# Patient Record
Sex: FEMALE | Race: WHITE | NOT HISPANIC OR LATINO | Employment: OTHER | URBAN - METROPOLITAN AREA
[De-identification: names, ages, dates, MRNs, and addresses within clinical notes are randomized per-mention and may not be internally consistent; named-entity substitution may affect disease eponyms.]

---

## 2017-01-04 ENCOUNTER — HOSPITAL ENCOUNTER (EMERGENCY)
Facility: HOSPITAL | Age: 82
Discharge: HOME | End: 2017-01-04
Attending: EMERGENCY MEDICINE
Payer: MEDICARE

## 2017-01-04 VITALS
HEART RATE: 84 BPM | OXYGEN SATURATION: 95 % | WEIGHT: 160 LBS | DIASTOLIC BLOOD PRESSURE: 60 MMHG | SYSTOLIC BLOOD PRESSURE: 134 MMHG | HEIGHT: 59 IN | BODY MASS INDEX: 32.25 KG/M2 | RESPIRATION RATE: 18 BRPM | TEMPERATURE: 101 F

## 2017-01-04 DIAGNOSIS — J11.1 INFLUENZA-LIKE ILLNESS: Primary | ICD-10-CM

## 2017-01-04 DIAGNOSIS — R05.9 COUGH IN ADULT: ICD-10-CM

## 2017-01-04 LAB
FLUAV AG SPEC QL IA: NEGATIVE
FLUBV AG SPEC QL IA: NEGATIVE
SPECIMEN SOURCE: NORMAL

## 2017-01-04 PROCEDURE — 87400 INFLUENZA A/B EACH AG IA: CPT | Mod: 59

## 2017-01-04 PROCEDURE — 94640 AIRWAY INHALATION TREATMENT: CPT

## 2017-01-04 PROCEDURE — 99283 EMERGENCY DEPT VISIT LOW MDM: CPT | Mod: 59

## 2017-01-04 PROCEDURE — 25000242 PHARM REV CODE 250 ALT 637 W/ HCPCS: Performed by: EMERGENCY MEDICINE

## 2017-01-04 PROCEDURE — 25000003 PHARM REV CODE 250: Performed by: EMERGENCY MEDICINE

## 2017-01-04 RX ORDER — IPRATROPIUM BROMIDE AND ALBUTEROL SULFATE 2.5; .5 MG/3ML; MG/3ML
3 SOLUTION RESPIRATORY (INHALATION)
Status: COMPLETED | OUTPATIENT
Start: 2017-01-04 | End: 2017-01-04

## 2017-01-04 RX ORDER — ACETAMINOPHEN 325 MG/1
650 TABLET ORAL
Status: COMPLETED | OUTPATIENT
Start: 2017-01-04 | End: 2017-01-04

## 2017-01-04 RX ADMIN — ACETAMINOPHEN 650 MG: 325 TABLET ORAL at 04:01

## 2017-01-04 RX ADMIN — IPRATROPIUM BROMIDE AND ALBUTEROL SULFATE 3 ML: .5; 3 SOLUTION RESPIRATORY (INHALATION) at 04:01

## 2017-01-04 NOTE — ED PROVIDER NOTES
Encounter Date: 1/4/2017    SCRIBE #1 NOTE: I, Rosina Smith, am scribing for, and in the presence of,  Dr. Hermosillo. I have scribed the entire note.       History     Chief Complaint   Patient presents with    Sore Throat     sore throat last night, cough x 2 days, fever last night, headache.  Hx of COPD and Asthma.      Review of patient's allergies indicates:   Allergen Reactions    Sulfa (sulfonamide antibiotics) Swelling    Penicillins      HPI Comments: 01/04/2017  4:19 PM     Chief Complaint: sore throat      The patient is a 83 y.o. female who is presenting with a 2 day hx of sore throat, productive cough, and fever. Associated with slight SOB. She denies vomiting, diarrhea or CP. She reports that nothing has alleviated her sx's and did not want to take tylenol before going into the ER. She denies sick contact. There are no other associated sx's at this time. She has no further complaints. She has a past medical history of Anxiety; Arthritis; Asthma; COPD; Hypertension; and Thyroid disease. She has a past surgical history that includes Skin biopsy.    The history is provided by the patient.     Past Medical History   Diagnosis Date    Anxiety     Arthritis     Asthma     COPD (chronic obstructive pulmonary disease)     Hypertension     Thyroid disease      Past Medical History Pertinent Negatives   Diagnosis Date Noted    Blood transfusion 2/15/2012     Past Surgical History   Procedure Laterality Date    Skin biopsy       No family history on file.  Social History   Substance Use Topics    Smoking status: Former Smoker     Packs/day: 0.50     Years: 5.00     Quit date: 2/15/2005    Smokeless tobacco: Never Used    Alcohol use No     Review of Systems   Constitutional: Positive for fever.   HENT: Negative for sore throat.    Eyes: Negative for visual disturbance.   Respiratory: Positive for cough and shortness of breath.    Cardiovascular: Negative for chest pain.   Gastrointestinal: Negative  for diarrhea, nausea and vomiting.   Genitourinary: Negative for dysuria.   Musculoskeletal: Negative for back pain.   Skin: Negative for rash.   Neurological: Negative for weakness.   Hematological: Does not bruise/bleed easily.   Psychiatric/Behavioral: Negative for confusion.       Physical Exam   Initial Vitals   BP Pulse Resp Temp SpO2   01/04/17 1558 01/04/17 1558 01/04/17 1558 01/04/17 1558 01/04/17 1558   134/60 88 16 101.3 °F (38.5 °C) 91 %     Physical Exam    Nursing note and vitals reviewed.  Constitutional: She appears well-developed and well-nourished. No distress.   HENT:   Head: Normocephalic and atraumatic.   Eyes: Conjunctivae are normal.   Cardiovascular: Normal rate, regular rhythm, normal heart sounds and intact distal pulses. Exam reveals no gallop and no friction rub.    No murmur heard.  Pulmonary/Chest: No respiratory distress. She has wheezes. She has no rhonchi. She has no rales. She exhibits no tenderness.   Wheezing bilaterally    Abdominal: Soft. Bowel sounds are normal. She exhibits no distension and no mass. There is no tenderness. There is no rebound and no guarding.   Neurological: She is alert and oriented to person, place, and time.   Skin: Skin is warm.   Psychiatric: She has a normal mood and affect.         ED Course   Procedures  Labs Reviewed   INFLUENZA A AND B ANTIGEN             Medical Decision Making:   Initial Assessment:   83-year-old female presented with a chief complaint of sore throat and cough.  Differential Diagnosis:   Initial differential diagnosis included but not limited to pneumonia, influenza, and acute asthma exacerbation.    Clinical Tests:   Lab Tests: Ordered and Reviewed  Radiological Study: Ordered and Reviewed  ED Management:  The patient was urgently evaluated in the ED, her evaluation was significant for an elderly female with abnormal lung sounds. The patient was treated with rest 3 nebulizer treatments and Tylenol in the ED.  The patient's x-ray  shows no acute abnormalities per my independent interpretation.  The patient's influenza swab to be negative. Her diagnosis is likely an influenza-like illness. She is stable for discharge to home. She reports improvement in her symptoms after the respiratory treatments. She'll be discharged home with medications for symptomatic relief and she is to follow-up with her PCP for further care.            Scribe Attestation:   Scribe #1: I performed the above scribed service and the documentation accurately describes the services I performed. I attest to the accuracy of the note.    Attending Attestation:           Physician Attestation for Scribe:  Physician Attestation Statement for Scribe #1: I, Dr. Hermosillo, reviewed documentation, as scribed by Rosina Smith in my presence, and it is both accurate and complete.                 ED Course     Clinical Impression:   The primary encounter diagnosis was Influenza-like illness. A diagnosis of Cough in adult was also pertinent to this visit.     .hpit     Obie Hermosillo MD  01/05/17 [id number]

## 2017-01-04 NOTE — ED NOTES
Pt presents to ED with c/o sore throat, cough, and fever x2 days. Pt also reports mild SOB. Pt is AAOx4. Skin warm, dry to touch. Respirations even, nonlabored. NAD noted. VSS. Wheezing noted bilaterally upon ausculation of lungs.

## 2017-01-04 NOTE — ED NOTES
Discharge instructions, diagnosis, and follow up discussed with patient. Patient verbalized understanding. All questions and concerns answered. No needs expressed at the time. Pt is awake, alert and oriented with no acute distress noted. Respirations even and unlabored. Ambulatory out of ed.

## 2017-01-04 NOTE — ED AVS SNAPSHOT
[facility] [address] [place] [address]               Valerie Jones   2017  4:05 PM   ED    Description:  Female : 1933   Department:  [facility]           Your Care was Coordinated By:     Provider Role From To    Obie Hermosillo MD Attending Provider 17 1606 --      Reason for Visit     Sore Throat           Diagnoses this Visit        Comments    Influenza-like illness    -  Primary     Cough in adult           ED Disposition     None           To Do List           Follow-up Information     Follow up with Reed Andersen MD. Schedule an appointment as soon as possible for a visit in 1 week.    Specialty:  Family Medicine    Why:  return to the ED, As needed, If symptoms worsen    Contact information:    [address] [facility]  [address]  [facility] [address]  [phone]        [facility] On Call     [facility] On Call Nurse Care Line -  Assistance  Registered nurses in the [facility] provide clinical advisement, health education, appointment booking, and other advisory services.  Call for this free service at [phone].             Medications           Message regarding Medications     Verify the changes and/or additions to your medication regime listed below are the same as discussed with your clinician today.  If any of these changes or additions are incorrect, please notify your healthcare provider.        These medications were administered today        Dose Freq    acetaminophen tablet 650 mg 650 mg ED 1 Time    Sig: Take 2 tablets (650 mg total) by mouth ED 1 Time.    Class: Normal    Route: Oral    albuterol-ipratropium 2.5mg-0.5mg/3mL nebulizer solution 3 mL 3 mL ED 1 Time    Sig: Take 3 mLs by nebulization ED 1 Time.    Class: Normal    Route: Nebulization           Verify that the below list of medications is an accurate representation of the medications you are currently  "taking.  If none reported, the list may be blank. If incorrect, please contact your healthcare provider. Carry this list with you in case of emergency.           Current Medications     alprazolam (XANAX) 0.5 MG tablet Take 0.5 mg by mouth nightly as needed.      atenolol (TENORMIN) 50 MG tablet Take 50 mg by mouth once daily.      BENZONATATE (TESSALON PERLES ORAL) Take by mouth.      clidinium-chlordiazepoxide (LIBRAX) 5-2.5 mg per capsule Take 1 capsule by mouth 4 (four) times daily before meals and nightly.      esomeprazole (NEXIUM) 40 MG capsule Take 40 mg by mouth before breakfast.      hydrochlorothiazide (HYDRODIURIL) 25 MG tablet Take 25 mg by mouth once daily.      levothyroxine (SYNTHROID) 50 MCG tablet Take 50 mcg by mouth once daily.      mth-me blue-sod phos-phsal-hyo (PHOSPHASAL) 81.6-10.8-40.8 mg Tab Take 1 tablet by mouth every 6 (six) hours as needed.           Clinical Reference Information           Your Vitals Were     BP Pulse Temp Resp Height Weight    134/60 (BP Location: Right arm, Patient Position: Sitting) 84 101.3 °F (38.5 °C) (Oral) 18 4' 11" (1.499 m) 72.6 kg (160 lb)    Last Period SpO2 BMI          02/15/1980 95% 32.32 kg/m2        Allergies as of 1/4/2017        Reactions    Sulfa (Sulfonamide Antibiotics) Swelling    Penicillins       Immunizations Administered on Date of Encounter - 1/4/2017     None      ED Micro, Lab, POCT     Start Ordered       Status Ordering Provider    01/04/17 1601 01/04/17 1600  Influenza antigen Nasopharyngeal Swab  Once      Final result       ED Imaging Orders     Start Ordered       Status Ordering Provider    01/04/17 1605 01/04/17 1604  X-Ray Chest PA And Lateral  1 time imaging      Final result       [facility] Sign-Up     Activating your [facility] account is as easy as 1-2-3!     1) Visit [email or web address], select Sign Up Now, enter this activation code and your date of birth, then select Next.  [email or web address]  Expires: 2/18/2017  5:19 PM      2) " Create a username and password to use when you visit [facility] in the future and select a security question in case you lose your password and select Next.    3) Enter your e-mail address and click Sign Up!    Additional Information  If you have questions, please e-mail [email or web address] or call [phone] to talk to our [facility] staff. Remember, [facility] is NOT to be used for urgent needs. For medical emergencies, dial 911.         Smoking Cessation     If you would like to quit smoking:   You may be eligible for free services if you are a [place] resident and started smoking cigarettes before September 1, 1988.  Call the Smoking Cessation Trust (SCT) toll free at (257) 801-8593 or (151) 321-0682.   Call 8-012-QUIT-NOW if you do not meet the above criteria.             [facility] complies with applicable Federal civil rights laws and does not discriminate on the basis of race, color, national origin, age, disability, or sex.        Language Assistance Services     ATTENTION: Language assistance services are available, free of charge. Please call [phone].      ATENCIÓN: Si habla español, tiene a barkley disposición servicios gratuitos de asistencia lingüística. Llame al [phone].     CHÚ Ý: N?u b?n nói Ti?ng Vi?t, có các d?ch v? h? tr? ngôn ng? mi?n phí dành cho b?n. G?i s? [phone].

## 2017-09-25 ENCOUNTER — OFFICE VISIT (OUTPATIENT)
Dept: PULMONOLOGY | Facility: CLINIC | Age: 82
End: 2017-09-25
Payer: MEDICARE

## 2017-09-25 VITALS
HEIGHT: 59 IN | HEART RATE: 98 BPM | DIASTOLIC BLOOD PRESSURE: 68 MMHG | WEIGHT: 164 LBS | SYSTOLIC BLOOD PRESSURE: 150 MMHG | BODY MASS INDEX: 33.06 KG/M2 | OXYGEN SATURATION: 89 %

## 2017-09-25 DIAGNOSIS — J96.11 CHRONIC HYPOXEMIC RESPIRATORY FAILURE: ICD-10-CM

## 2017-09-25 DIAGNOSIS — J44.89 COPD WITH ASTHMA: ICD-10-CM

## 2017-09-25 PROCEDURE — 1159F MED LIST DOCD IN RCRD: CPT | Mod: ,,, | Performed by: INTERNAL MEDICINE

## 2017-09-25 PROCEDURE — 1126F AMNT PAIN NOTED NONE PRSNT: CPT | Mod: ,,, | Performed by: INTERNAL MEDICINE

## 2017-09-25 PROCEDURE — 3078F DIAST BP <80 MM HG: CPT | Mod: ,,, | Performed by: INTERNAL MEDICINE

## 2017-09-25 PROCEDURE — 99203 OFFICE O/P NEW LOW 30 MIN: CPT | Mod: S$PBB,,, | Performed by: INTERNAL MEDICINE

## 2017-09-25 PROCEDURE — 99999 PR PBB SHADOW E&M-EST. PATIENT-LVL IV: CPT | Mod: PBBFAC,,, | Performed by: INTERNAL MEDICINE

## 2017-09-25 PROCEDURE — 99214 OFFICE O/P EST MOD 30 MIN: CPT | Mod: PBBFAC,PO | Performed by: INTERNAL MEDICINE

## 2017-09-25 PROCEDURE — 3077F SYST BP >= 140 MM HG: CPT | Mod: ,,, | Performed by: INTERNAL MEDICINE

## 2017-09-25 RX ORDER — ALBUTEROL SULFATE 90 UG/1
AEROSOL, METERED RESPIRATORY (INHALATION)
Qty: 3 INHALER | Refills: 3 | Status: SHIPPED | OUTPATIENT
Start: 2017-09-25 | End: 2017-12-19 | Stop reason: SDUPTHER

## 2017-09-25 RX ORDER — ATENOLOL AND CHLORTHALIDONE TABLET 50; 25 MG/1; MG/1
TABLET ORAL
COMMUNITY
Start: 2017-09-06 | End: 2019-09-17 | Stop reason: SDUPTHER

## 2017-09-25 RX ORDER — FUROSEMIDE 40 MG/1
TABLET ORAL
COMMUNITY
Start: 2017-08-01 | End: 2019-09-17 | Stop reason: SDUPTHER

## 2017-09-25 RX ORDER — OMEGA-3-ACID ETHYL ESTERS 1 G/1
CAPSULE, LIQUID FILLED ORAL
COMMUNITY
Start: 2017-06-28 | End: 2019-12-17

## 2017-09-25 RX ORDER — PREDNISONE 20 MG/1
TABLET ORAL
Qty: 12 TABLET | Refills: 0 | Status: SHIPPED | OUTPATIENT
Start: 2017-09-25 | End: 2018-10-16 | Stop reason: SDUPTHER

## 2017-09-25 RX ORDER — IPRATROPIUM BROMIDE AND ALBUTEROL SULFATE 2.5; .5 MG/3ML; MG/3ML
3 SOLUTION RESPIRATORY (INHALATION) EVERY 6 HOURS PRN
Qty: 120 VIAL | Refills: 11 | Status: SHIPPED | OUTPATIENT
Start: 2017-09-25 | End: 2019-10-21 | Stop reason: SDUPTHER

## 2017-09-25 RX ORDER — HYDROCODONE BITARTRATE AND ACETAMINOPHEN 10; 325 MG/1; MG/1
TABLET ORAL
COMMUNITY
Start: 2017-08-31 | End: 2019-09-17 | Stop reason: SDUPTHER

## 2017-09-25 RX ORDER — CLOBETASOL PROPIONATE 0.5 MG/G
OINTMENT TOPICAL
COMMUNITY
Start: 2017-09-08 | End: 2019-12-17

## 2017-09-25 RX ORDER — NAPROXEN 375 MG/1
375 TABLET ORAL 2 TIMES DAILY
COMMUNITY
Start: 2017-07-07 | End: 2020-03-16 | Stop reason: SDUPTHER

## 2017-09-25 RX ORDER — DOXYCYCLINE 100 MG/1
100 CAPSULE ORAL EVERY 12 HOURS
Qty: 20 CAPSULE | Refills: 2 | Status: SHIPPED | OUTPATIENT
Start: 2017-09-25 | End: 2017-10-05

## 2017-09-25 RX ORDER — TRAMADOL HYDROCHLORIDE 50 MG/1
TABLET ORAL
COMMUNITY
Start: 2017-09-16 | End: 2018-10-16

## 2017-09-25 RX ORDER — DOXYCYCLINE 100 MG/1
CAPSULE ORAL
COMMUNITY
Start: 2017-09-20 | End: 2017-09-25 | Stop reason: SDUPTHER

## 2017-09-25 RX ORDER — CODEINE PHOSPHATE AND GUAIFENESIN 10; 100 MG/5ML; MG/5ML
SOLUTION ORAL
COMMUNITY
Start: 2017-09-20 | End: 2019-12-17

## 2017-09-25 NOTE — PROGRESS NOTES
"9/25/2017    Valerie Jones  Patient Seen Years Ago And Here For Evaluation    Chief Complaint   Patient presents with    COPD    Asthma       HPI: pt h/o asthma and copd, off ppd or less last 12 yrs, no daily lung meds, gets vera, smothery daily, medications in past helped but stopped as was ok.  Monitors 02 home but no home 02,  Uses albuterol in nebs over  5 yrs old. Doxycycline works, no recent steroids - ppt jumpy.  [place]ives [place], pt independent.      anoro works but not lasting, used for years.      The chief compliant  problem is varies with instablilty at time  PFSH:  Past Medical History:   Diagnosis Date    Anxiety     Arthritis     Asthma     COPD (chronic obstructive pulmonary disease)     Hypertension     Thyroid disease          Past Surgical History:   Procedure Laterality Date    SKIN BIOPSY       Social History   Substance Use Topics    Smoking status: Former Smoker     Packs/day: 0.50     Years: 5.00     Quit date: 2/15/2005    Smokeless tobacco: Never Used    Alcohol use No     History reviewed. No pertinent family history.  Review of patient's allergies indicates:   Allergen Reactions    Sulfa (sulfonamide antibiotics) Swelling    Penicillins        Performance Status:The patient's activity level is functions out of house.      Review of Systems:  a review of eleven systems covering constitutional, Eye, HEENT, Psych, Respiratory, Cardiac, GI, , Musculoskeletal, Endocrine, Dermatologic was negative except for pertinent findings as listed ABOVE and below: uses stool softener, nebulizer helps.     Exam:Comprehensive exam done. BP (!) 150/68 (BP Location: Right arm, Patient Position: Sitting)   Pulse 98   Ht 4' 11" (1.499 m)   Wt 74.4 kg (164 lb 0.4 oz)   LMP 02/15/1980   SpO2 (!) 89%   BMI 33.13 kg/m²   Exam included Vitals as listed, and patient's appearance and affect and alertness and mood, oral exam for yeast and hygiene and pharynx lesions and Mallapatti (M) " score, neck with inspection for jvd and masses and thyroid abnormalities and lymph nodes (supraclavicular and infraclavicular nodes and axillary also examined and noted if abn), chest exam included symmetry and effort and fremitus and percussion and auscultation, cardiac exam included rhythm and gallops and murmur and rubs and jvd and edema, abdominal exam for mass and hepatosplenomegaly and tenderness and hernias and bowel sounds, Musculoskeletal exam with muscle tone and posture and mobility/gait and  strength, and skin for rashes and cyanosis and pallor and turgor, extremity for clubbing.  Findings were normal except for pertinent findings listed below: younger than 84, M2, sl obese, good bs, symmetric,no distress, nl fremitus/percussion, cor nl,        Radiographs (ct chest and cxr) reviewed: view by direct vision  cxr Jan 2017 kyphosis with old compression fx.      Labs reviewed  eos sl up in 2012    PFT will be done and results to be reviewed     Plan:  Clinical impression is resonably certain and repeated evaluation prn +/- follow up will be needed as below.    Valerie was seen today for copd and asthma.    Diagnoses and all orders for this visit:    COPD with asthma  -     fluticasone furoate (ARNUITY ELLIPTA) 200 mcg/actuation DsDv; Inhale 1 Inhaler into the lungs once daily. Controller  -     Six Minute Walk Test to qualify for Home Oxygen; Future  -     Pulse oximetry overnight  -     Complete PFT with bronchodilator; Future  -     doxycycline (MONODOX) 100 MG capsule; Take 1 capsule (100 mg total) by mouth every 12 (twelve) hours.  -     predniSONE (DELTASONE) 20 MG tablet; One daily for 3 days and repeat for flare of lung symptoms as intructed  -     albuterol-ipratropium 2.5mg-0.5mg/3mL (DUO-NEB) 0.5 mg-3 mg(2.5 mg base)/3 mL nebulizer solution; Take 3 mLs by nebulization every 6 (six) hours as needed.  -     albuterol 90 mcg/actuation inhaler; 2 puffs every 4 hours as needed for cough, wheeze, or  shortness of breath    Chronic hypoxemic respiratory failure  -     Six Minute Walk Test to qualify for Home Oxygen; Future  -     Pulse oximetry overnight  -     Complete PFT with bronchodilator; Future        Return in about 6 months (around 3/25/2018), or if symptoms worsen or fail to improve.    Discussed with patient above for education the following:       Check 0xygen sleep and walking,  Set up at home, may need to use for sleep and as needed in day.  Check lung capacity.  If portable system needed- will need small tanks.    Use anoro one daily, use arnuity one daily.    Use albuterol inhaler or nebulizer as needed.    If yellow mucous - take doxycycline, call if cannot control yellow mucous.    If cough or wheezes badly- take prednisone daily for just 3 days.

## 2017-09-27 ENCOUNTER — HOSPITAL ENCOUNTER (OUTPATIENT)
Dept: RESPIRATORY THERAPY | Facility: HOSPITAL | Age: 82
Discharge: HOME | End: 2017-09-27
Attending: INTERNAL MEDICINE
Payer: MEDICARE

## 2017-09-27 DIAGNOSIS — J44.89 COPD WITH ASTHMA: ICD-10-CM

## 2017-09-27 DIAGNOSIS — J96.11 CHRONIC HYPOXEMIC RESPIRATORY FAILURE: ICD-10-CM

## 2017-09-27 PROCEDURE — 94620 *HC PUL STRESS; SIMPLE (6MIN WALK): CPT

## 2017-09-27 PROCEDURE — 94727 GAS DIL/WSHOT DETER LNG VOL: CPT | Mod: 26,,, | Performed by: INTERNAL MEDICINE

## 2017-09-27 PROCEDURE — 94729 DIFFUSING CAPACITY: CPT | Mod: 26,,, | Performed by: INTERNAL MEDICINE

## 2017-09-27 PROCEDURE — 94060 EVALUATION OF WHEEZING: CPT

## 2017-09-27 PROCEDURE — 94727 GAS DIL/WSHOT DETER LNG VOL: CPT

## 2017-09-27 PROCEDURE — 94729 DIFFUSING CAPACITY: CPT

## 2017-09-27 PROCEDURE — 94060 EVALUATION OF WHEEZING: CPT | Mod: 26,,, | Performed by: INTERNAL MEDICINE

## 2017-09-27 NOTE — PROCEDURES
6 minute Walk                                                                     O2 Saturation                        Heart Rate    Resting Room Air    92    75                    1 minute ambulating   87% Nc 2 lpm  85     2 minute Ambulating   94%    89                         3 minute ambulating   94%    83                                               4 minute ambulating   94%    87         5 minute Ambulating   93%    92        6 minute Ambulating   93%    89                Recovery at rest      1 minute Nc 2 lpm   96%    82    2 minute     96%     79           How Many times stopped_ None     Symptoms: none    Total feet Walked: 1260 ft without difficulty

## 2017-09-28 ENCOUNTER — TELEPHONE (OUTPATIENT)
Dept: PULMONOLOGY | Facility: CLINIC | Age: 82
End: 2017-09-28

## 2017-09-28 NOTE — TELEPHONE ENCOUNTER
----- Message from Davidson Mack MD sent at 9/28/2017 12:51 PM CDT -----  Needs to have 02 2 lpm set up

## 2017-09-28 NOTE — TELEPHONE ENCOUNTER
Called the patient to [facility] per Dr. Mack that she needs O2 at 2lpm and that I am sending the orders to [employer] and that someone from there will be calling her.

## 2017-09-29 ENCOUNTER — TELEPHONE (OUTPATIENT)
Dept: PULMONOLOGY | Facility: CLINIC | Age: 82
End: 2017-09-29

## 2017-09-29 NOTE — TELEPHONE ENCOUNTER
Explained to the patient that the [employer] rep will call her as soon as she gets everything set up from the insurance stand point.

## 2017-09-29 NOTE — TELEPHONE ENCOUNTER
----- Message from Jamie Garnica sent at 9/29/2017  1:47 PM CDT -----  Contact: self  [phone]  Patient called asking to speak with the nurse. Patient says the oxygen has not arrived.Thanks!

## 2017-10-03 NOTE — PROCEDURES
Pulmonary Functions, including spirometry and bronchodilator response and lung volumes and diffusion, study was done sept 27, 2017.  Spirometry shows no obstruction and normal vital capacity and no bronchodilator response.   FEV1 is 90% or 0.97 liters.  Lung volumes show  normal TLC and elevated residual volume.  Diffusion shows within normal range.    Pulmonary functions show air trapping. Clinical correlation recommended.     Davidson Mack M.D.

## 2017-12-19 ENCOUNTER — OFFICE VISIT (OUTPATIENT)
Dept: PULMONOLOGY | Facility: CLINIC | Age: 82
End: 2017-12-19
Payer: MEDICARE

## 2017-12-19 VITALS
HEART RATE: 80 BPM | OXYGEN SATURATION: 88 % | HEIGHT: 59 IN | SYSTOLIC BLOOD PRESSURE: 136 MMHG | WEIGHT: 166.25 LBS | DIASTOLIC BLOOD PRESSURE: 63 MMHG | BODY MASS INDEX: 33.52 KG/M2

## 2017-12-19 DIAGNOSIS — J44.89 COPD WITH ASTHMA: Primary | ICD-10-CM

## 2017-12-19 DIAGNOSIS — J96.11 CHRONIC HYPOXEMIC RESPIRATORY FAILURE: ICD-10-CM

## 2017-12-19 PROCEDURE — 99214 OFFICE O/P EST MOD 30 MIN: CPT | Mod: PBBFAC,PO | Performed by: INTERNAL MEDICINE

## 2017-12-19 PROCEDURE — 99213 OFFICE O/P EST LOW 20 MIN: CPT | Mod: S$PBB,,, | Performed by: INTERNAL MEDICINE

## 2017-12-19 PROCEDURE — 99999 PR PBB SHADOW E&M-EST. PATIENT-LVL IV: CPT | Mod: PBBFAC,,, | Performed by: INTERNAL MEDICINE

## 2017-12-19 RX ORDER — LINACLOTIDE 72 UG/1
CAPSULE, GELATIN COATED ORAL
COMMUNITY
Start: 2017-10-24 | End: 2018-10-16

## 2017-12-19 RX ORDER — ALBUTEROL SULFATE 90 UG/1
AEROSOL, METERED RESPIRATORY (INHALATION)
Qty: 3 INHALER | Refills: 3 | Status: SHIPPED | OUTPATIENT
Start: 2017-12-19 | End: 2018-10-16 | Stop reason: SDUPTHER

## 2017-12-19 NOTE — PATIENT INSTRUCTIONS
Doing well , use 02 as much as able,  Use anoro daily, take prednisone for bad cough/wheeze, use doxycycline for yellow mucous.    Re check in a yr, call for problems.    Breathing test is very good.  If oxygen were better - would consider asthma more than copd.

## 2017-12-19 NOTE — PROGRESS NOTES
"12/19/2017    Valerie Jones  Patient Seen Years Ago And Here For f/u    Chief Complaint   Patient presents with    Follow-up     3 month    Shortness of Breath    COPD    Asthma   copd and resp failure hypoxic are cc.      HPI:   Dec 19, 2017-- c/o heavy 02 portable concentrator.  No mucous, sinus drip occ.  Uses meds as needed.  No prednisone or abx.  Got flu shot.  pft good    9/25/17 pt h/o asthma and copd, off ppd or less last 12 yrs, no daily lung meds, gets vera, smothery daily, medications in past helped but stopped as was ok.  Monitors 02 home but no home 02,  Uses albuterol in nebs over  5 yrs. Doxycycline works, no recent steroids - ppt jumpy.  [place], pt independent.      anoro works but not lasting, used for years.      The chief compliant  problem is varies with instablilty at time  PFSH:  Past Medical History:   Diagnosis Date    Anxiety     Arthritis     Asthma     COPD (chronic obstructive pulmonary disease)     Hypertension     Thyroid disease          Past Surgical History:   Procedure Laterality Date    SKIN BIOPSY       Social History   Substance Use Topics    Smoking status: Former Smoker     Packs/day: 0.50     Years: 5.00     Quit date: 2/15/2005    Smokeless tobacco: Never Used    Alcohol use No     History reviewed. No pertinent family history.  Review of patient's allergies indicates:   Allergen Reactions    Sulfa (sulfonamide antibiotics) Swelling    Penicillins        Performance Status:The patient's activity level is functions out of house.      Review of Systems:  a review of eleven systems covering constitutional, Eye, HEENT, Psych, Respiratory, Cardiac, GI, , Musculoskeletal, Endocrine, Dermatologic was negative except for pertinent findings as listed ABOVE and below: uses stool softener, nebulizer helps.     Exam:Comprehensive exam done. /63 (BP Location: Left arm, Patient Position: Sitting)   Pulse 80   Ht 4' 11" (1.499 m)   Wt 75.4 kg " (166 lb 3.6 oz)   LMP 02/15/1980   SpO2 (!) 88%   BMI 33.57 kg/m²   Exam included Vitals as listed, and patient's appearance and affect and alertness and mood, oral exam for yeast and hygiene and pharynx lesions and Mallapatti (M) score, neck with inspection for jvd and masses and thyroid abnormalities and lymph nodes (supraclavicular and infraclavicular nodes and axillary also examined and noted if abn), chest exam included symmetry and effort and fremitus and percussion and auscultation, cardiac exam included rhythm and gallops and murmur and rubs and jvd and edema, abdominal exam for mass and hepatosplenomegaly and tenderness and hernias and bowel sounds, Musculoskeletal exam with muscle tone and posture and mobility/gait and  strength, and skin for rashes and cyanosis and pallor and turgor, extremity for clubbing.  Findings were normal except for pertinent findings listed below: younger than 84, M2, sl obese, good bs, symmetric,no distress, nl fremitus/percussion, cor nl,        Radiographs (ct chest and cxr) reviewed: view by direct vision  cxr Jan 2017 kyphosis with old compression fx.      Labs reviewed  eos sl up in 2012    PFT Pulmonary Functions, including spirometry and bronchodilator response and lung volumes and diffusion, study was done sept 27, 2017.  Spirometry shows no obstruction and normal vital capacity and no bronchodilator response.   FEV1 is 90% or 0.97 liters.  Lung volumes show  normal TLC and elevated residual volume.  Diffusion shows within normal range.    Plan:  Clinical impression is resonably certain and repeated evaluation prn +/- follow up will be needed as below.    Valerei was seen today for follow-up, shortness of breath, copd and asthma.    Diagnoses and all orders for this visit:    COPD with asthma  -     umeclidinium-vilanterol (ANORO ELLIPTA) 62.5-25 mcg/actuation DsDv; Inhale 1 puff into the lungs once daily. Controller  -     albuterol 90 mcg/actuation inhaler; 2  puffs every 4 hours as needed for cough, wheeze, or shortness of breath    Chronic hypoxemic respiratory failure        Return in about 1 year (around 12/19/2018), or if symptoms worsen or fail to improve.    Discussed with patient above for education the following:      Doing well , use 02 as much as able,  Use anoro daily, take prednisone for bad cough/wheeze, use doxycycline for yellow mucous.    Re check in a yr, call for problems.  If oxygen were better - would consider asthma more than copd.

## 2018-10-16 ENCOUNTER — OFFICE VISIT (OUTPATIENT)
Dept: PULMONOLOGY | Facility: CLINIC | Age: 83
End: 2018-10-16
Payer: MEDICARE

## 2018-10-16 VITALS
BODY MASS INDEX: 32.44 KG/M2 | SYSTOLIC BLOOD PRESSURE: 128 MMHG | HEART RATE: 71 BPM | WEIGHT: 160.94 LBS | DIASTOLIC BLOOD PRESSURE: 62 MMHG | HEIGHT: 59 IN | OXYGEN SATURATION: 90 %

## 2018-10-16 DIAGNOSIS — J96.11 CHRONIC HYPOXEMIC RESPIRATORY FAILURE: ICD-10-CM

## 2018-10-16 DIAGNOSIS — J44.89 COPD WITH ASTHMA: Primary | ICD-10-CM

## 2018-10-16 PROCEDURE — 99999 PR PBB SHADOW E&M-EST. PATIENT-LVL IV: CPT | Mod: PBBFAC,,, | Performed by: INTERNAL MEDICINE

## 2018-10-16 PROCEDURE — 99214 OFFICE O/P EST MOD 30 MIN: CPT | Mod: PBBFAC,PO | Performed by: INTERNAL MEDICINE

## 2018-10-16 PROCEDURE — 99213 OFFICE O/P EST LOW 20 MIN: CPT | Mod: S$PBB,,, | Performed by: INTERNAL MEDICINE

## 2018-10-16 RX ORDER — ALBUTEROL SULFATE 90 UG/1
AEROSOL, METERED RESPIRATORY (INHALATION)
Qty: 3 INHALER | Refills: 3 | Status: SHIPPED | OUTPATIENT
Start: 2018-10-16 | End: 2019-10-11 | Stop reason: SDUPTHER

## 2018-10-16 RX ORDER — PREDNISONE 20 MG/1
TABLET ORAL
Qty: 12 TABLET | Refills: 0 | Status: SHIPPED | OUTPATIENT
Start: 2018-10-16 | End: 2019-09-17 | Stop reason: ALTCHOICE

## 2018-10-16 NOTE — PROGRESS NOTES
10/16/2018    Valerie Jones  Patient Seen Years Ago And Here For f/u    Chief Complaint   Patient presents with    yearly check up    COPD    Medication Refill   copd and resp failure hypoxic are cc.      HPI:   Oct 16, 2018- inogen used for distances,  Breathing ok , no abx nor prednisone.    Dec 19, 2017-- c/o heavy 02 portable concentrator.  No mucous, sinus drip occ.  Uses meds as needed.  No prednisone or abx.  Got flu shot.  pft good    17 pt h/o asthma and copd, off ppd or less last 12 yrs, no daily lung meds, gets vera, smothery daily, medications in past helped but stopped as was ok.  Monitors 02 home but no home 02,  Uses albuterol in nebs over  5 yrs. Doxycycline works, no recent steroids - ppt jumpy.  [place], pt independent.      anoro works but not lasting, used for years.      The chief compliant  problem is varies with instablilty at time  PFSH:  Past Medical History:   Diagnosis Date    Anxiety     Arthritis     Asthma     COPD (chronic obstructive pulmonary disease)     Hypertension     Thyroid disease          Past Surgical History:   Procedure Laterality Date    SKIN BIOPSY       Social History     Tobacco Use    Smoking status: Former Smoker     Packs/day: 0.50     Years: 5.00     Pack years: 2.50     Last attempt to quit: 2/15/2005     Years since quittin.6    Smokeless tobacco: Never Used   Substance Use Topics    Alcohol use: No    Drug use: No     History reviewed. No pertinent family history.  Review of patient's allergies indicates:   Allergen Reactions    Sulfa (sulfonamide antibiotics) Swelling    Penicillins        Performance Status:The patient's activity level is functions out of house.      Review of Systems:  a review of eleven systems covering constitutional, Eye, HEENT, Psych, Respiratory, Cardiac, GI, , Musculoskeletal, Endocrine, Dermatologic was negative except for pertinent findings as listed ABOVE and below: uses stool softener,  "nebulizer helps.     Exam:Comprehensive exam done. /62 (BP Location: Left arm, Patient Position: Sitting)   Pulse 71   Ht 4' 11" (1.499 m)   Wt 73 kg (160 lb 15 oz)   LMP 02/15/1980   SpO2 (!) 90% Comment: on room air  BMI 32.51 kg/m²   Exam included Vitals as listed, and patient's appearance and affect and alertness and mood, oral exam for yeast and hygiene and pharynx lesions and Mallapatti (M) score, neck with inspection for jvd and masses and thyroid abnormalities and lymph nodes (supraclavicular and infraclavicular nodes and axillary also examined and noted if abn), chest exam included symmetry and effort and fremitus and percussion and auscultation, cardiac exam included rhythm and gallops and murmur and rubs and jvd and edema, abdominal exam for mass and hepatosplenomegaly and tenderness and hernias and bowel sounds, Musculoskeletal exam with muscle tone and posture and mobility/gait and  strength, and skin for rashes and cyanosis and pallor and turgor, extremity for clubbing.  Findings were normal except for pertinent findings listed below: younger than 85, M2, sl obese, good bs, symmetric,no distress, nl fremitus/percussion, cor nl,        Radiographs (ct chest and cxr) reviewed: view by direct vision  cxr Jan 2017 kyphosis with old compression fx.      Labs reviewed  eos sl up in 2012    PFT Pulmonary Functions, including spirometry and bronchodilator response and lung volumes and diffusion, study was done sept 27, 2017.  Spirometry shows no obstruction and normal vital capacity and no bronchodilator response.   FEV1 is 90% or 0.97 liters.  Lung volumes show  normal TLC and elevated residual volume.  Diffusion shows within normal range.    Plan:  Clinical impression is resonably certain and repeated evaluation prn +/- follow up will be needed as below.    Valerie was seen today for yearly check up, copd and medication refill.    Diagnoses and all orders for this visit:    COPD with asthma  - "     predniSONE (DELTASONE) 20 MG tablet; One daily for 3 days and repeat for flare of lung symptoms as intructed  -     fluticasone-umeclidin-vilanter (TRELEGY ELLIPTA) 100-62.5-25 mcg DsDv; Inhale 1 puff into the lungs once daily.  -     albuterol (PROVENTIL/VENTOLIN HFA) 90 mcg/actuation inhaler; 2 puffs every 4 hours as needed for cough, wheeze, or shortness of breath    Chronic hypoxemic respiratory failure        Follow-up in about 1 year (around 10/16/2019), or if symptoms worsen or fail to improve.    Discussed with patient above for education the following:      Change anoro to trelegy once out anoro.     May use prednisone 20 mg daily for 3 days if bronchitis worsens.

## 2018-10-16 NOTE — PATIENT INSTRUCTIONS
Change anoro to trelegy once out anoro.     May use prednisone 20 mg daily for 3 days if bronchitis worsens.

## 2019-08-15 DIAGNOSIS — R10.13 ABDOMINAL PAIN, EPIGASTRIC: Primary | ICD-10-CM

## 2019-09-05 ENCOUNTER — TELEPHONE (OUTPATIENT)
Dept: FAMILY MEDICINE | Facility: CLINIC | Age: 84
End: 2019-09-05

## 2019-09-05 NOTE — TELEPHONE ENCOUNTER
----- Message from Payal Hylton sent at 9/5/2019 12:22 PM CDT -----  Contact: Valerie Diamond fill furosemide 40 mg tablets. [facility]. pts # [phone]

## 2019-09-17 ENCOUNTER — TELEPHONE (OUTPATIENT)
Dept: FAMILY MEDICINE | Facility: CLINIC | Age: 84
End: 2019-09-17

## 2019-09-17 ENCOUNTER — OFFICE VISIT (OUTPATIENT)
Dept: FAMILY MEDICINE | Facility: CLINIC | Age: 84
End: 2019-09-17
Payer: MEDICARE

## 2019-09-17 VITALS
HEIGHT: 59 IN | SYSTOLIC BLOOD PRESSURE: 124 MMHG | DIASTOLIC BLOOD PRESSURE: 52 MMHG | WEIGHT: 156 LBS | HEART RATE: 68 BPM | BODY MASS INDEX: 31.45 KG/M2

## 2019-09-17 DIAGNOSIS — E03.9 ACQUIRED HYPOTHYROIDISM: ICD-10-CM

## 2019-09-17 DIAGNOSIS — F51.01 PRIMARY INSOMNIA: ICD-10-CM

## 2019-09-17 DIAGNOSIS — J44.9 CHRONIC OBSTRUCTIVE PULMONARY DISEASE, UNSPECIFIED COPD TYPE: Primary | ICD-10-CM

## 2019-09-17 DIAGNOSIS — I10 ESSENTIAL HYPERTENSION: ICD-10-CM

## 2019-09-17 DIAGNOSIS — M15.9 PRIMARY OSTEOARTHRITIS INVOLVING MULTIPLE JOINTS: ICD-10-CM

## 2019-09-17 DIAGNOSIS — R10.13 EPIGASTRIC PAIN: ICD-10-CM

## 2019-09-17 DIAGNOSIS — I87.8 VENOUS STASIS OF BOTH LOWER EXTREMITIES: ICD-10-CM

## 2019-09-17 PROBLEM — M15.0 PRIMARY OSTEOARTHRITIS INVOLVING MULTIPLE JOINTS: Status: ACTIVE | Noted: 2019-09-17

## 2019-09-17 PROCEDURE — 99214 PR OFFICE/OUTPT VISIT, EST, LEVL IV, 30-39 MIN: ICD-10-PCS | Mod: S$GLB,,, | Performed by: NURSE PRACTITIONER

## 2019-09-17 PROCEDURE — 99214 OFFICE O/P EST MOD 30 MIN: CPT | Mod: S$GLB,,, | Performed by: NURSE PRACTITIONER

## 2019-09-17 RX ORDER — ALPRAZOLAM 0.5 MG/1
0.5 TABLET ORAL NIGHTLY PRN
Qty: 90 TABLET | Refills: 1 | Status: SHIPPED | OUTPATIENT
Start: 2019-09-17 | End: 2019-12-17 | Stop reason: SDUPTHER

## 2019-09-17 RX ORDER — ATENOLOL AND CHLORTHALIDONE TABLET 50; 25 MG/1; MG/1
1 TABLET ORAL DAILY
Qty: 90 TABLET | Refills: 1 | Status: SHIPPED | OUTPATIENT
Start: 2019-09-17 | End: 2019-10-22 | Stop reason: SDUPTHER

## 2019-09-17 RX ORDER — HYDROCODONE BITARTRATE AND ACETAMINOPHEN 10; 325 MG/1; MG/1
1 TABLET ORAL EVERY 12 HOURS PRN
Qty: 60 TABLET | Refills: 0 | Status: SHIPPED | OUTPATIENT
Start: 2019-09-17 | End: 2019-10-22 | Stop reason: SDUPTHER

## 2019-09-17 RX ORDER — FUROSEMIDE 40 MG/1
40 TABLET ORAL DAILY
Qty: 90 TABLET | Refills: 1 | Status: SHIPPED | OUTPATIENT
Start: 2019-09-17 | End: 2019-12-17

## 2019-09-17 RX ORDER — LEVOTHYROXINE SODIUM 50 UG/1
50 TABLET ORAL DAILY
Qty: 90 TABLET | Refills: 1 | Status: SHIPPED | OUTPATIENT
Start: 2019-09-17 | End: 2019-12-17 | Stop reason: SDUPTHER

## 2019-09-17 NOTE — PROGRESS NOTES
SUBJECTIVE:    Patient ID: Valerie Jones is a 85 y.o. female.    Chief Complaint: Follow-up    Pt here for regular f/u. Overall doing okay. Leg swelling improved- no longer getting the steroid shots from derm which seemed to help a lot.  Pt reports was having some reflux and epigastric pain- saw Dr. Bah and he has ordered abd US and labs- has f/u with him next month  Hx of asthma/COPD- follows with Dr. Mack. Reports has home O2 for prn use- breathing has been stable recently      No visits with results within 6 Month(s) from this visit.   Latest known visit with results is:   Admission on 01/04/2017, Discharged on 01/04/2017   Component Date Value Ref Range Status    Influenza A Ag, EIA 01/04/2017 Negative  Negative Final    Influenza B Ag, EIA 01/04/2017 Negative  Negative Final    Flu A & B Source 01/04/2017 Nasopharyngeal Swab   Final       Past Medical History:   Diagnosis Date    Anxiety     Arthritis     Asthma     COPD (chronic obstructive pulmonary disease)     Hypertension     Thyroid disease      Past Surgical History:   Procedure Laterality Date    SKIN BIOPSY       History reviewed. No pertinent family history.    Marital Status:   Alcohol History:  reports that she does not drink alcohol.  Tobacco History:  reports that she quit smoking about 14 years ago. She has a 2.50 pack-year smoking history. She has never used smokeless tobacco.  Drug History:  reports that she does not use drugs.    Review of patient's allergies indicates:   Allergen Reactions    Sulfa (sulfonamide antibiotics) Swelling    Androgenic anabolic steroid     Aspirin Other (See Comments)    Erythromycin Other (See Comments)    Methylprednisolone Other (See Comments)    Penicillins        Current Outpatient Medications:     albuterol (PROVENTIL/VENTOLIN HFA) 90 mcg/actuation inhaler, 2 puffs every 4 hours as needed for cough, wheeze, or shortness of breath, Disp: 3 Inhaler, Rfl: 3     albuterol-ipratropium 2.5mg-0.5mg/3mL (DUO-NEB) 0.5 mg-3 mg(2.5 mg base)/3 mL nebulizer solution, Take 3 mLs by nebulization every 6 (six) hours as needed., Disp: 120 vial, Rfl: 11    ALPRAZolam (XANAX) 0.5 MG tablet, Take 1 tablet (0.5 mg total) by mouth nightly as needed., Disp: 90 tablet, Rfl: 1    atenolol-chlorthalidone (TENORETIC) 50-25 mg Tab, Take 1 tablet by mouth once daily., Disp: 90 tablet, Rfl: 1    clidinium-chlordiazepoxide (LIBRAX) 5-2.5 mg per capsule, Take 1 capsule by mouth 4 (four) times daily before meals and nightly.  , Disp: , Rfl:     clobetasol 0.05% (TEMOVATE) 0.05 % Oint, , Disp: , Rfl:     fluticasone-umeclidin-vilanter (TRELEGY ELLIPTA) 100-62.5-25 mcg DsDv, Inhale 1 puff into the lungs once daily., Disp: 3 each, Rfl: 3    furosemide (LASIX) 40 MG tablet, Take 1 tablet (40 mg total) by mouth once daily., Disp: 90 tablet, Rfl: 1    guaifenesin-codeine 100-10 mg/5 ml (TUSSI-ORGANIDIN NR)  mg/5 mL syrup, , Disp: , Rfl:     levothyroxine (SYNTHROID) 50 MCG tablet, Take 1 tablet (50 mcg total) by mouth once daily., Disp: 90 tablet, Rfl: 1    omega-3 acid ethyl esters (LOVAZA) 1 gram capsule, , Disp: , Rfl:     ranitidine (ZANTAC) 300 MG capsule, Take 1 capsule (300 mg total) by mouth nightly., Disp: 90 capsule, Rfl: 1    BENZONATATE (TESSALON PERLES ORAL), Take by mouth.  , Disp: , Rfl:     HYDROcodone-acetaminophen (NORCO)  mg per tablet, Take 1 tablet by mouth every 12 (twelve) hours as needed for Pain., Disp: 60 tablet, Rfl: 0    naproxen (NAPROSYN) 375 MG tablet, , Disp: , Rfl:     Review of Systems   Constitutional: Negative for appetite change, chills and fever.   Respiratory: Positive for shortness of breath (at baseline). Negative for cough and wheezing.    Cardiovascular: Positive for leg swelling (improved). Negative for chest pain and palpitations.   Gastrointestinal: Positive for abdominal pain (mild epigastric discomfort and indigesion occasionally).  "Negative for constipation and diarrhea.   Genitourinary: Negative for difficulty urinating, dysuria, frequency and hematuria.   Musculoskeletal: Positive for back pain (chronic lower back pain). Negative for gait problem.   Skin: Negative for rash.   Neurological: Negative for dizziness, syncope and numbness.   Psychiatric/Behavioral: Negative for dysphoric mood. The patient is not nervous/anxious.           Objective:      Vitals:    09/17/19 1445   BP: (!) 124/52   Pulse: 68   Weight: 70.8 kg (156 lb)   Height: 4' 11" (1.499 m)     Physical Exam   Constitutional: She is oriented to person, place, and time. She appears well-developed and well-nourished.   HENT:   Head: Normocephalic and atraumatic.   Right Ear: Tympanic membrane and ear canal normal.   Left Ear: Tympanic membrane and ear canal normal.   Mouth/Throat: Mucous membranes are normal. No posterior oropharyngeal erythema.   Neck: Neck supple. Carotid bruit is not present.   Cardiovascular: Normal rate and regular rhythm. Exam reveals no gallop and no friction rub.   No murmur heard.  Pulmonary/Chest: No respiratory distress. She has no wheezes. She has no rales.   Slightly diminished throughout otherwise clear   Abdominal: Soft. She exhibits no distension. There is no tenderness.   Musculoskeletal: She exhibits no edema (numerous spider/varicose veins without erythema).   Lymphadenopathy:     She has no cervical adenopathy.   Neurological: She is alert and oriented to person, place, and time. She has normal strength. Gait normal.   Skin: Skin is warm and dry. No rash noted.   Psychiatric: She has a normal mood and affect.   Nursing note and vitals reviewed.        Assessment:       1. Chronic obstructive pulmonary disease, unspecified COPD type    2. Epigastric pain    3. Essential hypertension    4. Venous stasis of both lower extremities    5. Acquired hypothyroidism    6. Primary insomnia    7. Primary osteoarthritis involving multiple joints         "   Plan:       Chronic obstructive pulmonary disease, unspecified COPD type  - stable on current regimen    Epigastric pain  -     ranitidine (ZANTAC) 300 MG capsule; Take 1 capsule (300 mg total) by mouth nightly.  Dispense: 90 capsule; Refill: 1  -     Ambulatory referral to Gastroenterology  -glo, has f/u with US and labs scheduled with Dr. Bah    Essential hypertension  -     atenolol-chlorthalidone (TENORETIC) 50-25 mg Tab; Take 1 tablet by mouth once daily.  Dispense: 90 tablet; Refill: 1  - BP well controlled    Venous stasis of both lower extremities  -     furosemide (LASIX) 40 MG tablet; Take 1 tablet (40 mg total) by mouth once daily.  Dispense: 90 tablet; Refill: 1  -improved    Acquired hypothyroidism  -     levothyroxine (SYNTHROID) 50 MCG tablet; Take 1 tablet (50 mcg total) by mouth once daily.  Dispense: 90 tablet; Refill: 1  -stable, needs f/u labs before next visit    Primary insomnia  -     ALPRAZolam (XANAX) 0.5 MG tablet; Take 1 tablet (0.5 mg total) by mouth nightly as needed.  Dispense: 90 tablet; Refill: 1  Stable    Primary osteoarthritis of multiple joints  -overall stable      Follow up for as scheduled.        9/17/2019 Nimo Butterfield NP

## 2019-09-23 ENCOUNTER — HOSPITAL ENCOUNTER (OUTPATIENT)
Dept: RADIOLOGY | Facility: HOSPITAL | Age: 84
Discharge: HOME | End: 2019-09-23
Attending: INTERNAL MEDICINE
Payer: MEDICARE

## 2019-09-23 DIAGNOSIS — R10.13 ABDOMINAL PAIN, EPIGASTRIC: ICD-10-CM

## 2019-09-23 PROCEDURE — 76705 ECHO EXAM OF ABDOMEN: CPT | Mod: TC,PO

## 2019-09-30 ENCOUNTER — TELEPHONE (OUTPATIENT)
Dept: FAMILY MEDICINE | Facility: CLINIC | Age: 84
End: 2019-09-30

## 2019-09-30 NOTE — TELEPHONE ENCOUNTER
----- Message from Irma Monterroso sent at 9/30/2019 11:24 AM CDT -----  WE RECEIVED A FAX COPY OF THE (US ABDOMEN LIMITED) 9/23/19. THE REPORT CAN BE FOUND IN [facility] IMAGING.    THANKSGRAHAM

## 2019-10-11 DIAGNOSIS — J44.89 COPD WITH ASTHMA: ICD-10-CM

## 2019-10-11 NOTE — TELEPHONE ENCOUNTER
----- Message from Vidya Pope sent at 10/11/2019 12:24 PM CDT -----  Contact: patient  Type:  RX Refill Request    Who Called:  patient  Refill or New Rx:  rfill  RX Name and Strength:  albuterol (PROVENTIL/VENTOLIN HFA) 90 mcg/actuation inhaler  Preferred Pharmacy with phone number:    [facility]- [place] - [address]  [address] [place] [address]  Phone: [phone] Fax: [phone]  Local or Mail Order:  local  Ordering Provider:  Frederick Miranda Call Back Number:  [phone]  Additional Information:  Patient states that she is completely out of her rescue inhaler. Please give call once completely

## 2019-10-21 ENCOUNTER — OFFICE VISIT (OUTPATIENT)
Dept: PULMONOLOGY | Facility: CLINIC | Age: 84
End: 2019-10-21
Payer: MEDICARE

## 2019-10-21 VITALS
WEIGHT: 153.25 LBS | HEART RATE: 71 BPM | HEIGHT: 59 IN | BODY MASS INDEX: 30.89 KG/M2 | OXYGEN SATURATION: 89 % | SYSTOLIC BLOOD PRESSURE: 120 MMHG | DIASTOLIC BLOOD PRESSURE: 57 MMHG

## 2019-10-21 DIAGNOSIS — J96.11 CHRONIC HYPOXEMIC RESPIRATORY FAILURE: Primary | ICD-10-CM

## 2019-10-21 DIAGNOSIS — J44.9 CHRONIC OBSTRUCTIVE PULMONARY DISEASE, UNSPECIFIED COPD TYPE: ICD-10-CM

## 2019-10-21 DIAGNOSIS — J44.89 COPD WITH ASTHMA: ICD-10-CM

## 2019-10-21 PROCEDURE — 99999 PR PBB SHADOW E&M-EST. PATIENT-LVL IV: ICD-10-PCS | Mod: PBBFAC,,, | Performed by: INTERNAL MEDICINE

## 2019-10-21 PROCEDURE — 99999 PR PBB SHADOW E&M-EST. PATIENT-LVL IV: CPT | Mod: PBBFAC,,, | Performed by: INTERNAL MEDICINE

## 2019-10-21 PROCEDURE — 99214 OFFICE O/P EST MOD 30 MIN: CPT | Mod: PBBFAC,PO | Performed by: INTERNAL MEDICINE

## 2019-10-21 PROCEDURE — 99213 PR OFFICE/OUTPT VISIT, EST, LEVL III, 20-29 MIN: ICD-10-PCS | Mod: S$PBB,,, | Performed by: INTERNAL MEDICINE

## 2019-10-21 PROCEDURE — 99213 OFFICE O/P EST LOW 20 MIN: CPT | Mod: S$PBB,,, | Performed by: INTERNAL MEDICINE

## 2019-10-21 RX ORDER — PREDNISONE 20 MG/1
TABLET ORAL
Qty: 12 TABLET | Refills: 0 | Status: ON HOLD | OUTPATIENT
Start: 2019-10-21 | End: 2019-11-22 | Stop reason: HOSPADM

## 2019-10-21 RX ORDER — IPRATROPIUM BROMIDE AND ALBUTEROL SULFATE 2.5; .5 MG/3ML; MG/3ML
3 SOLUTION RESPIRATORY (INHALATION) EVERY 6 HOURS PRN
Qty: 120 VIAL | Refills: 11 | Status: SHIPPED | OUTPATIENT
Start: 2019-10-21 | End: 2019-12-17 | Stop reason: SDUPTHER

## 2019-10-21 RX ORDER — FLUOCINONIDE 0.5 MG/G
OINTMENT TOPICAL
COMMUNITY
End: 2019-12-17

## 2019-10-21 RX ORDER — AZITHROMYCIN 500 MG/1
TABLET, FILM COATED ORAL
Qty: 3 TABLET | Refills: 3 | Status: SHIPPED | OUTPATIENT
Start: 2019-10-21 | End: 2019-12-17

## 2019-10-21 RX ORDER — NYSTATIN 100000 [USP'U]/G
POWDER TOPICAL
COMMUNITY
End: 2019-12-17

## 2019-10-21 RX ORDER — PANTOPRAZOLE SODIUM 40 MG/1
40 TABLET, DELAYED RELEASE ORAL EVERY MORNING
Refills: 3 | COMMUNITY
Start: 2019-09-30 | End: 2020-03-16 | Stop reason: SDUPTHER

## 2019-10-21 NOTE — PATIENT INSTRUCTIONS
"Your lungs have been stable- could use oxygen more.  Use trelegy    Can use rescue albuterol as needed.    If yellow mucous - may take azithromycin    If bad cough or wheezes- prednisone daily for 3 days- steroid may make sugar up.       Oximeter could be obtained from drug store or over internet- no endorsement.  Fingertip Pulse Oximeter Blood Oxygen Saturation Monitor with lanyard and carrying case   by [employer]   3.9 out of 5 stars 295 customer reviews    6 answered questions   Amazon's Choice for "Neighbor[employer]ly pulse oximeter"     Price: $12.95 FREE Shipping   on orders over $25.00 shipped by Amazon or get Fast, Free Shipping with [employer] Prime & FREE Returns            "

## 2019-10-21 NOTE — PROGRESS NOTES
10/21/2019    Valerieradha Jones  Patient Seen Years Ago And Here For f/u    Chief Complaint   Patient presents with    yearly f/u    COPD    Shortness of Breath   copd and resp failure hypoxic are cc.      HPI:   Oct 21, 2019-no prednisone nor abx use last yr.  No recent cxr, for cholecystectomy soon.    Oct 16, 2018- inogen used for distances,  Breathing ok , no abx nor prednisone.  Follow-up in about 1 year (around 10/16/2019), or if symptoms worsen or fail to improve.    Discussed with patient above for education the following:      Change anoro to trelegy once out anoro.     May use prednisone 20 mg daily for 3 days if bronchitis worsens.  Dec 19, 2017-- c/o heavy 02 portable concentrator.  No mucous, sinus drip occ.  Uses meds as needed.  No prednisone or abx.  Got flu shot.  pft good    17 pt h/o asthma and copd, off ppd or less last 12 yrs, no daily lung meds, gets vera, smothery daily, medications in past helped but stopped as was ok.  Monitors 02 home but no home 02,  Uses albuterol in nebs over  5 yrs. Doxycycline works, no recent steroids - ppt jumpy.  [place], pt independent.      anoro works but not lasting, used for years.      The chief compliant  problem is varies with instablilty at time  PFSH:  Past Medical History:   Diagnosis Date    Anxiety     Arthritis     Asthma     COPD (chronic obstructive pulmonary disease)     Hypertension     Thyroid disease          Past Surgical History:   Procedure Laterality Date    SKIN BIOPSY       Social History     Tobacco Use    Smoking status: Former Smoker     Packs/day: 0.50     Years: 5.00     Pack years: 2.50     Last attempt to quit: 2/15/2005     Years since quittin.6    Smokeless tobacco: Never Used   Substance Use Topics    Alcohol use: No    Drug use: No     History reviewed. No pertinent family history.  Review of patient's allergies indicates:   Allergen Reactions    Sulfa (sulfonamide antibiotics) Swelling     "Penicillins        Performance Status:The patient's activity level is functions out of house.      Review of Systems:  a review of eleven systems covering constitutional, Eye, HEENT, Psych, Respiratory, Cardiac, GI, , Musculoskeletal, Endocrine, Dermatologic was negative except for pertinent findings as listed ABOVE and below: uses stool softener, nebulizer helps.     Exam:Comprehensive exam done. BP (!) 120/57 (BP Location: Left arm, Patient Position: Sitting)   Pulse 71   Ht 4' 11" (1.499 m)   Wt 69.5 kg (153 lb 3.5 oz)   LMP 02/15/1980   SpO2 (!) 89% Comment: on room air  BMI 30.95 kg/m²   Exam included Vitals as listed, and patient's appearance and affect and alertness and mood, oral exam for yeast and hygiene and pharynx lesions and Mallapatti (M) score, neck with inspection for jvd and masses and thyroid abnormalities and lymph nodes (supraclavicular and infraclavicular nodes and axillary also examined and noted if abn), chest exam included symmetry and effort and fremitus and percussion and auscultation, cardiac exam included rhythm and gallops and murmur and rubs and jvd and edema, abdominal exam for mass and hepatosplenomegaly and tenderness and hernias and bowel sounds, Musculoskeletal exam with muscle tone and posture and mobility/gait and  strength, and skin for rashes and cyanosis and pallor and turgor, extremity for clubbing.  Findings were normal except for pertinent findings listed below: younger than 85, M2, sl obese, good bs, symmetric,no distress, nl fremitus/percussion, cor nl,        Radiographs (ct chest and cxr) reviewed: view by direct vision  cxr Jan 2017 kyphosis with old compression fx.      Labs reviewed  eos sl up in 2012    PFT Pulmonary Functions, including spirometry and bronchodilator response and lung volumes and diffusion, study was done sept 27, 2017.  Spirometry shows no obstruction and normal vital capacity and no bronchodilator response.   FEV1 is 90% or 0.97 " liters.  Lung volumes show  normal TLC and elevated residual volume.  Diffusion shows within normal range.    Plan:  Clinical impression is resonably certain and repeated evaluation prn +/- follow up will be needed as below.    Valerie was seen today for yearly f/u, copd and shortness of breath.    Diagnoses and all orders for this visit:    Chronic hypoxemic respiratory failure    COPD with asthma  -     fluticasone-umeclidin-vilanter (TRELEGY ELLIPTA) 100-62.5-25 mcg DsDv; Inhale 1 puff into the lungs once daily.  -     albuterol sulfate (PROAIR RESPICLICK) 90 mcg/actuation AePB; Inhale 2 puffs into the lungs every 4 (four) hours. Rescue2 puffs every 4 hours as needed for cough, wheeze, or shortness of breath  -     albuterol-ipratropium (DUO-NEB) 2.5 mg-0.5 mg/3 mL nebulizer solution; Take 3 mLs by nebulization every 6 (six) hours as needed.  -     predniSONE (DELTASONE) 20 MG tablet; One daily for 3 days and repeat for flare of lung symptoms as intructed    Chronic obstructive pulmonary disease, unspecified COPD type    Other orders  -     azithromycin (ZITHROMAX) 500 MG tablet; One daily for yellow mucous, repeat if needed        Follow up in about 1 year (around 10/21/2020).    Discussed with patient above for education the following:      Change anoro to trelegy once out anoro.     May use prednisone 20 mg daily for 3 days if bronchitis worsens.

## 2019-10-22 ENCOUNTER — CLINICAL SUPPORT (OUTPATIENT)
Dept: FAMILY MEDICINE | Facility: CLINIC | Age: 84
End: 2019-10-22
Payer: MEDICARE

## 2019-10-22 VITALS — TEMPERATURE: 98 F

## 2019-10-22 DIAGNOSIS — I10 ESSENTIAL HYPERTENSION: ICD-10-CM

## 2019-10-22 DIAGNOSIS — M15.9 PRIMARY OSTEOARTHRITIS INVOLVING MULTIPLE JOINTS: ICD-10-CM

## 2019-10-22 DIAGNOSIS — J96.11 CHRONIC HYPOXEMIC RESPIRATORY FAILURE: ICD-10-CM

## 2019-10-22 DIAGNOSIS — Z23 NEED FOR INFLUENZA VACCINATION: Primary | ICD-10-CM

## 2019-10-22 DIAGNOSIS — E03.9 ACQUIRED HYPOTHYROIDISM: ICD-10-CM

## 2019-10-22 DIAGNOSIS — I87.8 VENOUS STASIS OF BOTH LOWER EXTREMITIES: ICD-10-CM

## 2019-10-22 DIAGNOSIS — J43.1 PANLOBULAR EMPHYSEMA: ICD-10-CM

## 2019-10-22 DIAGNOSIS — F51.01 PRIMARY INSOMNIA: ICD-10-CM

## 2019-10-22 PROCEDURE — G0008 FLU VACCINE - HIGH DOSE (65+) PRESERVATIVE FREE IM: ICD-10-PCS | Mod: S$GLB,,, | Performed by: FAMILY MEDICINE

## 2019-10-22 PROCEDURE — 90662 IIV NO PRSV INCREASED AG IM: CPT | Mod: S$GLB,,, | Performed by: FAMILY MEDICINE

## 2019-10-22 PROCEDURE — G0008 ADMIN INFLUENZA VIRUS VAC: HCPCS | Mod: S$GLB,,, | Performed by: FAMILY MEDICINE

## 2019-10-22 PROCEDURE — 90662 FLU VACCINE - HIGH DOSE (65+) PRESERVATIVE FREE IM: ICD-10-PCS | Mod: S$GLB,,, | Performed by: FAMILY MEDICINE

## 2019-10-22 RX ORDER — ATENOLOL AND CHLORTHALIDONE TABLET 50; 25 MG/1; MG/1
1 TABLET ORAL DAILY
Qty: 90 TABLET | Refills: 1 | Status: SHIPPED | OUTPATIENT
Start: 2019-10-22 | End: 2019-12-17 | Stop reason: SDUPTHER

## 2019-10-22 RX ORDER — HYDROCODONE BITARTRATE AND ACETAMINOPHEN 10; 325 MG/1; MG/1
1 TABLET ORAL EVERY 12 HOURS PRN
Qty: 60 TABLET | Refills: 0 | Status: SHIPPED | OUTPATIENT
Start: 2019-11-02 | End: 2019-12-04 | Stop reason: SDUPTHER

## 2019-10-22 RX ORDER — CYCLOBENZAPRINE HCL 10 MG
10 TABLET ORAL NIGHTLY
Qty: 30 TABLET | Refills: 0 | Status: SHIPPED | OUTPATIENT
Start: 2019-10-22 | End: 2019-11-21

## 2019-10-30 ENCOUNTER — OFFICE VISIT (OUTPATIENT)
Dept: SURGERY | Facility: CLINIC | Age: 84
End: 2019-10-30
Payer: MEDICARE

## 2019-10-30 VITALS
WEIGHT: 152 LBS | HEART RATE: 69 BPM | SYSTOLIC BLOOD PRESSURE: 109 MMHG | TEMPERATURE: 98 F | DIASTOLIC BLOOD PRESSURE: 67 MMHG | BODY MASS INDEX: 30.64 KG/M2 | HEIGHT: 59 IN

## 2019-10-30 DIAGNOSIS — K80.10 CHRONIC CALCULOUS CHOLECYSTITIS: Primary | ICD-10-CM

## 2019-10-30 PROCEDURE — 99214 OFFICE O/P EST MOD 30 MIN: CPT | Performed by: SURGERY

## 2019-10-30 PROCEDURE — 99203 PR OFFICE/OUTPT VISIT, NEW, LEVL III, 30-44 MIN: ICD-10-PCS | Mod: S$PBB,,, | Performed by: SURGERY

## 2019-10-30 PROCEDURE — 99203 OFFICE O/P NEW LOW 30 MIN: CPT | Mod: S$PBB,,, | Performed by: SURGERY

## 2019-10-30 RX ORDER — LIDOCAINE HYDROCHLORIDE 10 MG/ML
1 INJECTION, SOLUTION EPIDURAL; INFILTRATION; INTRACAUDAL; PERINEURAL ONCE
Status: DISCONTINUED | OUTPATIENT
Start: 2019-10-30 | End: 2019-11-22 | Stop reason: HOSPADM

## 2019-10-30 RX ORDER — SODIUM CHLORIDE 9 MG/ML
INJECTION, SOLUTION INTRAVENOUS CONTINUOUS
Status: CANCELLED | OUTPATIENT
Start: 2019-10-30

## 2019-10-30 NOTE — LETTER
October 30, 2019      Leander Bah MD  [address]  [place] [address]           [facility]  [address]  [place] [address]  Phone: [phone]  Fax: [phone]          Patient: Valerie Jones   MR Number: [id number]   YOB: 1933   Date of Visit: 10/30/2019       Dear Dr. Leander Bah:    Thank you for referring Valerie Jones to me for evaluation. Attached you will find relevant portions of my assessment and plan of care.    If you have questions, please do not hesitate to call me. I look forward to following Valerie Jones along with you.    Sincerely,    Toney Grace III, MD    Enclosure  CC:  No Recipients    If you would like to receive this communication electronically, please contact [email or web address] or [phone] to request more information on [employer] Link access.    For providers and/or their staff who would like to refer a patient to [facility], please contact us through our one-stop-shop provider referral line, [facility], at [phone].    If you feel you have received this communication in error or would no longer like to receive these types of communications, please e-mail [email or web address]

## 2019-10-30 NOTE — PROGRESS NOTES
Subjective:       Patient ID: Valerie Jones is a 86 y.o. female.    Chief Complaint: Other (Referred by Dr. Bah to eval galbladder)      HPI:  Patient is referred to the office with issues related to the gallbladder. She has history of frequent nausea with RUQ discomfort. Symptoms date back to around five years ago. She had EGD last month that showed gastritis without ulcer. She had Abd US that showed large 2.3cm intraluminal gallstone without evidence of cholecystitis. She has no abd pain today. She has been afebrile. She has history of  via midline incision.     Past Medical History:   Diagnosis Date    Anxiety     Arthritis     Asthma     COPD (chronic obstructive pulmonary disease)     Hypertension     Thyroid disease      Past Surgical History:   Procedure Laterality Date     SECTION      x's2    SKIN CANCER EXCISION      Dr.Weil     Review of patient's allergies indicates:   Allergen Reactions    Sulfa (sulfonamide antibiotics) Swelling    Androgenic anabolic steroid     Aspirin Other (See Comments)    Erythromycin Other (See Comments)    Methylprednisolone Other (See Comments)    Penicillins      Medication List with Changes/Refills   Current Medications    ALBUTEROL SULFATE (PROAIR RESPICLICK) 90 MCG/ACTUATION AEPB    Inhale 2 puffs into the lungs every 4 (four) hours. Rescue2 puffs every 4 hours as needed for cough, wheeze, or shortness of breath    ALBUTEROL-IPRATROPIUM (DUO-NEB) 2.5 MG-0.5 MG/3 ML NEBULIZER SOLUTION    Take 3 mLs by nebulization every 6 (six) hours as needed.    ALPRAZOLAM (XANAX) 0.5 MG TABLET    Take 1 tablet (0.5 mg total) by mouth nightly as needed.    ATENOLOL-CHLORTHALIDONE (TENORETIC) 50-25 MG TAB    Take 1 tablet by mouth once daily.    AZITHROMYCIN (ZITHROMAX) 500 MG TABLET    One daily for yellow mucous, repeat if needed    BENZONATATE (TESSALON PERLES ORAL)    Take by mouth.      CLIDINIUM-CHLORDIAZEPOXIDE (LIBRAX) 5-2.5 MG PER CAPSULE     Take 1 capsule by mouth 4 (four) times daily before meals and nightly.      CLOBETASOL 0.05% (TEMOVATE) 0.05 % OINT        CYCLOBENZAPRINE (FLEXERIL) 10 MG TABLET    Take 1 tablet (10 mg total) by mouth every evening.    FLUOCINONIDE (LIDEX) 0.05 % OINTMENT    fluocinonide 0.05 % topical ointment    FLUTICASONE-UMECLIDIN-VILANTER (TRELEGY ELLIPTA) 100-62.5-25 MCG DSDV    Inhale 1 puff into the lungs once daily.    FUROSEMIDE (LASIX) 40 MG TABLET    Take 1 tablet (40 mg total) by mouth once daily.    GUAIFENESIN-CODEINE 100-10 MG/5 ML (TUSSI-ORGANIDIN NR)  MG/5 ML SYRUP        HYDROCODONE-ACETAMINOPHEN (NORCO)  MG PER TABLET    Take 1 tablet by mouth every 12 (twelve) hours as needed for Pain.    LEVOTHYROXINE (SYNTHROID) 50 MCG TABLET    Take 1 tablet (50 mcg total) by mouth once daily.    NAPROXEN (NAPROSYN) 375 MG TABLET        NYSTATIN (MYCOSTATIN) POWDER    nystatin 100,000 unit/gram topical powder    OMEGA-3 ACID ETHYL ESTERS (LOVAZA) 1 GRAM CAPSULE        PANTOPRAZOLE (PROTONIX) 40 MG TABLET    Take 40 mg by mouth every morning.    PREDNISONE (DELTASONE) 20 MG TABLET    One daily for 3 days and repeat for flare of lung symptoms as intructed     Family History   Problem Relation Age of Onset    Diabetes Mother     Depression Maternal Grandmother     Depression Maternal Grandfather      Social History     Socioeconomic History    Marital status:      Spouse name: Not on file    Number of children: Not on file    Years of education: Not on file    Highest education level: Not on file   Occupational History    Not on file   Social Needs    Financial resource strain: Not on file    Food insecurity:     Worry: Not on file     Inability: Not on file    Transportation needs:     Medical: Not on file     Non-medical: Not on file   Tobacco Use    Smoking status: Former Smoker     Packs/day: 0.50     Years: 5.00     Pack years: 2.50     Last attempt to quit: 2/15/2005     Years since  quittin.7    Smokeless tobacco: Never Used   Substance and Sexual Activity    Alcohol use: No    Drug use: No    Sexual activity: Never   Lifestyle    Physical activity:     Days per week: Not on file     Minutes per session: Not on file    Stress: Not at all   Relationships    Social connections:     Talks on phone: Not on file     Gets together: Not on file     Attends Oriental orthodox service: Not on file     Active member of club or organization: Not on file     Attends meetings of clubs or organizations: Not on file     Relationship status: Not on file   Other Topics Concern    Not on file   Social History Narrative    Not on file         Review of Systems   Constitutional: Negative for appetite change, chills, fever and unexpected weight change.   HENT: Negative for hearing loss, rhinorrhea, sore throat and voice change.    Eyes: Negative for photophobia and visual disturbance.   Respiratory: Negative for cough, choking and shortness of breath.    Cardiovascular: Negative for chest pain, palpitations and leg swelling.   Gastrointestinal: Negative for abdominal pain, blood in stool, constipation, diarrhea, nausea and vomiting.   Endocrine: Negative for cold intolerance, heat intolerance, polydipsia and polyuria.   Musculoskeletal: Negative for arthralgias, back pain, joint swelling and neck stiffness.   Skin: Negative for color change, pallor and rash.   Neurological: Negative for dizziness, seizures, syncope and headaches.   Hematological: Negative for adenopathy. Does not bruise/bleed easily.   Psychiatric/Behavioral: Negative for agitation, behavioral problems and confusion.       Objective:      Physical Exam   Constitutional: She is oriented to person, place, and time. She appears well-developed and well-nourished.  Non-toxic appearance. No distress.   HENT:   Head: Normocephalic and atraumatic. Head is without abrasion and without laceration.   Right Ear: External ear normal.   Left Ear: External  ear normal.   Nose: Nose normal.   Mouth/Throat: Oropharynx is clear and moist.   Eyes: Pupils are equal, round, and reactive to light. EOM are normal.   Neck: Trachea normal and phonation normal. Neck supple. No tracheal deviation and normal range of motion present.   Cardiovascular: Normal rate and regular rhythm.   Pulmonary/Chest: Effort normal. No accessory muscle usage. No tachypnea. No respiratory distress.   Abdominal: Soft. Normal appearance and bowel sounds are normal. She exhibits no distension and no mass. There is no tenderness. There is no rigidity, no rebound and no guarding.       Lymphadenopathy:        Right: No inguinal adenopathy present.        Left: No inguinal adenopathy present.   Neurological: She is alert and oriented to person, place, and time. Coordination and gait normal.   Skin: Skin is warm and intact.   Psychiatric: She has a normal mood and affect. Her speech is normal and behavior is normal.       Assessment/Plan:   Valerie was seen today for other.    Diagnoses and all orders for this visit:    Chronic calculous cholecystitis  -     Vital signs; Standing  -     Insert peripheral IV; Standing  -     Diet NPO; Standing  -     Pulse Oximetry Q4H; Standing  -     Case Request Operating Room: CHOLECYSTECTOMY, LAPAROSCOPIC  -     Full code; Standing  -     Place sequential compression device; Standing  -     Comprehensive metabolic panel; Future  -     CBC auto differential; Future  -     Place in Outpatient; Standing  -     EKG 12-lead; Future  -     X-Ray Chest 1 View; Future  -     Insert peripheral IV  -     Full code    Other orders  -     lidocaine (PF) 10 mg/ml (1%) injection 10 mg  -     0.9%  NaCl infusion  -     IP VTE LOW RISK PATIENT; Standing      Patient has large gallstone with associated frequent nausea and right upper quadrant pain. She will be scheduled for cholecystectomy Nov 22.       Impression/Treatment Plan: elizabeth luna I discussed the proposed procedures the  patient including risks, benefits, indications, alternatives and special concerns.  The patient appears to understand and agrees to go ahead with surgery.  I have made no promises, warranties or verbal agreements beyond what was discussed above.

## 2019-10-30 NOTE — H&P (VIEW-ONLY)
Subjective:       Patient ID: Valerie Jones is a 86 y.o. female.    Chief Complaint: Other (Referred by Dr. Bah to eval galbladder)      HPI:  Patient is referred to the office with issues related to the gallbladder. She has history of frequent nausea with RUQ discomfort. Symptoms date back to around five years ago. She had EGD last month that showed gastritis without ulcer. She had Abd US that showed large 2.3cm intraluminal gallstone without evidence of cholecystitis. She has no abd pain today. She has been afebrile. She has history of  via midline incision.     Past Medical History:   Diagnosis Date    Anxiety     Arthritis     Asthma     COPD (chronic obstructive pulmonary disease)     Hypertension     Thyroid disease      Past Surgical History:   Procedure Laterality Date     SECTION      x's2    SKIN CANCER EXCISION      Dr.Weil     Review of patient's allergies indicates:   Allergen Reactions    Sulfa (sulfonamide antibiotics) Swelling    Androgenic anabolic steroid     Aspirin Other (See Comments)    Erythromycin Other (See Comments)    Methylprednisolone Other (See Comments)    Penicillins      Medication List with Changes/Refills   Current Medications    ALBUTEROL SULFATE (PROAIR RESPICLICK) 90 MCG/ACTUATION AEPB    Inhale 2 puffs into the lungs every 4 (four) hours. Rescue2 puffs every 4 hours as needed for cough, wheeze, or shortness of breath    ALBUTEROL-IPRATROPIUM (DUO-NEB) 2.5 MG-0.5 MG/3 ML NEBULIZER SOLUTION    Take 3 mLs by nebulization every 6 (six) hours as needed.    ALPRAZOLAM (XANAX) 0.5 MG TABLET    Take 1 tablet (0.5 mg total) by mouth nightly as needed.    ATENOLOL-CHLORTHALIDONE (TENORETIC) 50-25 MG TAB    Take 1 tablet by mouth once daily.    AZITHROMYCIN (ZITHROMAX) 500 MG TABLET    One daily for yellow mucous, repeat if needed    BENZONATATE (TESSALON PERLES ORAL)    Take by mouth.      CLIDINIUM-CHLORDIAZEPOXIDE (LIBRAX) 5-2.5 MG PER CAPSULE     Take 1 capsule by mouth 4 (four) times daily before meals and nightly.      CLOBETASOL 0.05% (TEMOVATE) 0.05 % OINT        CYCLOBENZAPRINE (FLEXERIL) 10 MG TABLET    Take 1 tablet (10 mg total) by mouth every evening.    FLUOCINONIDE (LIDEX) 0.05 % OINTMENT    fluocinonide 0.05 % topical ointment    FLUTICASONE-UMECLIDIN-VILANTER (TRELEGY ELLIPTA) 100-62.5-25 MCG DSDV    Inhale 1 puff into the lungs once daily.    FUROSEMIDE (LASIX) 40 MG TABLET    Take 1 tablet (40 mg total) by mouth once daily.    GUAIFENESIN-CODEINE 100-10 MG/5 ML (TUSSI-ORGANIDIN NR)  MG/5 ML SYRUP        HYDROCODONE-ACETAMINOPHEN (NORCO)  MG PER TABLET    Take 1 tablet by mouth every 12 (twelve) hours as needed for Pain.    LEVOTHYROXINE (SYNTHROID) 50 MCG TABLET    Take 1 tablet (50 mcg total) by mouth once daily.    NAPROXEN (NAPROSYN) 375 MG TABLET        NYSTATIN (MYCOSTATIN) POWDER    nystatin 100,000 unit/gram topical powder    OMEGA-3 ACID ETHYL ESTERS (LOVAZA) 1 GRAM CAPSULE        PANTOPRAZOLE (PROTONIX) 40 MG TABLET    Take 40 mg by mouth every morning.    PREDNISONE (DELTASONE) 20 MG TABLET    One daily for 3 days and repeat for flare of lung symptoms as intructed     Family History   Problem Relation Age of Onset    Diabetes Mother     Depression Maternal Grandmother     Depression Maternal Grandfather      Social History     Socioeconomic History    Marital status:      Spouse name: Not on file    Number of children: Not on file    Years of education: Not on file    Highest education level: Not on file   Occupational History    Not on file   Social Needs    Financial resource strain: Not on file    Food insecurity:     Worry: Not on file     Inability: Not on file    Transportation needs:     Medical: Not on file     Non-medical: Not on file   Tobacco Use    Smoking status: Former Smoker     Packs/day: 0.50     Years: 5.00     Pack years: 2.50     Last attempt to quit: 2/15/2005     Years since  quittin.7    Smokeless tobacco: Never Used   Substance and Sexual Activity    Alcohol use: No    Drug use: No    Sexual activity: Never   Lifestyle    Physical activity:     Days per week: Not on file     Minutes per session: Not on file    Stress: Not at all   Relationships    Social connections:     Talks on phone: Not on file     Gets together: Not on file     Attends Mormonism service: Not on file     Active member of club or organization: Not on file     Attends meetings of clubs or organizations: Not on file     Relationship status: Not on file   Other Topics Concern    Not on file   Social History Narrative    Not on file         Review of Systems   Constitutional: Negative for appetite change, chills, fever and unexpected weight change.   HENT: Negative for hearing loss, rhinorrhea, sore throat and voice change.    Eyes: Negative for photophobia and visual disturbance.   Respiratory: Negative for cough, choking and shortness of breath.    Cardiovascular: Negative for chest pain, palpitations and leg swelling.   Gastrointestinal: Negative for abdominal pain, blood in stool, constipation, diarrhea, nausea and vomiting.   Endocrine: Negative for cold intolerance, heat intolerance, polydipsia and polyuria.   Musculoskeletal: Negative for arthralgias, back pain, joint swelling and neck stiffness.   Skin: Negative for color change, pallor and rash.   Neurological: Negative for dizziness, seizures, syncope and headaches.   Hematological: Negative for adenopathy. Does not bruise/bleed easily.   Psychiatric/Behavioral: Negative for agitation, behavioral problems and confusion.       Objective:      Physical Exam   Constitutional: She is oriented to person, place, and time. She appears well-developed and well-nourished.  Non-toxic appearance. No distress.   HENT:   Head: Normocephalic and atraumatic. Head is without abrasion and without laceration.   Right Ear: External ear normal.   Left Ear: External  ear normal.   Nose: Nose normal.   Mouth/Throat: Oropharynx is clear and moist.   Eyes: Pupils are equal, round, and reactive to light. EOM are normal.   Neck: Trachea normal and phonation normal. Neck supple. No tracheal deviation and normal range of motion present.   Cardiovascular: Normal rate and regular rhythm.   Pulmonary/Chest: Effort normal. No accessory muscle usage. No tachypnea. No respiratory distress.   Abdominal: Soft. Normal appearance and bowel sounds are normal. She exhibits no distension and no mass. There is no tenderness. There is no rigidity, no rebound and no guarding.       Lymphadenopathy:        Right: No inguinal adenopathy present.        Left: No inguinal adenopathy present.   Neurological: She is alert and oriented to person, place, and time. Coordination and gait normal.   Skin: Skin is warm and intact.   Psychiatric: She has a normal mood and affect. Her speech is normal and behavior is normal.       Assessment/Plan:   Valerie was seen today for other.    Diagnoses and all orders for this visit:    Chronic calculous cholecystitis  -     Vital signs; Standing  -     Insert peripheral IV; Standing  -     Diet NPO; Standing  -     Pulse Oximetry Q4H; Standing  -     Case Request Operating Room: CHOLECYSTECTOMY, LAPAROSCOPIC  -     Full code; Standing  -     Place sequential compression device; Standing  -     Comprehensive metabolic panel; Future  -     CBC auto differential; Future  -     Place in Outpatient; Standing  -     EKG 12-lead; Future  -     X-Ray Chest 1 View; Future  -     Insert peripheral IV  -     Full code    Other orders  -     lidocaine (PF) 10 mg/ml (1%) injection 10 mg  -     0.9%  NaCl infusion  -     IP VTE LOW RISK PATIENT; Standing      Patient has large gallstone with associated frequent nausea and right upper quadrant pain. She will be scheduled for cholecystectomy Nov 22.       Impression/Treatment Plan: elizabeth luna I discussed the proposed procedures the  patient including risks, benefits, indications, alternatives and special concerns.  The patient appears to understand and agrees to go ahead with surgery.  I have made no promises, warranties or verbal agreements beyond what was discussed above.

## 2019-11-06 ENCOUNTER — TELEPHONE (OUTPATIENT)
Dept: FAMILY MEDICINE | Facility: CLINIC | Age: 84
End: 2019-11-06

## 2019-11-06 NOTE — TELEPHONE ENCOUNTER
Spoke with the pharmacist, they are going to fix the rx. The directions say 1 daily but directions are suppose to read tid. #90.

## 2019-11-06 NOTE — TELEPHONE ENCOUNTER
----- Message from Izabel Rodriguez sent at 11/6/2019  4:00 PM CST -----  Contact: pt  Pt said she saw Nimo last time (b/c Keenan was out -baby arrival)    She said she usually gets a 90 supply for her Alprazolam... But Nimo called in her 30 day supply. She she said didn't look at it written Rx (b/c its been the same for so long)    Wants to know If we can call in the other 60.    [phone]    [facility]

## 2019-11-11 ENCOUNTER — HOSPITAL ENCOUNTER (OUTPATIENT)
Dept: RADIOLOGY | Facility: HOSPITAL | Age: 84
Discharge: HOME | End: 2019-11-11
Attending: SURGERY
Payer: MEDICARE

## 2019-11-11 ENCOUNTER — HOSPITAL ENCOUNTER (OUTPATIENT)
Dept: PREADMISSION TESTING | Facility: HOSPITAL | Age: 84
Discharge: HOME | End: 2019-11-11
Attending: SURGERY
Payer: MEDICARE

## 2019-11-11 VITALS
RESPIRATION RATE: 16 BRPM | OXYGEN SATURATION: 94 % | SYSTOLIC BLOOD PRESSURE: 107 MMHG | BODY MASS INDEX: 30.84 KG/M2 | WEIGHT: 153 LBS | HEART RATE: 67 BPM | TEMPERATURE: 98 F | HEIGHT: 59 IN | DIASTOLIC BLOOD PRESSURE: 58 MMHG

## 2019-11-11 DIAGNOSIS — Z01.818 PRE-OP TESTING: Primary | ICD-10-CM

## 2019-11-11 DIAGNOSIS — K80.10 CHRONIC CALCULOUS CHOLECYSTITIS: ICD-10-CM

## 2019-11-11 PROCEDURE — 93005 ELECTROCARDIOGRAM TRACING: CPT

## 2019-11-11 PROCEDURE — 71046 X-RAY EXAM CHEST 2 VIEWS: CPT | Mod: TC

## 2019-11-11 NOTE — DISCHARGE INSTRUCTIONS
To confirm, Your doctor has instructed you that surgery is scheduled for:     Pre-Op will call the afternoon prior to surgery between 4:00 and 6:00 PM with the final arrival time.      Please report to [facility] on [address] the morning of surgery.     Do not eat or drink anything after midnight the night before your surgery - THIS INCLUDES  WATER, GUM, MINTS AND CANDY.  YOU MAY BRUSH YOUR TEETH BUT DO NOT SWALLOW     TAKE ONLY THESE MEDICATIONS WITH A SMALL SIP OF WATER THE MORNING OF YOUR PROCEDURE:      ONLY if you are diabetic, check your sugar in the morning before your procedure.       Do not take any diabetic medicines or insulin the morning of surgery .     PLEASE NOTE:  The surgery schedule has many variables which may affect the time of your surgery case.  Family members should be available if your surgery time changes.  Plan to be here the day of your procedure between 4-6 hours.      DO NOT TAKE THESE MEDICATIONS 5-7 DAYS PRIOR to your procedure or per your surgeon's request: ASPIRIN, ALEVE, ADVIL, IBUPROFEN,  GUSTAVO SELTZER, BC , FISH OIL , VITAMIN E, HERBALS  (May take Tylenol) PT WILL NOT TAKE NAPROSYN OR ASA PRODUCTS 7 DAYS PRIOR TO SURG       ONLY if you are prescribed any types of blood thinners such as:  Aspirin, Coumadin, Plavix, Pradaxa, Xarelto, Aggrenox, Effient, Eliquis, Savasya, Brilinta, or any other, ask your surgeon whether you should stop taking them and how long before surgery you should stop.  You may also need to verify with the prescribing physician if it is ok to stop your medication.                                                        IMPORTANT INSTRUCTIONS      · Do not smoke, vape or drink alcoholic beverages 24 hours prior to your procedure.  · Shower the night before AND the morning of your procedure with a Chlorhexidine wash such as Hibiclens or Dial antibacterial soap from the neck down.   ·  Do not get it on your face or in your eyes.  You may use your own shampoo  and face wash. This helps your skin to be as bacteria free as possible.   ·  DO NOT remove hair from the surgery site.  Do not shave the incision site unless you are given specific instructions to do so.    · Sleep in a bed with clean sheets.  Do not sleep with a pet in the bed.   · If you wear contact lenses, dentures, hearing aids or glasses, bring a container to put them in during surgery and give to a family member for safe keeping.    · Please leave all jewelry, piercing's and valuables at home.   · ONLY if you have been diagnosed with sleep apnea please bring your C-PAP machine.  · ONLY if you wear home oxygen please bring your portable oxygen tank the day of your procedure.   · ONLY for patients requiring bowel prep, written instructions will be given by your doctor's office.  · ONLY if you have a neuro stimulator, please bring the controller with you the morning of surgery  · ONLY if a type and screen test is needed before surgery, please return:  · If your doctor has scheduled you for an overnight stay, bring a small overnight bag with any personal items you need.    · Make arrangements in advance for transportation home by a responsible adult.      · You must make arrangements for transportation, TAXI'S, UBER'S OR LYFTS ARE NOT ALLOWED.        If you have any questions about these instructions, call Pre-Op Admit  Nursing at [phone] or the Pre-Op Day Surgery Unit at [phone].

## 2019-11-12 ENCOUNTER — TELEPHONE (OUTPATIENT)
Dept: FAMILY MEDICINE | Facility: CLINIC | Age: 84
End: 2019-11-12

## 2019-11-12 ENCOUNTER — ANESTHESIA EVENT (OUTPATIENT)
Dept: SURGERY | Facility: HOSPITAL | Age: 84
End: 2019-11-12
Payer: MEDICARE

## 2019-11-12 NOTE — PRE-PROCEDURE INSTRUCTIONS
Spoke with dr arreola (anesthesia dept) re ok - no new orders but would like note from her pulmonologist re surg ( dr tran)- will f/u tomorrow.

## 2019-11-12 NOTE — TELEPHONE ENCOUNTER
----- Message from Albertina Murphy sent at 11/12/2019  2:47 PM CST -----  Contact: Shanice cabrera/ [facility]  Wants to know if we have any old EKG on the patient, if so please fax them to # [phone] or call her [phone]

## 2019-11-13 ENCOUNTER — TELEPHONE (OUTPATIENT)
Dept: PULMONOLOGY | Facility: CLINIC | Age: 84
End: 2019-11-13

## 2019-11-13 NOTE — TELEPHONE ENCOUNTER
Changed appt date.    ----- Message from Deonte Wiseman sent at 11/13/2019  2:38 PM CST -----  Contact: pt  Type: Needs Medical Advice    Who Called:  pt    Best Call Back Number: [phone]  Additional Information: Pt would like to know if her appointment can be moved to 11/18/19 instead of coming in on 11/20/19. please call to advise.e

## 2019-11-13 NOTE — PRE-PROCEDURE INSTRUCTIONS
Spoke with madelyn crook's office re pulmonary clearance- she will fax request for clearance from dr tran.

## 2019-11-18 ENCOUNTER — OFFICE VISIT (OUTPATIENT)
Dept: PULMONOLOGY | Facility: CLINIC | Age: 84
End: 2019-11-18
Payer: MEDICARE

## 2019-11-18 VITALS
HEART RATE: 69 BPM | SYSTOLIC BLOOD PRESSURE: 120 MMHG | BODY MASS INDEX: 30.4 KG/M2 | WEIGHT: 150.81 LBS | OXYGEN SATURATION: 88 % | HEIGHT: 59 IN | DIASTOLIC BLOOD PRESSURE: 60 MMHG

## 2019-11-18 DIAGNOSIS — J44.89 COPD WITH ASTHMA: ICD-10-CM

## 2019-11-18 DIAGNOSIS — J43.1 PANLOBULAR EMPHYSEMA: ICD-10-CM

## 2019-11-18 DIAGNOSIS — J96.11 CHRONIC HYPOXEMIC RESPIRATORY FAILURE: Primary | ICD-10-CM

## 2019-11-18 PROCEDURE — 99213 PR OFFICE/OUTPT VISIT, EST, LEVL III, 20-29 MIN: ICD-10-PCS | Mod: S$PBB,,, | Performed by: INTERNAL MEDICINE

## 2019-11-18 PROCEDURE — 99213 OFFICE O/P EST LOW 20 MIN: CPT | Mod: S$PBB,,, | Performed by: INTERNAL MEDICINE

## 2019-11-18 PROCEDURE — 99214 OFFICE O/P EST MOD 30 MIN: CPT | Mod: PBBFAC,PO | Performed by: INTERNAL MEDICINE

## 2019-11-18 PROCEDURE — 99999 PR PBB SHADOW E&M-EST. PATIENT-LVL IV: ICD-10-PCS | Mod: PBBFAC,,, | Performed by: INTERNAL MEDICINE

## 2019-11-18 PROCEDURE — 99999 PR PBB SHADOW E&M-EST. PATIENT-LVL IV: CPT | Mod: PBBFAC,,, | Performed by: INTERNAL MEDICINE

## 2019-11-18 RX ORDER — PREDNISONE 20 MG/1
TABLET ORAL
Qty: 12 TABLET | Refills: 0 | Status: SHIPPED | OUTPATIENT
Start: 2019-11-18 | End: 2019-12-17

## 2019-11-18 RX ORDER — SPIRONOLACTONE 25 MG/1
TABLET ORAL
COMMUNITY
Start: 2019-11-14 | End: 2020-03-16 | Stop reason: SDUPTHER

## 2019-11-18 NOTE — LETTER
[facility] - Pulmonary   [facility] [address]  [place] [address]  Phone: [phone]  Fax: [phone]   2019    Dr Koch,    Re: Valerie Jones,  19    Pt has chronic lung disease in stable and optimal shape for cholecystectomy.  She has had low 02 sat on room air for last 3 yrs in upper 80's, and she uses oxygen occasionally.  Her fev1 was 90% in 2017.    Would request she take low dose prednisone, 20 mg daily on  and , then use ics (trelegy) to maximize lungs.    She is cleared for cholecystectomy from pulmonary perspective.    Thank You,       Davidson Mack MD  Pulmonary.

## 2019-11-18 NOTE — PROGRESS NOTES
11/18/2019    Valeriesukhdev Jones  Patient Seen Years Ago And Here For f/u    Chief Complaint   Patient presents with    surgical clearance    Shortness of Breath   copd and resp failure hypoxic are cc.      HPI:     Nov 18, 2019- needs to have cheryl, having usual cough/wheezes and ox sat 88%.   Pt was getting shots for psoirasis with subsequent leg swelling and was told to not take steroids.  Uses anoro only - no ics.  Has inogen- usually not taking with pt. rm air sat 89 oct 2019-   Sat 90 in oct 2018- sat 88 oct 2017.   Oct 21, 2019-no prednisone nor abx use last yr.  No recent cxr, for cholecystectomy soon.  Discussed with patient above for education the following:      Change anoro to trelegy once out anoro.     May use prednisone 20 mg daily for 3 days if bronchitis worsens.  Oct 16, 2018- inogen used for distances,  Breathing ok , no abx nor prednisone.  Follow-up in about 1 year (around 10/16/2019), or if symptoms worsen or fail to improve.    Discussed with patient above for education the following:      Change anoro to trelegy once out anoro.     May use prednisone 20 mg daily for 3 days if bronchitis worsens.  Dec 19, 2017-- c/o heavy 02 portable concentrator.  No mucous, sinus drip occ.  Uses meds as needed.  No prednisone or abx.  Got flu shot.  pft good    9/25/17 pt h/o asthma and copd, off ppd or less last 12 yrs, no daily lung meds, gets vera, smothery daily, medications in past helped but stopped as was ok.  Monitors 02 home but no home 02,  Uses albuterol in [facility] over  5 yrs. Doxycycline works, no recent steroids - ppt jumpy.  [place]ives [place] son, pt independent.      anoro works but not lasting, used for years.      The chief compliant  problem is varies with instablilty at time  PFSH:  Past Medical History:   Diagnosis Date    Anxiety     Arthritis     Asthma     COPD (chronic obstructive pulmonary disease)     Hypertension     Pain in the abdomen 2019    Thyroid disease          Past  "Surgical History:   Procedure Laterality Date     SECTION      x's2    SKIN CANCER EXCISION  2014    Dr.Weil     Social History     Tobacco Use    Smoking status: Former Smoker     Packs/day: 0.50     Years: 5.00     Pack years: 2.50     Last attempt to quit: 2/15/2005     Years since quittin.7    Smokeless tobacco: Never Used   Substance Use Topics    Alcohol use: No    Drug use: No     Family History   Problem Relation Age of Onset    Diabetes Mother     Depression Maternal Grandmother     Depression Maternal Grandfather      Review of patient's allergies indicates:   Allergen Reactions    Sulfa (sulfonamide antibiotics) Swelling    Penicillins        Performance Status:The patient's activity level is functions out of house.      Review of Systems:  a review of eleven systems covering constitutional, Eye, HEENT, Psych, Respiratory, Cardiac, GI, , Musculoskeletal, Endocrine, Dermatologic was negative except for pertinent findings as listed ABOVE and below: uses stool softener, nebulizer helps.     Exam:Comprehensive exam done. /60 (BP Location: Right arm, Patient Position: Sitting)   Pulse 69   Ht 4' 11" (1.499 m)   Wt 68.4 kg (150 lb 12.7 oz)   LMP 02/15/1980   SpO2 (!) 88% Comment: on room air  BMI 30.46 kg/m²   Exam included Vitals as listed, and patient's appearance and affect and alertness and mood, oral exam for yeast and hygiene and pharynx lesions and Mallapatti (M) score, neck with inspection for jvd and masses and thyroid abnormalities and lymph nodes (supraclavicular and infraclavicular nodes and axillary also examined and noted if abn), chest exam included symmetry and effort and fremitus and percussion and auscultation, cardiac exam included rhythm and gallops and murmur and rubs and jvd and edema, abdominal exam for mass and hepatosplenomegaly and tenderness and hernias and bowel sounds, Musculoskeletal exam with muscle tone and posture and mobility/gait and  " strength, and skin for rashes and cyanosis and pallor and turgor, extremity for clubbing.  Findings were normal except for pertinent findings listed below: younger than 85, M2, sl obese, good bs, symmetric,no distress, nl fremitus/percussion, cor nl,        Radiographs (ct chest and cxr) reviewed: view by direct vision  cxr Jan 2017 kyphosis with old compression fx.      Labs reviewed  eos sl up in 2012    PFT Pulmonary Functions, including spirometry and bronchodilator response and lung volumes and diffusion, study was done sept 27, 2017.  Spirometry shows no obstruction and normal vital capacity and no bronchodilator response.   FEV1 is 90% or 0.97 liters.  Lung volumes show  normal TLC and elevated residual volume.  Diffusion shows within normal range.    Plan:  Clinical impression is resonably certain and repeated evaluation prn +/- follow up will be needed as below.    There are no diagnoses linked to this encounter.    Follow up in about 1 year (around 11/18/2020), or if symptoms worsen or fail to improve.    Discussed with patient above for education the following:         Follow up in about 1 year (around 11/18/2020), or if symptoms worsen or fail to improve.    Discussed with patient above for education the following:      Patient Instructions   You are in stable and optimal shape for gall bladder removal by scope.      You do report some wheeze lately.      Would use trelegy as has steroid inhaled - doesn't get into system- in place of anoro.      Prednisone one daily for 2 days should further stabilize lungs.  May repeat if needed.  You can take low dose steroid pills.

## 2019-11-18 NOTE — PATIENT INSTRUCTIONS
You are in stable and optimal shape for gall bladder removal by scope.      You do report some wheeze lately.      Would use trelegy as has steroid inhaled - doesn't get into system- in place of anoro.      Prednisone one daily for 2 days should further stabilize lungs.  May repeat if needed.  You can take low dose steroid pills.

## 2019-11-22 ENCOUNTER — ANESTHESIA (OUTPATIENT)
Dept: SURGERY | Facility: HOSPITAL | Age: 84
End: 2019-11-22
Payer: MEDICARE

## 2019-11-22 ENCOUNTER — HOSPITAL ENCOUNTER (OUTPATIENT)
Facility: HOSPITAL | Age: 84
Discharge: HOME | End: 2019-11-22
Attending: SURGERY | Admitting: SURGERY
Payer: MEDICARE

## 2019-11-22 VITALS
SYSTOLIC BLOOD PRESSURE: 130 MMHG | RESPIRATION RATE: 14 BRPM | TEMPERATURE: 98 F | OXYGEN SATURATION: 98 % | WEIGHT: 152.13 LBS | HEART RATE: 72 BPM | BODY MASS INDEX: 30.67 KG/M2 | DIASTOLIC BLOOD PRESSURE: 64 MMHG | HEIGHT: 59 IN

## 2019-11-22 DIAGNOSIS — K80.10 CHRONIC CALCULOUS CHOLECYSTITIS: Primary | ICD-10-CM

## 2019-11-22 DIAGNOSIS — Z01.818 PRE-OP TESTING: ICD-10-CM

## 2019-11-22 LAB — POTASSIUM SERPL-SCNC: 2.8 MMOL/L (ref 3.5–5.1)

## 2019-11-22 PROCEDURE — 37000009 HC ANESTHESIA EA ADD 15 MINS: Performed by: SURGERY

## 2019-11-22 PROCEDURE — 36000708 HC OR TIME LEV III 1ST 15 MIN: Performed by: SURGERY

## 2019-11-22 PROCEDURE — 63600175 PHARM REV CODE 636 W HCPCS: Performed by: NURSE ANESTHETIST, CERTIFIED REGISTERED

## 2019-11-22 PROCEDURE — 84132 ASSAY OF SERUM POTASSIUM: CPT

## 2019-11-22 PROCEDURE — 63600175 PHARM REV CODE 636 W HCPCS: Performed by: SURGERY

## 2019-11-22 PROCEDURE — 88304 TISSUE EXAM BY PATHOLOGIST: CPT | Mod: TC

## 2019-11-22 PROCEDURE — 71000039 HC RECOVERY, EACH ADD'L HOUR: Performed by: SURGERY

## 2019-11-22 PROCEDURE — 37000008 HC ANESTHESIA 1ST 15 MINUTES: Performed by: SURGERY

## 2019-11-22 PROCEDURE — C9290 INJ, BUPIVACAINE LIPOSOME: HCPCS | Performed by: SURGERY

## 2019-11-22 PROCEDURE — 25000003 PHARM REV CODE 250: Performed by: SURGERY

## 2019-11-22 PROCEDURE — 27202107 HC XP QUATRO SENSOR: Performed by: STUDENT IN AN ORGANIZED HEALTH CARE EDUCATION/TRAINING PROGRAM

## 2019-11-22 PROCEDURE — 71000033 HC RECOVERY, INTIAL HOUR: Performed by: SURGERY

## 2019-11-22 PROCEDURE — 27201107 HC STYLET, STANDARD: Performed by: STUDENT IN AN ORGANIZED HEALTH CARE EDUCATION/TRAINING PROGRAM

## 2019-11-22 PROCEDURE — 25000003 PHARM REV CODE 250: Performed by: NURSE ANESTHETIST, CERTIFIED REGISTERED

## 2019-11-22 PROCEDURE — 63600175 PHARM REV CODE 636 W HCPCS: Performed by: STUDENT IN AN ORGANIZED HEALTH CARE EDUCATION/TRAINING PROGRAM

## 2019-11-22 PROCEDURE — 71000016 HC POSTOP RECOV ADDL HR: Performed by: SURGERY

## 2019-11-22 PROCEDURE — 27201423 OPTIME MED/SURG SUP & DEVICES STERILE SUPPLY: Performed by: SURGERY

## 2019-11-22 PROCEDURE — 47562 PR LAP,CHOLECYSTECTOMY: ICD-10-PCS | Mod: ,,, | Performed by: SURGERY

## 2019-11-22 PROCEDURE — 25000003 PHARM REV CODE 250: Performed by: STUDENT IN AN ORGANIZED HEALTH CARE EDUCATION/TRAINING PROGRAM

## 2019-11-22 PROCEDURE — 27000675 HC TUBING MICRODRIP: Performed by: STUDENT IN AN ORGANIZED HEALTH CARE EDUCATION/TRAINING PROGRAM

## 2019-11-22 PROCEDURE — 47562 LAPAROSCOPIC CHOLECYSTECTOMY: CPT | Mod: ,,, | Performed by: SURGERY

## 2019-11-22 PROCEDURE — 36000709 HC OR TIME LEV III EA ADD 15 MIN: Performed by: SURGERY

## 2019-11-22 PROCEDURE — 71000015 HC POSTOP RECOV 1ST HR: Performed by: SURGERY

## 2019-11-22 PROCEDURE — 27000080 OPTIME MED/SURG SUP & DEVICES GENERAL CLASSIFICATION: Performed by: SURGERY

## 2019-11-22 RX ORDER — LIDOCAINE HYDROCHLORIDE 20 MG/ML
JELLY TOPICAL
Status: DISCONTINUED | OUTPATIENT
Start: 2019-11-22 | End: 2019-11-22

## 2019-11-22 RX ORDER — LIDOCAINE HCL/PF 100 MG/5ML
SYRINGE (ML) INTRAVENOUS
Status: DISCONTINUED | OUTPATIENT
Start: 2019-11-22 | End: 2019-11-22

## 2019-11-22 RX ORDER — DEXAMETHASONE SODIUM PHOSPHATE 4 MG/ML
INJECTION, SOLUTION INTRA-ARTICULAR; INTRALESIONAL; INTRAMUSCULAR; INTRAVENOUS; SOFT TISSUE
Status: DISCONTINUED | OUTPATIENT
Start: 2019-11-22 | End: 2019-11-22

## 2019-11-22 RX ORDER — ACETAMINOPHEN 500 MG
1000 TABLET ORAL ONCE
Status: COMPLETED | OUTPATIENT
Start: 2019-11-22 | End: 2019-11-22

## 2019-11-22 RX ORDER — DIPHENHYDRAMINE HYDROCHLORIDE 50 MG/ML
12.5 INJECTION INTRAMUSCULAR; INTRAVENOUS
Status: DISCONTINUED | OUTPATIENT
Start: 2019-11-22 | End: 2019-11-22 | Stop reason: HOSPADM

## 2019-11-22 RX ORDER — SUCCINYLCHOLINE CHLORIDE 20 MG/ML
INJECTION INTRAMUSCULAR; INTRAVENOUS
Status: DISCONTINUED | OUTPATIENT
Start: 2019-11-22 | End: 2019-11-22

## 2019-11-22 RX ORDER — OXYCODONE HYDROCHLORIDE 5 MG/1
5 TABLET ORAL
Status: DISCONTINUED | OUTPATIENT
Start: 2019-11-22 | End: 2019-11-22 | Stop reason: HOSPADM

## 2019-11-22 RX ORDER — CEFOXITIN SODIUM 2 G/50ML
INJECTION, SOLUTION INTRAVENOUS
Status: DISCONTINUED | OUTPATIENT
Start: 2019-11-22 | End: 2019-11-22

## 2019-11-22 RX ORDER — SODIUM CHLORIDE 0.9 % (FLUSH) 0.9 %
10 SYRINGE (ML) INJECTION
Status: DISCONTINUED | OUTPATIENT
Start: 2019-11-22 | End: 2019-11-22 | Stop reason: HOSPADM

## 2019-11-22 RX ORDER — ONDANSETRON 2 MG/ML
4 INJECTION INTRAMUSCULAR; INTRAVENOUS DAILY PRN
Status: DISCONTINUED | OUTPATIENT
Start: 2019-11-22 | End: 2019-11-22 | Stop reason: HOSPADM

## 2019-11-22 RX ORDER — POTASSIUM CHLORIDE 7.45 MG/ML
10 INJECTION INTRAVENOUS
Status: COMPLETED | OUTPATIENT
Start: 2019-11-22 | End: 2019-11-22

## 2019-11-22 RX ORDER — SODIUM CHLORIDE, SODIUM LACTATE, POTASSIUM CHLORIDE, CALCIUM CHLORIDE 600; 310; 30; 20 MG/100ML; MG/100ML; MG/100ML; MG/100ML
INJECTION, SOLUTION INTRAVENOUS CONTINUOUS
Status: DISCONTINUED | OUTPATIENT
Start: 2019-11-22 | End: 2019-11-22 | Stop reason: HOSPADM

## 2019-11-22 RX ORDER — SODIUM CHLORIDE, SODIUM LACTATE, POTASSIUM CHLORIDE, CALCIUM CHLORIDE 600; 310; 30; 20 MG/100ML; MG/100ML; MG/100ML; MG/100ML
INJECTION, SOLUTION INTRAVENOUS CONTINUOUS PRN
Status: DISCONTINUED | OUTPATIENT
Start: 2019-11-22 | End: 2019-11-22

## 2019-11-22 RX ORDER — SODIUM CHLORIDE 9 MG/ML
INJECTION, SOLUTION INTRAVENOUS CONTINUOUS
Status: DISCONTINUED | OUTPATIENT
Start: 2019-11-22 | End: 2019-11-22 | Stop reason: HOSPADM

## 2019-11-22 RX ORDER — EPHEDRINE SULFATE 50 MG/ML
INJECTION, SOLUTION INTRAVENOUS
Status: DISCONTINUED | OUTPATIENT
Start: 2019-11-22 | End: 2019-11-22

## 2019-11-22 RX ORDER — ROCURONIUM BROMIDE 10 MG/ML
INJECTION, SOLUTION INTRAVENOUS
Status: DISCONTINUED | OUTPATIENT
Start: 2019-11-22 | End: 2019-11-22

## 2019-11-22 RX ORDER — GABAPENTIN 100 MG/1
100 CAPSULE ORAL ONCE
Status: COMPLETED | OUTPATIENT
Start: 2019-11-22 | End: 2019-11-22

## 2019-11-22 RX ORDER — FENTANYL CITRATE 50 UG/ML
INJECTION, SOLUTION INTRAMUSCULAR; INTRAVENOUS
Status: DISCONTINUED | OUTPATIENT
Start: 2019-11-22 | End: 2019-11-22

## 2019-11-22 RX ORDER — BUPIVACAINE HYDROCHLORIDE AND EPINEPHRINE 2.5; 5 MG/ML; UG/ML
INJECTION, SOLUTION EPIDURAL; INFILTRATION; INTRACAUDAL; PERINEURAL
Status: DISCONTINUED | OUTPATIENT
Start: 2019-11-22 | End: 2019-11-22 | Stop reason: HOSPADM

## 2019-11-22 RX ORDER — ONDANSETRON 2 MG/ML
INJECTION INTRAMUSCULAR; INTRAVENOUS
Status: DISCONTINUED | OUTPATIENT
Start: 2019-11-22 | End: 2019-11-22

## 2019-11-22 RX ORDER — MEPERIDINE HYDROCHLORIDE 50 MG/ML
12.5 INJECTION INTRAMUSCULAR; INTRAVENOUS; SUBCUTANEOUS EVERY 10 MIN PRN
Status: DISCONTINUED | OUTPATIENT
Start: 2019-11-22 | End: 2019-11-22 | Stop reason: HOSPADM

## 2019-11-22 RX ORDER — PROPOFOL 10 MG/ML
VIAL (ML) INTRAVENOUS
Status: DISCONTINUED | OUTPATIENT
Start: 2019-11-22 | End: 2019-11-22

## 2019-11-22 RX ORDER — HYDROMORPHONE HYDROCHLORIDE 1 MG/ML
0.2 INJECTION, SOLUTION INTRAMUSCULAR; INTRAVENOUS; SUBCUTANEOUS
Status: DISCONTINUED | OUTPATIENT
Start: 2019-11-22 | End: 2019-11-22 | Stop reason: HOSPADM

## 2019-11-22 RX ORDER — POTASSIUM CHLORIDE 7.45 MG/ML
INJECTION INTRAVENOUS
Status: DISCONTINUED | OUTPATIENT
Start: 2019-11-22 | End: 2019-11-22

## 2019-11-22 RX ADMIN — CEFOXITIN SODIUM 2 G: 2 INJECTION, SOLUTION INTRAVENOUS at 07:11

## 2019-11-22 RX ADMIN — DEXAMETHASONE SODIUM PHOSPHATE 4 MG: 4 INJECTION, SOLUTION INTRAMUSCULAR; INTRAVENOUS at 08:11

## 2019-11-22 RX ADMIN — ONDANSETRON 4 MG: 2 INJECTION INTRAMUSCULAR; INTRAVENOUS at 07:11

## 2019-11-22 RX ADMIN — HYDROMORPHONE HYDROCHLORIDE 0.2 MG: 1 INJECTION, SOLUTION INTRAMUSCULAR; INTRAVENOUS; SUBCUTANEOUS at 09:11

## 2019-11-22 RX ADMIN — EPHEDRINE SULFATE 15 MG: 50 INJECTION, SOLUTION INTRAVENOUS at 07:11

## 2019-11-22 RX ADMIN — LIDOCAINE HYDROCHLORIDE 60 MG: 20 INJECTION, SOLUTION INTRAVENOUS at 07:11

## 2019-11-22 RX ADMIN — POTASSIUM CHLORIDE 10 MEQ: 7.46 INJECTION, SOLUTION INTRAVENOUS at 07:11

## 2019-11-22 RX ADMIN — ROCURONIUM BROMIDE 25 MG: 10 INJECTION, SOLUTION INTRAVENOUS at 07:11

## 2019-11-22 RX ADMIN — SUCCINYLCHOLINE CHLORIDE 120 MG: 20 INJECTION, SOLUTION INTRAMUSCULAR; INTRAVENOUS at 07:11

## 2019-11-22 RX ADMIN — SODIUM CHLORIDE, SODIUM LACTATE, POTASSIUM CHLORIDE, AND CALCIUM CHLORIDE: .6; .31; .03; .02 INJECTION, SOLUTION INTRAVENOUS at 07:11

## 2019-11-22 RX ADMIN — ACETAMINOPHEN 1000 MG: 500 TABLET, FILM COATED ORAL at 06:11

## 2019-11-22 RX ADMIN — OXYCODONE HYDROCHLORIDE 5 MG: 5 TABLET ORAL at 08:11

## 2019-11-22 RX ADMIN — SUGAMMADEX 200 MG: 100 INJECTION, SOLUTION INTRAVENOUS at 08:11

## 2019-11-22 RX ADMIN — FENTANYL CITRATE 25 MCG: 50 INJECTION INTRAMUSCULAR; INTRAVENOUS at 07:11

## 2019-11-22 RX ADMIN — FENTANYL CITRATE 50 MCG: 50 INJECTION INTRAMUSCULAR; INTRAVENOUS at 07:11

## 2019-11-22 RX ADMIN — PROPOFOL 70 MG: 10 INJECTION, EMULSION INTRAVENOUS at 07:11

## 2019-11-22 RX ADMIN — ROCURONIUM BROMIDE 5 MG: 10 INJECTION, SOLUTION INTRAVENOUS at 07:11

## 2019-11-22 RX ADMIN — SODIUM CHLORIDE, SODIUM LACTATE, POTASSIUM CHLORIDE, AND CALCIUM CHLORIDE 100 ML: .6; .31; .03; .02 INJECTION, SOLUTION INTRAVENOUS at 07:11

## 2019-11-22 RX ADMIN — LIDOCAINE HYDROCHLORIDE 3 ML: 20 JELLY TOPICAL at 07:11

## 2019-11-22 RX ADMIN — GABAPENTIN 100 MG: 100 CAPSULE ORAL at 06:11

## 2019-11-22 NOTE — BRIEF OP NOTE
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 She was given general anesthesia and intubated. Abdomen was prepped and draped. She had previous midline operation.  To try to stay away from potential adhesions, I made a small LUQ incision and placed Veress needle through the abdominal wall. The abdomen was insufflated. I then entered the abdomen using the 5 mm Visiport.  There were no adhesions to the anterior abdominal wall. I placed another 5 mm port in the right anterior axillary line. I then placed a 12mm port just inferior to the umbilicus.  The camera was replaced to this port. I placed a 5 mm port in the right midclavicular line.  The gallbladder was grasped. There were some omental adhesions to the gallbladder that were taken down.  The dome of the gallbladder was grasped and retracted superiorly and the infundibulum was retracted laterally.  The cystic duct and cystic artery were dissected out 360°.  The cystic duct was clipped proximally and distally and transected with EndoShears. The cystic artery was clipped proximally and distally and divided with Endo shear. The gallbladder was taken off the liver bed with electrocautery. Gallbladder was removed with Endo Catch. There was no evidence of bleeding or leak at the end of the case.  All her ports were removed. The abdomen was deflated. Abdominal fascia closed with interrupted Ethibond. Skin sites were closed with 4 Monocryl. She was extubated and brought to recovery  room in stable condition.    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

## 2019-11-22 NOTE — ANESTHESIA POSTPROCEDURE EVALUATION
Anesthesia Post Evaluation    Patient: Valerie Jones    Procedure(s) Performed: Procedure(s) (LRB):  CHOLECYSTECTOMY, LAPAROSCOPIC (N/A)    Final Anesthesia Type: general    Patient location during evaluation: PACU  Patient participation: Yes- Able to Participate  Level of consciousness: awake and alert  Post-procedure vital signs: reviewed and stable  Pain management: adequate  Airway patency: patent    PONV status at discharge: No PONV  Anesthetic complications: no      Cardiovascular status: hemodynamically stable  Respiratory status: unassisted, spontaneous ventilation and room air  Hydration status: euvolemic  Follow-up not needed.          Vitals Value Taken Time   /58 11/22/2019  9:30 AM   Temp 36.4 °C (97.5 °F) 11/22/2019  9:30 AM   Pulse 73 11/22/2019  9:41 AM   Resp 27 11/22/2019  9:41 AM   SpO2 96 % 11/22/2019  9:41 AM   Vitals shown include unvalidated device data.      No case tracking events are documented in the log.      Pain/Arianna Score: Pain Rating Prior to Med Admin: 3 (11/22/2019  9:35 AM)  Arianna Score: 9 (11/22/2019  9:15 AM)

## 2019-11-22 NOTE — DISCHARGE SUMMARY
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    Patient Instructions:   Current Discharge Medication List      CONTINUE these medications which have NOT CHANGED    Details   albuterol sulfate (PROAIR RESPICLICK) 90 mcg/actuation AePB Inhale 2 puffs into the lungs every 4 (four) hours. Rescue2 puffs every 4 hours as needed for cough, wheeze, or shortness of breath  Qty: 1 each, Refills: 11    Associated Diagnoses: COPD with asthma      albuterol-ipratropium (DUO-NEB) 2.5 mg-0.5 mg/3 mL nebulizer solution Take 3 mLs by nebulization every 6 (six) hours as needed.  Qty: 120 vial, Refills: 11    Associated Diagnoses: COPD with asthma      ALPRAZolam (XANAX) 0.5 MG tablet Take 1 tablet (0.5 mg total) by mouth nightly as needed.  Qty: 90 tablet, Refills: 1    Associated Diagnoses: Primary insomnia      atenolol-chlorthalidone (TENORETIC) 50-25 mg Tab Take 1 tablet by mouth once daily.  Qty: 90 tablet, Refills: 1    Associated Diagnoses: Essential hypertension      furosemide (LASIX) 40 MG tablet Take 1 tablet (40 mg total) by mouth once daily.  Qty: 90 tablet, Refills: 1    Associated Diagnoses: Venous stasis of both lower extremities      guaifenesin-codeine 100-10 mg/5 ml (TUSSI-ORGANIDIN NR)  mg/5 mL syrup       HYDROcodone-acetaminophen (NORCO)  mg per tablet Take 1 tablet by mouth every 12 (twelve) hours as needed for Pain.  Qty: 60 tablet, Refills: 0     Comments: Quantity prescribed more than 7 day supply? Yes, quantity medically necessary  Associated Diagnoses: Primary osteoarthritis involving multiple joints      levothyroxine (SYNTHROID) 50 MCG tablet Take 1 tablet (50 mcg total) by mouth once daily.  Qty: 90 tablet, Refills: 1    Associated Diagnoses: Acquired hypothyroidism      omega-3 acid ethyl esters (LOVAZA) 1 gram capsule       pantoprazole (PROTONIX) 40 MG tablet Take 40 mg by mouth every morning.  Refills: 3      spironolactone (ALDACTONE) 25 MG tablet       azithromycin (ZITHROMAX) 500 MG tablet One daily for yellow mucous, repeat if needed  Qty: 3 tablet, Refills: 3      BENZONATATE (TESSALON PERLES ORAL) Take by mouth.        clidinium-chlordiazepoxide (LIBRAX) 5-2.5 mg per capsule Take 1 capsule by mouth before breakfast.       clobetasol 0.05% (TEMOVATE) 0.05 % Oint       fluocinonide (LIDEX) 0.05 % ointment fluocinonide 0.05 % topical ointment      fluticasone-umeclidin-vilanter (TRELEGY ELLIPTA) 100-62.5-25 mcg DsDv Inhale 1 puff into the lungs once daily.  Qty: 3 each, Refills: 3    Associated Diagnoses: COPD with asthma      naproxen (NAPROSYN) 375 MG tablet Take 375 mg by mouth 2 (two) times daily.       nystatin (MYCOSTATIN) powder nystatin 100,000 unit/gram topical powder      predniSONE (DELTASONE) 20 MG tablet One daily for 2 days and repeat for flare of lung symptoms as intructed  Qty: 12 tablet, Refills: 0         STOP taking these medications       cyclobenzaprine (FLEXERIL) 10 MG tablet Comments:   Reason for Stopping:               Discharge Procedure Orders   Diet Adult Regular     Lifting restrictions   Order Comments: No lifting over twenty pounds for six weeks     Notify your health care provider if you experience any of the following:  temperature >100.4     Notify your health care provider if you experience any of the following:  persistent nausea and vomiting or diarrhea     Notify your health care provider if you experience  any of the following:  redness, tenderness, or signs of infection (pain, swelling, redness, odor or green/yellow discharge around incision site)     Notify your health care provider if you experience any of the following:  increased confusion or weakness     Remove dressing in 48 hours     Shower on day dressing removed (No bath)

## 2019-11-22 NOTE — OP NOTE
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 She was given general anesthesia and intubated. Abdomen was prepped and draped. She had previous midline operation.  To try to stay away from potential adhesions, I made a small LUQ incision and placed Veress needle through the abdominal wall. The abdomen was insufflated. I then entered the abdomen using the 5 mm Visiport.  There were no adhesions to the anterior abdominal wall. I placed another 5 mm port in the right anterior axillary line. I then placed a 12mm port just inferior to the umbilicus.  The camera was replaced to this port. I placed a 5 mm port in the right midclavicular line.  The gallbladder was grasped. There were some omental adhesions to the gallbladder that were taken down.  The dome of the gallbladder was grasped and retracted superiorly and the infundibulum was retracted laterally.  The cystic duct and cystic artery were dissected out 360°.  The cystic duct was clipped proximally and distally and transected with EndoShears. The cystic artery was clipped proximally and distally and divided with Endo shear. The gallbladder was taken off the liver bed with electrocautery. Gallbladder was removed with Endo Catch. There was no evidence of bleeding or leak at the end of the case.  All her ports were removed. The abdomen was deflated. Abdominal fascia closed with interrupted Ethibond. Skin sites were closed with 4 Monocryl. She was extubated and brought to recovery room in stable condition.    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

## 2019-11-22 NOTE — ANESTHESIA PREPROCEDURE EVALUATION
2019  Valerie Jones is a 86 y.o., female.     Patient Active Problem List   Diagnosis    COPD with asthma    Chronic hypoxemic respiratory failure    Chronic obstructive pulmonary disease    Essential hypertension    Venous stasis of both lower extremities    Acquired hypothyroidism    Primary insomnia    Primary osteoarthritis involving multiple joints    Chronic calculous cholecystitis       Past Surgical History:   Procedure Laterality Date     SECTION      x's2    SKIN CANCER EXCISION      Dr.Weil        Tobacco Use:  The patient  reports that she quit smoking about 14 years ago. She has a 2.50 pack-year smoking history. She has never used smokeless tobacco.     Results for orders placed or performed during the hospital encounter of 19   EKG 12-lead    Collection Time: 19  2:40 PM    Narrative    Test Reason : Z01.818,    Vent. Rate : 068 BPM     Atrial Rate : 068 BPM     P-R Int : 166 ms          QRS Dur : 102 ms      QT Int : 456 ms       P-R-T Axes : 102 042 112 degrees     QTc Int : 484 ms    Normal sinus rhythm  Lateral infarct (cited on or before 2019)  Abnormal ECG  When compared with ECG of 2019 14:39,  No significant change was found  Confirmed by Zoran Chahal MD ([id number]) on 2019 6:18:43 PM    Referred By:  BOO           Confirmed By:Zoran Chahal MD             Lab Results   Component Value Date    WBC 8.60 2019    HGB 12.3 2019    HCT 38.3 2019    MCV 96 2019     2019     BMP  Lab Results   Component Value Date     2019    K 3.4 (L) 2019    CL 89 (L) 2019    CO2 34 (H) 2019    BUN 32 (H) 2019    CREATININE 1.5 (H) 2019    CALCIUM 9.1 2019    ANIONGAP 16 2019    ESTGFRAFRICA 36.1 (A) 2019    EGFRNONAA 31.3 (A) 2019            Anesthesia Evaluation    I have reviewed the Patient Summary Reports.     I have reviewed the Medications.     Review of Systems  Anesthesia Hx:  No problems with previous Anesthesia Denies Hx of Anesthetic complications  Denies Family Hx of Anesthesia complications.   Denies Personal Hx of Anesthesia complications.   Social:  Former Smoker    Hematology/Oncology:  Hematology Normal   Oncology Normal     EENT/Dental:EENT/Dental Normal   Cardiovascular:   Hypertension    Pulmonary:   COPD (Uses 3L NC at home prnUses 3 L NC at home prn, puilm clearance on chart) Asthma    Renal/:  Renal/ Normal     Hepatic/GI:  Hepatic/GI Normal    Musculoskeletal:   Arthritis     Neurological:  Neurology Normal    Endocrine:   Hypothyroidism    Psych:  Psychiatric Normal           Physical Exam  General:  Well nourished    Airway/Jaw/Neck:  Airway Findings: Mouth Opening: Small, but > 3cm Tongue: Normal  General Airway Assessment: Adult  Mallampati: II  TM Distance: Normal, at least 6 cm  Jaw/Neck Findings:  Neck ROM: Extension Decreased, Mild      Dental:  Dental Findings: Edentulous   Chest/Lungs:  Chest/Lungs Findings: Clear to auscultation, Normal Respiratory Rate     Heart/Vascular:  Heart Findings: Rate: Normal  Rhythm: Regular Rhythm  Sounds: Normal        Mental Status:  Mental Status Findings:  Alert and Oriented         Anesthesia Plan  Type of Anesthesia, risks & benefits discussed:  Anesthesia Type:  general  Patient's Preference:   Intra-op Monitoring Plan: standard ASA monitors  Intra-op Monitoring Plan Comments:   Post Op Pain Control Plan: multimodal analgesia  Post Op Pain Control Plan Comments:   Induction:   IV  Beta Blocker:  Patient is not currently on a Beta-Blocker (No further documentation required).       Informed Consent: Patient understands risks and agrees with Anesthesia plan.  Questions answered. Anesthesia consent signed with patient.  ASA Score: 3     Day of Surgery Review of History &  Physical:        Anesthesia Plan Notes: 100 mg gabapentin and 1g tylenol given in OPS.  Breathing treatment vs albuterol INH preinduction.   Glidescope available tfor intubation.  Reversal with sugamadex.          Ready For Surgery From Anesthesia Perspective.

## 2019-11-22 NOTE — TRANSFER OF CARE
"Anesthesia Transfer of Care Note    Patient: Valerie Jones    Procedure(s) Performed: Procedure(s) (LRB):  CHOLECYSTECTOMY, LAPAROSCOPIC (N/A)    Patient location: PACU    Anesthesia Type: general    Transport from OR: Transported from OR on room air with adequate spontaneous ventilation    Post pain: adequate analgesia    Post assessment: no apparent anesthetic complications    Post vital signs: stable    Level of consciousness: awake and alert    Nausea/Vomiting: no nausea/vomiting    Complications: none    Transfer of care protocol was followed      Last vitals:   Visit Vitals  BP (!) 147/64 (BP Location: Right arm, Patient Position: Lying)   Pulse 73   Temp 36.7 °C (98 °F) (Oral)   Resp 14   Ht 4' 11" (1.499 m)   Wt 69 kg (152 lb 1.9 oz)   LMP 02/15/1980   SpO2 96%   Breastfeeding? No   BMI 30.72 kg/m²     "

## 2019-11-22 NOTE — ANESTHESIA PROCEDURE NOTES
Intubation  Performed by: Reed Ryan CRNA  Authorized by: Arcenio Menendez MD     Intubation:     Induction:  Intravenous    Intubated:  Postinduction    Mask Ventilation:  Easy mask    Attempts:  1    Attempted By:  CRNA    Method of Intubation:  Direct    Blade:  Pope 2    Laryngeal View Grade: Grade I - full view of chords      Difficult Airway Encountered?: No      Complications:  None    Airway Device:  Oral endotracheal tube    Airway Device Size:  7.5    Style/Cuff Inflation:  Cuffed (inflated to minimal occlusive pressure)    Tube secured:  22    Secured at:  The lips    Placement Verified By:  Capnometry    Complicating Factors:  None    Findings Post-Intubation:  BS equal bilateral and atraumatic/condition of teeth unchanged

## 2019-12-04 ENCOUNTER — OFFICE VISIT (OUTPATIENT)
Dept: SURGERY | Facility: CLINIC | Age: 84
End: 2019-12-04
Payer: MEDICARE

## 2019-12-04 VITALS
HEART RATE: 74 BPM | BODY MASS INDEX: 30.64 KG/M2 | TEMPERATURE: 98 F | HEIGHT: 59 IN | WEIGHT: 152 LBS | SYSTOLIC BLOOD PRESSURE: 115 MMHG | DIASTOLIC BLOOD PRESSURE: 72 MMHG

## 2019-12-04 DIAGNOSIS — K80.10 CHRONIC CALCULOUS CHOLECYSTITIS: Primary | ICD-10-CM

## 2019-12-04 DIAGNOSIS — M15.9 PRIMARY OSTEOARTHRITIS INVOLVING MULTIPLE JOINTS: ICD-10-CM

## 2019-12-04 PROCEDURE — 99024 PR POST-OP FOLLOW-UP VISIT: ICD-10-PCS | Mod: POP,,, | Performed by: SURGERY

## 2019-12-04 PROCEDURE — 99024 POSTOP FOLLOW-UP VISIT: CPT | Mod: POP,,, | Performed by: SURGERY

## 2019-12-04 PROCEDURE — 99213 OFFICE O/P EST LOW 20 MIN: CPT | Performed by: SURGERY

## 2019-12-04 RX ORDER — HYDROCODONE BITARTRATE AND ACETAMINOPHEN 10; 325 MG/1; MG/1
1 TABLET ORAL EVERY 12 HOURS PRN
Qty: 60 TABLET | Refills: 0 | Status: SHIPPED | OUTPATIENT
Start: 2019-12-04 | End: 2019-12-17 | Stop reason: SDUPTHER

## 2019-12-11 NOTE — PROGRESS NOTES
Subjective:       Patient ID: Valerie Jones is a 86 y.o. female.    Chief Complaint: Post-op Evaluation (FU DOS 11/22/19 Lap Deborah )      HPI:  Patient is status post laparoscopic cholecystectomy.  She is feeling well. She has no complaints.  Pathology is benign.    Review of Systems    Objective:      Physical Exam   Constitutional: She is oriented to person, place, and time. She is cooperative. No distress.   Pulmonary/Chest: Effort normal. No respiratory distress.   Abdominal: Soft. She exhibits no distension. There is no tenderness. There is no rebound and no guarding. No hernia.   Neurological: She is alert and oriented to person, place, and time.   Skin:   Incisions are clean, dry and intact  There is no evidence of infection, hematoma or seroma        Assessment/Plan:   Chronic calculous cholecystitis      Status post cholecystectomy.  Path benign.  No evidence of any postoperative complications. Return to clinic p.r.n..  Follow up if symptoms worsen or fail to improve.

## 2019-12-17 ENCOUNTER — OFFICE VISIT (OUTPATIENT)
Dept: FAMILY MEDICINE | Facility: CLINIC | Age: 84
End: 2019-12-17
Payer: MEDICARE

## 2019-12-17 VITALS
HEART RATE: 76 BPM | DIASTOLIC BLOOD PRESSURE: 78 MMHG | SYSTOLIC BLOOD PRESSURE: 126 MMHG | BODY MASS INDEX: 30.84 KG/M2 | WEIGHT: 153 LBS | HEIGHT: 59 IN

## 2019-12-17 DIAGNOSIS — I87.8 VENOUS STASIS OF BOTH LOWER EXTREMITIES: ICD-10-CM

## 2019-12-17 DIAGNOSIS — M15.9 PRIMARY OSTEOARTHRITIS INVOLVING MULTIPLE JOINTS: ICD-10-CM

## 2019-12-17 DIAGNOSIS — Z90.49 STATUS POST LAPAROSCOPIC CHOLECYSTECTOMY: Primary | ICD-10-CM

## 2019-12-17 DIAGNOSIS — E03.9 ACQUIRED HYPOTHYROIDISM: ICD-10-CM

## 2019-12-17 DIAGNOSIS — F51.01 PRIMARY INSOMNIA: ICD-10-CM

## 2019-12-17 DIAGNOSIS — J44.89 COPD WITH ASTHMA: ICD-10-CM

## 2019-12-17 DIAGNOSIS — I10 ESSENTIAL HYPERTENSION: ICD-10-CM

## 2019-12-17 PROCEDURE — 1159F PR MEDICATION LIST DOCUMENTED IN MEDICAL RECORD: ICD-10-PCS | Mod: S$GLB,,, | Performed by: FAMILY MEDICINE

## 2019-12-17 PROCEDURE — 99214 OFFICE O/P EST MOD 30 MIN: CPT | Mod: S$GLB,,, | Performed by: FAMILY MEDICINE

## 2019-12-17 PROCEDURE — 99214 PR OFFICE/OUTPT VISIT, EST, LEVL IV, 30-39 MIN: ICD-10-PCS | Mod: S$GLB,,, | Performed by: FAMILY MEDICINE

## 2019-12-17 PROCEDURE — 1159F MED LIST DOCD IN RCRD: CPT | Mod: S$GLB,,, | Performed by: FAMILY MEDICINE

## 2019-12-17 RX ORDER — CYCLOBENZAPRINE HCL 10 MG
10 TABLET ORAL NIGHTLY
Qty: 30 TABLET | Refills: 1 | Status: SHIPPED | OUTPATIENT
Start: 2019-12-17 | End: 2019-12-27

## 2019-12-17 RX ORDER — HYDROCODONE BITARTRATE AND ACETAMINOPHEN 10; 325 MG/1; MG/1
1 TABLET ORAL EVERY 12 HOURS PRN
Qty: 60 TABLET | Refills: 0 | Status: SHIPPED | OUTPATIENT
Start: 2020-01-04 | End: 2020-02-28 | Stop reason: SDUPTHER

## 2019-12-17 RX ORDER — PHENAZOPYRIDINE HYDROCHLORIDE 100 MG/1
100 TABLET, FILM COATED ORAL 3 TIMES DAILY PRN
Qty: 30 TABLET | Refills: 2 | Status: SHIPPED | OUTPATIENT
Start: 2019-12-17 | End: 2019-12-27

## 2019-12-17 RX ORDER — LEVOTHYROXINE SODIUM 50 UG/1
50 TABLET ORAL DAILY
Qty: 90 TABLET | Refills: 1 | Status: SHIPPED | OUTPATIENT
Start: 2019-12-17 | End: 2020-03-16 | Stop reason: SDUPTHER

## 2019-12-17 RX ORDER — ALPRAZOLAM 0.5 MG/1
0.5 TABLET ORAL NIGHTLY PRN
Qty: 90 TABLET | Refills: 1 | Status: SHIPPED | OUTPATIENT
Start: 2019-12-17 | End: 2020-01-21 | Stop reason: SDUPTHER

## 2019-12-17 RX ORDER — FUROSEMIDE 20 MG/1
20 TABLET ORAL DAILY
Qty: 90 TABLET | Refills: 1 | Status: SHIPPED | OUTPATIENT
Start: 2019-12-17 | End: 2020-03-16 | Stop reason: SDUPTHER

## 2019-12-17 RX ORDER — HYDROCORTISONE ACETATE, PRAMOXINE HCL 2.5; 1 G/100G; G/100G
CREAM TOPICAL 3 TIMES DAILY
Qty: 30 G | Refills: 2 | Status: SHIPPED | OUTPATIENT
Start: 2019-12-17 | End: 2020-03-16 | Stop reason: SDUPTHER

## 2019-12-17 RX ORDER — HYDROCODONE BITARTRATE AND ACETAMINOPHEN 10; 325 MG/1; MG/1
1 TABLET ORAL EVERY 12 HOURS PRN
Qty: 60 TABLET | Refills: 0 | OUTPATIENT
Start: 2020-02-04 | End: 2020-03-09

## 2019-12-17 RX ORDER — ATENOLOL AND CHLORTHALIDONE TABLET 50; 25 MG/1; MG/1
1 TABLET ORAL DAILY
Qty: 90 TABLET | Refills: 1 | Status: SHIPPED | OUTPATIENT
Start: 2019-12-17 | End: 2020-03-16 | Stop reason: SDUPTHER

## 2019-12-17 RX ORDER — IPRATROPIUM BROMIDE AND ALBUTEROL SULFATE 2.5; .5 MG/3ML; MG/3ML
3 SOLUTION RESPIRATORY (INHALATION) EVERY 6 HOURS PRN
Qty: 120 VIAL | Refills: 11 | Status: SHIPPED | OUTPATIENT
Start: 2019-12-17 | End: 2020-03-16 | Stop reason: SDUPTHER

## 2019-12-17 NOTE — PROGRESS NOTES
SUBJECTIVE:    Patient ID: Valerie Jones is a 86 y.o. female.    Chief Complaint: Follow-up (bottles brought -ac )    This 86-year-old female who recently had a laparoscopic cholecystectomy with Dr. Grace.  Due to nausea she sought advice from Dr. Bah who found a very large gallstone in her gallbladder.  Her pathology report showed a gall stone to be approximately 7 cm in size.  She recovered well from the surgery and went home with a 1 day surgery.  Postoperative she is eating well and feels like her normal self.      Admission on 11/22/2019, Discharged on 11/22/2019   Component Date Value Ref Range Status    Potassium 11/22/2019 2.8* 3.5 - 5.1 mmol/L Final   Lab Visit on 11/11/2019   Component Date Value Ref Range Status    Sodium 11/11/2019 139  136 - 145 mmol/L Final    Potassium 11/11/2019 3.4* 3.5 - 5.1 mmol/L Final    Chloride 11/11/2019 89* 95 - 110 mmol/L Final    CO2 11/11/2019 34* 23 - 29 mmol/L Final    Glucose 11/11/2019 102  70 - 110 mg/dL Final    BUN, Bld 11/11/2019 32* 8 - 23 mg/dL Final    Creatinine 11/11/2019 1.5* 0.5 - 1.4 mg/dL Final    Calcium 11/11/2019 9.1  8.7 - 10.5 mg/dL Final    Total Protein 11/11/2019 7.4  6.0 - 8.4 g/dL Final    Albumin 11/11/2019 3.9  3.5 - 5.2 g/dL Final    Total Bilirubin 11/11/2019 0.9  0.1 - 1.0 mg/dL Final    Alkaline Phosphatase 11/11/2019 38* 55 - 135 U/L Final    AST 11/11/2019 23  10 - 40 U/L Final    ALT 11/11/2019 16  10 - 44 U/L Final    Anion Gap 11/11/2019 16  8 - 16 mmol/L Final    eGFR if  11/11/2019 36.1* >60 mL/min/1.73 m^2 Final    eGFR if non African American 11/11/2019 31.3* >60 mL/min/1.73 m^2 Final    WBC 11/11/2019 8.60  3.90 - 12.70 K/uL Final    RBC 11/11/2019 4.00  4.00 - 5.40 M/uL Final    Hemoglobin 11/11/2019 12.3  12.0 - 16.0 g/dL Final    Hematocrit 11/11/2019 38.3  37.0 - 48.5 % Final    Mean Corpuscular Volume 11/11/2019 96  82 - 98 fL Final    Mean Corpuscular Hemoglobin 11/11/2019  30.8  27.0 - 31.0 pg Final    Mean Corpuscular Hemoglobin Conc 2019 32.1  32.0 - 36.0 g/dL Final    RDW 2019 13.6  11.5 - 14.5 % Final    Platelets 2019 337  150 - 350 K/uL Final    MPV 2019 10.9  9.2 - 12.9 fL Final    Immature Granulocytes 2019 0.2  0.0 - 0.5 % Final    Gran # (ANC) 2019 5.4  1.8 - 7.7 K/uL Final    Immature Grans (Abs) 2019 0.02  0.00 - 0.04 K/uL Final    Lymph # 2019 2.3  1.0 - 4.8 K/uL Final    Mono # 2019 0.6  0.3 - 1.0 K/uL Final    Eos # 2019 0.2  0.0 - 0.5 K/uL Final    Baso # 2019 0.07  0.00 - 0.20 K/uL Final    nRBC 2019 0  0 /100 WBC Final    Gran% 2019 63.1  38.0 - 73.0 % Final    Lymph% 2019 26.9  18.0 - 48.0 % Final    Mono% 2019 7.1  4.0 - 15.0 % Final    Eosinophil% 2019 1.9  0.0 - 8.0 % Final    Basophil% 2019 0.8  0.0 - 1.9 % Final    Differential Method 2019 Automated   Final       Past Medical History:   Diagnosis Date    Anxiety     Arthritis     Asthma     COPD (chronic obstructive pulmonary disease)     Hypertension     Pain in the abdomen     Thyroid disease      Past Surgical History:   Procedure Laterality Date    CATARACT EXTRACTION, BILATERAL  2017    Dr Collins     SECTION      x's2    CHOLECYSTECTOMY      COLONOSCOPY  2009    ESOPHAGOGASTRODUODENOSCOPY (EGD) WITH DILATION      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  Tobacco History:  reports that she quit smoking about 14  years ago. She has a 2.50 pack-year smoking history. She has never used smokeless tobacco.  Drug History:  reports that she does not use drugs.    Review of patient's allergies indicates:   Allergen Reactions    Sulfa (sulfonamide antibiotics) Swelling    Androgenic anabolic steroid Swelling     Feet swelling     Aspirin Other (See Comments)     Upset stomach     Erythromycin Itching    Methylprednisolone Other (See Comments)    Penicillins Itching       Current Outpatient Medications:     ALPRAZolam (XANAX) 0.5 MG tablet, Take 1 tablet (0.5 mg total) by mouth nightly as needed., Disp: 90 tablet, Rfl: 1    atenolol-chlorthalidone (TENORETIC) 50-25 mg Tab, Take 1 tablet by mouth once daily., Disp: 90 tablet, Rfl: 1    [START ON 1/4/2020] HYDROcodone-acetaminophen (NORCO)  mg per tablet, Take 1 tablet by mouth every 12 (twelve) hours as needed for Pain., Disp: 60 tablet, Rfl: 0    [START ON 2/4/2020] HYDROcodone-acetaminophen (NORCO)  mg per tablet, Take 1 tablet by mouth every 12 (twelve) hours as needed for Pain., Disp: 60 tablet, Rfl: 0    levothyroxine (SYNTHROID) 50 MCG tablet, Take 1 tablet (50 mcg total) by mouth once daily., Disp: 90 tablet, Rfl: 1    spironolactone (ALDACTONE) 25 MG tablet, , Disp: , Rfl:     albuterol sulfate (PROAIR RESPICLICK) 90 mcg/actuation AePB, Inhale 2 puffs into the lungs every 4 (four) hours. Rescue2 puffs every 4 hours as needed for cough, wheeze, or shortness of breath, Disp: 1 each, Rfl: 11    albuterol-ipratropium (DUO-NEB) 2.5 mg-0.5 mg/3 mL nebulizer solution, Take 3 mLs by nebulization every 6 (six) hours as needed., Disp: 120 vial, Rfl: 11    cyclobenzaprine (FLEXERIL) 10 MG tablet, Take 1 tablet (10 mg total) by mouth every evening. for 10 days, Disp: 30 tablet, Rfl: 1    fluticasone-umeclidin-vilanter (TRELEGY ELLIPTA) 100-62.5-25 mcg DsDv, Inhale 1 puff into the lungs once daily., Disp: 3 each, Rfl: 3    furosemide (LASIX) 20 MG tablet, Take  "1 tablet (20 mg total) by mouth once daily., Disp: 90 tablet, Rfl: 1    naproxen (NAPROSYN) 375 MG tablet, Take 375 mg by mouth 2 (two) times daily. , Disp: , Rfl:     pantoprazole (PROTONIX) 40 MG tablet, Take 40 mg by mouth every morning., Disp: , Rfl: 3    phenazopyridine (PYRIDIUM) 100 MG tablet, Take 1 tablet (100 mg total) by mouth 3 (three) times daily as needed for Pain., Disp: 30 tablet, Rfl: 2    pramoxine-hydrocortisone cream, Apply topically 3 (three) times daily., Disp: 30 g, Rfl: 2    Review of Systems   Constitutional: Negative for appetite change, chills, fatigue, fever and unexpected weight change.   HENT: Negative for congestion, ear pain, sinus pain, sore throat and trouble swallowing.    Eyes: Negative for pain, discharge and visual disturbance.   Respiratory: Negative for apnea, cough, shortness of breath and wheezing.    Cardiovascular: Negative for chest pain, palpitations and leg swelling.   Gastrointestinal: Negative for abdominal pain, blood in stool, constipation, diarrhea, nausea and vomiting.   Endocrine: Negative for heat intolerance, polydipsia and polyuria.   Genitourinary: Negative for difficulty urinating, dyspareunia, dysuria, frequency, hematuria and menstrual problem.   Musculoskeletal: Negative for arthralgias, back pain, gait problem, joint swelling and myalgias.   Allergic/Immunologic: Negative for environmental allergies, food allergies and immunocompromised state.   Neurological: Negative for dizziness, tremors, seizures, numbness and headaches.   Psychiatric/Behavioral: Negative for behavioral problems, confusion, hallucinations and suicidal ideas. The patient is not nervous/anxious.           Objective:      Vitals:    12/17/19 1207   BP: 126/78   Pulse: 76   Weight: 69.4 kg (153 lb)   Height: 4' 11" (1.499 m)     Body mass index is 30.9 kg/m².  Physical Exam   Constitutional: She is oriented to person, place, and time. She appears well-developed and well-nourished. "   Elderly white female in no apparent distress   HENT:   Head: Normocephalic and atraumatic.   Right Ear: External ear normal.   Left Ear: External ear normal.   Nose: Nose normal.   Mouth/Throat: Oropharynx is clear and moist.   Eyes: Pupils are equal, round, and reactive to light. EOM are normal.   Neck: Normal range of motion. Neck supple. Carotid bruit is not present. No thyromegaly present.   Cardiovascular: Normal rate, regular rhythm, normal heart sounds and intact distal pulses.   No murmur heard.  Pulmonary/Chest: Effort normal and breath sounds normal. She has no wheezes. She has no rales.   Abdominal: Soft. Bowel sounds are normal. She exhibits no distension. There is no hepatosplenomegaly. There is no tenderness.   Laparoscopy sites have all healed well.  She has no abdominal pain or tenderness   Musculoskeletal: Normal range of motion. She exhibits no tenderness or deformity.        Lumbar back: Normal. She exhibits no pain and no spasm.   Bends 90 degrees at  waist   Lymphadenopathy:     She has no cervical adenopathy.   Neurological: She is alert and oriented to person, place, and time. No cranial nerve deficit. Coordination normal.   Skin: Skin is warm and dry. No rash noted.   Psychiatric: She has a normal mood and affect. Her behavior is normal. Judgment and thought content normal.   Nursing note and vitals reviewed.        Assessment:       1. Status post laparoscopic cholecystectomy    2. Primary osteoarthritis involving multiple joints    3. Acquired hypothyroidism    4. Venous stasis of both lower extremities    5. Essential hypertension    6. Primary insomnia    7. COPD with asthma         Plan:       Status post laparoscopic cholecystectomy  She is now back to normal activities and normal diet.  Primary osteoarthritis involving multiple joints  -     HYDROcodone-acetaminophen (NORCO)  mg per tablet; Take 1 tablet by mouth every 12 (twelve) hours as needed for Pain.  Dispense: 60 tablet;  Refill: 0  -     HYDROcodone-acetaminophen (NORCO)  mg per tablet; Take 1 tablet by mouth every 12 (twelve) hours as needed for Pain.  Dispense: 60 tablet; Refill: 0  Okay to refill her pain medications for the next 2 months  Acquired hypothyroidism  -     levothyroxine (SYNTHROID) 50 MCG tablet; Take 1 tablet (50 mcg total) by mouth once daily.  Dispense: 90 tablet; Refill: 1  Continue levothyroxine  Venous stasis of both lower extremities    Essential hypertension  -     atenolol-chlorthalidone (TENORETIC) 50-25 mg Tab; Take 1 tablet by mouth once daily.  Dispense: 90 tablet; Refill: 1  -     CBC auto differential; Future; Expected date: 12/17/2019  -     Comprehensive metabolic panel; Future; Expected date: 12/17/2019  -     Lipid panel; Future; Expected date: 12/17/2019  -     TSH w/reflex to FT4; Future; Expected date: 12/17/2019    Primary insomnia  -     ALPRAZolam (XANAX) 0.5 MG tablet; Take 1 tablet (0.5 mg total) by mouth nightly as needed.  Dispense: 90 tablet; Refill: 1    COPD with asthma  -     albuterol-ipratropium (DUO-NEB) 2.5 mg-0.5 mg/3 mL nebulizer solution; Take 3 mLs by nebulization every 6 (six) hours as needed.  Dispense: 120 vial; Refill: 11    Other orders  -     cyclobenzaprine (FLEXERIL) 10 MG tablet; Take 1 tablet (10 mg total) by mouth every evening. for 10 days  Dispense: 30 tablet; Refill: 1  -     phenazopyridine (PYRIDIUM) 100 MG tablet; Take 1 tablet (100 mg total) by mouth 3 (three) times daily as needed for Pain.  Dispense: 30 tablet; Refill: 2  -     furosemide (LASIX) 20 MG tablet; Take 1 tablet (20 mg total) by mouth once daily.  Dispense: 90 tablet; Refill: 1  -     pramoxine-hydrocortisone cream; Apply topically 3 (three) times daily.  Dispense: 30 g; Refill: 2      Follow up in about 3 months (around 3/17/2020) for [place]-hypertension.    labs to be ordered a [facility] for the next few months

## 2020-01-21 ENCOUNTER — TELEPHONE (OUTPATIENT)
Dept: PULMONOLOGY | Facility: CLINIC | Age: 85
End: 2020-01-21

## 2020-01-21 ENCOUNTER — TELEPHONE (OUTPATIENT)
Dept: FAMILY MEDICINE | Facility: CLINIC | Age: 85
End: 2020-01-21

## 2020-01-21 DIAGNOSIS — F51.01 PRIMARY INSOMNIA: ICD-10-CM

## 2020-01-21 RX ORDER — ALPRAZOLAM 0.5 MG/1
0.5 TABLET ORAL 3 TIMES DAILY PRN
Qty: 90 TABLET | Refills: 2 | Status: SHIPPED | OUTPATIENT
Start: 2020-01-21 | End: 2020-03-16 | Stop reason: SDUPTHER

## 2020-01-21 NOTE — TELEPHONE ENCOUNTER
----- Message from Albertina Murphy sent at 1/21/2020  3:05 PM CST -----  Contact: Rafaela Jones   Pt says that her Alprazolam script was messed up about a few months ago. She says she was getting 90 day supply but lately she's been getting 30. She take at least 2 a day , but it was decreased to 60. She says she would like to go back to her old prescription for 90 day or 30 day supply if she can please. She says if the nurse can give her a call to better explain the situation, that would be greatly appreciated  [facility]  Pt# [phone]

## 2020-01-21 NOTE — TELEPHONE ENCOUNTER
----- Message from Payal Hylton sent at 1/21/2020  3:31 PM CST -----  Contact: Valerie  The last time the patient got her alprazolam filled IT was a paper Rx # 30. It suppose to be # 60 can you call in the other 30 in . [facility] pts # [phone]

## 2020-01-21 NOTE — TELEPHONE ENCOUNTER
Patient will  an order for her nebulizer machine.      ----- Message from Deonte Wiseman sent at 1/21/2020  2:26 PM CST -----  Contact: pt  Type: Needs Medical Advice    Who Called:  pt    Best Call Back Number: [phone]  Additional Information: pt states that she needs a nebulizer/breathng machine  Ordered. Please call to advise.

## 2020-01-23 ENCOUNTER — TELEPHONE (OUTPATIENT)
Dept: FAMILY MEDICINE | Facility: CLINIC | Age: 85
End: 2020-01-23

## 2020-01-23 NOTE — TELEPHONE ENCOUNTER
Spoke with Marco A and she stated they did receive it but it can not be filled until Jan 28th. Spoke with pt and let her know the above pt stated okay thank you.

## 2020-01-23 NOTE — TELEPHONE ENCOUNTER
----- Message from Albertina Murphy sent at 1/23/2020 11:03 AM CST -----  Contact: Rafaela Jones  Pt says that she called the pharmacy yesterday and they told her that they still hadn't received the Alprazolam escript. Please re-send it over to [facility]   Pt# [phone]

## 2020-02-28 DIAGNOSIS — M15.9 PRIMARY OSTEOARTHRITIS INVOLVING MULTIPLE JOINTS: ICD-10-CM

## 2020-02-28 RX ORDER — HYDROCODONE BITARTRATE AND ACETAMINOPHEN 10; 325 MG/1; MG/1
1 TABLET ORAL EVERY 12 HOURS PRN
Qty: 60 TABLET | Refills: 0 | Status: SHIPPED | OUTPATIENT
Start: 2020-02-28 | End: 2020-03-09

## 2020-02-28 NOTE — TELEPHONE ENCOUNTER
----- Message from Izabel Ruiz sent at 2/28/2020 11:41 AM CST -----  Contact: pt  Pt needs refill on her hydocodone - sent to [facility]    [phone]

## 2020-03-05 ENCOUNTER — TELEPHONE (OUTPATIENT)
Dept: FAMILY MEDICINE | Facility: CLINIC | Age: 85
End: 2020-03-05

## 2020-03-09 ENCOUNTER — HOSPITAL ENCOUNTER (EMERGENCY)
Facility: HOSPITAL | Age: 85
Discharge: HOME | End: 2020-03-09
Attending: EMERGENCY MEDICINE
Payer: MEDICARE

## 2020-03-09 ENCOUNTER — TELEPHONE (OUTPATIENT)
Dept: FAMILY MEDICINE | Facility: CLINIC | Age: 85
End: 2020-03-09

## 2020-03-09 VITALS
RESPIRATION RATE: 20 BRPM | DIASTOLIC BLOOD PRESSURE: 66 MMHG | HEART RATE: 67 BPM | SYSTOLIC BLOOD PRESSURE: 142 MMHG | TEMPERATURE: 98 F | OXYGEN SATURATION: 93 %
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
## 2020-03-09 DIAGNOSIS — R07.81 RIB PAIN: ICD-10-CM

## 2020-03-09 DIAGNOSIS — S80.12XA CONTUSION OF LEFT LOWER LEG, INITIAL ENCOUNTER: ICD-10-CM

## 2020-03-09 PROCEDURE — 99284 EMERGENCY DEPT VISIT MOD MDM: CPT | Mod: 25

## 2020-03-09 PROCEDURE — 29505 APPLICATION LONG LEG SPLINT: CPT | Mod: LT

## 2020-03-09 RX ORDER — HYDROCODONE BITARTRATE AND ACETAMINOPHEN 5; 325 MG/1; MG/1
1 TABLET ORAL EVERY 4 HOURS PRN
Qty: 10 TABLET | Refills: 0 | Status: SHIPPED | OUTPATIENT
Start: 2020-03-09 | End: 2020-03-26

## 2020-03-09 NOTE — ED PROVIDER NOTES
Encounter Date: 3/9/2020       History     Chief Complaint   Patient presents with    Rib Injury     multiple rib fractures / Thursday        Time seen by provider: 6:37 PM on 2020    Valerie Jones is a 86 y.o. female with a PMHx of asthma, COPD and HTN who presents to the ED with an onset of left knee and left rib pain s/p a mechanical fall onto her concrete floor 5 days ago on . Patient went to Urgent Care [facility]TA for same complaint. She had a negative x-ray of the left knee but CXR showed 3 rib fractures. Patient was sent to the ED for a CT scan and further examination. Patient reports rib pain when taking a deep breath. Patient is able to bear weight on left leg with pain. Patient endorses taking 5mg of Lortab for pain with some relief. The patient denies any other symptoms at this time. No musculoskeletal PSHx.      The history is provided by the patient.     Review of patient's allergies indicates:   Allergen Reactions    Sulfa (sulfonamide antibiotics) Swelling    Androgenic anabolic steroid Swelling     Feet swelling     Aspirin Other (See Comments)     Upset stomach     Erythromycin Itching    Methylprednisolone Other (See Comments)    Penicillins Itching     Past Medical History:   Diagnosis Date    Anxiety     Arthritis     Asthma     COPD (chronic obstructive pulmonary disease)     Hypertension     Pain in the abdomen     Thyroid disease      Past Surgical History:   Procedure Laterality Date    CATARACT EXTRACTION, BILATERAL  2017    Dr Collins     SECTION      x's2    CHOLECYSTECTOMY      COLONOSCOPY  2009    ESOPHAGOGASTRODUODENOSCOPY (EGD) WITH DILATION      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    LAPAROSCOPIC CHOLECYSTECTOMY N/A 2019    Procedure: CHOLECYSTECTOMY, LAPAROSCOPIC;  Surgeon: Toney Grace III, MD;  Location: [facility];  Service: General;  Laterality: N/A;    SKIN CANCER EXCISION      Dr.Weil     Family History   Problem Relation Age of  Onset    Diabetes Mother     Depression Maternal Grandmother     Depression Maternal Grandfather      Social History     Tobacco Use    Smoking status: Former Smoker     Packs/day: 0.50     Years: 5.00     Pack years: 2.50     Last attempt to quit: 2/15/2005     Years since quitting: 15.0    Smokeless tobacco: Never Used   Substance Use Topics    Alcohol use: No    Drug use: No     Review of Systems   Constitutional: Negative for activity change, appetite change, chills and fever.   HENT: Negative for congestion, ear pain, rhinorrhea, sinus pressure, sinus pain, sneezing, sore throat and voice change.    Eyes: Negative for pain, discharge and redness.   Respiratory: Negative for cough, shortness of breath, wheezing and stridor.    Cardiovascular: Negative for chest pain, palpitations and leg swelling.   Gastrointestinal: Negative for abdominal distention, abdominal pain, blood in stool, constipation, diarrhea, nausea and vomiting.   Genitourinary: Negative for difficulty urinating, dysuria, flank pain, frequency, hematuria and urgency.   Musculoskeletal: Positive for arthralgias and gait problem. Negative for joint swelling and myalgias.   Skin: Negative for rash and wound.   Neurological: Negative for dizziness, syncope, facial asymmetry, speech difficulty, weakness, light-headedness, numbness and headaches.   Hematological: Negative for adenopathy.   Psychiatric/Behavioral: Negative.        Physical Exam     Initial Vitals [03/09/20 1829]   BP Pulse Resp Temp SpO2   (!) 142/66 67 20 98 °F (36.7 °C) (!) 93 %      MAP       --         Physical Exam    Nursing note and vitals reviewed.  Constitutional: She appears well-developed and well-nourished. She is active.   HENT:   Head: Normocephalic and atraumatic.   Right Ear: Tympanic membrane, external ear and ear canal normal. No drainage or tenderness. No mastoid tenderness. Tympanic membrane is not perforated, not erythematous and not bulging. No middle ear  effusion.   Left Ear: Tympanic membrane, external ear and ear canal normal. No drainage or tenderness. No mastoid tenderness. Tympanic membrane is not perforated, not erythematous and not bulging.  No middle ear effusion.   Nose: Nose normal. No rhinorrhea. Right sinus exhibits no maxillary sinus tenderness and no frontal sinus tenderness. Left sinus exhibits no maxillary sinus tenderness and no frontal sinus tenderness.   Mouth/Throat: Uvula is midline, oropharynx is clear and moist and mucous membranes are normal. No uvula swelling. No oropharyngeal exudate, posterior oropharyngeal edema or posterior oropharyngeal erythema.   Eyes: Conjunctivae, EOM and lids are normal. Pupils are equal, round, and reactive to light. Right eye exhibits no chemosis and no discharge. Left eye exhibits no chemosis and no discharge. Right conjunctiva is not injected. Left conjunctiva is not injected.   Neck: Trachea normal and normal range of motion. Neck supple. No stridor present. No tracheal deviation present. No neck rigidity.   Cardiovascular: Normal rate, regular rhythm, normal heart sounds and normal pulses. Exam reveals no distant heart sounds and no friction rub.    No murmur heard.  Pulmonary/Chest: Breath sounds normal. No stridor. She has no wheezes. She has no rhonchi. She has no rales. She exhibits tenderness.   Breath sounds clear bilaterally. Left lateral ribs tender to palpation.   Musculoskeletal: Normal range of motion.   Neurological: She is alert and oriented to person, place, and time. She has normal strength. GCS eye subscore is 4. GCS verbal subscore is 5. GCS motor subscore is 6.   Skin: Skin is warm, dry and intact. Capillary refill takes less than 2 seconds. Bruising noted. No rash noted.   Bruising noted to left lower leg.   Psychiatric: She has a normal mood and affect. Her speech is normal and behavior is normal. Thought content normal.         ED Course   Procedures  Labs Reviewed - No data to display        Imaging Results          CT Knee Without Contrast Left (Final result)  Result time 03/09/20 21:24:54    Final result by Negrito Haq MD (03/09/20 21:24:54)                 Impression:      No evidence of acute fracture or malalignment of the left knee.      Electronically signed by: Negrito Haq MD  Date:    03/09/2020  Time:    21:24             Narrative:    EXAMINATION:  CT KNEE WITHOUT CONTRAST LEFT    CLINICAL HISTORY:  Fracture, knee;    TECHNIQUE:  Axial CT scan of the left knee was performed without intravenous contrast.  Coronal and sagittal reformats were obtained.  3D reformats were also obtained and reviewed    COMPARISON:  X-ray of the left knee dated 03/09/2020.    FINDINGS:  The bone mineralization is within normal limits.  No cortical step-off is identified.  Specifically, region of the lateral tibial plateau, the bone is normal in morphology.    There is mild tricompartmental joint space narrowing.  No significant joint effusion is identified.  The quadriceps tendon appears unremarkable.  The patellar tendon is within normal limits.  The menisci appears normal in morphology.  The anterior cruciate and posterior cruciate ligaments appear unremarkable.  No significant translation of the tibia is identified.    The muscles appear normal in bulk.  The vessels are unremarkable.  No radiopaque foreign body is identified.                               X-Ray Tibia Fibula 2 View Left (Final result)  Result time 03/09/20 20:04:46    Final result by Negrito Haq MD (03/09/20 20:04:46)                 Impression:      Cortical step-off of the left lateral tibia plateau, suggestive of an acute nondisplaced fracture.      Electronically signed by: Negrito Haq MD  Date:    03/09/2020  Time:    20:04             Narrative:    EXAMINATION:  XR TIBIA FIBULA 2 VIEW LEFT    CLINICAL HISTORY:  Unspecified fall, initial encounter    TECHNIQUE:  AP and lateral views of the left tibia and fibula were  performed.    COMPARISON:  None.    FINDINGS:  The bone mineralization is within normal limits.  There is cortical step-off involving the left lateral tibia plateau.    The joint spaces are maintained.  The soft tissues are unremarkable.  No radiopaque foreign body is identified.    There is no evidence of a dislocation.                               X-Ray Knee 3 View Left (Final result)  Result time 03/09/20 20:03:29    Final result by Negrito Haq MD (03/09/20 20:03:29)                 Impression:      Cortical step-off involving the lateral tibial plateau of the left knee.  The findings represent an acute nondisplaced fracture.  Additional evaluation with MR may be obtained, as clinically warranted.      Electronically signed by: Negrito Haq MD  Date:    03/09/2020  Time:    20:03             Narrative:    EXAMINATION:  XR KNEE 3 VIEW LEFT    CLINICAL HISTORY:  Unspecified fall, initial encounter    TECHNIQUE:  AP, lateral, and Merchant views of the left knee were performed.    COMPARISON:  None    FINDINGS:  The bone mineralization is within normal limits.  There is minimal cortical step-off involving the lateral tibial plateau.  No additional cortical step-off is identified.    The joint spaces are maintained.  No joint effusion is present.  The soft tissues are unremarkable.  No radiopaque foreign body is identified.                               X-Ray Chest PA And Lateral (Final result)  Result time 03/09/20 19:50:27    Final result by Negrito Haq MD (03/09/20 19:50:27)                 Impression:      No acute process.      Electronically signed by: Negrito Haq MD  Date:    03/09/2020  Time:    19:50             Narrative:    EXAMINATION:  XR CHEST PA AND LATERAL    CLINICAL HISTORY:  Unspecified fall, initial encounter    TECHNIQUE:  PA and lateral views of the chest were performed.    COMPARISON:  11/11/2019.    FINDINGS:  The trachea is unremarkable.  The cardiomediastinal silhouette is upper limits of  normal.  The hilar structures are unremarkable.  The hemidiaphragms are within normal limits.  There is no evidence of free air beneath the hemidiaphragms.  There are no pleural effusions.  There is no evidence of a pneumothorax.  There is no evidence of pneumomediastinum.  No airspace opacity is present.  There are degenerative changes in the osseous structures.    There are postoperative changes in the right upper abdominal quadrant.                            (radiology reading, XRs visualized by me)     Medical Decision Making:   History:   Old Medical Records: I decided to obtain old medical records.  Differential Diagnosis:   Fracture  Dislocation  Sprain  Contusion  Strain  Spasm      Clinical Tests:   Radiological Study: Ordered and Reviewed       APC / Resident Notes:   Based upon the patient's thorough history and physical exam, I do not appreciate any severe injuries from their fall aside from musculoskeletal sprains and strains and contusions.  The patient has no signs of significant head injury, neurologic deficit, musculoskeletal deformities, acute abdomen, cardiopulmonary injury, or vascular deficit. Chest xray showed no acute findings. Xray of lower leg concerning for fracture however CT scan did not show that. I do not think the patient needs any further workup at this time.  I have given the patient specific return precautions as well as instructed them to follow up with their regular doctor or the one provided. I have discussed pt with Dr Clifford who evaluated pt and agrees with poc. Pt voices understanding and is agreeable to the plan.  She is given specific return precautions.            Scribe Attestation:   Scribe #1: I performed the above scribed service and the documentation accurately describes the services I performed. I attest to the accuracy of the note.    I, Dr. Rajendra Clifford, personally performed the services described in this documentation. All medical record entries made by  the scribe were at my direction and in my presence.  I have reviewed the chart and agree that the record reflects my personal performance and is accurate and complete. Rajendra Clifford MD.  10:05 PM 03/10/2020    Valerie Jones is a 86 y.o. female presenting with fall with multiple complaints.  Outpatient imaging shows rib fractures.  There is no sign of flail chest or any increased work of breathing here.  There is no tachypnea.  There is no paradoxical wall motion.  There is no chest wall crepitus.  Injury notably occurred multiple days prior.  Oxygen levels are normal.  There is no sign of pneumonia or pulmonary contusion.  I did discuss possible CT although I do not feel it is indicated today.  I have very low suspicion for acute life-threatening process, particularly in this patient who has done well multiple days out from her injury.  I doubt pneumothorax.  I have reviewed appropriate return precautions.  Further CT of the knee done to clarify subtle potential x-ray finding showing no nondisplaced tibial plateau fracture.  I do not think immobilization is indicated in doubt fracture.  Potential or meniscal ligamentous injuries in the knee discussed to be reassessed on outpatient with further referral to Orthopedics as decided by follow-up provider.  2+ DP PT pulses with 5/5 distal strength sensation.  Equal breath sounds bilaterally with normal work of breathing.  Occasional scattered rhonchi with otherwise clear lungs.  She has full active range of motion of the left knee with no effusion.  She does have ecchymosis to the knee and the associated tib-fib region.  There is mild, diffuse tenderness within this region.  Compartments are soft.  Detailed return precautions reviewed.          I, GLORIA Mcqueen, personally performed the services described in this documentation. All medical record entries made by the scribe were at my direction and in my presence.  I have reviewed the chart and agree that the  record reflects my personal performance and is accurate and complete. GLORIA Mcqueen.  1:06 AM 03/10/2020               Clinical Impression:       ICD-10-CM ICD-9-CM   1. Fall W19.XXXA E888.9   2. Rib pain R07.81 786.50   3. Contusion of left lower leg, initial encounter S80.12XA 924.10         Disposition:   Disposition: Discharged  Condition: Stable     ED Disposition Condition    Discharge Stable        ED Prescriptions     Medication Sig Dispense Start Date End Date Auth. Provider    HYDROcodone-acetaminophen (NORCO) 5-325 mg per tablet Take 1 tablet by mouth every 4 (four) hours as needed for Pain. 10 tablet 3/9/2020  Daiana Pearce NP        Follow-up Information     Follow up With Specialties Details Why Contact Info    Rajendra Devine PA-C Family Medicine Schedule an appointment as soon as possible for a visit   [address] [facility]  [address] [place] [id number]  [phone]      [facility] Emergency Medicine  As needed, If symptoms worsen [address] [place] [id number] [phone]                                     Daiana Pearce NP  03/10/20 0106

## 2020-03-09 NOTE — TELEPHONE ENCOUNTER
----- Message from Silvia Schaffer sent at 3/9/2020 11:41 AM CDT -----   pt had a fall at a grocery store 3-5-2020 hit left knee and ribs she is very bruised and is wanting to know if she can be seen for this as well but isnt sure because the store is liable.  cb # [phone]

## 2020-03-09 NOTE — TELEPHONE ENCOUNTER
Patient says she does not want to use her insurance to pay for imaging she feels that sh needs done. Says she fell  In a grocery store and is currently bruised and in pain but did not seek emergency care. She is currently waiting for the home office of store to contact her with further advise. Patient reports she dealt with pain since fall and has been using OTC analgesics.         Verified Lab orders had been placed in preparation for upcoming office visit.

## 2020-03-10 NOTE — DISCHARGE INSTRUCTIONS
Follow up with primary care doctor this week as scheduled. Return to ED for new or worsening symptoms.

## 2020-03-11 ENCOUNTER — HOSPITAL ENCOUNTER (OUTPATIENT)
Dept: RADIOLOGY | Facility: HOSPITAL | Age: 85
Discharge: HOME | End: 2020-03-11
Attending: NURSE PRACTITIONER
Payer: MEDICARE

## 2020-03-11 DIAGNOSIS — S22.39XA CLOSED FRACTURE OF ONE RIB: ICD-10-CM

## 2020-03-11 DIAGNOSIS — S22.39XA CLOSED FRACTURE OF ONE RIB: Primary | ICD-10-CM

## 2020-03-11 PROCEDURE — 71260 CT THORAX DX C+: CPT | Mod: TC

## 2020-03-11 PROCEDURE — 25500020 PHARM REV CODE 255: Performed by: NURSE PRACTITIONER

## 2020-03-11 RX ADMIN — IOHEXOL 100 ML: 350 INJECTION, SOLUTION INTRAVENOUS at 01:03

## 2020-03-12 ENCOUNTER — TELEPHONE (OUTPATIENT)
Dept: FAMILY MEDICINE | Facility: CLINIC | Age: 85
End: 2020-03-12

## 2020-03-12 LAB
ALBUMIN SERPL-MCNC: 4 G/DL (ref 3.6–5.1)
ALBUMIN/GLOB SERPL: 1.3 (CALC) (ref 1–2.5)
ALP SERPL-CCNC: 54 U/L (ref 37–153)
ALT SERPL-CCNC: 10 U/L (ref 6–29)
AST SERPL-CCNC: 17 U/L (ref 10–35)
BASOPHILS # BLD AUTO: 69 CELLS/UL (ref 0–200)
BASOPHILS NFR BLD AUTO: 0.8 %
BILIRUB SERPL-MCNC: 0.5 MG/DL (ref 0.2–1.2)
BUN SERPL-MCNC: 28 MG/DL (ref 7–25)
BUN/CREAT SERPL: 27 (CALC) (ref 6–22)
CALCIUM SERPL-MCNC: 9.3 MG/DL (ref 8.6–10.4)
CHLORIDE SERPL-SCNC: 91 MMOL/L (ref 98–110)
CHOLEST SERPL-MCNC: 195 MG/DL
CHOLEST/HDLC SERPL: 4.6 (CALC)
CO2 SERPL-SCNC: 38 MMOL/L (ref 20–32)
CREAT SERPL-MCNC: 1.05 MG/DL (ref 0.6–0.88)
EOSINOPHIL # BLD AUTO: 138 CELLS/UL (ref 15–500)
EOSINOPHIL NFR BLD AUTO: 1.6 %
ERYTHROCYTE [DISTWIDTH] IN BLOOD BY AUTOMATED COUNT: 13.5 % (ref 11–15)
GFRSERPLBLD MDRD-ARVRAT: 48 ML/MIN/1.73M2
GLOBULIN SER CALC-MCNC: 3 G/DL (CALC) (ref 1.9–3.7)
GLUCOSE SERPL-MCNC: 98 MG/DL (ref 65–99)
HCT VFR BLD AUTO: 35.4 % (ref 35–45)
HDLC SERPL-MCNC: 42 MG/DL
HGB BLD-MCNC: 11.7 G/DL (ref 11.7–15.5)
LDLC SERPL CALC-MCNC: 118 MG/DL (CALC)
LYMPHOCYTES # BLD AUTO: 3156 CELLS/UL (ref 850–3900)
LYMPHOCYTES NFR BLD AUTO: 36.7 %
MCH RBC QN AUTO: 30.5 PG (ref 27–33)
MCHC RBC AUTO-ENTMCNC: 33.1 G/DL (ref 32–36)
MCV RBC AUTO: 92.2 FL (ref 80–100)
MONOCYTES # BLD AUTO: 602 CELLS/UL (ref 200–950)
MONOCYTES NFR BLD AUTO: 7 %
NEUTROPHILS # BLD AUTO: 4635 CELLS/UL (ref 1500–7800)
NEUTROPHILS NFR BLD AUTO: 53.9 %
NONHDLC SERPL-MCNC: 153 MG/DL (CALC)
PLATELET # BLD AUTO: 362 THOUSAND/UL (ref 140–400)
PMV BLD REES-ECKER: 10 FL (ref 7.5–12.5)
POTASSIUM SERPL-SCNC: 3.1 MMOL/L (ref 3.5–5.3)
PROT SERPL-MCNC: 7 G/DL (ref 6.1–8.1)
RBC # BLD AUTO: 3.84 MILLION/UL (ref 3.8–5.1)
SODIUM SERPL-SCNC: 140 MMOL/L (ref 135–146)
TRIGL SERPL-MCNC: 230 MG/DL
TSH SERPL-ACNC: 3.65 MIU/L (ref 0.4–4.5)
WBC # BLD AUTO: 8.6 THOUSAND/UL (ref 3.8–10.8)

## 2020-03-16 DIAGNOSIS — E03.9 ACQUIRED HYPOTHYROIDISM: ICD-10-CM

## 2020-03-16 DIAGNOSIS — I10 ESSENTIAL HYPERTENSION: ICD-10-CM

## 2020-03-16 DIAGNOSIS — F51.01 PRIMARY INSOMNIA: ICD-10-CM

## 2020-03-16 DIAGNOSIS — J44.89 COPD WITH ASTHMA: ICD-10-CM

## 2020-03-16 RX ORDER — ATENOLOL AND CHLORTHALIDONE TABLET 50; 25 MG/1; MG/1
1 TABLET ORAL DAILY
Qty: 90 TABLET | Refills: 1 | Status: SHIPPED | OUTPATIENT
Start: 2020-03-16 | End: 2020-07-23 | Stop reason: SDUPTHER

## 2020-03-16 RX ORDER — LEVOTHYROXINE SODIUM 50 UG/1
50 TABLET ORAL DAILY
Qty: 90 TABLET | Refills: 1 | Status: SHIPPED | OUTPATIENT
Start: 2020-03-16 | End: 2020-06-16 | Stop reason: SDUPTHER

## 2020-03-16 RX ORDER — IPRATROPIUM BROMIDE AND ALBUTEROL SULFATE 2.5; .5 MG/3ML; MG/3ML
3 SOLUTION RESPIRATORY (INHALATION) EVERY 6 HOURS PRN
Qty: 120 VIAL | Refills: 1 | Status: SHIPPED | OUTPATIENT
Start: 2020-03-16 | End: 2022-07-11

## 2020-03-16 RX ORDER — NAPROXEN 375 MG/1
375 TABLET ORAL 2 TIMES DAILY
Qty: 60 TABLET | Refills: 0 | Status: SHIPPED | OUTPATIENT
Start: 2020-03-16 | End: 2020-05-22 | Stop reason: SDUPTHER

## 2020-03-16 RX ORDER — FUROSEMIDE 20 MG/1
20 TABLET ORAL DAILY
Qty: 90 TABLET | Refills: 1 | Status: SHIPPED | OUTPATIENT
Start: 2020-03-16 | End: 2020-03-19 | Stop reason: SDUPTHER

## 2020-03-16 RX ORDER — HYDROCORTISONE ACETATE, PRAMOXINE HCL 2.5; 1 G/100G; G/100G
CREAM TOPICAL 3 TIMES DAILY
Qty: 30 G | Refills: 2 | Status: SHIPPED | OUTPATIENT
Start: 2020-03-16 | End: 2020-06-18

## 2020-03-16 RX ORDER — ALPRAZOLAM 0.5 MG/1
0.5 TABLET ORAL 3 TIMES DAILY PRN
Qty: 90 TABLET | Refills: 2 | Status: SHIPPED | OUTPATIENT
Start: 2020-03-16 | End: 2020-06-18 | Stop reason: SDUPTHER

## 2020-03-16 RX ORDER — SPIRONOLACTONE 25 MG/1
25 TABLET ORAL DAILY
Qty: 90 TABLET | Refills: 1 | Status: SHIPPED | OUTPATIENT
Start: 2020-03-16 | End: 2020-06-18 | Stop reason: SDUPTHER

## 2020-03-16 RX ORDER — CHLORDIAZEPOXIDE HYDROCHLORIDE AND CLIDINIUM BROMIDE 5; 2.5 MG/1; MG/1
1 CAPSULE ORAL
Qty: 120 CAPSULE | Refills: 2 | Status: SHIPPED | OUTPATIENT
Start: 2020-03-16 | End: 2020-03-19 | Stop reason: SDUPTHER

## 2020-03-16 RX ORDER — PANTOPRAZOLE SODIUM 40 MG/1
40 TABLET, DELAYED RELEASE ORAL EVERY MORNING
Qty: 90 TABLET | Refills: 1 | Status: SHIPPED | OUTPATIENT
Start: 2020-03-16 | End: 2020-06-16 | Stop reason: SDUPTHER

## 2020-03-19 DIAGNOSIS — R60.9 EDEMA, UNSPECIFIED TYPE: Primary | ICD-10-CM

## 2020-03-19 DIAGNOSIS — Z90.49 STATUS POST LAPAROSCOPIC CHOLECYSTECTOMY: ICD-10-CM

## 2020-03-19 RX ORDER — CYCLOBENZAPRINE HCL 10 MG
10 TABLET ORAL DAILY PRN
Qty: 30 TABLET | Refills: 2 | Status: SHIPPED | OUTPATIENT
Start: 2020-03-19 | End: 2020-03-29

## 2020-03-19 RX ORDER — FUROSEMIDE 20 MG/1
20 TABLET ORAL DAILY
Qty: 90 TABLET | Refills: 1 | Status: SHIPPED | OUTPATIENT
Start: 2020-03-19 | End: 2020-03-23 | Stop reason: SDUPTHER

## 2020-03-19 RX ORDER — CHLORDIAZEPOXIDE HYDROCHLORIDE AND CLIDINIUM BROMIDE 5; 2.5 MG/1; MG/1
1 CAPSULE ORAL
Qty: 120 CAPSULE | Refills: 2 | Status: SHIPPED | OUTPATIENT
Start: 2020-03-19 | End: 2020-04-13 | Stop reason: SDUPTHER

## 2020-03-19 NOTE — TELEPHONE ENCOUNTER
----- Message from Albertina Murphy sent at 3/19/2020  2:24 PM CDT -----  Contact: Valerie Jones   Needs refill on furosemide , chlordiazepoxide-clidinium, and cyclobenzprine  Send to [facility]  Pt# [phone]

## 2020-03-23 DIAGNOSIS — R60.9 EDEMA, UNSPECIFIED TYPE: ICD-10-CM

## 2020-03-23 RX ORDER — FUROSEMIDE 40 MG/1
40 TABLET ORAL DAILY
Qty: 90 TABLET | Refills: 1 | Status: SHIPPED | OUTPATIENT
Start: 2020-03-23 | End: 2020-10-07 | Stop reason: SDUPTHER

## 2020-03-23 NOTE — TELEPHONE ENCOUNTER
----- Message from Indira Joe MA sent at 3/23/2020  3:02 PM CDT -----  Pt reports that her medication was called in incorrectly.  She states she takes Furosemide 40 mg tablets, however 20 mg tablets were sent in.    Advised pt she could take two, but she has requested a new Rx of 40 mg.    Pharmacy - [facility]    Pt - [phone]

## 2020-03-25 ENCOUNTER — TELEPHONE (OUTPATIENT)
Dept: FAMILY MEDICINE | Facility: CLINIC | Age: 85
End: 2020-03-25

## 2020-03-25 NOTE — TELEPHONE ENCOUNTER
"----- Message from Indira Joe MA sent at 3/25/2020  2:50 PM CDT -----  Pt is requesting an "in office" visit due to the fact she fell 3 weeks ago and her left leg is still hurting.    Pt - [phone] - H         [phone] - C  "

## 2020-03-26 ENCOUNTER — OFFICE VISIT (OUTPATIENT)
Dept: FAMILY MEDICINE | Facility: CLINIC | Age: 85
End: 2020-03-26
Payer: MEDICARE

## 2020-03-26 VITALS
SYSTOLIC BLOOD PRESSURE: 134 MMHG | BODY MASS INDEX: 30.44 KG/M2 | HEIGHT: 59 IN | DIASTOLIC BLOOD PRESSURE: 62 MMHG | TEMPERATURE: 98 F | HEART RATE: 76 BPM | WEIGHT: 151 LBS

## 2020-03-26 DIAGNOSIS — M25.562 ACUTE PAIN OF LEFT KNEE: Primary | ICD-10-CM

## 2020-03-26 DIAGNOSIS — E87.6 HYPOKALEMIA: ICD-10-CM

## 2020-03-26 DIAGNOSIS — L03.116 LEFT LEG CELLULITIS: ICD-10-CM

## 2020-03-26 DIAGNOSIS — S22.32XD CLOSED FRACTURE OF ONE RIB OF LEFT SIDE WITH ROUTINE HEALING, SUBSEQUENT ENCOUNTER: ICD-10-CM

## 2020-03-26 DIAGNOSIS — M15.9 PRIMARY OSTEOARTHRITIS INVOLVING MULTIPLE JOINTS: Primary | ICD-10-CM

## 2020-03-26 PROCEDURE — 99213 OFFICE O/P EST LOW 20 MIN: CPT | Mod: S$GLB,,, | Performed by: NURSE PRACTITIONER

## 2020-03-26 PROCEDURE — 99213 PR OFFICE/OUTPT VISIT, EST, LEVL III, 20-29 MIN: ICD-10-PCS | Mod: S$GLB,,, | Performed by: NURSE PRACTITIONER

## 2020-03-26 RX ORDER — HYDROCODONE BITARTRATE AND ACETAMINOPHEN 10; 325 MG/1; MG/1
1 TABLET ORAL EVERY 4 HOURS PRN
COMMUNITY
End: 2020-03-26 | Stop reason: SDUPTHER

## 2020-03-26 RX ORDER — DOXYCYCLINE 100 MG/1
100 CAPSULE ORAL EVERY 12 HOURS
Qty: 14 CAPSULE | Refills: 0 | Status: SHIPPED | OUTPATIENT
Start: 2020-03-26 | End: 2020-06-18

## 2020-03-26 RX ORDER — POTASSIUM CHLORIDE 750 MG/1
10 CAPSULE, EXTENDED RELEASE ORAL DAILY
Qty: 30 CAPSULE | Refills: 3 | Status: SHIPPED | OUTPATIENT
Start: 2020-03-26 | End: 2020-06-18 | Stop reason: SDUPTHER

## 2020-03-26 RX ORDER — HYDROCODONE BITARTRATE AND ACETAMINOPHEN 10; 325 MG/1; MG/1
1 TABLET ORAL EVERY 4 HOURS PRN
Qty: 60 TABLET | Refills: 0 | Status: SHIPPED | OUTPATIENT
Start: 2020-03-26 | End: 2020-04-23 | Stop reason: SDUPTHER

## 2020-03-26 RX ORDER — MUPIROCIN 20 MG/G
OINTMENT TOPICAL 2 TIMES DAILY
Qty: 22 G | Refills: 1 | Status: SHIPPED | OUTPATIENT
Start: 2020-03-26 | End: 2022-07-02 | Stop reason: CLARIF

## 2020-03-26 NOTE — PROGRESS NOTES
SUBJECTIVE:    Patient ID: Valerie Jones is a 86 y.o. female.    Chief Complaint: Fall (x3 weeks, no bottles// SW)    Patient here reporting fall 3 weeks ago.  Patient states she was walking and got her toe caught up on the carpet and fell over onto her left side.  She denies any head trauma or loss of consciousness.  She struck her left leg as well as left lateral chest on the concrete floor.  Initially was seen in urgent care with x-rays and then referred to ER.  CT of the chest revealed nondisplaced left 4th rib fracture.  X-rays of the left knee tib fib suggested a possible left lateral nondisplaced tibial fracture however CT of the left knee did not show any fractures.  Patient admits overall her pain to the both the left chest and her left leg have improved.  She continues to have pain from left knee and anterior shin and has ecchymosis spreading to the left lower leg.  Has also developed increasing redness to the left ankle area.  No significant swelling to the left leg.  She states pain has improved since initially after the fall though still will hurt rather severely with certain movements of the leg.  She is ambulating without assistance      Lab Visit on 03/11/2020   Component Date Value Ref Range Status    Creatinine 03/11/2020 1.0  0.5 - 1.4 mg/dL Final    eGFR if African American 03/11/2020 58.9* >60 mL/min/1.73 m^2 Final    eGFR if non African American 03/11/2020 51.1* >60 mL/min/1.73 m^2 Final   Office Visit on 12/17/2019   Component Date Value Ref Range Status    WBC 03/11/2020 8.6  3.8 - 10.8 Thousand/uL Final    RBC 03/11/2020 3.84  3.80 - 5.10 Million/uL Final    Hemoglobin 03/11/2020 11.7  11.7 - 15.5 g/dL Final    Hematocrit 03/11/2020 35.4  35.0 - 45.0 % Final    Mean Corpuscular Volume 03/11/2020 92.2  80.0 - 100.0 fL Final    Mean Corpuscular Hemoglobin 03/11/2020 30.5  27.0 - 33.0 pg Final    Mean Corpuscular Hemoglobin Conc 03/11/2020 33.1  32.0 - 36.0 g/dL Final    RDW  03/11/2020 13.5  11.0 - 15.0 % Final    Platelets 03/11/2020 362  140 - 400 Thousand/uL Final    MPV 03/11/2020 10.0  7.5 - 12.5 fL Final    Neutrophils Absolute 03/11/2020 4,635  1,500 - 7,800 cells/uL Final    Lymph # 03/11/2020 3,156  850 - 3,900 cells/uL Final    Mono # 03/11/2020 602  200 - 950 cells/uL Final    Eos # 03/11/2020 138  15 - 500 cells/uL Final    Baso # 03/11/2020 69  0 - 200 cells/uL Final    Neutrophils Relative 03/11/2020 53.9  % Final    Lymph% 03/11/2020 36.7  % Final    Mono% 03/11/2020 7.0  % Final    Eosinophil% 03/11/2020 1.6  % Final    Basophil% 03/11/2020 0.8  % Final    Glucose 03/11/2020 98  65 - 99 mg/dL Final    BUN, Bld 03/11/2020 28* 7 - 25 mg/dL Final    Creatinine 03/11/2020 1.05* 0.60 - 0.88 mg/dL Final    eGFR if non African American 03/11/2020 48* > OR = 60 mL/min/1.73m2 Final    eGFR if African American 03/11/2020 56* > OR = 60 mL/min/1.73m2 Final    BUN/Creatinine Ratio 03/11/2020 27* 6 - 22 (calc) Final    Sodium 03/11/2020 140  135 - 146 mmol/L Final    Potassium 03/11/2020 3.1* 3.5 - 5.3 mmol/L Final    Chloride 03/11/2020 91* 98 - 110 mmol/L Final    CO2 03/11/2020 38* 20 - 32 mmol/L Final    Calcium 03/11/2020 9.3  8.6 - 10.4 mg/dL Final    Total Protein 03/11/2020 7.0  6.1 - 8.1 g/dL Final    Albumin 03/11/2020 4.0  3.6 - 5.1 g/dL Final    Globulin, Total 03/11/2020 3.0  1.9 - 3.7 g/dL (calc) Final    Albumin/Globulin Ratio 03/11/2020 1.3  1.0 - 2.5 (calc) Final    Total Bilirubin 03/11/2020 0.5  0.2 - 1.2 mg/dL Final    Alkaline Phosphatase 03/11/2020 54  37 - 153 U/L Final    AST 03/11/2020 17  10 - 35 U/L Final    ALT 03/11/2020 10  6 - 29 U/L Final    Cholesterol 03/11/2020 195  <200 mg/dL Final    HDL 03/11/2020 42* > OR = 50 mg/dL Final    Triglycerides 03/11/2020 230* <150 mg/dL Final    LDL Cholesterol 03/11/2020 118* mg/dL (calc) Final    Hdl/Cholesterol Ratio 03/11/2020 4.6  <5.0 (calc) Final    Non HDL Chol. (LDL+VLDL)  03/11/2020 153* <130 mg/dL (calc) Final    TSH w/reflex to FT4 03/11/2020 3.65  0.40 - 4.50 mIU/L Final   Admission on 11/22/2019, Discharged on 11/22/2019   Component Date Value Ref Range Status    Potassium 11/22/2019 2.8* 3.5 - 5.1 mmol/L Final   Lab Visit on 11/11/2019   Component Date Value Ref Range Status    Sodium 11/11/2019 139  136 - 145 mmol/L Final    Potassium 11/11/2019 3.4* 3.5 - 5.1 mmol/L Final    Chloride 11/11/2019 89* 95 - 110 mmol/L Final    CO2 11/11/2019 34* 23 - 29 mmol/L Final    Glucose 11/11/2019 102  70 - 110 mg/dL Final    BUN, Bld 11/11/2019 32* 8 - 23 mg/dL Final    Creatinine 11/11/2019 1.5* 0.5 - 1.4 mg/dL Final    Calcium 11/11/2019 9.1  8.7 - 10.5 mg/dL Final    Total Protein 11/11/2019 7.4  6.0 - 8.4 g/dL Final    Albumin 11/11/2019 3.9  3.5 - 5.2 g/dL Final    Total Bilirubin 11/11/2019 0.9  0.1 - 1.0 mg/dL Final    Alkaline Phosphatase 11/11/2019 38* 55 - 135 U/L Final    AST 11/11/2019 23  10 - 40 U/L Final    ALT 11/11/2019 16  10 - 44 U/L Final    Anion Gap 11/11/2019 16  8 - 16 mmol/L Final    eGFR if  11/11/2019 36.1* >60 mL/min/1.73 m^2 Final    eGFR if non African American 11/11/2019 31.3* >60 mL/min/1.73 m^2 Final    WBC 11/11/2019 8.60  3.90 - 12.70 K/uL Final    RBC 11/11/2019 4.00  4.00 - 5.40 M/uL Final    Hemoglobin 11/11/2019 12.3  12.0 - 16.0 g/dL Final    Hematocrit 11/11/2019 38.3  37.0 - 48.5 % Final    Mean Corpuscular Volume 11/11/2019 96  82 - 98 fL Final    Mean Corpuscular Hemoglobin 11/11/2019 30.8  27.0 - 31.0 pg Final    Mean Corpuscular Hemoglobin Conc 11/11/2019 32.1  32.0 - 36.0 g/dL Final    RDW 11/11/2019 13.6  11.5 - 14.5 % Final    Platelets 11/11/2019 337  150 - 350 K/uL Final    MPV 11/11/2019 10.9  9.2 - 12.9 fL Final    Immature Granulocytes 11/11/2019 0.2  0.0 - 0.5 % Final    Gran # (ANC) 11/11/2019 5.4  1.8 - 7.7 K/uL Final    Immature Grans (Abs) 11/11/2019 0.02  0.00 - 0.04 K/uL Final     Lymph # 2019 2.3  1.0 - 4.8 K/uL Final    Mono # 2019 0.6  0.3 - 1.0 K/uL Final    Eos # 2019 0.2  0.0 - 0.5 K/uL Final    Baso # 2019 0.07  0.00 - 0.20 K/uL Final    nRBC 2019 0  0 /100 WBC Final    Gran% 2019 63.1  38.0 - 73.0 % Final    Lymph% 2019 26.9  18.0 - 48.0 % Final    Mono% 2019 7.1  4.0 - 15.0 % Final    Eosinophil% 2019 1.9  0.0 - 8.0 % Final    Basophil% 2019 0.8  0.0 - 1.9 % Final    Differential Method 2019 Automated   Final       Past Medical History:   Diagnosis Date    Anxiety     Arthritis     Asthma     COPD (chronic obstructive pulmonary disease)     Hypertension     Pain in the abdomen     Thyroid disease      Past Surgical History:   Procedure Laterality Date    CATARACT EXTRACTION, BILATERAL      Dr Collins     SECTION      x's2    CHOLECYSTECTOMY      COLONOSCOPY      ESOPHAGOGASTRODUODENOSCOPY (EGD) WITH DILATION      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    LAPAROSCOPIC CHOLECYSTECTOMY N/A 2019    Procedure: CHOLECYSTECTOMY, LAPAROSCOPIC;  Surgeon: Toney Grace III, MD;  Location[facility];  Service: General;  Laterality: N/A;    SKIN CANCER EXCISION      Dr.Weil           Alcohol History:  reports that she does not drink alcohol.  Tobacco History:  reports that she quit smoking about 15 years ago. She has a 2.50 pack-year smoking history. She has never used smokeless tobacco.      Review of patient's allergies indicates:   Allergen Reactions    Sulfa (sulfonamide antibiotics) Swelling    Androgenic anabolic steroid Swelling     Feet swelling     Aspirin Other (See Comments)     Upset stomach     Erythromycin Itching    Methylprednisolone Other (See Comments)    Penicillins Itching       Current Outpatient Medications:     albuterol sulfate (PROAIR RESPICLICK) 90 mcg/actuation AePB, Inhale 2 puffs into the lungs every 4 (four) hours. Rescue2 puffs every 4 hours as  needed for cough, wheeze, or shortness of breath, Disp: 1 each, Rfl: 2    albuterol-ipratropium (DUO-NEB) 2.5 mg-0.5 mg/3 mL nebulizer solution, Take 3 mLs by nebulization every 6 (six) hours as needed., Disp: 120 vial, Rfl: 1    ALPRAZolam (XANAX) 0.5 MG tablet, Take 1 tablet (0.5 mg total) by mouth 3 (three) times daily as needed., Disp: 90 tablet, Rfl: 2    atenoloL-chlorthalidone (TENORETIC) 50-25 mg Tab, Take 1 tablet by mouth once daily., Disp: 90 tablet, Rfl: 1    chlordiazepoxide-clidinium 5-2.5 mg (LIBRAX) 5-2.5 mg Cap, Take 1 capsule by mouth 4 (four) times daily before meals and nightly., Disp: 120 capsule, Rfl: 2    cyclobenzaprine (FLEXERIL) 10 MG tablet, Take 1 tablet (10 mg total) by mouth daily as needed for Muscle spasms., Disp: 30 tablet, Rfl: 2    doxycycline (VIBRAMYCIN) 100 MG Cap, Take 1 capsule (100 mg total) by mouth every 12 (twelve) hours., Disp: 14 capsule, Rfl: 0    fluticasone-umeclidin-vilanter (TRELEGY ELLIPTA) 100-62.5-25 mcg DsDv, Inhale 1 puff into the lungs once daily., Disp: 3 each, Rfl: 3    furosemide (LASIX) 40 MG tablet, Take 1 tablet (40 mg total) by mouth once daily., Disp: 90 tablet, Rfl: 1    HYDROcodone-acetaminophen (NORCO)  mg per tablet, Take 1 tablet by mouth every 4 (four) hours as needed for Pain., Disp: , Rfl:     levothyroxine (SYNTHROID) 50 MCG tablet, Take 1 tablet (50 mcg total) by mouth once daily., Disp: 90 tablet, Rfl: 1    mupirocin (BACTROBAN) 2 % ointment, Apply topically 2 (two) times daily., Disp: 22 g, Rfl: 1    naproxen (NAPROSYN) 375 MG tablet, Take 1 tablet (375 mg total) by mouth 2 (two) times daily., Disp: 60 tablet, Rfl: 0    pantoprazole (PROTONIX) 40 MG tablet, Take 1 tablet (40 mg total) by mouth every morning., Disp: 90 tablet, Rfl: 1    potassium chloride (MICRO-K) 10 MEQ CpSR, Take 1 capsule (10 mEq total) by mouth once daily., Disp: 30 capsule, Rfl: 3    pramoxine-hydrocortisone cream, Apply topically 3 (three) times  "daily., Disp: 30 g, Rfl: 2    spironolactone (ALDACTONE) 25 MG tablet, Take 1 tablet (25 mg total) by mouth once daily., Disp: 90 tablet, Rfl: 1    Review of Systems   Constitutional: Negative for chills and fever.   Respiratory: Negative for cough, shortness of breath and wheezing.    Cardiovascular: Negative for chest pain and leg swelling.   Gastrointestinal: Negative for abdominal pain.   Genitourinary: Negative for dysuria.   Musculoskeletal: Positive for arthralgias (see HPI).   Neurological: Negative for dizziness, syncope and headaches.          Objective:      Vitals:    03/26/20 1407   BP: 134/62   Pulse: 76   Temp: 97.7 °F (36.5 °C)   Weight: 68.5 kg (151 lb)   Height: 4' 11" (1.499 m)     Physical Exam   Constitutional: She is oriented to person, place, and time. She appears well-developed and well-nourished.   HENT:   Head: Normocephalic and atraumatic.   Right Ear: Tympanic membrane and ear canal normal.   Left Ear: Tympanic membrane and ear canal normal.   Mouth/Throat: Mucous membranes are normal. No posterior oropharyngeal erythema.   Neck: Neck supple. Carotid bruit is not present.   Cardiovascular: Normal rate and regular rhythm. Exam reveals no gallop and no friction rub.   No murmur heard.  Pulmonary/Chest: Effort normal and breath sounds normal. No respiratory distress. She has no wheezes. She has no rales.       Abdominal: Soft. She exhibits no distension. There is no tenderness.   Musculoskeletal:        Left knee: She exhibits ecchymosis. She exhibits normal range of motion, no swelling and no effusion. Tenderness found. Lateral joint line tenderness noted.        Legs:  Lymphadenopathy:     She has no cervical adenopathy.   Neurological: She is alert and oriented to person, place, and time. She has normal strength. Gait normal.   Skin: Skin is warm and dry. No rash noted. There is erythema.        Psychiatric: She has a normal mood and affect.   Nursing note and vitals reviewed.    "     Assessment:       1. Acute pain of left knee    2. Closed fracture of one rib of left side with routine healing, subsequent encounter    3. Left leg cellulitis    4. Hypokalemia           Plan:       Acute pain of left knee  -reviewed xray and CT scan of knee with no fracture seen on CT. Pt reports pain has improved some- recommend trying to reduce weight bearing by using walker/rollator for the next couple weeks. Cautioned to call me if pain is not improving or worsening over next week or two and then will consider knee MRI    Closed fracture of one rib of left side with routine healing, subsequent encounter  -stable/doing well    Left leg cellulitis  -     mupirocin (BACTROBAN) 2 % ointment; Apply topically 2 (two) times daily.  Dispense: 22 g; Refill: 1  -     doxycycline (VIBRAMYCIN) 100 MG Cap; Take 1 capsule (100 mg total) by mouth every 12 (twelve) hours.  Dispense: 14 capsule; Refill: 0  -will treat for mild cellulitis- cautioned to call for any worsening    Hypokalemia  -     potassium chloride (MICRO-K) 10 MEQ CpSR; Take 1 capsule (10 mEq total) by mouth once daily.  Dispense: 30 capsule; Refill: 3  -     Basic metabolic panel; Future; Expected date: 03/26/2020  -reviewed recent labs with low potassium- add KCL daily and recheck labs before next visit    Follow up in about 3 months (around 6/26/2020) for Labs before next visit.        3/26/2020 Nimo Butterfield NP

## 2020-04-03 ENCOUNTER — TELEPHONE (OUTPATIENT)
Dept: FAMILY MEDICINE | Facility: CLINIC | Age: 85
End: 2020-04-03

## 2020-04-03 NOTE — TELEPHONE ENCOUNTER
----- Message from Indira Joe MA sent at 4/3/2020 11:11 AM CDT -----  Pt reports she was in office on last Thursday and pt was prescribed doxycycline at that visit for rash around her ankle (cellulitis).  States she was advised to call with an update.  She reports she has finished the doxycycline and her ankle looks much better, now is only slightly red.  She is also putting the Bactroban ointment on the area.  She is wondering if she needs another round of antibiotics?    Pt - [phone]  
I don't think she needs more abx- I would just continue with bactroban to site  
Spoke to pt to advise her on the message below. Pt verbalized understanding   
DISPLAY PLAN FREE TEXT

## 2020-04-13 DIAGNOSIS — Z90.49 STATUS POST LAPAROSCOPIC CHOLECYSTECTOMY: ICD-10-CM

## 2020-04-13 NOTE — TELEPHONE ENCOUNTER
----- Message from Nimo Tabor sent at 4/13/2020  3:02 PM CDT -----  Refill for chlordiazepoxide-clidinium cap qty 90. [facility]. Pt #[phone]

## 2020-04-14 RX ORDER — CHLORDIAZEPOXIDE HYDROCHLORIDE AND CLIDINIUM BROMIDE 5; 2.5 MG/1; MG/1
1 CAPSULE ORAL
Qty: 120 CAPSULE | Refills: 2 | Status: SHIPPED | OUTPATIENT
Start: 2020-04-14 | End: 2020-04-15 | Stop reason: SDUPTHER

## 2020-04-15 DIAGNOSIS — Z90.49 STATUS POST LAPAROSCOPIC CHOLECYSTECTOMY: ICD-10-CM

## 2020-04-15 NOTE — TELEPHONE ENCOUNTER
----- Message from Silvia Schaffer sent at 4/15/2020  2:57 PM CDT -----  Refill on Librax qty 90 tablets to [facility] cb # [phone]

## 2020-04-16 RX ORDER — CHLORDIAZEPOXIDE HYDROCHLORIDE AND CLIDINIUM BROMIDE 5; 2.5 MG/1; MG/1
1 CAPSULE ORAL
Qty: 120 CAPSULE | Refills: 2 | Status: SHIPPED | OUTPATIENT
Start: 2020-04-16 | End: 2020-07-15

## 2020-04-20 DIAGNOSIS — Z90.49 STATUS POST LAPAROSCOPIC CHOLECYSTECTOMY: ICD-10-CM

## 2020-04-20 RX ORDER — CHLORDIAZEPOXIDE HYDROCHLORIDE AND CLIDINIUM BROMIDE 5; 2.5 MG/1; MG/1
1 CAPSULE ORAL
Qty: 120 CAPSULE | Refills: 2 | OUTPATIENT
Start: 2020-04-20 | End: 2020-07-19

## 2020-04-20 NOTE — TELEPHONE ENCOUNTER
----- Message from Zoya Lynn sent at 4/20/2020 11:52 AM CDT -----  Patient said this is her 5th time calling saying the pharmacy does NOT have a rx order for her librax please call cassidy and see whats going on .

## 2020-04-23 DIAGNOSIS — M15.9 PRIMARY OSTEOARTHRITIS INVOLVING MULTIPLE JOINTS: ICD-10-CM

## 2020-04-23 RX ORDER — HYDROCODONE BITARTRATE AND ACETAMINOPHEN 10; 325 MG/1; MG/1
1 TABLET ORAL EVERY 4 HOURS PRN
Qty: 60 TABLET | Refills: 0 | Status: SHIPPED | OUTPATIENT
Start: 2020-04-23 | End: 2020-05-22 | Stop reason: SDUPTHER

## 2020-04-23 NOTE — TELEPHONE ENCOUNTER
----- Message from Yue Jaramillo sent at 4/23/2020 12:23 PM CDT -----  Refills hydrocodone   Pharm [facility]   [phone]

## 2020-05-22 DIAGNOSIS — M15.9 PRIMARY OSTEOARTHRITIS INVOLVING MULTIPLE JOINTS: ICD-10-CM

## 2020-05-22 RX ORDER — NAPROXEN 375 MG/1
375 TABLET ORAL 2 TIMES DAILY
Qty: 60 TABLET | Refills: 0 | Status: SHIPPED | OUTPATIENT
Start: 2020-05-22 | End: 2020-06-18 | Stop reason: SDUPTHER

## 2020-05-22 RX ORDER — HYDROCODONE BITARTRATE AND ACETAMINOPHEN 10; 325 MG/1; MG/1
1 TABLET ORAL EVERY 4 HOURS PRN
Qty: 60 TABLET | Refills: 0 | Status: SHIPPED | OUTPATIENT
Start: 2020-05-22 | End: 2020-06-18 | Stop reason: SDUPTHER

## 2020-05-22 NOTE — TELEPHONE ENCOUNTER
----- Message from Albertina Murphy sent at 5/22/2020 11:08 AM CDT -----  Contact: Valerie Jones  Needs refill on Hydrocodone, Pyridium (generic), Naproxen  Send to [facility]   Pt# [phone]

## 2020-05-28 ENCOUNTER — TELEPHONE (OUTPATIENT)
Dept: FAMILY MEDICINE | Facility: CLINIC | Age: 85
End: 2020-05-28

## 2020-05-28 NOTE — TELEPHONE ENCOUNTER
"----- Message from Indira Joe MA sent at 5/28/2020  2:31 PM CDT -----  Pt is requesting a refill.  Original message was sent on 5/22 (see below).  Pt is completely out of this medication.    Phenazopyridine 100 mg    Pharmacy - [facility]    Original Message:    ----- Message from Albertina Murphy sent at 5/22/2020 11:08 AM CDT -----  Contact: Valerie Jones  Needs refill on Hydrocodone, "Pyridium (generic)", Naproxen  Send to [facility]   Pt# [phone]   "

## 2020-05-28 NOTE — TELEPHONE ENCOUNTER
Please clarify with pt how often she is taking the pyridium- this medication should be limited to no more than 3 days at a time

## 2020-05-29 ENCOUNTER — TELEPHONE (OUTPATIENT)
Dept: FAMILY MEDICINE | Facility: CLINIC | Age: 85
End: 2020-05-29

## 2020-05-29 RX ORDER — PHENAZOPYRIDINE HYDROCHLORIDE 100 MG/1
100 TABLET, FILM COATED ORAL 3 TIMES DAILY PRN
Qty: 9 TABLET | Refills: 1 | Status: SHIPPED | OUTPATIENT
Start: 2020-05-29 | End: 2020-06-08

## 2020-05-29 NOTE — TELEPHONE ENCOUNTER
----- Message from Yue Jaramillo sent at 5/29/2020 11:47 AM CDT -----  Pt is returning a call from yesterday about her medication  Pt [phone]

## 2020-06-11 ENCOUNTER — TELEPHONE (OUTPATIENT)
Dept: FAMILY MEDICINE | Facility: CLINIC | Age: 85
End: 2020-06-11

## 2020-06-11 NOTE — TELEPHONE ENCOUNTER
From overdue results-lab due. LMOR that fasting lab is due prior to ov, that she can come to [facility] in our office, and to wear a mask.

## 2020-06-16 DIAGNOSIS — E03.9 ACQUIRED HYPOTHYROIDISM: ICD-10-CM

## 2020-06-16 RX ORDER — LEVOTHYROXINE SODIUM 50 UG/1
50 TABLET ORAL DAILY
Qty: 30 TABLET | Refills: 0 | Status: SHIPPED | OUTPATIENT
Start: 2020-06-16 | End: 2020-06-18 | Stop reason: SDUPTHER

## 2020-06-16 RX ORDER — PANTOPRAZOLE SODIUM 40 MG/1
40 TABLET, DELAYED RELEASE ORAL EVERY MORNING
Qty: 30 TABLET | Refills: 0 | Status: SHIPPED | OUTPATIENT
Start: 2020-06-16 | End: 2020-06-18 | Stop reason: SDUPTHER

## 2020-06-16 NOTE — TELEPHONE ENCOUNTER
Please advise. Patient aware insurance may not cover additional medication refills before they are due. Thanks.      Only requesting 30 days of the following pended medications
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
## 2020-06-18 ENCOUNTER — OFFICE VISIT (OUTPATIENT)
Dept: FAMILY MEDICINE | Facility: CLINIC | Age: 85
End: 2020-06-18
Payer: MEDICARE

## 2020-06-18 VITALS
HEIGHT: 59 IN | WEIGHT: 154 LBS | HEART RATE: 76 BPM | BODY MASS INDEX: 31.04 KG/M2 | DIASTOLIC BLOOD PRESSURE: 52 MMHG | TEMPERATURE: 99 F | SYSTOLIC BLOOD PRESSURE: 110 MMHG

## 2020-06-18 DIAGNOSIS — K21.9 GASTROESOPHAGEAL REFLUX DISEASE, ESOPHAGITIS PRESENCE NOT SPECIFIED: Primary | ICD-10-CM

## 2020-06-18 DIAGNOSIS — F51.01 PRIMARY INSOMNIA: ICD-10-CM

## 2020-06-18 DIAGNOSIS — M15.9 PRIMARY OSTEOARTHRITIS INVOLVING MULTIPLE JOINTS: ICD-10-CM

## 2020-06-18 DIAGNOSIS — E87.6 HYPOKALEMIA: ICD-10-CM

## 2020-06-18 DIAGNOSIS — E03.9 ACQUIRED HYPOTHYROIDISM: ICD-10-CM

## 2020-06-18 PROCEDURE — 99214 PR OFFICE/OUTPT VISIT, EST, LEVL IV, 30-39 MIN: ICD-10-PCS | Mod: S$GLB,,, | Performed by: PHYSICIAN ASSISTANT

## 2020-06-18 PROCEDURE — 99214 OFFICE O/P EST MOD 30 MIN: CPT | Mod: S$GLB,,, | Performed by: PHYSICIAN ASSISTANT

## 2020-06-18 RX ORDER — PANTOPRAZOLE SODIUM 40 MG/1
40 TABLET, DELAYED RELEASE ORAL EVERY MORNING
Qty: 30 TABLET | Refills: 0 | Status: SHIPPED | OUTPATIENT
Start: 2020-06-18 | End: 2020-06-18 | Stop reason: SDUPTHER

## 2020-06-18 RX ORDER — PHENAZOPYRIDINE HYDROCHLORIDE 100 MG/1
100 TABLET, FILM COATED ORAL 3 TIMES DAILY PRN
COMMUNITY
End: 2020-07-29 | Stop reason: SDUPTHER

## 2020-06-18 RX ORDER — FUROSEMIDE 20 MG/1
20 TABLET ORAL DAILY
COMMUNITY
End: 2020-09-10

## 2020-06-18 RX ORDER — CYCLOBENZAPRINE HCL 10 MG
TABLET ORAL
COMMUNITY
Start: 2020-05-19 | End: 2020-06-18

## 2020-06-18 RX ORDER — LEVOTHYROXINE SODIUM 50 UG/1
50 TABLET ORAL DAILY
Qty: 30 TABLET | Refills: 0 | Status: SHIPPED | OUTPATIENT
Start: 2020-06-18 | End: 2020-06-18 | Stop reason: SDUPTHER

## 2020-06-18 RX ORDER — POTASSIUM CHLORIDE 750 MG/1
10 CAPSULE, EXTENDED RELEASE ORAL DAILY
Qty: 30 CAPSULE | Refills: 3 | Status: SHIPPED | OUTPATIENT
Start: 2020-06-18 | End: 2020-06-18 | Stop reason: SDUPTHER

## 2020-06-18 RX ORDER — HYDROCORTISONE ACETATE PRAMOXINE HCL 2.5; 1 G/100G; G/100G
CREAM TOPICAL
COMMUNITY
Start: 2020-03-23 | End: 2020-09-10

## 2020-06-18 RX ORDER — PANTOPRAZOLE SODIUM 40 MG/1
40 TABLET, DELAYED RELEASE ORAL EVERY MORNING
Qty: 90 TABLET | Refills: 1 | Status: SHIPPED | OUTPATIENT
Start: 2020-06-18 | End: 2020-09-16 | Stop reason: SDUPTHER

## 2020-06-18 RX ORDER — SPIRONOLACTONE 25 MG/1
25 TABLET ORAL DAILY
Qty: 90 TABLET | Refills: 1 | Status: SHIPPED | OUTPATIENT
Start: 2020-06-18 | End: 2020-06-18 | Stop reason: SDUPTHER

## 2020-06-18 RX ORDER — LEVOTHYROXINE SODIUM 50 UG/1
50 TABLET ORAL DAILY
Qty: 90 TABLET | Refills: 1 | Status: SHIPPED | OUTPATIENT
Start: 2020-06-18 | End: 2020-09-16 | Stop reason: SDUPTHER

## 2020-06-18 RX ORDER — HYDROCODONE BITARTRATE AND ACETAMINOPHEN 10; 325 MG/1; MG/1
1 TABLET ORAL EVERY 4 HOURS PRN
Qty: 60 TABLET | Refills: 0 | Status: SHIPPED | OUTPATIENT
Start: 2020-06-22 | End: 2020-07-21 | Stop reason: SDUPTHER

## 2020-06-18 RX ORDER — POTASSIUM CHLORIDE 750 MG/1
10 CAPSULE, EXTENDED RELEASE ORAL DAILY
Qty: 90 CAPSULE | Refills: 1 | Status: SHIPPED | OUTPATIENT
Start: 2020-06-18 | End: 2020-12-15

## 2020-06-18 RX ORDER — ALPRAZOLAM 0.5 MG/1
0.5 TABLET ORAL 3 TIMES DAILY PRN
Qty: 90 TABLET | Refills: 2 | Status: SHIPPED | OUTPATIENT
Start: 2020-06-18 | End: 2020-09-16 | Stop reason: SDUPTHER

## 2020-06-18 RX ORDER — NAPROXEN 375 MG/1
375 TABLET ORAL 2 TIMES DAILY
Qty: 60 TABLET | Refills: 0 | Status: SHIPPED | OUTPATIENT
Start: 2020-06-18 | End: 2020-07-21 | Stop reason: SDUPTHER

## 2020-06-18 RX ORDER — SPIRONOLACTONE 25 MG/1
25 TABLET ORAL DAILY
Qty: 90 TABLET | Refills: 1 | Status: SHIPPED | OUTPATIENT
Start: 2020-06-18 | End: 2021-03-16

## 2020-06-18 NOTE — PATIENT INSTRUCTIONS

## 2020-06-18 NOTE — PROGRESS NOTES
SUBJECTIVE:    Patient ID: Valerie Jones is a 86 y.o. female.    Chief Complaint: Follow-up (3mth, brought bottles// SW)    Very pleasant 86-year-old white female presents today for regular 3 month checkup.  Do see where she was in the ER and had evaluation after a fall.  She sustained trauma and left rib fracture. Injured the left knee without evidence of fracture or dislocation. Still slightly tender but she is able to ambulate and get around ok. Hx of psoriasis and she follows with Dr. Weil. BP at goal, anxiety and asthma seem to be stable as well.      Lab Visit on 2020   Component Date Value Ref Range Status    Creatinine 2020 1.0  0.5 - 1.4 mg/dL Final    eGFR if African American 2020 58.9* >60 mL/min/1.73 m^2 Final    eGFR if non African American 2020 51.1* >60 mL/min/1.73 m^2 Final       Past Medical History:   Diagnosis Date    Anxiety     Arthritis     Asthma     COPD (chronic obstructive pulmonary disease)     Hypertension     Pain in the abdomen     Thyroid disease      Past Surgical History:   Procedure Laterality Date    CATARACT EXTRACTION, BILATERAL      Dr Collins     SECTION      x's2    CHOLECYSTECTOMY      COLONOSCOPY  2009    ESOPHAGOGASTRODUODENOSCOPY (EGD) WITH DILATION      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    LAPAROSCOPIC CHOLECYSTECTOMY N/A 2019    Procedure: CHOLECYSTECTOMY, LAPAROSCOPIC;  Surgeon: Toney Grace III, MD;  Location: [facility];  Service: General;  Laterality: N/A;    SKIN CANCER EXCISION      Dr.Weil     Family History   Problem Relation Age of Onset    Diabetes Mother     Depression Maternal Grandmother     Depression Maternal Grandfather        Marital Status:   Alcohol History:  reports no history of alcohol use.  Tobacco History:  reports that she quit smoking about 15 years ago. She has a 2.50 pack-year smoking history. She has never used smokeless tobacco.  Drug History:  reports no  history of drug use.    Review of patient's allergies indicates:   Allergen Reactions    Sulfa (sulfonamide antibiotics) Swelling    Androgenic anabolic steroid Swelling     Feet swelling     Aspirin Other (See Comments)     Upset stomach     Erythromycin Itching    Methylprednisolone Other (See Comments)    Penicillins Itching       Current Outpatient Medications:     albuterol sulfate (PROAIR RESPICLICK) 90 mcg/actuation AePB, Inhale 2 puffs into the lungs every 4 (four) hours. Rescue2 puffs every 4 hours as needed for cough, wheeze, or shortness of breath, Disp: 1 each, Rfl: 2    albuterol-ipratropium (DUO-NEB) 2.5 mg-0.5 mg/3 mL nebulizer solution, Take 3 mLs by nebulization every 6 (six) hours as needed., Disp: 120 vial, Rfl: 1    ALPRAZolam (XANAX) 0.5 MG tablet, Take 1 tablet (0.5 mg total) by mouth 3 (three) times daily as needed., Disp: 90 tablet, Rfl: 2    atenoloL-chlorthalidone (TENORETIC) 50-25 mg Tab, Take 1 tablet by mouth once daily., Disp: 90 tablet, Rfl: 1    chlordiazepoxide-clidinium 5-2.5 mg (LIBRAX) 5-2.5 mg Cap, Take 1 capsule by mouth 4 (four) times daily before meals and nightly., Disp: 120 capsule, Rfl: 2    fluticasone-umeclidin-vilanter (TRELEGY ELLIPTA) 100-62.5-25 mcg DsDv, Inhale 1 puff into the lungs once daily., Disp: 3 each, Rfl: 3    furosemide (LASIX) 20 MG tablet, Take 20 mg by mouth once daily., Disp: , Rfl:     furosemide (LASIX) 40 MG tablet, Take 1 tablet (40 mg total) by mouth once daily., Disp: 90 tablet, Rfl: 1    HYDROcodone-acetaminophen (NORCO)  mg per tablet, Take 1 tablet by mouth every 4 (four) hours as needed for Pain., Disp: 60 tablet, Rfl: 0    hydrocortisone-pramoxine (ANALPRAM-HC) 2.5-1 % Crea, , Disp: , Rfl:     levothyroxine (SYNTHROID) 50 MCG tablet, Take 1 tablet (50 mcg total) by mouth once daily., Disp: 30 tablet, Rfl: 0    mupirocin (BACTROBAN) 2 % ointment, Apply topically 2 (two) times daily., Disp: 22 g, Rfl: 1    naproxen  "(NAPROSYN) 375 MG tablet, Take 1 tablet (375 mg total) by mouth 2 (two) times daily., Disp: 60 tablet, Rfl: 0    pantoprazole (PROTONIX) 40 MG tablet, Take 1 tablet (40 mg total) by mouth every morning., Disp: 30 tablet, Rfl: 0    phenazopyridine (PYRIDIUM) 100 MG tablet, Take 100 mg by mouth 3 (three) times daily as needed for Pain., Disp: , Rfl:     potassium chloride (MICRO-K) 10 MEQ CpSR, Take 1 capsule (10 mEq total) by mouth once daily., Disp: 30 capsule, Rfl: 3    spironolactone (ALDACTONE) 25 MG tablet, Take 1 tablet (25 mg total) by mouth once daily., Disp: 90 tablet, Rfl: 1    Review of Systems   Constitutional: Negative for appetite change, chills, fatigue, fever and unexpected weight change.   HENT: Negative for congestion.    Respiratory: Negative for cough, chest tightness and shortness of breath.    Cardiovascular: Negative for chest pain and palpitations.   Gastrointestinal: Negative for abdominal distention and abdominal pain.   Endocrine: Negative for cold intolerance and heat intolerance.   Genitourinary: Negative for difficulty urinating and dysuria.   Musculoskeletal: Negative for arthralgias and back pain.   Neurological: Negative for dizziness, weakness and headaches.          Objective:      Vitals:    06/18/20 1534   BP: (!) 110/52   Pulse: 76   Temp: 99.1 °F (37.3 °C)   Weight: 69.9 kg (154 lb)   Height: 4' 11" (1.499 m)     Physical Exam  Constitutional:       General: She is not in acute distress.     Appearance: She is well-developed.   HENT:      Head: Normocephalic and atraumatic.   Eyes:      Conjunctiva/sclera: Conjunctivae normal.      Pupils: Pupils are equal, round, and reactive to light.   Neck:      Musculoskeletal: Normal range of motion and neck supple.      Thyroid: No thyromegaly.   Cardiovascular:      Rate and Rhythm: Normal rate and regular rhythm.      Heart sounds: Normal heart sounds.   Pulmonary:      Effort: Pulmonary effort is normal.      Breath sounds: Normal " breath sounds.   Abdominal:      General: Bowel sounds are normal. There is no distension.      Palpations: Abdomen is soft.      Tenderness: There is no abdominal tenderness.   Musculoskeletal: Normal range of motion.   Skin:     General: Skin is warm and dry.      Findings: No erythema.   Neurological:      Mental Status: She is alert and oriented to person, place, and time.      Cranial Nerves: No cranial nerve deficit.           Assessment:       1. Gastroesophageal reflux disease, esophagitis presence not specified    2. Primary insomnia    3. Hypokalemia    4. Primary osteoarthritis involving multiple joints    5. Acquired hypothyroidism         Plan:       Gastroesophageal reflux disease, esophagitis presence not specified  -     pantoprazole (PROTONIX) 40 MG tablet; Take 1 tablet (40 mg total) by mouth every morning.  Dispense: 30 tablet; Refill: 0    Primary insomnia  Comments:  she just uses low dose prn alprazolam. refills as needed.  Orders:  -     ALPRAZolam (XANAX) 0.5 MG tablet; Take 1 tablet (0.5 mg total) by mouth 3 (three) times daily as needed.  Dispense: 90 tablet; Refill: 2    Hypokalemia  Comments:  refills of potassium, will keep on board as is.  Orders:  -     potassium chloride (MICRO-K) 10 MEQ CpSR; Take 1 capsule (10 mEq total) by mouth once daily.  Dispense: 30 capsule; Refill: 3    Primary osteoarthritis involving multiple joints  Comments:  uses prn pain meds. refilled per Dr. Andersen.  reviewed.  Orders:  -     naproxen (NAPROSYN) 375 MG tablet; Take 1 tablet (375 mg total) by mouth 2 (two) times daily.  Dispense: 60 tablet; Refill: 0    Acquired hypothyroidism  -     levothyroxine (SYNTHROID) 50 MCG tablet; Take 1 tablet (50 mcg total) by mouth once daily.  Dispense: 30 tablet; Refill: 0    Other orders  -     spironolactone (ALDACTONE) 25 MG tablet; Take 1 tablet (25 mg total) by mouth once daily.  Dispense: 90 tablet; Refill: 1      Follow up if symptoms worsen or fail to improve,  for as scheduled with Dr. Andersen.        6/18/2020 Rajendra Devine PA-C

## 2020-06-30 ENCOUNTER — OFFICE VISIT (OUTPATIENT)
Dept: ORTHOPEDICS | Facility: CLINIC | Age: 85
End: 2020-06-30
Payer: MEDICARE

## 2020-06-30 VITALS
SYSTOLIC BLOOD PRESSURE: 117 MMHG | HEART RATE: 70 BPM | WEIGHT: 154 LBS | BODY MASS INDEX: 31.04 KG/M2 | DIASTOLIC BLOOD PRESSURE: 59 MMHG | HEIGHT: 59 IN

## 2020-06-30 DIAGNOSIS — M79.662 PAIN IN LEFT LOWER LEG: ICD-10-CM

## 2020-06-30 DIAGNOSIS — M17.12 PRIMARY OSTEOARTHRITIS OF LEFT KNEE: Primary | ICD-10-CM

## 2020-06-30 PROCEDURE — 99203 OFFICE O/P NEW LOW 30 MIN: CPT | Mod: 25,S$GLB,, | Performed by: ORTHOPAEDIC SURGERY

## 2020-06-30 PROCEDURE — 99203 PR OFFICE/OUTPT VISIT, NEW, LEVL III, 30-44 MIN: ICD-10-PCS | Mod: 25,S$GLB,, | Performed by: ORTHOPAEDIC SURGERY

## 2020-06-30 PROCEDURE — 20610 DRAIN/INJ JOINT/BURSA W/O US: CPT | Mod: LT,S$GLB,, | Performed by: ORTHOPAEDIC SURGERY

## 2020-06-30 PROCEDURE — 20610 LARGE JOINT ASPIRATION/INJECTION: L KNEE: ICD-10-PCS | Mod: LT,S$GLB,, | Performed by: ORTHOPAEDIC SURGERY

## 2020-06-30 RX ORDER — METHYLPREDNISOLONE ACETATE 40 MG/ML
40 INJECTION, SUSPENSION INTRA-ARTICULAR; INTRALESIONAL; INTRAMUSCULAR; SOFT TISSUE
Status: DISCONTINUED | OUTPATIENT
Start: 2020-06-30 | End: 2020-06-30 | Stop reason: HOSPADM

## 2020-06-30 RX ADMIN — METHYLPREDNISOLONE ACETATE 40 MG: 40 INJECTION, SUSPENSION INTRA-ARTICULAR; INTRALESIONAL; INTRAMUSCULAR; SOFT TISSUE at 02:06

## 2020-06-30 NOTE — PROCEDURES
Large Joint Aspiration/Injection: L knee    Date/Time: 6/30/2020 2:00 PM  Performed by: Frank Hudson MD  Authorized by: Frank Hudson MD     Consent Done?:  Yes (Verbal)  Indications:  Pain  Site marked: the procedure site was marked    Timeout: prior to procedure the correct patient, procedure, and site was verified    Prep: patient was prepped and draped in usual sterile fashion      Local anesthesia used?: Yes    Local anesthetic:  Lidocaine 1% without epinephrine    Details:  Needle Size:  25 G  Ultrasonic Guidance for needle placement?: No    Approach:  Lateral  Location:  Knee  Site:  L knee  Medications:  40 mg methylPREDNISolone acetate 40 mg/mL  Patient tolerance:  Patient tolerated the procedure well with no immediate complications

## 2020-06-30 NOTE — PROGRESS NOTES
[facility]    Subjective:     Chief Complaint:   Chief Complaint   Patient presents with    Left Knee - Pain     Left knee pain x 3.. States that she had a fall and has had pain since then. Hard for her to walk Did go to the ER and had xrays and CT done.     Left Lower Leg - Pain     Lower leg pain x 3.. States that she had a fall and since the fall she has had pain. Pain is worse in the lower leg then the knee.        Past Medical History:   Diagnosis Date    Anxiety     Arthritis     Asthma     COPD (chronic obstructive pulmonary disease)     Hypertension     Pain in the abdomen     Thyroid disease        Past Surgical History:   Procedure Laterality Date    CATARACT EXTRACTION, BILATERAL  2017    Dr Collins     SECTION      x's2    CHOLECYSTECTOMY      COLONOSCOPY      ESOPHAGOGASTRODUODENOSCOPY (EGD) WITH DILATION      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    LAPAROSCOPIC CHOLECYSTECTOMY N/A 2019    Procedure: CHOLECYSTECTOMY, LAPAROSCOPIC;  Surgeon: Toney Grace III, MD;  Location: [facility];  Service: General;  Laterality: N/A;    SKIN CANCER EXCISION      Dr.Weil       Current Outpatient Medications   Medication Sig    albuterol sulfate (PROAIR RESPICLICK) 90 mcg/actuation AePB Inhale 2 puffs into the lungs every 4 (four) hours. Rescue2 puffs every 4 hours as needed for cough, wheeze, or shortness of breath    albuterol-ipratropium (DUO-NEB) 2.5 mg-0.5 mg/3 mL nebulizer solution Take 3 mLs by nebulization every 6 (six) hours as needed.    ALPRAZolam (XANAX) 0.5 MG tablet Take 1 tablet (0.5 mg total) by mouth 3 (three) times daily as needed.    atenoloL-chlorthalidone (TENORETIC) 50-25 mg Tab Take 1 tablet by mouth once daily.    chlordiazepoxide-clidinium 5-2.5 mg (LIBRAX) 5-2.5 mg Cap Take 1 capsule by mouth 4 (four) times daily before meals and nightly.    fluticasone-umeclidin-vilanter (TRELEGY ELLIPTA) 100-62.5-25 mcg DsDv Inhale 1 puff  into the lungs once daily.    furosemide (LASIX) 40 MG tablet Take 1 tablet (40 mg total) by mouth once daily.    HYDROcodone-acetaminophen (NORCO)  mg per tablet Take 1 tablet by mouth every 4 (four) hours as needed for Pain.    hydrocortisone-pramoxine (ANALPRAM-HC) 2.5-1 % Crea     levothyroxine (SYNTHROID) 50 MCG tablet Take 1 tablet (50 mcg total) by mouth once daily.    mupirocin (BACTROBAN) 2 % ointment Apply topically 2 (two) times daily.    naproxen (NAPROSYN) 375 MG tablet Take 1 tablet (375 mg total) by mouth 2 (two) times daily.    pantoprazole (PROTONIX) 40 MG tablet Take 1 tablet (40 mg total) by mouth every morning.    phenazopyridine (PYRIDIUM) 100 MG tablet Take 100 mg by mouth 3 (three) times daily as needed for Pain.    potassium chloride (MICRO-K) 10 MEQ CpSR Take 1 capsule (10 mEq total) by mouth once daily.    spironolactone (ALDACTONE) 25 MG tablet Take 1 tablet (25 mg total) by mouth once daily.    furosemide (LASIX) 20 MG tablet Take 20 mg by mouth once daily.     No current facility-administered medications for this visit.        Review of patient's allergies indicates:   Allergen Reactions    Sulfa (sulfonamide antibiotics) Swelling    Androgenic anabolic steroid Swelling     Feet swelling     Aspirin Other (See Comments)     Upset stomach     Erythromycin Itching    Methylprednisolone Other (See Comments)    Penicillins Itching       Family History   Problem Relation Age of Onset    Diabetes Mother     Depression Maternal Grandmother     Depression Maternal Grandfather        Social History     Socioeconomic History    Marital status:      Spouse name: Not on file    Number of children: Not on file    Years of education: Not on file    Highest education level: Not on file   Occupational History    Not on file   Social Needs    Financial resource strain: Not on file    Food insecurity     Worry: Not on file     Inability: Not on file     Transportation needs     Medical: Not on file     Non-medical: Not on file   Tobacco Use    Smoking status: Former Smoker     Packs/day: 0.50     Years: 5.00     Pack years: 2.50     Quit date: 2/15/2005     Years since quitting: 15.3    Smokeless tobacco: Never Used   Substance and Sexual Activity    Alcohol use: No    Drug use: No    Sexual activity: Never   Lifestyle    Physical activity     Days per week: Not on file     Minutes per session: Not on file    Stress: Not at all   Relationships    Social connections     Talks on phone: Not on file     Gets together: Not on file     Attends Christian service: Not on file     Active member of club or organization: Not on file     Attends meetings of clubs or organizations: Not on file     Relationship status: Not on file   Other Topics Concern    Not on file   Social History Narrative    Not on file       History of present illness:  Patient comes in today for the left knee.  She has had left knee pain since March when she fell.  She had a fall onto a concrete floor.  At that time she sustained a nondisplaced tibial plateau fracture.  She generally done well since then but she has developed a lot achy discomfort.      Review of Systems:    Constitution: Negative for chills, fever, and sweats.  Negative for unexplained weight loss.    HENT:  Negative for headaches and blurry vision.    Cardiovascular:Negative for chest pain or irregular heart beat. Negative for hypertension.    Respiratory:  Negative for cough and shortness of breath.    Gastrointestinal: Negative for abdominal pain, heartburn, melena, nausea, and vomitting.    Genitourinary:  Negative bladder incontinence and dysuria.    Musculoskeletal:  See HPI for details.     Neurological: Negative for numbness.    Psychiatric/Behavioral: Negative for depression.  The patient is not nervous/anxious.      Endocrine: Negative for polyuria    Hematologic/Lymphatic: Negative for bleeding problem.  Does not  bruise/bleed easily.    Skin: Negative for poor would healing and rash    Objective:      Physical Examination:    Vital Signs:    Vitals:    06/30/20 1410   BP: (!) 117/59   Pulse: 70       Body mass index is 31.1 kg/m².    This a well-developed, well nourished patient in no acute distress.  They are alert and oriented and cooperative to examination.        Patient has full range of motion of the left knee.  She has crepitus.  She has 1+ effusion.  She is stable knee to varus valgus stresses  Pertinent New Results:    XRAY Report / Interpretation:   Three views of the left knee demonstrate moderate degenerative arthritis of the left knee with 3 compartment spurring    Assessment/Plan:      Osteoarthritis left knee.  I injected the left knee with Depo-Medrol and lidocaine.  Follow-up p.r.n.      This note was created using Dragon voice recognition software that occasionally misinterpreted phrases or words.

## 2020-07-21 DIAGNOSIS — M15.9 PRIMARY OSTEOARTHRITIS INVOLVING MULTIPLE JOINTS: ICD-10-CM

## 2020-07-21 RX ORDER — NAPROXEN 375 MG/1
375 TABLET ORAL 2 TIMES DAILY
Qty: 60 TABLET | Refills: 0 | Status: SHIPPED | OUTPATIENT
Start: 2020-07-21 | End: 2020-07-21 | Stop reason: SDUPTHER

## 2020-07-21 RX ORDER — NAPROXEN 375 MG/1
375 TABLET ORAL 2 TIMES DAILY
Qty: 60 TABLET | Refills: 0 | Status: SHIPPED | OUTPATIENT
Start: 2020-07-21 | End: 2020-08-12 | Stop reason: SDUPTHER

## 2020-07-21 RX ORDER — HYDROCODONE BITARTRATE AND ACETAMINOPHEN 10; 325 MG/1; MG/1
1 TABLET ORAL EVERY 4 HOURS PRN
Qty: 60 TABLET | Refills: 0 | Status: SHIPPED | OUTPATIENT
Start: 2020-07-21 | End: 2020-08-19 | Stop reason: SDUPTHER

## 2020-07-21 NOTE — TELEPHONE ENCOUNTER
----- Message from Yue Jaramillo sent at 7/21/2020 11:59 AM CDT -----  Regarding: refills  Hydrocodone   Naproxen   Pharm [facility]   [phone]

## 2020-07-23 DIAGNOSIS — I10 ESSENTIAL HYPERTENSION: ICD-10-CM

## 2020-07-23 RX ORDER — ATENOLOL AND CHLORTHALIDONE TABLET 50; 25 MG/1; MG/1
1 TABLET ORAL DAILY
Qty: 90 TABLET | Refills: 1 | Status: SHIPPED | OUTPATIENT
Start: 2020-07-23 | End: 2020-09-16 | Stop reason: SDUPTHER

## 2020-07-23 NOTE — TELEPHONE ENCOUNTER
----- Message from Albertina Murphy sent at 7/23/2020  3:03 PM CDT -----  Regarding: Refill  Contact: Valerie Jones  Needs refill Atenolol   Send to [facility] pharmacy   Pt# [phone]

## 2020-07-28 ENCOUNTER — TELEPHONE (OUTPATIENT)
Dept: FAMILY MEDICINE | Facility: CLINIC | Age: 85
End: 2020-07-28

## 2020-07-28 NOTE — TELEPHONE ENCOUNTER
----- Message from Silvia Schaffer sent at 7/28/2020  3:55 PM CDT -----  Refills on Phenazopridine 100 mg to [facility] with a 30 day supply.  # [phone]

## 2020-07-29 RX ORDER — PHENAZOPYRIDINE HYDROCHLORIDE 100 MG/1
100 TABLET, FILM COATED ORAL
Qty: 90 TABLET | Refills: 0 | Status: SHIPPED | OUTPATIENT
Start: 2020-07-29 | End: 2020-08-28

## 2020-07-29 NOTE — TELEPHONE ENCOUNTER
I dont think she is taking everyday. She just requests 30 days worth to keep on hand. Dr. Andersen has been doing this for years for her.

## 2020-08-12 DIAGNOSIS — M15.9 PRIMARY OSTEOARTHRITIS INVOLVING MULTIPLE JOINTS: ICD-10-CM

## 2020-08-12 NOTE — TELEPHONE ENCOUNTER
----- Message from Silvia Schaffer sent at 8/12/2020  4:03 PM CDT -----  Pt calling for refills on Naproxen to [facility] cb # [phone]

## 2020-08-13 RX ORDER — NAPROXEN 375 MG/1
375 TABLET ORAL 2 TIMES DAILY
Qty: 60 TABLET | Refills: 0 | Status: SHIPPED | OUTPATIENT
Start: 2020-08-13 | End: 2020-09-30 | Stop reason: SDUPTHER

## 2020-08-18 NOTE — TELEPHONE ENCOUNTER
----- Message from Silvia Schaffer sent at 3/16/2020 11:28 AM CDT -----  Pt had fu 3-17  She needs refills on all medications asking for 90 fills.  Wants a call for lab results.   # home [phone] cell [phone]  
I have personally provided 40 minutes of critical care time concurrently with the resident/fellow.  This time excludes time spent on separate procedures and time spent teaching.  I have reviewed the resident/fellow's documentation and I agree with the assessment and plan of care.     Patient seen and examined.  Agree with resident note as above.  Patient with hx as noted including depression, previous suicide attempts, who presented to [facility] ER after taking Naproxen (?70-100pills) intentionally.  In [place], patient was initially stable, then became obtunded after Reglan (unclear if due to reglan vs naproxen).  Also with some electrolyte disturbance.  Transferred to [facility] for further care.  Now here at [facility] pt was unresponsive in ER.  Handling secretions well, with stable vitals, but unable to follow commands or open eyes.  Accepted to MICU for further monitoring.  FUll plan as above - monitor for CNS sx, f/u tox consult, serial chem to monitor lytes and acidemia. 1:1 for safety.

## 2020-08-19 DIAGNOSIS — M15.9 PRIMARY OSTEOARTHRITIS INVOLVING MULTIPLE JOINTS: ICD-10-CM

## 2020-08-19 RX ORDER — HYDROCODONE BITARTRATE AND ACETAMINOPHEN 10; 325 MG/1; MG/1
1 TABLET ORAL EVERY 4 HOURS PRN
Qty: 60 TABLET | Refills: 0 | Status: SHIPPED | OUTPATIENT
Start: 2020-08-21 | End: 2020-09-16 | Stop reason: SDUPTHER

## 2020-08-19 NOTE — TELEPHONE ENCOUNTER
----- Message from Silvia Schaffer sent at 8/19/2020  3:39 PM CDT -----  Pt calling for a refill on Hydrocodone to [facility] she said she runs out on Saturday.     # [phone]

## 2020-09-10 ENCOUNTER — HOSPITAL ENCOUNTER (OUTPATIENT)
Dept: RADIOLOGY | Facility: CLINIC | Age: 85
Discharge: HOME | End: 2020-09-10
Attending: PODIATRIST
Payer: MEDICARE

## 2020-09-10 ENCOUNTER — TELEPHONE (OUTPATIENT)
Dept: FAMILY MEDICINE | Facility: CLINIC | Age: 85
End: 2020-09-10

## 2020-09-10 ENCOUNTER — OFFICE VISIT (OUTPATIENT)
Dept: PODIATRY | Facility: CLINIC | Age: 85
End: 2020-09-10
Payer: MEDICARE

## 2020-09-10 VITALS
SYSTOLIC BLOOD PRESSURE: 103 MMHG | BODY MASS INDEX: 31.1 KG/M2 | DIASTOLIC BLOOD PRESSURE: 65 MMHG | TEMPERATURE: 99 F | RESPIRATION RATE: 16 BRPM | HEIGHT: 59 IN | HEART RATE: 70 BPM

## 2020-09-10 DIAGNOSIS — M25.572 ACUTE LEFT ANKLE PAIN: ICD-10-CM

## 2020-09-10 DIAGNOSIS — S93.492A SPRAIN OF ANTERIOR TALOFIBULAR LIGAMENT OF LEFT ANKLE, INITIAL ENCOUNTER: ICD-10-CM

## 2020-09-10 DIAGNOSIS — M79.672 LEFT FOOT PAIN: Primary | ICD-10-CM

## 2020-09-10 LAB
BUN SERPL-MCNC: 26 MG/DL (ref 7–25)
BUN/CREAT SERPL: 18 (CALC) (ref 6–22)
CALCIUM SERPL-MCNC: 9.5 MG/DL (ref 8.6–10.4)
CHLORIDE SERPL-SCNC: 92 MMOL/L (ref 98–110)
CO2 SERPL-SCNC: 39 MMOL/L (ref 20–32)
CREAT SERPL-MCNC: 1.42 MG/DL (ref 0.6–0.88)
GFRSERPLBLD MDRD-ARVRAT: 33 ML/MIN/1.73M2
GLUCOSE SERPL-MCNC: 95 MG/DL (ref 65–139)
POTASSIUM SERPL-SCNC: 3.2 MMOL/L (ref 3.5–5.3)
SODIUM SERPL-SCNC: 141 MMOL/L (ref 135–146)

## 2020-09-10 PROCEDURE — 99203 OFFICE O/P NEW LOW 30 MIN: CPT | Mod: S$PBB,,, | Performed by: PODIATRIST

## 2020-09-10 PROCEDURE — 73630 XR FOOT COMPLETE 3 VIEW LEFT: ICD-10-PCS | Mod: 26,S$PBB,LT, | Performed by: PODIATRIST

## 2020-09-10 PROCEDURE — 73630 X-RAY EXAM OF FOOT: CPT | Mod: PBBFAC,LT | Performed by: PODIATRIST

## 2020-09-10 PROCEDURE — 73600 X-RAY EXAM OF ANKLE: CPT | Mod: PBBFAC,LT | Performed by: PODIATRIST

## 2020-09-10 PROCEDURE — 73600 XR ANKLE 2 VIEW LEFT: ICD-10-PCS | Mod: 26,S$PBB,LT, | Performed by: PODIATRIST

## 2020-09-10 PROCEDURE — 99203 PR OFFICE/OUTPT VISIT, NEW, LEVL III, 30-44 MIN: ICD-10-PCS | Mod: S$PBB,,, | Performed by: PODIATRIST

## 2020-09-10 PROCEDURE — 99215 OFFICE O/P EST HI 40 MIN: CPT | Mod: 25 | Performed by: PODIATRIST

## 2020-09-10 RX ORDER — CHLORDIAZEPOXIDE HYDROCHLORIDE AND CLIDINIUM BROMIDE 5; 2.5 MG/1; MG/1
CAPSULE ORAL
COMMUNITY
Start: 2020-07-22 | End: 2020-09-30 | Stop reason: SDUPTHER

## 2020-09-10 RX ORDER — FUROSEMIDE 20 MG/1
20 TABLET ORAL DAILY PRN
COMMUNITY
End: 2020-09-16

## 2020-09-10 NOTE — PROGRESS NOTES
[address] [facility] [address]  [place] [address]  Phone: [phone]   [phone]    Patient's PCP:Rajendra Devine PA-C  Referring Provider: No ref. provider found    Subjective:      Chief Complaint:: Ankle Pain (left) and Foot Pain (left)    DEMETRIUS Jones is a 86 y.o. female who presents with a complaint of knot in left medial aspect of ankle and tibia pain lasting since March. Onset of the symptoms was pt fell in March and pain has been since.  Current symptoms include soreness and swelling, occasional pain.  Aggravating factors are prolong walking/standing. Treatment to date have included taking Lasix and elevating, when hurting has to walk with walker. Patients rates pain 7/10 on pain scale.    Vitals:    09/10/20 1610   BP: 103/65   Pulse: 70   Resp: 16   Temp: 98.7 °F (37.1 °C)     Shoe Size:     Past Surgical History:   Procedure Laterality Date    CATARACT EXTRACTION, BILATERAL      Dr Collins     SECTION      x's2    CHOLECYSTECTOMY      COLONOSCOPY  2009    ESOPHAGOGASTRODUODENOSCOPY (EGD) WITH DILATION      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    LAPAROSCOPIC CHOLECYSTECTOMY N/A 2019    Procedure: CHOLECYSTECTOMY, LAPAROSCOPIC;  Surgeon: Toney Grace III, MD;  Location: [facility];  Service: General;  Laterality: N/A;    SKIN CANCER EXCISION      Dr.Weil     Past Medical History:   Diagnosis Date    Anxiety     Arthritis     Asthma     COPD (chronic obstructive pulmonary disease)     Hypertension     Pain in the abdomen     Thyroid disease      Family History   Problem Relation Age of Onset    Diabetes Mother     Depression Maternal Grandmother     Depression Maternal Grandfather         Social History:   Marital Status:   Alcohol History:  reports no history of alcohol use.  Tobacco History:  reports that she quit smoking about 15 years ago. She has a 2.50 pack-year smoking history. She has never used smokeless tobacco.  Drug History:   reports no history of drug use.    Review of patient's allergies indicates:   Allergen Reactions    Sulfa (sulfonamide antibiotics) Swelling    Androgenic anabolic steroid Swelling     Feet swelling     Aspirin Other (See Comments)     Upset stomach     Erythromycin Itching    Methylprednisolone Other (See Comments)    Penicillins Itching       Current Outpatient Medications   Medication Sig Dispense Refill    albuterol sulfate (PROAIR RESPICLICK) 90 mcg/actuation AePB Inhale 2 puffs into the lungs every 4 (four) hours. Rescue2 puffs every 4 hours as needed for cough, wheeze, or shortness of breath 1 each 2    albuterol-ipratropium (DUO-NEB) 2.5 mg-0.5 mg/3 mL nebulizer solution Take 3 mLs by nebulization every 6 (six) hours as needed. 120 vial 1    atenoloL-chlorthalidone (TENORETIC) 50-25 mg Tab Take 1 tablet by mouth once daily. 90 tablet 1    chlordiazepoxide-clidinium 5-2.5 mg (LIBRAX) 5-2.5 mg Cap TAKE ONE CAPSULE BY MOUTH THREE TIMES DAILY BEFORE MEALS      clobetasoL 0.025 % Crea Apply topically.      fluticasone-umeclidin-vilanter (TRELEGY ELLIPTA) 100-62.5-25 mcg DsDv Inhale 1 puff into the lungs once daily. 3 each 3    furosemide (LASIX) 20 MG tablet Take 20 mg by mouth daily as needed.      furosemide (LASIX) 40 MG tablet Take 1 tablet (40 mg total) by mouth once daily. 90 tablet 1    HYDROcodone-acetaminophen (NORCO)  mg per tablet Take 1 tablet by mouth every 4 (four) hours as needed for Pain. 60 tablet 0    levothyroxine (SYNTHROID) 50 MCG tablet Take 1 tablet (50 mcg total) by mouth once daily. 90 tablet 1    naproxen (NAPROSYN) 375 MG tablet Take 1 tablet (375 mg total) by mouth 2 (two) times daily. 60 tablet 0    pantoprazole (PROTONIX) 40 MG tablet Take 1 tablet (40 mg total) by mouth every morning. 90 tablet 1    ALPRAZolam (XANAX) 0.5 MG tablet Take 1 tablet (0.5 mg total) by mouth 3 (three) times daily as needed. 90 tablet 2    mupirocin (BACTROBAN) 2 % ointment Apply  topically 2 (two) times daily. (Patient not taking: Reported on 9/10/2020) 22 g 1    potassium chloride (MICRO-K) 10 MEQ CpSR Take 1 capsule (10 mEq total) by mouth once daily. (Patient not taking: Reported on 9/10/2020) 90 capsule 1    spironolactone (ALDACTONE) 25 MG tablet Take 1 tablet (25 mg total) by mouth once daily. (Patient not taking: Reported on 9/10/2020) 90 tablet 1     No current facility-administered medications for this visit.        Review of Systems      Objective:        Physical Exam:   Foot Exam    General  General Appearance: appears stated age and healthy   Orientation: alert and oriented to person, place, and time   Affect: appropriate   Gait: antalgic       Left Foot/Ankle      Inspection and Palpation  Ecchymosis: none  Tenderness: (Diffuse tenderness of the entire foot and lower lateral leg.)  Swelling: (Diffuse swelling lower extremity but symmetrical with the opposite leg.)  Arch: normal  Skin Exam: skin intact;     Neurovascular  Dorsalis pedis: 1+  Posterior tibial: 1+  Saphenous nerve sensation: normal  Tibial nerve sensation: normal  Superficial peroneal nerve sensation: normal  Deep peroneal nerve sensation: normal  Sural nerve sensation: normal    Muscle Strength  Ankle dorsiflexion: 5  Ankle plantar flexion: 5  Ankle inversion: 5  Ankle eversion: 5  Great toe extension: 5  Great toe flexion: 5    Range of Motion    Normal left ankle ROM          Physical Exam   Cardiovascular:   Pulses:       Dorsalis pedis pulses are 1+ on the left side.        Posterior tibial pulses are 1+ on the left side.   Musculoskeletal:        Legs:         Feet:        Imaging:   AP, lateral oblique ankle and AP lateral and lateral obliques weight bearing x-rays left foot:  No acute fractures no bone tumors or softissue masses seen and no malalignment of joints.  Osteopenia consistent with age.  Minor arch arhtritis and a little arthritis anterior ankle.         Assessment:       1. Left foot pain     2. Acute left ankle pain    3. Sprain of anterior talofibular ligament of left ankle, initial encounter      Plan:   Left foot pain  -     X-Ray Foot Complete Left    Acute left ankle pain  -     X-Ray Ankle 2 View Left  -     IMMOBILIZER FOR HOME USE    Sprain of anterior talofibular ligament of left ankle, initial encounter  -     IMMOBILIZER FOR HOME USE    1. I evaluated patient and described clinical and xray findings to patient.  I told her no pathology seen other then arthritis.  Recommend ice area, use topical medications, good shoes and inserts and see ortho if knee and leg continue to bother her.Return as needed.  Follow up if symptoms worsen or fail to improve.    Procedures - None    Counseling:     I provided patient education verbally regarding:   Patient diagnosis, treatment options, as well as alternatives, risks, and benefits.     This note was created using Dragon voice recognition software that occasionally misinterpreted phrases or words.

## 2020-09-13 NOTE — PROGRESS NOTES
Please call pt and let her know recent labs show she is dehydrated with weakened kidney function. Ask how often she is taking naproxen and I recommend stopping that completely and switching to tylenol for pain. Also if she's taking furosemide regularly then recommend holding that for 3 days and then only use prn. Needs to increase water intake and Recheck BMP in 2 weeks

## 2020-09-14 ENCOUNTER — TELEPHONE (OUTPATIENT)
Dept: FAMILY MEDICINE | Facility: CLINIC | Age: 85
End: 2020-09-14

## 2020-09-14 DIAGNOSIS — N28.9 ABNORMAL KIDNEY FUNCTION: Primary | ICD-10-CM

## 2020-09-14 NOTE — TELEPHONE ENCOUNTER
----- Message from Nimo Butterfield NP sent at 9/13/2020  6:52 PM CDT -----  Please call pt and let her know recent labs show she is dehydrated with weakened kidney function. Ask how often she is taking naproxen and I recommend stopping that completely and switching to tylenol for pain. Also if she's taking furosemide regularly then recommend holding that for 3 days and then only use prn. Needs to increase water intake and Recheck BMP in 2 weeks

## 2020-09-14 NOTE — PROGRESS NOTES
Spoke to patient with results verbatim per Nimo. States she uses Naproxen about 3 times a week and will stop taking that and replace with Tylenol when needed. States she takes Furosemide 40mg every day, verbalized understanding to hold that x 3 days then resume as needed. She will drink more water and aware we will call her when repeat lab is due in 2 weeks.Remind me created. FYI to Nimo. BMP order pended.
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
## 2020-09-16 ENCOUNTER — OFFICE VISIT (OUTPATIENT)
Dept: FAMILY MEDICINE | Facility: CLINIC | Age: 85
End: 2020-09-16
Payer: MEDICARE

## 2020-09-16 VITALS
SYSTOLIC BLOOD PRESSURE: 132 MMHG | HEART RATE: 76 BPM | HEIGHT: 59 IN | DIASTOLIC BLOOD PRESSURE: 70 MMHG | WEIGHT: 155 LBS | TEMPERATURE: 98 F | BODY MASS INDEX: 31.25 KG/M2

## 2020-09-16 DIAGNOSIS — J96.11 CHRONIC HYPOXEMIC RESPIRATORY FAILURE: ICD-10-CM

## 2020-09-16 DIAGNOSIS — F11.20 UNCOMPLICATED OPIOID DEPENDENCE: ICD-10-CM

## 2020-09-16 DIAGNOSIS — Z23 NEED FOR IMMUNIZATION AGAINST INFLUENZA: ICD-10-CM

## 2020-09-16 DIAGNOSIS — K21.9 GASTROESOPHAGEAL REFLUX DISEASE, ESOPHAGITIS PRESENCE NOT SPECIFIED: ICD-10-CM

## 2020-09-16 DIAGNOSIS — J43.1 PANLOBULAR EMPHYSEMA: ICD-10-CM

## 2020-09-16 DIAGNOSIS — J44.89 COPD WITH ASTHMA: ICD-10-CM

## 2020-09-16 DIAGNOSIS — N18.30 CHRONIC KIDNEY DISEASE, STAGE 3: Primary | ICD-10-CM

## 2020-09-16 DIAGNOSIS — F51.01 PRIMARY INSOMNIA: ICD-10-CM

## 2020-09-16 DIAGNOSIS — D69.2 OTHER NONTHROMBOCYTOPENIC PURPURA: ICD-10-CM

## 2020-09-16 DIAGNOSIS — I10 ESSENTIAL HYPERTENSION: ICD-10-CM

## 2020-09-16 DIAGNOSIS — M15.9 PRIMARY OSTEOARTHRITIS INVOLVING MULTIPLE JOINTS: ICD-10-CM

## 2020-09-16 DIAGNOSIS — E03.9 ACQUIRED HYPOTHYROIDISM: ICD-10-CM

## 2020-09-16 PROCEDURE — G0008 FLU VACCINE - QUADRIVALENT - HIGH DOSE (65+) PRESERVATIVE FREE IM: ICD-10-PCS | Mod: S$GLB,,, | Performed by: FAMILY MEDICINE

## 2020-09-16 PROCEDURE — 99214 PR OFFICE/OUTPT VISIT, EST, LEVL IV, 30-39 MIN: ICD-10-PCS | Mod: 25,S$GLB,, | Performed by: FAMILY MEDICINE

## 2020-09-16 PROCEDURE — 90662 IIV NO PRSV INCREASED AG IM: CPT | Mod: S$GLB,,, | Performed by: FAMILY MEDICINE

## 2020-09-16 PROCEDURE — 99214 OFFICE O/P EST MOD 30 MIN: CPT | Mod: 25,S$GLB,, | Performed by: FAMILY MEDICINE

## 2020-09-16 PROCEDURE — 90662 FLU VACCINE - QUADRIVALENT - HIGH DOSE (65+) PRESERVATIVE FREE IM: ICD-10-PCS | Mod: S$GLB,,, | Performed by: FAMILY MEDICINE

## 2020-09-16 PROCEDURE — G0008 ADMIN INFLUENZA VIRUS VAC: HCPCS | Mod: S$GLB,,, | Performed by: FAMILY MEDICINE

## 2020-09-16 RX ORDER — HYDROCODONE BITARTRATE AND ACETAMINOPHEN 10; 325 MG/1; MG/1
1 TABLET ORAL EVERY 4 HOURS PRN
Qty: 60 TABLET | Refills: 0 | Status: SHIPPED | OUTPATIENT
Start: 2020-09-21 | End: 2020-12-17 | Stop reason: SDUPTHER

## 2020-09-16 RX ORDER — ALPRAZOLAM 0.5 MG/1
0.5 TABLET ORAL 3 TIMES DAILY PRN
Qty: 90 TABLET | Refills: 2 | Status: SHIPPED | OUTPATIENT
Start: 2020-09-16 | End: 2020-12-17 | Stop reason: SDUPTHER

## 2020-09-16 RX ORDER — ATENOLOL AND CHLORTHALIDONE TABLET 50; 25 MG/1; MG/1
1 TABLET ORAL DAILY
Qty: 90 TABLET | Refills: 1 | Status: SHIPPED | OUTPATIENT
Start: 2020-09-16 | End: 2020-12-29 | Stop reason: SDUPTHER

## 2020-09-16 RX ORDER — HYDROCODONE BITARTRATE AND ACETAMINOPHEN 10; 325 MG/1; MG/1
1 TABLET ORAL EVERY 12 HOURS PRN
Qty: 60 TABLET | Refills: 0 | Status: SHIPPED | OUTPATIENT
Start: 2020-11-19 | End: 2020-12-29 | Stop reason: SDUPTHER

## 2020-09-16 RX ORDER — LEVOTHYROXINE SODIUM 50 UG/1
50 TABLET ORAL DAILY
Qty: 90 TABLET | Refills: 1 | Status: SHIPPED | OUTPATIENT
Start: 2020-09-16 | End: 2020-12-29 | Stop reason: SDUPTHER

## 2020-09-16 RX ORDER — PANTOPRAZOLE SODIUM 40 MG/1
40 TABLET, DELAYED RELEASE ORAL EVERY MORNING
Qty: 90 TABLET | Refills: 1 | Status: SHIPPED | OUTPATIENT
Start: 2020-09-16 | End: 2020-12-29 | Stop reason: SDUPTHER

## 2020-09-16 RX ORDER — HYDROCODONE BITARTRATE AND ACETAMINOPHEN 10; 325 MG/1; MG/1
1 TABLET ORAL EVERY 12 HOURS PRN
Qty: 60 TABLET | Refills: 0 | Status: SHIPPED | OUTPATIENT
Start: 2020-10-20 | End: 2020-12-29 | Stop reason: SDUPTHER

## 2020-09-16 NOTE — PROGRESS NOTES
SUBJECTIVE:    Patient ID: Valerie Jones is a 86 y.o. female.    Chief Complaint: Follow-up (brought bottles ac )    Resolve this 86-year-old female had tripped and fallen in the grocery store recently.  Right little she saw orthopedics and did not have a fracture.  Just a sprain of her knee and her ankle.  She has been taking naproxen 375 mg p.o. b.i.d. for joint pains.  Recent BMP showed a decrease in her GFR down to 33 and low potassium at 3.2.  BUN was 26 creatinine 1.42.  She was told to hold the Naprosyn as well as the Lasix 40 mg that she took daily.  She also had a optional 20 mg Lasix she could take in the evening p.r.n. extra edema.  She usually drinks 2-4 glasses of water per day.    Her COPD is been stable and she has not been struggling for air.  She has O2 to wear at night should she need it      Office Visit on 03/26/2020   Component Date Value Ref Range Status    Glucose 09/09/2020 95  65 - 139 mg/dL Final    BUN, Bld 09/09/2020 26* 7 - 25 mg/dL Final    Creatinine 09/09/2020 1.42* 0.60 - 0.88 mg/dL Final    eGFR if non African American 09/09/2020 33* > OR = 60 mL/min/1.73m2 Final    eGFR if  09/09/2020 39* > OR = 60 mL/min/1.73m2 Final    BUN/Creatinine Ratio 09/09/2020 18  6 - 22 (calc) Final    Sodium 09/09/2020 141  135 - 146 mmol/L Final    Potassium 09/09/2020 3.2* 3.5 - 5.3 mmol/L Final    Chloride 09/09/2020 92* 98 - 110 mmol/L Final    CO2 09/09/2020 39* 20 - 32 mmol/L Final    Calcium 09/09/2020 9.5  8.6 - 10.4 mg/dL Final       Past Medical History:   Diagnosis Date    Anxiety     Arthritis     Asthma     COPD (chronic obstructive pulmonary disease)     Hypertension     Pain in the abdomen 2019    Thyroid disease      Social History     Socioeconomic History    Marital status:      Spouse name: Not on file    Number of children: Not on file    Years of education: Not on file    Highest education level: Not on file   Occupational History     Not on file   Social Needs    Financial resource strain: Not on file    Food insecurity     Worry: Not on file     Inability: Not on file    Transportation needs     Medical: Not on file     Non-medical: Not on file   Tobacco Use    Smoking status: Former Smoker     Packs/day: 0.50     Years: 5.00     Pack years: 2.50     Quit date: 2/15/2005     Years since quitting: 15.5    Smokeless tobacco: Never Used   Substance and Sexual Activity    Alcohol use: No    Drug use: No    Sexual activity: Never   Lifestyle    Physical activity     Days per week: Not on file     Minutes per session: Not on file    Stress: Not at all   Relationships    Social connections     Talks on phone: Not on file     Gets together: Not on file     Attends Orthodoxy service: Not on file     Active member of club or organization: Not on file     Attends meetings of clubs or organizations: Not on file     Relationship status: Not on file   Other Topics Concern    Not on file   Social History Narrative    Not on file     Past Surgical History:   Procedure Laterality Date    CATARACT EXTRACTION, BILATERAL      Dr Collins     SECTION      x's2    CHOLECYSTECTOMY      COLONOSCOPY      ESOPHAGOGASTRODUODENOSCOPY (EGD) WITH DILATION      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    LAPAROSCOPIC CHOLECYSTECTOMY N/A 2019    Procedure: CHOLECYSTECTOMY, LAPAROSCOPIC;  Surgeon: Toney Grace III, MD;  Location: [facility];  Service: General;  Laterality: N/A;    SKIN CANCER EXCISION      Dr.Weil     Family History   Problem Relation Age of Onset    Diabetes Mother     Depression Maternal Grandmother     Depression Maternal Grandfather        Review of patient's allergies indicates:   Allergen Reactions    Sulfa (sulfonamide antibiotics) Swelling    Androgenic anabolic steroid Swelling     Feet swelling     Aspirin Other (See Comments)     Upset stomach     Erythromycin Itching    Methylprednisolone Other (See  Comments)    Penicillins Itching       Current Outpatient Medications:     ALPRAZolam (XANAX) 0.5 MG tablet, Take 1 tablet (0.5 mg total) by mouth 3 (three) times daily as needed., Disp: 90 tablet, Rfl: 2    atenoloL-chlorthalidone (TENORETIC) 50-25 mg Tab, Take 1 tablet by mouth once daily., Disp: 90 tablet, Rfl: 1    chlordiazepoxide-clidinium 5-2.5 mg (LIBRAX) 5-2.5 mg Cap, TAKE ONE CAPSULE BY MOUTH THREE TIMES DAILY BEFORE MEALS, Disp: , Rfl:     [START ON 9/21/2020] HYDROcodone-acetaminophen (NORCO)  mg per tablet, Take 1 tablet by mouth every 4 (four) hours as needed for Pain., Disp: 60 tablet, Rfl: 0    levothyroxine (SYNTHROID) 50 MCG tablet, Take 1 tablet (50 mcg total) by mouth once daily., Disp: 90 tablet, Rfl: 1    pantoprazole (PROTONIX) 40 MG tablet, Take 1 tablet (40 mg total) by mouth every morning., Disp: 90 tablet, Rfl: 1    albuterol sulfate (PROAIR RESPICLICK) 90 mcg/actuation AePB, Inhale 2 puffs into the lungs every 4 (four) hours. Rescue2 puffs every 4 hours as needed for cough, wheeze, or shortness of breath, Disp: 1 each, Rfl: 2    albuterol-ipratropium (DUO-NEB) 2.5 mg-0.5 mg/3 mL nebulizer solution, Take 3 mLs by nebulization every 6 (six) hours as needed., Disp: 120 vial, Rfl: 1    clobetasoL 0.025 % Crea, Apply topically., Disp: , Rfl:     fluticasone-umeclidin-vilanter (TRELEGY ELLIPTA) 100-62.5-25 mcg DsDv, Inhale 1 puff into the lungs once daily., Disp: 3 each, Rfl: 3    furosemide (LASIX) 40 MG tablet, Take 1 tablet (40 mg total) by mouth once daily. (Patient not taking: Reported on 9/16/2020), Disp: 90 tablet, Rfl: 1    [START ON 10/20/2020] HYDROcodone-acetaminophen (NORCO)  mg per tablet, Take 1 tablet by mouth every 12 (twelve) hours as needed for Pain., Disp: 60 tablet, Rfl: 0    [START ON 11/19/2020] HYDROcodone-acetaminophen (NORCO)  mg per tablet, Take 1 tablet by mouth every 12 (twelve) hours as needed for Pain., Disp: 60 tablet, Rfl: 0     mupirocin (BACTROBAN) 2 % ointment, Apply topically 2 (two) times daily. (Patient not taking: Reported on 9/10/2020), Disp: 22 g, Rfl: 1    naproxen (NAPROSYN) 375 MG tablet, Take 1 tablet (375 mg total) by mouth 2 (two) times daily. (Patient not taking: Reported on 9/14/2020), Disp: 60 tablet, Rfl: 0    potassium chloride (MICRO-K) 10 MEQ CpSR, Take 1 capsule (10 mEq total) by mouth once daily. (Patient not taking: Reported on 9/10/2020), Disp: 90 capsule, Rfl: 1    spironolactone (ALDACTONE) 25 MG tablet, Take 1 tablet (25 mg total) by mouth once daily. (Patient not taking: Reported on 9/10/2020), Disp: 90 tablet, Rfl: 1    Review of Systems   Constitutional: Negative for appetite change, chills, fatigue, fever and unexpected weight change.   HENT: Negative for congestion, ear pain, sinus pain, sore throat and trouble swallowing.    Eyes: Negative for pain, discharge and visual disturbance.   Respiratory: Positive for shortness of breath. Negative for apnea, cough and wheezing.         COPD is been stable   Cardiovascular: Negative for chest pain, palpitations and leg swelling.   Gastrointestinal: Negative for abdominal pain, blood in stool, constipation, diarrhea, nausea and vomiting.   Endocrine: Negative for heat intolerance, polydipsia and polyuria.   Genitourinary: Negative for difficulty urinating, dyspareunia, dysuria, frequency, hematuria and menstrual problem.   Musculoskeletal: Positive for back pain (Continue with hydrocodone b.i.d. p.r.n. pain). Negative for arthralgias (Left ankle and left knee pains, these are mild she is managing it with Tylenol), gait problem, joint swelling and myalgias.   Allergic/Immunologic: Negative for environmental allergies, food allergies and immunocompromised state.   Neurological: Negative for dizziness, tremors, seizures, numbness and headaches.   Psychiatric/Behavioral: Negative for behavioral problems, confusion, hallucinations and suicidal ideas. The patient is  "not nervous/anxious.           Objective:      Vitals:    09/16/20 1447   BP: 132/70   Pulse: 76   Temp: 97.8 °F (36.6 °C)   Weight: 70.3 kg (155 lb)   Height: 4' 11" (1.499 m)     Physical Exam  Vitals signs and nursing note reviewed.   Constitutional:       Appearance: She is well-developed. She is obese.      Comments: Elderly female in no apparent distress   HENT:      Head: Normocephalic and atraumatic.      Right Ear: External ear normal.      Left Ear: External ear normal.      Nose: Nose normal.   Eyes:      Pupils: Pupils are equal, round, and reactive to light.   Neck:      Musculoskeletal: Normal range of motion and neck supple.      Thyroid: No thyromegaly.      Vascular: No carotid bruit.   Cardiovascular:      Rate and Rhythm: Normal rate and regular rhythm.      Heart sounds: Normal heart sounds. No murmur.   Pulmonary:      Effort: Pulmonary effort is normal.      Breath sounds: Normal breath sounds. No wheezing or rales.   Abdominal:      General: Bowel sounds are normal. There is no distension.      Palpations: Abdomen is soft.      Tenderness: There is no abdominal tenderness.   Musculoskeletal: Normal range of motion.         General: No tenderness or deformity.      Lumbar back: Normal. She exhibits no pain and no spasm.      Comments: Bends 45 degrees at  waist, has osteoarthritis in the hands knees and feet   Lymphadenopathy:      Cervical: No cervical adenopathy.   Skin:     General: Skin is warm and dry.      Findings: No rash.   Neurological:      General: No focal deficit present.      Mental Status: She is alert and oriented to person, place, and time. Mental status is at baseline.      Cranial Nerves: No cranial nerve deficit.      Coordination: Coordination normal.      Comments: Slow steady gait   Psychiatric:         Behavior: Behavior normal.         Thought Content: Thought content normal.         Judgment: Judgment normal.           Assessment:       1. Chronic kidney disease, " stage 3    2. Need for immunization against influenza    3. Gastroesophageal reflux disease, esophagitis presence not specified    4. Acquired hypothyroidism    5. Primary osteoarthritis involving multiple joints    6. Primary insomnia    7. Essential hypertension    8. Other nonthrombocytopenic purpura    9. Uncomplicated opioid dependence    10. Chronic hypoxemic respiratory failure    11. COPD with asthma    12. Panlobular emphysema         Plan:       Chronic kidney disease, stage 3  GFR down to the 33 patient is to continue increasing her water intake.  Hold off on Naprosyn and hold off on Lasix.  Recheck labs in 2 weeks.  If she gains over 6 lb we may have to restart her diuretics  Need for immunization against influenza  -     Influenza - Quadrivalent - High Dose (65+) (PF) (IM)  Flu vaccine today  Gastroesophageal reflux disease, esophagitis presence not specified  -     pantoprazole (PROTONIX) 40 MG tablet; Take 1 tablet (40 mg total) by mouth every morning.  Dispense: 90 tablet; Refill: 1  Refill pantoprazole  Acquired hypothyroidism  -     levothyroxine (SYNTHROID) 50 MCG tablet; Take 1 tablet (50 mcg total) by mouth once daily.  Dispense: 90 tablet; Refill: 1  Continue levothyroxine  Primary osteoarthritis involving multiple joints  -     HYDROcodone-acetaminophen (NORCO)  mg per tablet; Take 1 tablet by mouth every 4 (four) hours as needed for Pain.  Dispense: 60 tablet; Refill: 0  Pain medicine refilled  Primary insomnia  Comments:  she just uses low dose prn alprazolam. refills as needed.  Orders:  -     ALPRAZolam (XANAX) 0.5 MG tablet; Take 1 tablet (0.5 mg total) by mouth 3 (three) times daily as needed.  Dispense: 90 tablet; Refill: 2    Essential hypertension  -     atenoloL-chlorthalidone (TENORETIC) 50-25 mg Tab; Take 1 tablet by mouth once daily.  Dispense: 90 tablet; Refill: 1  Blood pressure well controlled currently  Other nonthrombocytopenic purpura    Uncomplicated opioid  dependence    Chronic hypoxemic respiratory failure    COPD with asthma    Panlobular emphysema    Other orders  -     HYDROcodone-acetaminophen (NORCO)  mg per tablet; Take 1 tablet by mouth every 12 (twelve) hours as needed for Pain.  Dispense: 60 tablet; Refill: 0  -     HYDROcodone-acetaminophen (NORCO)  mg per tablet; Take 1 tablet by mouth every 12 (twelve) hours as needed for Pain.  Dispense: 60 tablet; Refill: 0      Follow up in about 3 months (around 12/16/2020), or [facility]D 3.        9/16/2020 Reed Andersen

## 2020-09-23 ENCOUNTER — TELEPHONE (OUTPATIENT)
Dept: FAMILY MEDICINE | Facility: CLINIC | Age: 85
End: 2020-09-23

## 2020-09-23 NOTE — TELEPHONE ENCOUNTER
Spoke to patient that fasting lab is due Monday or Tuesday of next week. Verbalized understanding. Updated remind me.

## 2020-09-23 NOTE — TELEPHONE ENCOUNTER
----- Message from RT Janina sent at 9/14/2020 12:46 PM CDT -----  Regarding: Lab due next week   Nimo Butterfield NP sent at 9/13/2020  6:52 PM CDT -----  Please call pt and let her know recent labs show she is dehydrated with weakened kidney function. Ask how often she is taking naproxen and I recommend stopping that completely and switching to tylenol for pain. Also if she's taking furosemide regularly then recommend holding that for 3 days and then only use prn. Needs to increase water intake and Recheck BMP in 2 weeks

## 2020-09-30 DIAGNOSIS — M15.9 PRIMARY OSTEOARTHRITIS INVOLVING MULTIPLE JOINTS: ICD-10-CM

## 2020-09-30 LAB
BUN SERPL-MCNC: 17 MG/DL (ref 7–25)
BUN/CREAT SERPL: 18 (CALC) (ref 6–22)
CALCIUM SERPL-MCNC: 9.3 MG/DL (ref 8.6–10.4)
CHLORIDE SERPL-SCNC: 94 MMOL/L (ref 98–110)
CO2 SERPL-SCNC: 36 MMOL/L (ref 20–32)
CREAT SERPL-MCNC: 0.96 MG/DL (ref 0.6–0.88)
GFRSERPLBLD MDRD-ARVRAT: 53 ML/MIN/1.73M2
GLUCOSE SERPL-MCNC: 89 MG/DL (ref 65–139)
POTASSIUM SERPL-SCNC: 3.6 MMOL/L (ref 3.5–5.3)
SODIUM SERPL-SCNC: 136 MMOL/L (ref 135–146)

## 2020-09-30 RX ORDER — CHLORDIAZEPOXIDE HYDROCHLORIDE AND CLIDINIUM BROMIDE 5; 2.5 MG/1; MG/1
CAPSULE ORAL
Qty: 90 CAPSULE | Refills: 3 | Status: SHIPPED | OUTPATIENT
Start: 2020-09-30 | End: 2020-10-01 | Stop reason: SDUPTHER

## 2020-09-30 RX ORDER — NAPROXEN 375 MG/1
375 TABLET ORAL 2 TIMES DAILY
Qty: 60 TABLET | Refills: 0 | Status: SHIPPED | OUTPATIENT
Start: 2020-09-30 | End: 2020-12-17 | Stop reason: SDUPTHER

## 2020-10-01 ENCOUNTER — TELEPHONE (OUTPATIENT)
Dept: FAMILY MEDICINE | Facility: CLINIC | Age: 85
End: 2020-10-01

## 2020-10-01 RX ORDER — CHLORDIAZEPOXIDE HYDROCHLORIDE AND CLIDINIUM BROMIDE 5; 2.5 MG/1; MG/1
CAPSULE ORAL
Qty: 90 CAPSULE | Refills: 3 | Status: SHIPPED | OUTPATIENT
Start: 2020-10-01 | End: 2020-10-05 | Stop reason: SDUPTHER

## 2020-10-01 NOTE — TELEPHONE ENCOUNTER
Tried calling patient and alternate number, no voicemail on patient number, alternate number disconnected.

## 2020-10-01 NOTE — TELEPHONE ENCOUNTER
Spoke to patient with results verbatim per Nimo. States she was on 40mg Lasix Qam and she changed to 20mg in the morning starting today because Nimo said she was dehydrated. Also said she started back on her Naprosyn which Nimo took her off of because she was dehydrated. To Nimo. Also updated contact numbers.

## 2020-10-01 NOTE — TELEPHONE ENCOUNTER
----- Message from Nimo Butterfield NP sent at 9/30/2020  7:59 PM CDT -----  Please call pt and let her know kidney function is much improved since labs 3 weeks ago- continue as is.

## 2020-10-01 NOTE — TELEPHONE ENCOUNTER
Please call pt and let her know kidney function is much improved since labs 3 weeks ago- continue as is.

## 2020-10-02 NOTE — TELEPHONE ENCOUNTER
Please let her know I recommend using naprosyn very sparingly as it can cause worsening renal fxn/renal failure with daily or overuse. Recommend taking taking as first line for pain and/or the hydrocodone pain pill.

## 2020-10-02 NOTE — TELEPHONE ENCOUNTER
Spoke to patient who understood the message. She will try taking naprosyn every other day along with the hydrocodone/ba

## 2020-10-05 RX ORDER — CHLORDIAZEPOXIDE HYDROCHLORIDE AND CLIDINIUM BROMIDE 5; 2.5 MG/1; MG/1
CAPSULE ORAL
Qty: 90 CAPSULE | Refills: 3 | Status: SHIPPED | OUTPATIENT
Start: 2020-10-05 | End: 2020-12-29 | Stop reason: SDUPTHER

## 2020-10-05 NOTE — TELEPHONE ENCOUNTER
----- Message from Silvia Schaffer sent at 10/5/2020  9:33 AM CDT -----  [facility] calling for the pt Refill on Librato

## 2020-10-05 NOTE — TELEPHONE ENCOUNTER
----- Message from Silvia Schaffer sent at 10/5/2020  2:17 PM CDT -----  Pt calling back in regards to Librax she will  a hard script tomorrow.     # [phone]

## 2020-10-07 DIAGNOSIS — R60.9 EDEMA, UNSPECIFIED TYPE: ICD-10-CM

## 2020-10-07 RX ORDER — FUROSEMIDE 40 MG/1
40 TABLET ORAL DAILY
Qty: 90 TABLET | Refills: 1 | Status: SHIPPED | OUTPATIENT
Start: 2020-10-07 | End: 2020-12-29 | Stop reason: SDUPTHER

## 2020-10-07 NOTE — TELEPHONE ENCOUNTER
----- Message from Silvia Schaffer sent at 10/7/2020  3:01 PM CDT -----  Pt calling refill on Furosemide 40 mg to [facility] cb # [phone]

## 2020-11-02 ENCOUNTER — TELEPHONE (OUTPATIENT)
Dept: PULMONOLOGY | Facility: CLINIC | Age: 85
End: 2020-11-02

## 2020-11-02 DIAGNOSIS — J44.89 COPD WITH ASTHMA: ICD-10-CM

## 2020-11-02 NOTE — TELEPHONE ENCOUNTER
Spoke with patient and let her know Roxy approved her inhaler but anything further she would need a follow up appointment for. Patient verbally understood, stated she would call later on to schedule an appointment.     ----- Message from Roxy Marks NP sent at 11/2/2020 11:10 AM CST -----  Needs one year appointment.

## 2020-11-16 ENCOUNTER — TELEPHONE (OUTPATIENT)
Dept: FAMILY MEDICINE | Facility: CLINIC | Age: 85
End: 2020-11-16

## 2020-11-16 RX ORDER — PHENAZOPYRIDINE HYDROCHLORIDE 100 MG/1
100 TABLET, FILM COATED ORAL 3 TIMES DAILY PRN
COMMUNITY
End: 2021-03-16 | Stop reason: SDUPTHER

## 2020-11-16 NOTE — TELEPHONE ENCOUNTER
Pt states she has chronic UTI's. Pt states her urine is hot. She states she tries to keep rx on hand. She is not currently having any burning or urinary frequency.

## 2020-11-16 NOTE — TELEPHONE ENCOUNTER
----- Message from Yue Jaramillo sent at 11/16/2020  3:10 PM CST -----  Regarding: refils  [facility] 100 mg   [phone]

## 2020-12-03 ENCOUNTER — TELEPHONE (OUTPATIENT)
Dept: FAMILY MEDICINE | Facility: CLINIC | Age: 85
End: 2020-12-03

## 2020-12-03 NOTE — TELEPHONE ENCOUNTER
Im not sure of any change. If this is the case she will need to let us know where to send the orders to

## 2020-12-03 NOTE — TELEPHONE ENCOUNTER
----- Message from Silvia Schaffer sent at 12/3/2020  2:17 PM CST -----  Pt calling said she received a letter from [employer] saying that she will need new orders for there COPD supplies due to change in companies.  She did not know the name of the new company.  She will be leaving and asking that we call her back on Monday  Cb # [phone]

## 2020-12-08 ENCOUNTER — TELEPHONE (OUTPATIENT)
Dept: FAMILY MEDICINE | Facility: CLINIC | Age: 85
End: 2020-12-08

## 2020-12-08 DIAGNOSIS — J44.89 COPD WITH ASTHMA: Primary | ICD-10-CM

## 2020-12-08 NOTE — TELEPHONE ENCOUNTER
----- Message from Silvia Schaffer sent at 12/8/2020  2:39 PM CST -----  Pt calling said her breathing equipment company is changing. She is calling to provide the company name is [employer] phone # [phone] she does not have a fax number.  # [phone]

## 2020-12-08 NOTE — TELEPHONE ENCOUNTER
Spoke to pt about message below. Pt states she will contact mehdi to find out what new company her supplies will be under. Pt states she will call us as soon as she knows something.

## 2020-12-08 NOTE — TELEPHONE ENCOUNTER
Attempted to call both numbers provided for pt. Pt not answering. Call going straight to voicemail.

## 2020-12-10 NOTE — TELEPHONE ENCOUNTER
Does she currently have a tank? If so how many liters is she on? Does she use the oxygen 24/7 or as needed?

## 2020-12-14 NOTE — TELEPHONE ENCOUNTER
Pt states both units are 5mL. Also that she uses the bedside concentrator mostly when she is at home and the portable one when she goes out a few times a week. The combination of both units is 24/7 usage. Pt went on to say that [employer] will not cover the current [employer] company after January 1, 2021.

## 2020-12-15 NOTE — TELEPHONE ENCOUNTER
Called number for facility pt provided. [facility] gave fax number [phone], for intake department. Order faxed to fax number provided.

## 2020-12-16 ENCOUNTER — TELEPHONE (OUTPATIENT)
Dept: FAMILY MEDICINE | Facility: CLINIC | Age: 85
End: 2020-12-16

## 2020-12-16 NOTE — TELEPHONE ENCOUNTER
----- Message from Silvia Schaffer sent at 12/16/2020 12:41 PM CST -----  Francisco Mario calling for the pt doing fu said the pt is requesting a lighter portable oxygen tank.  They need a LMN to support this and supporting documents sent to [facility] fax # [phone] Phone # [phone]

## 2020-12-17 DIAGNOSIS — F51.01 PRIMARY INSOMNIA: ICD-10-CM

## 2020-12-17 DIAGNOSIS — M15.9 PRIMARY OSTEOARTHRITIS INVOLVING MULTIPLE JOINTS: ICD-10-CM

## 2020-12-17 RX ORDER — HYDROCODONE BITARTRATE AND ACETAMINOPHEN 10; 325 MG/1; MG/1
1 TABLET ORAL EVERY 4 HOURS PRN
Qty: 60 TABLET | Refills: 0 | Status: SHIPPED | OUTPATIENT
Start: 2020-12-17 | End: 2020-12-29 | Stop reason: SDUPTHER

## 2020-12-17 RX ORDER — ALPRAZOLAM 0.5 MG/1
0.5 TABLET ORAL 3 TIMES DAILY PRN
Qty: 90 TABLET | Refills: 2 | Status: SHIPPED | OUTPATIENT
Start: 2020-12-17 | End: 2021-03-16 | Stop reason: SDUPTHER

## 2020-12-17 RX ORDER — NAPROXEN 375 MG/1
375 TABLET ORAL 2 TIMES DAILY
Qty: 60 TABLET | Refills: 0 | Status: SHIPPED | OUTPATIENT
Start: 2020-12-17 | End: 2021-01-18 | Stop reason: SDUPTHER

## 2020-12-17 NOTE — TELEPHONE ENCOUNTER
----- Message from Yue Jaramillo sent at 12/17/2020 12:18 PM CST -----  Regarding: refills  Hydrocodone   Alprazolam  Naproxen    Pharm [facility]ial[facility]   Pt [phone]

## 2020-12-29 ENCOUNTER — OFFICE VISIT (OUTPATIENT)
Dept: FAMILY MEDICINE | Facility: CLINIC | Age: 85
End: 2020-12-29
Payer: MEDICARE

## 2020-12-29 VITALS
HEART RATE: 68 BPM | HEIGHT: 59 IN | DIASTOLIC BLOOD PRESSURE: 60 MMHG | SYSTOLIC BLOOD PRESSURE: 112 MMHG | WEIGHT: 156 LBS | BODY MASS INDEX: 31.45 KG/M2

## 2020-12-29 DIAGNOSIS — F51.01 PRIMARY INSOMNIA: ICD-10-CM

## 2020-12-29 DIAGNOSIS — R60.9 EDEMA, UNSPECIFIED TYPE: ICD-10-CM

## 2020-12-29 DIAGNOSIS — M15.9 PRIMARY OSTEOARTHRITIS INVOLVING MULTIPLE JOINTS: ICD-10-CM

## 2020-12-29 DIAGNOSIS — K21.9 GASTROESOPHAGEAL REFLUX DISEASE, UNSPECIFIED WHETHER ESOPHAGITIS PRESENT: ICD-10-CM

## 2020-12-29 DIAGNOSIS — Z79.899 ENCOUNTER FOR LONG-TERM (CURRENT) USE OF OTHER MEDICATIONS: ICD-10-CM

## 2020-12-29 DIAGNOSIS — I10 ESSENTIAL HYPERTENSION: ICD-10-CM

## 2020-12-29 DIAGNOSIS — E03.9 ACQUIRED HYPOTHYROIDISM: ICD-10-CM

## 2020-12-29 DIAGNOSIS — N18.31 STAGE 3A CHRONIC KIDNEY DISEASE: Primary | ICD-10-CM

## 2020-12-29 PROCEDURE — 99214 PR OFFICE/OUTPT VISIT, EST, LEVL IV, 30-39 MIN: ICD-10-PCS | Mod: S$GLB,,, | Performed by: PHYSICIAN ASSISTANT

## 2020-12-29 PROCEDURE — 99214 OFFICE O/P EST MOD 30 MIN: CPT | Mod: S$GLB,,, | Performed by: PHYSICIAN ASSISTANT

## 2020-12-29 RX ORDER — NAPROXEN 375 MG/1
375 TABLET ORAL 2 TIMES DAILY
Qty: 60 TABLET | Refills: 0 | Status: CANCELLED | OUTPATIENT
Start: 2020-12-29

## 2020-12-29 RX ORDER — ALPRAZOLAM 0.5 MG/1
0.5 TABLET ORAL 3 TIMES DAILY PRN
Qty: 90 TABLET | Refills: 2 | Status: CANCELLED | OUTPATIENT
Start: 2020-12-29 | End: 2021-01-28

## 2020-12-29 RX ORDER — LEVOTHYROXINE SODIUM 50 UG/1
50 TABLET ORAL DAILY
Qty: 90 TABLET | Refills: 1 | Status: SHIPPED | OUTPATIENT
Start: 2020-12-29 | End: 2021-03-16 | Stop reason: SDUPTHER

## 2020-12-29 RX ORDER — PANTOPRAZOLE SODIUM 40 MG/1
40 TABLET, DELAYED RELEASE ORAL EVERY MORNING
Qty: 90 TABLET | Refills: 1 | Status: SHIPPED | OUTPATIENT
Start: 2020-12-29 | End: 2021-03-16 | Stop reason: SDUPTHER

## 2020-12-29 RX ORDER — FUROSEMIDE 40 MG/1
40 TABLET ORAL DAILY
Qty: 90 TABLET | Refills: 1 | Status: SHIPPED | OUTPATIENT
Start: 2020-12-29 | End: 2021-03-16 | Stop reason: SDUPTHER

## 2020-12-29 RX ORDER — ATENOLOL AND CHLORTHALIDONE TABLET 50; 25 MG/1; MG/1
1 TABLET ORAL DAILY
Qty: 90 TABLET | Refills: 1 | Status: SHIPPED | OUTPATIENT
Start: 2020-12-29 | End: 2021-03-16 | Stop reason: SDUPTHER

## 2020-12-29 RX ORDER — CHLORDIAZEPOXIDE HYDROCHLORIDE AND CLIDINIUM BROMIDE 5; 2.5 MG/1; MG/1
CAPSULE ORAL
Qty: 90 CAPSULE | Refills: 3 | Status: SHIPPED | OUTPATIENT
Start: 2020-12-29 | End: 2021-03-16 | Stop reason: SDUPTHER

## 2020-12-29 RX ORDER — FUROSEMIDE 20 MG/1
20 TABLET ORAL
Qty: 90 TABLET | Refills: 1 | Status: CANCELLED | OUTPATIENT
Start: 2020-12-29

## 2020-12-29 RX ORDER — FUROSEMIDE 20 MG/1
20 TABLET ORAL 2 TIMES DAILY
COMMUNITY
End: 2021-06-03 | Stop reason: SDUPTHER

## 2020-12-29 NOTE — PROGRESS NOTES
SUBJECTIVE:    Patient ID: Valerie Jones is a 87 y.o. female.    Chief Complaint: Follow-up (CKD, brought bottles// SW)    This is a pleasant 87-year-old female who presents today for regular checkup.  She is treated for hypothyroidism, hypertension, hyperlipidemia, asthma, anxiety. She is keeping herself safe and staying inside as much as she can. No new concerns at this time. I do see where her renal function has bumped back up. She has started back taking her naproxen despite being told to use tylenol instead. Says that it is the only thing that keeps her joints from hurting. Doesn't want to harm her kidneys though.      Telephone on 2020   Component Date Value Ref Range Status    Glucose 2020 89  65 - 139 mg/dL Final    BUN 2020 17  7 - 25 mg/dL Final    Creatinine 2020 0.96* 0.60 - 0.88 mg/dL Final    eGFR if non African American 2020 53* > OR = 60 mL/min/1.73m2 Final    eGFR if African American 2020 62  > OR = 60 mL/min/1.73m2 Final    BUN/Creatinine Ratio 2020 18  6 - 22 (calc) Final    Sodium 2020 136  135 - 146 mmol/L Final    Potassium 2020 3.6  3.5 - 5.3 mmol/L Final    Chloride 2020 94* 98 - 110 mmol/L Final    CO2 2020 36* 20 - 32 mmol/L Final    Calcium 2020 9.3  8.6 - 10.4 mg/dL Final       Past Medical History:   Diagnosis Date    Anxiety     Arthritis     Asthma     COPD (chronic obstructive pulmonary disease)     Hypertension     Pain in the abdomen     Thyroid disease      Past Surgical History:   Procedure Laterality Date    CATARACT EXTRACTION, BILATERAL  2017    Dr Collins     SECTION      x's2    CHOLECYSTECTOMY      COLONOSCOPY  2009    ESOPHAGOGASTRODUODENOSCOPY (EGD) WITH DILATION  2017    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    LAPAROSCOPIC CHOLECYSTECTOMY N/A 2019    Procedure: CHOLECYSTECTOMY, LAPAROSCOPIC;  Surgeon: Toney Grace III, MD;  Location: [facility];  Service:  General;  Laterality: N/A;    SKIN CANCER EXCISION  2014    Dr.Weil     Family History   Problem Relation Age of Onset    Diabetes Mother     Depression Maternal Grandmother     Depression Maternal Grandfather        Marital Status:   Alcohol History:  reports no history of alcohol use.  Tobacco History:  reports that she quit smoking about 15 years ago. She has a 2.50 pack-year smoking history. She has never used smokeless tobacco.  Drug History:  reports no history of drug use.    Review of patient's allergies indicates:   Allergen Reactions    Sulfa (sulfonamide antibiotics) Swelling    Androgenic anabolic steroid Swelling     Feet swelling     Aspirin Other (See Comments)     Upset stomach     Erythromycin Itching    Methylprednisolone Other (See Comments)    Penicillins Itching       Current Outpatient Medications:     ALPRAZolam (XANAX) 0.5 MG tablet, Take 1 tablet (0.5 mg total) by mouth 3 (three) times daily as needed., Disp: 90 tablet, Rfl: 2    atenoloL-chlorthalidone (TENORETIC) 50-25 mg Tab, Take 1 tablet by mouth once daily., Disp: 90 tablet, Rfl: 1    chlordiazepoxide-clidinium 5-2.5 mg (LIBRAX) 5-2.5 mg Cap, TAKE ONE CAPSULE BY MOUTH THREE TIMES DAILY BEFORE MEALS, Disp: 90 capsule, Rfl: 3    furosemide (LASIX) 20 MG tablet, Take 20 mg by mouth 2 (two) times daily., Disp: , Rfl:     furosemide (LASIX) 40 MG tablet, Take 1 tablet (40 mg total) by mouth once daily., Disp: 90 tablet, Rfl: 1    HYDROcodone-acetaminophen (NORCO)  mg per tablet, Take 1 tablet by mouth every 12 (twelve) hours as needed for Pain., Disp: 60 tablet, Rfl: 0    HYDROcodone-acetaminophen (NORCO)  mg per tablet, Take 1 tablet by mouth every 12 (twelve) hours as needed for Pain., Disp: 60 tablet, Rfl: 0    HYDROcodone-acetaminophen (NORCO)  mg per tablet, Take 1 tablet by mouth every 4 (four) hours as needed for Pain., Disp: 60 tablet, Rfl: 0    levothyroxine (SYNTHROID) 50 MCG tablet,  Take 1 tablet (50 mcg total) by mouth once daily., Disp: 90 tablet, Rfl: 1    naproxen (NAPROSYN) 375 MG tablet, Take 1 tablet (375 mg total) by mouth 2 (two) times daily., Disp: 60 tablet, Rfl: 0    pantoprazole (PROTONIX) 40 MG tablet, Take 1 tablet (40 mg total) by mouth every morning., Disp: 90 tablet, Rfl: 1    albuterol sulfate (PROAIR RESPICLICK) 90 mcg/actuation AePB, Inhale 2 puffs into the lungs every 4 (four) hours. Rescue2 puffs every 4 hours as needed for cough, wheeze, or shortness of breath, Disp: 1 each, Rfl: 2    albuterol-ipratropium (DUO-NEB) 2.5 mg-0.5 mg/3 mL nebulizer solution, Take 3 mLs by nebulization every 6 (six) hours as needed., Disp: 120 vial, Rfl: 1    clobetasoL 0.025 % Crea, Apply topically., Disp: , Rfl:     fluticasone-umeclidin-vilanter (TRELEGY ELLIPTA) 100-62.5-25 mcg DsDv, Inhale 1 puff into the lungs once daily., Disp: 60 each, Rfl: 0    mupirocin (BACTROBAN) 2 % ointment, Apply topically 2 (two) times daily. (Patient not taking: Reported on 9/10/2020), Disp: 22 g, Rfl: 1    phenazopyridine (PYRIDIUM) 100 MG tablet, Take 100 mg by mouth 3 (three) times daily as needed., Disp: , Rfl:     spironolactone (ALDACTONE) 25 MG tablet, Take 1 tablet (25 mg total) by mouth once daily. (Patient not taking: Reported on 9/10/2020), Disp: 90 tablet, Rfl: 1    Review of Systems   Constitutional: Negative for appetite change, chills, fatigue, fever and unexpected weight change.   HENT: Negative for congestion, ear pain, sinus pain, sore throat and trouble swallowing.    Eyes: Negative for pain, discharge and visual disturbance.   Respiratory: Positive for shortness of breath. Negative for apnea, cough and wheezing.         COPD is been stable   Cardiovascular: Negative for chest pain, palpitations and leg swelling.   Gastrointestinal: Negative for abdominal pain, blood in stool, constipation, diarrhea, nausea and vomiting.   Endocrine: Negative for heat intolerance, polydipsia and  "polyuria.   Genitourinary: Negative for difficulty urinating, dyspareunia, dysuria, frequency, hematuria and menstrual problem.   Musculoskeletal: Positive for back pain (Continue with hydrocodone b.i.d. p.r.n. pain). Negative for arthralgias (Left ankle and left knee pains, these are mild she is managing it with Tylenol), gait problem, joint swelling and myalgias.   Allergic/Immunologic: Negative for environmental allergies, food allergies and immunocompromised state.   Neurological: Negative for dizziness, tremors, seizures, numbness and headaches.   Psychiatric/Behavioral: Negative for behavioral problems, confusion, hallucinations and suicidal ideas. The patient is not nervous/anxious.           Objective:      Vitals:    12/29/20 1538   BP: 112/60   Pulse: 68   Weight: 70.8 kg (156 lb)   Height: 4' 11" (1.499 m)     Physical Exam  Vitals signs and nursing note reviewed.   Constitutional:       Appearance: She is well-developed. She is obese.      Comments: Elderly female in no apparent distress   HENT:      Head: Normocephalic and atraumatic.      Right Ear: External ear normal.      Left Ear: External ear normal.      Nose: Nose normal.   Eyes:      Pupils: Pupils are equal, round, and reactive to light.   Neck:      Musculoskeletal: Normal range of motion and neck supple.      Thyroid: No thyromegaly.      Vascular: No carotid bruit.   Cardiovascular:      Rate and Rhythm: Normal rate and regular rhythm.      Heart sounds: Normal heart sounds. No murmur.   Pulmonary:      Effort: Pulmonary effort is normal.      Breath sounds: Normal breath sounds. No wheezing or rales.   Abdominal:      General: Bowel sounds are normal. There is no distension.      Palpations: Abdomen is soft.      Tenderness: There is no abdominal tenderness.   Musculoskeletal: Normal range of motion.         General: No tenderness or deformity.      Lumbar back: Normal. She exhibits no pain and no spasm.      Comments: Bends 45 degrees at  " waist, has osteoarthritis in the hands knees and feet   Lymphadenopathy:      Cervical: No cervical adenopathy.   Skin:     General: Skin is warm and dry.      Findings: No rash.   Neurological:      General: No focal deficit present.      Mental Status: She is alert and oriented to person, place, and time. Mental status is at baseline.      Cranial Nerves: No cranial nerve deficit.      Coordination: Coordination normal.      Comments: Slow steady gait   Psychiatric:         Behavior: Behavior normal.         Thought Content: Thought content normal.         Judgment: Judgment normal.           Assessment:       1. Stage 3a chronic kidney disease    2. Primary osteoarthritis involving multiple joints    3. Gastroesophageal reflux disease, unspecified whether esophagitis present    4. Acquired hypothyroidism    5. Primary insomnia    6. Essential hypertension    7. Encounter for long-term (current) use of other medications    8. Edema, unspecified type         Plan:       Stage 3a chronic kidney disease  Comments:  Going to recheck bmp today. I instructed her that I dont want her using NSAIDs anymore and that we should use tylenol only.  Orders:  -     Basic Metabolic Panel; Future; Expected date: 12/29/2020    Primary osteoarthritis involving multiple joints  Comments:  uses prn pain meds. refilled per Dr. Andersen.  reviewed.    Gastroesophageal reflux disease, unspecified whether esophagitis present  -     pantoprazole (PROTONIX) 40 MG tablet; Take 1 tablet (40 mg total) by mouth every morning.  Dispense: 90 tablet; Refill: 1    Acquired hypothyroidism  -     levothyroxine (SYNTHROID) 50 MCG tablet; Take 1 tablet (50 mcg total) by mouth once daily.  Dispense: 90 tablet; Refill: 1    Primary insomnia  Comments:  she just uses low dose prn alprazolam. refills as needed.    Essential hypertension  Comments:  BP at goal. no changes today  Orders:  -     atenoloL-chlorthalidone (TENORETIC) 50-25 mg Tab; Take 1 tablet  by mouth once daily.  Dispense: 90 tablet; Refill: 1    Encounter for long-term (current) use of other medications  -     Pain Managment Profile 4, w/ Confirm, Ur; Future; Expected date: 12/29/2020    Edema, unspecified type  -     furosemide (LASIX) 40 MG tablet; Take 1 tablet (40 mg total) by mouth once daily.  Dispense: 90 tablet; Refill: 1    Other orders  -     chlordiazepoxide-clidinium 5-2.5 mg (LIBRAX) 5-2.5 mg Cap; TAKE ONE CAPSULE BY MOUTH THREE TIMES DAILY BEFORE MEALS  Dispense: 90 capsule; Refill: 3      No follow-ups on file.        12/29/2020 Rajendra Devine PA-C

## 2020-12-29 NOTE — PATIENT INSTRUCTIONS
Chronic Kidney Disease (CKD)     The role of the kidneys is to remove waste products and extra water from the blood.  When the kidneys do not work as they should, waste products begin to build up in the blood. This is called chronic kidney disease (CKD). CKD means that you have kidney damage or a decrease in kidney function lasting at least 3 months. CKD allows extra water, waste, and toxins to build up in the body. This can eventually become life-threatening. You might need dialysis or a kidney transplant to stay alive. This most severe form is called end stage renal disease.  Diabetes is the leading causes of chronic renal failure. Other causes include high blood pressure, hardening of the arteries (atherosclerosis), lupus, inflammation of the blood vessels (vasculitis), and past viral or bacterial infections. Certain over-the-counter pain medicines can cause renal failure when taken often over a long period of time. These include aspirin, ibuprofen, and related anti-inflammatory medicines called NSAIDs (nonsteroidal anti-inflammatory drugs).  Home care  The following guidelines will help you care for yourself at home:  · If you have diabetes, talk with your healthcare provider about keeping your blood sugar under control. Ask if you need to make and changes to your diet, lifestyle, or medicines.  · If you have high blood pressure:  ¨ Take prescribed medicine to lower your blood pressure to the recommended goal of less than 130/80.  ¨ Start a regular exercise program that you enjoy. Check with your healthcare provider to be sure your planned exercise program is right for you.  ¨ Eat less salt (sodium). Your healthcare provider can tell you how much salt per day is safe for you.  · If you are overweight, talk with your healthcare provider about a weight loss plan.  · If you smoke, you must quit. Smoking makes kidney disease worse. Talk with your healthcare provider about ways to help you quit.  For more  information, visit the following links:  ¨ www.smokefree.gov/sites/default/files/pdf/clearing-the-air-accessible.pdf  ¨ www.smokefree.gov  ¨ www.cancer.org/healthy/stayawayfromtobacco/guidetoquittingsmoking/  · Most people with CKD need to follow a special diet.  Be sure you understand yours. In general, you will need to limit protein, salt, potassium, and phosphorus. You also need to limit how much fluid you drink.   · CKD is a risk factor for heart disease. Talk with your healthcare provider about any other risk factors you might have and what you can do to lessen them.  · Talk with your healthcare provider about any medicines you are taking to find out if they need to be reduced or stopped.  · Don't use the following over-the-counter medicines, or consult your healthcare provider before using:  ¨ Aspirin and NSAIDs such as ibuprofen or naproxen. Using acetaminophen for fever or pain is OK.  ¨ Laxatives and antacids containing magnesium or aluminum  ¨ Fleet or phospho soda enemas containing phosphorus  ¨ Certain stomach acid-blocking medicine such as cimetidine or ranitidine   ¨ Decongestants containing pseudoephedrine   ¨ Herbal supplements  Follow-up care  Follow up with your healthcare provider, or as advised. Contact one of the following for more information:  · American Association of Kidney Patients 320-035-2474 www.aakp.org  · National Kidney Foundation 789-779-0765 www.kidney.org  · American Kidney Fund 068-950-1102 www.kidneyfund.org  · National Kidney Disease Education Program 866-4KIDNEY www.nkdep.nih.gov  If an X-ray, ECG (cardiogram), or other diagnostic test was taken, you will be told of any new findings that may affect your care.  Call 911  Call 911 if you have any of the following:  · Severe weakness, dizziness, fainting, drowsiness, or confusion  · Chest pain or shortness of breath  · Heart beating fast, slow, or irregularly  When to seek medical advice  Call your healthcare provider right away  if any of these occur:  · Nausea or vomiting  · Fever of 100.4°F (38°C) or higher, or as directed by your healthcare provider  · Unexpected weight gain or swelling in the legs, ankles, or around the eyes  · Decrease or absent urine output  Date Last Reviewed: 9/1/2016  © 7331-6456 [employer]. [address] [place] [address], [place] [address]. All rights reserved. This information is not intended as a substitute for professional medical care. Always follow your healthcare professional's instructions.

## 2020-12-31 LAB
BUN SERPL-MCNC: 31 MG/DL (ref 7–25)
BUN/CREAT SERPL: 22 (CALC) (ref 6–22)
CALCIUM SERPL-MCNC: 9.7 MG/DL (ref 8.6–10.4)
CHLORIDE SERPL-SCNC: 92 MMOL/L (ref 98–110)
CO2 SERPL-SCNC: 35 MMOL/L (ref 20–32)
CREAT SERPL-MCNC: 1.42 MG/DL (ref 0.6–0.88)
GFRSERPLBLD MDRD-ARVRAT: 33 ML/MIN/1.73M2
GLUCOSE SERPL-MCNC: 124 MG/DL (ref 65–139)
POTASSIUM SERPL-SCNC: 3.3 MMOL/L (ref 3.5–5.3)
SODIUM SERPL-SCNC: 140 MMOL/L (ref 135–146)

## 2020-12-31 RX ORDER — HYDROCODONE BITARTRATE AND ACETAMINOPHEN 10; 325 MG/1; MG/1
1 TABLET ORAL EVERY 12 HOURS PRN
Qty: 60 TABLET | Refills: 0 | Status: SHIPPED | OUTPATIENT
Start: 2021-01-17 | End: 2021-02-16

## 2020-12-31 RX ORDER — HYDROCODONE BITARTRATE AND ACETAMINOPHEN 10; 325 MG/1; MG/1
1 TABLET ORAL EVERY 4 HOURS PRN
Qty: 60 TABLET | Refills: 0 | Status: SHIPPED | OUTPATIENT
Start: 2021-03-18 | End: 2021-05-19 | Stop reason: SDUPTHER

## 2020-12-31 RX ORDER — HYDROCODONE BITARTRATE AND ACETAMINOPHEN 10; 325 MG/1; MG/1
1 TABLET ORAL EVERY 12 HOURS PRN
Qty: 60 TABLET | Refills: 0 | Status: SHIPPED | OUTPATIENT
Start: 2021-02-16 | End: 2021-01-18 | Stop reason: SDUPTHER

## 2021-01-02 LAB
1OH-MIDAZOLAM UR-MCNC: NEGATIVE NG/ML
7AMINOCLONAZEPAM UR-MCNC: NEGATIVE NG/ML
A-OH ALPRAZ UR-MCNC: 120 NG/ML
A-OH-TRIAZOLAM UR-MCNC: NEGATIVE NG/ML
AMPHETAMINES UR QL: NEGATIVE NG/ML
BARBITURATES UR QL: NEGATIVE NG/ML
BENZODIAZ UR QL: POSITIVE NG/ML
BZE UR QL: NEGATIVE NG/ML
CODEINE UR-MCNC: NEGATIVE NG/ML
COMMENT: ABNORMAL
CREAT UR-MCNC: 53.3 MG/DL
DRUG SCREEN COMMENT UR-IMP: ABNORMAL
HYDROCODONE UR-MCNC: 482 NG/ML
HYDROMORPHONE UR-MCNC: 228 NG/ML
LORAZEPAM UR-MCNC: NEGATIVE NG/ML
MEDICATION PRESCRIBED: ABNORMAL
MEDICATION PRESCRIBED: ABNORMAL
METHADONE UR QL: NEGATIVE NG/ML
MORPHINE UR-MCNC: NEGATIVE NG/ML
NORDIAZEPAM UR-MCNC: NEGATIVE NG/ML
NORHYDROCODONE UR CFM-MCNC: 651 NG/ML
OH-ETHYLFLURAZ UR-MCNC: NEGATIVE NG/ML
OPIATES UR QL: POSITIVE NG/ML
OXAZEPAM UR-MCNC: 358 NG/ML
OXIDANTS UR QL: NEGATIVE MCG/ML
OXYCODONE UR QL: NEGATIVE NG/ML
PCP UR QL: NEGATIVE NG/ML
PH UR: 6.5 [PH] (ref 4.5–9)
TEMAZEPAM UR-MCNC: NEGATIVE NG/ML

## 2021-01-04 ENCOUNTER — TELEPHONE (OUTPATIENT)
Dept: FAMILY MEDICINE | Facility: CLINIC | Age: 86
End: 2021-01-04

## 2021-01-11 ENCOUNTER — TELEPHONE (OUTPATIENT)
Dept: FAMILY MEDICINE | Facility: CLINIC | Age: 86
End: 2021-01-11

## 2021-01-13 ENCOUNTER — OFFICE VISIT (OUTPATIENT)
Dept: ORTHOPEDICS | Facility: CLINIC | Age: 86
End: 2021-01-13
Payer: MEDICARE

## 2021-01-13 VITALS
DIASTOLIC BLOOD PRESSURE: 85 MMHG | WEIGHT: 156 LBS | HEART RATE: 75 BPM | HEIGHT: 59 IN | SYSTOLIC BLOOD PRESSURE: 125 MMHG | BODY MASS INDEX: 31.45 KG/M2

## 2021-01-13 DIAGNOSIS — M17.11 PRIMARY OSTEOARTHRITIS OF RIGHT KNEE: Primary | ICD-10-CM

## 2021-01-13 PROCEDURE — 99213 PR OFFICE/OUTPT VISIT, EST, LEVL III, 20-29 MIN: ICD-10-PCS | Mod: 25,S$GLB,, | Performed by: PHYSICIAN ASSISTANT

## 2021-01-13 PROCEDURE — 20610 DRAIN/INJ JOINT/BURSA W/O US: CPT | Mod: RT,S$GLB,, | Performed by: PHYSICIAN ASSISTANT

## 2021-01-13 PROCEDURE — 20610 LARGE JOINT ASPIRATION/INJECTION: R KNEE: ICD-10-PCS | Mod: RT,S$GLB,, | Performed by: PHYSICIAN ASSISTANT

## 2021-01-13 PROCEDURE — 99213 OFFICE O/P EST LOW 20 MIN: CPT | Mod: 25,S$GLB,, | Performed by: PHYSICIAN ASSISTANT

## 2021-01-13 RX ORDER — METHYLPREDNISOLONE ACETATE 40 MG/ML
40 INJECTION, SUSPENSION INTRA-ARTICULAR; INTRALESIONAL; INTRAMUSCULAR; SOFT TISSUE
Status: DISCONTINUED | OUTPATIENT
Start: 2021-01-13 | End: 2021-01-13 | Stop reason: HOSPADM

## 2021-01-13 RX ADMIN — METHYLPREDNISOLONE ACETATE 40 MG: 40 INJECTION, SUSPENSION INTRA-ARTICULAR; INTRALESIONAL; INTRAMUSCULAR; SOFT TISSUE at 11:01

## 2021-01-18 ENCOUNTER — TELEPHONE (OUTPATIENT)
Dept: FAMILY MEDICINE | Facility: CLINIC | Age: 86
End: 2021-01-18

## 2021-01-18 ENCOUNTER — TELEPHONE (OUTPATIENT)
Dept: ORTHOPEDICS | Facility: CLINIC | Age: 86
End: 2021-01-18

## 2021-01-18 DIAGNOSIS — M15.9 PRIMARY OSTEOARTHRITIS INVOLVING MULTIPLE JOINTS: ICD-10-CM

## 2021-01-18 RX ORDER — NAPROXEN 375 MG/1
375 TABLET ORAL 2 TIMES DAILY
Qty: 60 TABLET | Refills: 0 | Status: SHIPPED | OUTPATIENT
Start: 2021-01-18 | End: 2021-02-18 | Stop reason: SDUPTHER

## 2021-01-18 RX ORDER — HYDROCODONE BITARTRATE AND ACETAMINOPHEN 10; 325 MG/1; MG/1
1 TABLET ORAL EVERY 12 HOURS PRN
Qty: 60 TABLET | Refills: 0 | Status: SHIPPED | OUTPATIENT
Start: 2021-02-17 | End: 2021-02-18 | Stop reason: SDUPTHER

## 2021-01-18 RX ORDER — HYDROCODONE BITARTRATE AND ACETAMINOPHEN 10; 325 MG/1; MG/1
1 TABLET ORAL EVERY 12 HOURS PRN
Qty: 60 TABLET | Refills: 0 | Status: SHIPPED | OUTPATIENT
Start: 2021-01-18 | End: 2021-02-17

## 2021-01-21 ENCOUNTER — TELEPHONE (OUTPATIENT)
Dept: FAMILY MEDICINE | Facility: CLINIC | Age: 86
End: 2021-01-21

## 2021-01-21 DIAGNOSIS — Z79.899 ENCOUNTER FOR LONG-TERM (CURRENT) USE OF OTHER MEDICATIONS: ICD-10-CM

## 2021-01-21 DIAGNOSIS — N18.31 STAGE 3A CHRONIC KIDNEY DISEASE: ICD-10-CM

## 2021-01-21 DIAGNOSIS — I10 ESSENTIAL HYPERTENSION: Primary | ICD-10-CM

## 2021-01-22 ENCOUNTER — OFFICE VISIT (OUTPATIENT)
Dept: ORTHOPEDICS | Facility: CLINIC | Age: 86
End: 2021-01-22
Payer: MEDICARE

## 2021-01-22 VITALS
DIASTOLIC BLOOD PRESSURE: 82 MMHG | BODY MASS INDEX: 31.25 KG/M2 | HEART RATE: 74 BPM | SYSTOLIC BLOOD PRESSURE: 128 MMHG | WEIGHT: 155 LBS | HEIGHT: 59 IN

## 2021-01-22 DIAGNOSIS — M23.203 DEGENERATIVE TEAR OF MEDIAL MENISCUS OF RIGHT KNEE: ICD-10-CM

## 2021-01-22 DIAGNOSIS — M17.12 PRIMARY OSTEOARTHRITIS OF LEFT KNEE: ICD-10-CM

## 2021-01-22 DIAGNOSIS — M17.11 PRIMARY OSTEOARTHRITIS OF RIGHT KNEE: Primary | ICD-10-CM

## 2021-01-22 DIAGNOSIS — M25.461 EFFUSION, RIGHT KNEE: ICD-10-CM

## 2021-01-22 PROCEDURE — 99213 PR OFFICE/OUTPT VISIT, EST, LEVL III, 20-29 MIN: ICD-10-PCS | Mod: 25,S$GLB,, | Performed by: PHYSICIAN ASSISTANT

## 2021-01-22 PROCEDURE — 20610 DRAIN/INJ JOINT/BURSA W/O US: CPT | Mod: 50,S$GLB,, | Performed by: PHYSICIAN ASSISTANT

## 2021-01-22 PROCEDURE — 20610 LARGE JOINT ASPIRATION/INJECTION: L KNEE: ICD-10-PCS | Mod: 50,S$GLB,, | Performed by: PHYSICIAN ASSISTANT

## 2021-01-22 PROCEDURE — 99213 OFFICE O/P EST LOW 20 MIN: CPT | Mod: 25,S$GLB,, | Performed by: PHYSICIAN ASSISTANT

## 2021-01-22 RX ORDER — TRAMADOL HYDROCHLORIDE 50 MG/1
50 TABLET ORAL EVERY 6 HOURS PRN
Qty: 28 TABLET | Refills: 3 | Status: SHIPPED | OUTPATIENT
Start: 2021-01-22 | End: 2021-12-16

## 2021-01-22 RX ORDER — METHYLPREDNISOLONE ACETATE 40 MG/ML
40 INJECTION, SUSPENSION INTRA-ARTICULAR; INTRALESIONAL; INTRAMUSCULAR; SOFT TISSUE
Status: DISCONTINUED | OUTPATIENT
Start: 2021-01-22 | End: 2021-01-22 | Stop reason: HOSPADM

## 2021-01-22 RX ADMIN — METHYLPREDNISOLONE ACETATE 40 MG: 40 INJECTION, SUSPENSION INTRA-ARTICULAR; INTRALESIONAL; INTRAMUSCULAR; SOFT TISSUE at 11:01

## 2021-01-28 ENCOUNTER — HOSPITAL ENCOUNTER (OUTPATIENT)
Dept: RADIOLOGY | Facility: HOSPITAL | Age: 86
Discharge: HOME | End: 2021-01-28
Attending: PHYSICIAN ASSISTANT
Payer: MEDICARE

## 2021-01-28 DIAGNOSIS — M23.203 DEGENERATIVE TEAR OF MEDIAL MENISCUS OF RIGHT KNEE: ICD-10-CM

## 2021-01-28 DIAGNOSIS — M25.461 EFFUSION, RIGHT KNEE: ICD-10-CM

## 2021-01-28 DIAGNOSIS — M17.11 PRIMARY OSTEOARTHRITIS OF RIGHT KNEE: ICD-10-CM

## 2021-01-28 PROCEDURE — 73721 MRI JNT OF LWR EXTRE W/O DYE: CPT | Mod: TC,PO,RT

## 2021-01-29 LAB
BUN SERPL-MCNC: 28 MG/DL (ref 7–25)
BUN/CREAT SERPL: 23 (CALC) (ref 6–22)
CALCIUM SERPL-MCNC: 9.4 MG/DL (ref 8.6–10.4)
CHLORIDE SERPL-SCNC: 95 MMOL/L (ref 98–110)
CO2 SERPL-SCNC: 36 MMOL/L (ref 20–32)
CREAT SERPL-MCNC: 1.24 MG/DL (ref 0.6–0.88)
GFRSERPLBLD MDRD-ARVRAT: 39 ML/MIN/1.73M2
GLUCOSE SERPL-MCNC: 86 MG/DL (ref 65–139)
POTASSIUM SERPL-SCNC: 3.7 MMOL/L (ref 3.5–5.3)
SODIUM SERPL-SCNC: 139 MMOL/L (ref 135–146)

## 2021-02-01 ENCOUNTER — TELEPHONE (OUTPATIENT)
Dept: FAMILY MEDICINE | Facility: CLINIC | Age: 86
End: 2021-02-01

## 2021-02-05 ENCOUNTER — OFFICE VISIT (OUTPATIENT)
Dept: ORTHOPEDICS | Facility: CLINIC | Age: 86
End: 2021-02-05
Payer: MEDICARE

## 2021-02-05 VITALS
WEIGHT: 155 LBS | SYSTOLIC BLOOD PRESSURE: 123 MMHG | BODY MASS INDEX: 31.25 KG/M2 | DIASTOLIC BLOOD PRESSURE: 68 MMHG | HEART RATE: 75 BPM | HEIGHT: 59 IN

## 2021-02-05 DIAGNOSIS — M17.11 PRIMARY OSTEOARTHRITIS OF RIGHT KNEE: ICD-10-CM

## 2021-02-05 DIAGNOSIS — M23.203 DEGENERATIVE TEAR OF MEDIAL MENISCUS OF RIGHT KNEE: Primary | ICD-10-CM

## 2021-02-05 PROCEDURE — 99213 OFFICE O/P EST LOW 20 MIN: CPT | Mod: S$GLB,,, | Performed by: PHYSICIAN ASSISTANT

## 2021-02-05 PROCEDURE — 99213 PR OFFICE/OUTPT VISIT, EST, LEVL III, 20-29 MIN: ICD-10-PCS | Mod: S$GLB,,, | Performed by: PHYSICIAN ASSISTANT

## 2021-02-05 RX ORDER — CYCLOBENZAPRINE HCL 10 MG
TABLET ORAL
COMMUNITY
End: 2021-03-16

## 2021-02-08 ENCOUNTER — TELEPHONE (OUTPATIENT)
Dept: ORTHOPEDICS | Facility: CLINIC | Age: 86
End: 2021-02-08

## 2021-02-17 ENCOUNTER — TELEPHONE (OUTPATIENT)
Dept: ORTHOPEDICS | Facility: CLINIC | Age: 86
End: 2021-02-17

## 2021-02-18 DIAGNOSIS — M15.9 PRIMARY OSTEOARTHRITIS INVOLVING MULTIPLE JOINTS: ICD-10-CM

## 2021-02-18 RX ORDER — HYDROCODONE BITARTRATE AND ACETAMINOPHEN 10; 325 MG/1; MG/1
1 TABLET ORAL EVERY 12 HOURS PRN
Qty: 60 TABLET | Refills: 0 | Status: SHIPPED | OUTPATIENT
Start: 2021-03-19 | End: 2021-04-18

## 2021-02-18 RX ORDER — NAPROXEN 375 MG/1
375 TABLET ORAL 2 TIMES DAILY
Qty: 60 TABLET | Refills: 0 | Status: SHIPPED | OUTPATIENT
Start: 2021-02-18 | End: 2021-03-16 | Stop reason: SDUPTHER

## 2021-02-18 RX ORDER — HYDROCODONE BITARTRATE AND ACETAMINOPHEN 10; 325 MG/1; MG/1
1 TABLET ORAL EVERY 12 HOURS PRN
Qty: 60 TABLET | Refills: 0 | Status: SHIPPED | OUTPATIENT
Start: 2021-04-18 | End: 2021-05-18

## 2021-03-01 ENCOUNTER — TELEPHONE (OUTPATIENT)
Dept: FAMILY MEDICINE | Facility: CLINIC | Age: 86
End: 2021-03-01

## 2021-03-01 ENCOUNTER — TELEPHONE (OUTPATIENT)
Dept: PULMONOLOGY | Facility: CLINIC | Age: 86
End: 2021-03-01

## 2021-03-02 ENCOUNTER — TELEPHONE (OUTPATIENT)
Dept: ORTHOPEDICS | Facility: CLINIC | Age: 86
End: 2021-03-02

## 2021-03-02 ENCOUNTER — TELEPHONE (OUTPATIENT)
Dept: PULMONOLOGY | Facility: CLINIC | Age: 86
End: 2021-03-02

## 2021-03-03 ENCOUNTER — TELEPHONE (OUTPATIENT)
Dept: FAMILY MEDICINE | Facility: CLINIC | Age: 86
End: 2021-03-03

## 2021-03-04 ENCOUNTER — TELEPHONE (OUTPATIENT)
Dept: FAMILY MEDICINE | Facility: CLINIC | Age: 86
End: 2021-03-04

## 2021-03-16 ENCOUNTER — OFFICE VISIT (OUTPATIENT)
Dept: FAMILY MEDICINE | Facility: CLINIC | Age: 86
End: 2021-03-16
Payer: MEDICARE

## 2021-03-16 VITALS
HEART RATE: 72 BPM | DIASTOLIC BLOOD PRESSURE: 74 MMHG | SYSTOLIC BLOOD PRESSURE: 116 MMHG | WEIGHT: 148 LBS | BODY MASS INDEX: 31.07 KG/M2 | OXYGEN SATURATION: 94 % | HEIGHT: 58 IN

## 2021-03-16 DIAGNOSIS — I10 ESSENTIAL HYPERTENSION: ICD-10-CM

## 2021-03-16 DIAGNOSIS — K21.9 GASTROESOPHAGEAL REFLUX DISEASE, UNSPECIFIED WHETHER ESOPHAGITIS PRESENT: ICD-10-CM

## 2021-03-16 DIAGNOSIS — R60.9 EDEMA, UNSPECIFIED TYPE: ICD-10-CM

## 2021-03-16 DIAGNOSIS — F11.20 UNCOMPLICATED OPIOID DEPENDENCE: ICD-10-CM

## 2021-03-16 DIAGNOSIS — N18.32 STAGE 3B CHRONIC KIDNEY DISEASE: ICD-10-CM

## 2021-03-16 DIAGNOSIS — J44.89 COPD WITH ASTHMA: ICD-10-CM

## 2021-03-16 DIAGNOSIS — M15.9 PRIMARY OSTEOARTHRITIS INVOLVING MULTIPLE JOINTS: Primary | ICD-10-CM

## 2021-03-16 DIAGNOSIS — F51.01 PRIMARY INSOMNIA: ICD-10-CM

## 2021-03-16 DIAGNOSIS — E03.9 ACQUIRED HYPOTHYROIDISM: ICD-10-CM

## 2021-03-16 DIAGNOSIS — N30.00 ACUTE CYSTITIS WITHOUT HEMATURIA: ICD-10-CM

## 2021-03-16 DIAGNOSIS — K58.2 IRRITABLE BOWEL SYNDROME WITH BOTH CONSTIPATION AND DIARRHEA: ICD-10-CM

## 2021-03-16 PROCEDURE — 99214 OFFICE O/P EST MOD 30 MIN: CPT | Mod: S$GLB,,, | Performed by: FAMILY MEDICINE

## 2021-03-16 PROCEDURE — 99214 PR OFFICE/OUTPT VISIT, EST, LEVL IV, 30-39 MIN: ICD-10-PCS | Mod: S$GLB,,, | Performed by: FAMILY MEDICINE

## 2021-03-16 RX ORDER — ALPRAZOLAM 0.5 MG/1
0.5 TABLET ORAL 3 TIMES DAILY PRN
Qty: 90 TABLET | Refills: 5 | Status: SHIPPED | OUTPATIENT
Start: 2021-03-16 | End: 2021-09-16 | Stop reason: SDUPTHER

## 2021-03-16 RX ORDER — LEVOTHYROXINE SODIUM 50 UG/1
50 TABLET ORAL DAILY
Qty: 90 TABLET | Refills: 1 | Status: SHIPPED | OUTPATIENT
Start: 2021-03-16 | End: 2021-04-19 | Stop reason: SDUPTHER

## 2021-03-16 RX ORDER — PANTOPRAZOLE SODIUM 40 MG/1
40 TABLET, DELAYED RELEASE ORAL EVERY MORNING
Qty: 90 TABLET | Refills: 1 | Status: SHIPPED | OUTPATIENT
Start: 2021-03-16 | End: 2021-03-29 | Stop reason: SDUPTHER

## 2021-03-16 RX ORDER — NAPROXEN 375 MG/1
TABLET ORAL
Qty: 45 TABLET | Refills: 1 | Status: SHIPPED | OUTPATIENT
Start: 2021-03-16 | End: 2021-05-28

## 2021-03-16 RX ORDER — CHLORDIAZEPOXIDE HYDROCHLORIDE AND CLIDINIUM BROMIDE 5; 2.5 MG/1; MG/1
CAPSULE ORAL
Qty: 90 CAPSULE | Refills: 3 | Status: SHIPPED | OUTPATIENT
Start: 2021-03-16 | End: 2021-12-16 | Stop reason: SDUPTHER

## 2021-03-16 RX ORDER — ATENOLOL AND CHLORTHALIDONE TABLET 50; 25 MG/1; MG/1
1 TABLET ORAL DAILY
Qty: 90 TABLET | Refills: 1 | Status: SHIPPED | OUTPATIENT
Start: 2021-03-16 | End: 2021-06-21 | Stop reason: SDUPTHER

## 2021-03-16 RX ORDER — FUROSEMIDE 40 MG/1
40 TABLET ORAL DAILY
Qty: 90 TABLET | Refills: 1 | Status: SHIPPED | OUTPATIENT
Start: 2021-03-16 | End: 2021-09-12

## 2021-03-16 RX ORDER — PHENAZOPYRIDINE HYDROCHLORIDE 100 MG/1
100 TABLET, FILM COATED ORAL 3 TIMES DAILY PRN
Qty: 90 TABLET | Refills: 3 | Status: SHIPPED | OUTPATIENT
Start: 2021-03-16 | End: 2021-12-16

## 2021-03-19 ENCOUNTER — IMMUNIZATION (OUTPATIENT)
Dept: FAMILY MEDICINE | Facility: CLINIC | Age: 86
End: 2021-03-19
Payer: MEDICARE

## 2021-03-19 DIAGNOSIS — Z23 NEED FOR VACCINATION: Primary | ICD-10-CM

## 2021-03-19 PROCEDURE — 91300 COVID-19, MRNA, LNP-S, PF, 30 MCG/0.3 ML DOSE VACCINE: ICD-10-PCS | Mod: S$GLB,,, | Performed by: FAMILY MEDICINE

## 2021-03-19 PROCEDURE — 0001A COVID-19, MRNA, LNP-S, PF, 30 MCG/0.3 ML DOSE VACCINE: CPT | Mod: CV19,S$GLB,, | Performed by: FAMILY MEDICINE

## 2021-03-19 PROCEDURE — 0001A COVID-19, MRNA, LNP-S, PF, 30 MCG/0.3 ML DOSE VACCINE: ICD-10-PCS | Mod: CV19,S$GLB,, | Performed by: FAMILY MEDICINE

## 2021-03-19 PROCEDURE — 91300 COVID-19, MRNA, LNP-S, PF, 30 MCG/0.3 ML DOSE VACCINE: CPT | Mod: S$GLB,,, | Performed by: FAMILY MEDICINE

## 2021-03-24 ENCOUNTER — TELEPHONE (OUTPATIENT)
Dept: FAMILY MEDICINE | Facility: CLINIC | Age: 86
End: 2021-03-24

## 2021-03-29 DIAGNOSIS — K21.9 GASTROESOPHAGEAL REFLUX DISEASE, UNSPECIFIED WHETHER ESOPHAGITIS PRESENT: ICD-10-CM

## 2021-03-29 RX ORDER — PANTOPRAZOLE SODIUM 40 MG/1
40 TABLET, DELAYED RELEASE ORAL EVERY MORNING
Qty: 90 TABLET | Refills: 3 | Status: SHIPPED | OUTPATIENT
Start: 2021-03-29 | End: 2021-12-16 | Stop reason: SDUPTHER

## 2021-04-09 ENCOUNTER — IMMUNIZATION (OUTPATIENT)
Dept: FAMILY MEDICINE | Facility: CLINIC | Age: 86
End: 2021-04-09
Payer: MEDICARE

## 2021-04-09 DIAGNOSIS — Z23 NEED FOR VACCINATION: Primary | ICD-10-CM

## 2021-04-09 PROCEDURE — 91300 COVID-19, MRNA, LNP-S, PF, 30 MCG/0.3 ML DOSE VACCINE: CPT | Mod: S$GLB,,, | Performed by: FAMILY MEDICINE

## 2021-04-09 PROCEDURE — 0002A COVID-19, MRNA, LNP-S, PF, 30 MCG/0.3 ML DOSE VACCINE: ICD-10-PCS | Mod: CV19,S$GLB,, | Performed by: FAMILY MEDICINE

## 2021-04-09 PROCEDURE — 0002A COVID-19, MRNA, LNP-S, PF, 30 MCG/0.3 ML DOSE VACCINE: CPT | Mod: CV19,S$GLB,, | Performed by: FAMILY MEDICINE

## 2021-04-09 PROCEDURE — 91300 COVID-19, MRNA, LNP-S, PF, 30 MCG/0.3 ML DOSE VACCINE: ICD-10-PCS | Mod: S$GLB,,, | Performed by: FAMILY MEDICINE

## 2021-04-19 DIAGNOSIS — I10 ESSENTIAL HYPERTENSION: ICD-10-CM

## 2021-04-19 DIAGNOSIS — E03.9 ACQUIRED HYPOTHYROIDISM: ICD-10-CM

## 2021-04-19 DIAGNOSIS — Z79.899 ENCOUNTER FOR LONG-TERM (CURRENT) USE OF OTHER MEDICATIONS: Primary | ICD-10-CM

## 2021-04-19 RX ORDER — LEVOTHYROXINE SODIUM 50 UG/1
50 TABLET ORAL DAILY
Qty: 90 TABLET | Refills: 0 | Status: SHIPPED | OUTPATIENT
Start: 2021-04-19 | End: 2021-06-21 | Stop reason: SDUPTHER

## 2021-05-19 DIAGNOSIS — M15.9 PRIMARY OSTEOARTHRITIS INVOLVING MULTIPLE JOINTS: ICD-10-CM

## 2021-05-19 RX ORDER — HYDROCODONE BITARTRATE AND ACETAMINOPHEN 10; 325 MG/1; MG/1
1 TABLET ORAL EVERY 4 HOURS PRN
Qty: 60 TABLET | Refills: 0 | Status: SHIPPED | OUTPATIENT
Start: 2021-05-19 | End: 2021-09-16 | Stop reason: SDUPTHER

## 2021-05-19 RX ORDER — HYDROCODONE BITARTRATE AND ACETAMINOPHEN 10; 325 MG/1; MG/1
1 TABLET ORAL EVERY 4 HOURS PRN
Qty: 60 TABLET | Refills: 0 | Status: SHIPPED | OUTPATIENT
Start: 2021-06-18 | End: 2021-06-21 | Stop reason: SDUPTHER

## 2021-05-28 ENCOUNTER — OFFICE VISIT (OUTPATIENT)
Dept: ORTHOPEDICS | Facility: CLINIC | Age: 86
End: 2021-05-28
Payer: MEDICARE

## 2021-05-28 VITALS
HEIGHT: 58 IN | SYSTOLIC BLOOD PRESSURE: 118 MMHG | DIASTOLIC BLOOD PRESSURE: 80 MMHG | HEART RATE: 73 BPM | WEIGHT: 148 LBS | OXYGEN SATURATION: 96 % | BODY MASS INDEX: 31.07 KG/M2

## 2021-05-28 DIAGNOSIS — G57.90 NEUROPATHY OF LOWER EXTREMITY, UNSPECIFIED LATERALITY: ICD-10-CM

## 2021-05-28 DIAGNOSIS — M06.9 RHEUMATOID ARTHRITIS OF OTHER SITE, UNSPECIFIED WHETHER RHEUMATOID FACTOR PRESENT: ICD-10-CM

## 2021-05-28 DIAGNOSIS — M79.645 FINGER PAIN, LEFT: Primary | ICD-10-CM

## 2021-05-28 DIAGNOSIS — M15.9 PRIMARY OSTEOARTHRITIS INVOLVING MULTIPLE JOINTS: ICD-10-CM

## 2021-05-28 PROCEDURE — 99213 PR OFFICE/OUTPT VISIT, EST, LEVL III, 20-29 MIN: ICD-10-PCS | Mod: S$GLB,,, | Performed by: PHYSICIAN ASSISTANT

## 2021-05-28 PROCEDURE — 99213 OFFICE O/P EST LOW 20 MIN: CPT | Mod: S$GLB,,, | Performed by: PHYSICIAN ASSISTANT

## 2021-05-28 RX ORDER — GABAPENTIN 300 MG/1
300 CAPSULE ORAL NIGHTLY
Qty: 30 CAPSULE | Refills: 2 | Status: SHIPPED | OUTPATIENT
Start: 2021-05-28 | End: 2021-12-16

## 2021-05-28 RX ORDER — MELOXICAM 7.5 MG/1
7.5 TABLET ORAL DAILY
Qty: 30 TABLET | Refills: 2 | Status: SHIPPED | OUTPATIENT
Start: 2021-05-28 | End: 2021-06-21 | Stop reason: SDUPTHER

## 2021-06-03 ENCOUNTER — TELEPHONE (OUTPATIENT)
Dept: FAMILY MEDICINE | Facility: CLINIC | Age: 86
End: 2021-06-03

## 2021-06-03 RX ORDER — FUROSEMIDE 20 MG/1
20 TABLET ORAL 2 TIMES DAILY
Qty: 90 TABLET | Refills: 1 | Status: SHIPPED | OUTPATIENT
Start: 2021-06-03 | End: 2021-09-16 | Stop reason: SDUPTHER

## 2021-06-15 LAB
ALBUMIN SERPL-MCNC: 4.1 G/DL (ref 3.6–5.1)
ALBUMIN/CREAT UR: 2 MCG/MG CREAT
ALBUMIN/GLOB SERPL: 1.3 (CALC) (ref 1–2.5)
ALP SERPL-CCNC: 50 U/L (ref 37–153)
ALT SERPL-CCNC: 10 U/L (ref 6–29)
APPEARANCE UR: CLEAR
AST SERPL-CCNC: 16 U/L (ref 10–35)
BACTERIA #/AREA URNS HPF: ABNORMAL /HPF
BACTERIA UR CULT: ABNORMAL
BASOPHILS # BLD AUTO: 52 CELLS/UL (ref 0–200)
BASOPHILS NFR BLD AUTO: 0.6 %
BILIRUB SERPL-MCNC: 0.5 MG/DL (ref 0.2–1.2)
BILIRUB UR QL STRIP: NEGATIVE
BUN SERPL-MCNC: 21 MG/DL (ref 7–25)
BUN/CREAT SERPL: 18 (CALC) (ref 6–22)
CALCIUM SERPL-MCNC: 9.7 MG/DL (ref 8.6–10.4)
CHLORIDE SERPL-SCNC: 95 MMOL/L (ref 98–110)
CHOLEST SERPL-MCNC: 182 MG/DL
CHOLEST/HDLC SERPL: 4.3 (CALC)
CO2 SERPL-SCNC: 32 MMOL/L (ref 20–32)
COLOR UR: YELLOW
CREAT SERPL-MCNC: 1.16 MG/DL (ref 0.6–0.88)
CREAT UR-MCNC: 96 MG/DL (ref 20–275)
EOSINOPHIL # BLD AUTO: 112 CELLS/UL (ref 15–500)
EOSINOPHIL NFR BLD AUTO: 1.3 %
ERYTHROCYTE [DISTWIDTH] IN BLOOD BY AUTOMATED COUNT: 14.3 % (ref 11–15)
GLOBULIN SER CALC-MCNC: 3.2 G/DL (CALC) (ref 1.9–3.7)
GLUCOSE SERPL-MCNC: 92 MG/DL (ref 65–99)
GLUCOSE UR QL STRIP: NEGATIVE
HCT VFR BLD AUTO: 35.3 % (ref 35–45)
HDLC SERPL-MCNC: 42 MG/DL
HGB BLD-MCNC: 11.4 G/DL (ref 11.7–15.5)
HGB UR QL STRIP: NEGATIVE
HYALINE CASTS #/AREA URNS LPF: ABNORMAL /LPF
KETONES UR QL STRIP: NEGATIVE
LDLC SERPL CALC-MCNC: 100 MG/DL (CALC)
LEUKOCYTE ESTERASE UR QL STRIP: NEGATIVE
LYMPHOCYTES # BLD AUTO: 2537 CELLS/UL (ref 850–3900)
LYMPHOCYTES NFR BLD AUTO: 29.5 %
MCH RBC QN AUTO: 28 PG (ref 27–33)
MCHC RBC AUTO-ENTMCNC: 32.3 G/DL (ref 32–36)
MCV RBC AUTO: 86.7 FL (ref 80–100)
MICROALBUMIN UR-MCNC: 0.2 MG/DL
MONOCYTES # BLD AUTO: 654 CELLS/UL (ref 200–950)
MONOCYTES NFR BLD AUTO: 7.6 %
NEUTROPHILS # BLD AUTO: 5246 CELLS/UL (ref 1500–7800)
NEUTROPHILS NFR BLD AUTO: 61 %
NITRITE UR QL STRIP: NEGATIVE
NONHDLC SERPL-MCNC: 140 MG/DL (CALC)
PH UR STRIP: 6 [PH] (ref 5–8)
PLATELET # BLD AUTO: 387 THOUSAND/UL (ref 140–400)
PMV BLD REES-ECKER: 9.8 FL (ref 7.5–12.5)
POTASSIUM SERPL-SCNC: 3.6 MMOL/L (ref 3.5–5.3)
PROT SERPL-MCNC: 7.3 G/DL (ref 6.1–8.1)
PROT UR QL STRIP: NEGATIVE
RBC # BLD AUTO: 4.07 MILLION/UL (ref 3.8–5.1)
RBC #/AREA URNS HPF: ABNORMAL /HPF
SODIUM SERPL-SCNC: 139 MMOL/L (ref 135–146)
SP GR UR STRIP: 1.02 (ref 1–1.03)
SQUAMOUS #/AREA URNS HPF: ABNORMAL /HPF
TRIGL SERPL-MCNC: 303 MG/DL
TSH SERPL-ACNC: 4.48 MIU/L (ref 0.4–4.5)
WBC # BLD AUTO: 8.6 THOUSAND/UL (ref 3.8–10.8)
WBC #/AREA URNS HPF: ABNORMAL /HPF

## 2021-06-21 ENCOUNTER — OFFICE VISIT (OUTPATIENT)
Dept: FAMILY MEDICINE | Facility: CLINIC | Age: 86
End: 2021-06-21
Payer: MEDICARE

## 2021-06-21 VITALS
BODY MASS INDEX: 30.51 KG/M2 | DIASTOLIC BLOOD PRESSURE: 60 MMHG | SYSTOLIC BLOOD PRESSURE: 134 MMHG | HEART RATE: 80 BPM | WEIGHT: 146 LBS

## 2021-06-21 DIAGNOSIS — M15.9 PRIMARY OSTEOARTHRITIS INVOLVING MULTIPLE JOINTS: ICD-10-CM

## 2021-06-21 DIAGNOSIS — I10 ESSENTIAL HYPERTENSION: ICD-10-CM

## 2021-06-21 DIAGNOSIS — M17.11 OSTEOARTHRITIS OF RIGHT KNEE, UNSPECIFIED OSTEOARTHRITIS TYPE: Primary | ICD-10-CM

## 2021-06-21 DIAGNOSIS — M79.645 FINGER PAIN, LEFT: ICD-10-CM

## 2021-06-21 DIAGNOSIS — E03.9 ACQUIRED HYPOTHYROIDISM: ICD-10-CM

## 2021-06-21 DIAGNOSIS — G57.90 NEUROPATHY OF LOWER EXTREMITY, UNSPECIFIED LATERALITY: ICD-10-CM

## 2021-06-21 DIAGNOSIS — M06.9 RHEUMATOID ARTHRITIS OF OTHER SITE, UNSPECIFIED WHETHER RHEUMATOID FACTOR PRESENT: ICD-10-CM

## 2021-06-21 PROCEDURE — 99213 OFFICE O/P EST LOW 20 MIN: CPT | Mod: S$GLB,,, | Performed by: PHYSICIAN ASSISTANT

## 2021-06-21 PROCEDURE — 99213 PR OFFICE/OUTPT VISIT, EST, LEVL III, 20-29 MIN: ICD-10-PCS | Mod: S$GLB,,, | Performed by: PHYSICIAN ASSISTANT

## 2021-06-21 RX ORDER — ATENOLOL AND CHLORTHALIDONE TABLET 50; 25 MG/1; MG/1
1 TABLET ORAL DAILY
Qty: 90 TABLET | Refills: 1 | Status: SHIPPED | OUTPATIENT
Start: 2021-06-21 | End: 2021-09-16 | Stop reason: SDUPTHER

## 2021-06-21 RX ORDER — HYDROCODONE BITARTRATE AND ACETAMINOPHEN 10; 325 MG/1; MG/1
1 TABLET ORAL EVERY 4 HOURS PRN
Qty: 60 TABLET | Refills: 0 | Status: SHIPPED | OUTPATIENT
Start: 2021-07-21 | End: 2021-09-16 | Stop reason: SDUPTHER

## 2021-06-21 RX ORDER — MELOXICAM 15 MG/1
15 TABLET ORAL DAILY
Qty: 30 TABLET | Refills: 2 | Status: SHIPPED | OUTPATIENT
Start: 2021-06-21 | End: 2021-07-14

## 2021-06-21 RX ORDER — HYDROCODONE BITARTRATE AND ACETAMINOPHEN 10; 325 MG/1; MG/1
1 TABLET ORAL EVERY 4 HOURS PRN
Qty: 60 TABLET | Refills: 0 | Status: SHIPPED | OUTPATIENT
Start: 2021-08-20 | End: 2021-09-16 | Stop reason: SDUPTHER

## 2021-06-21 RX ORDER — LEVOTHYROXINE SODIUM 50 UG/1
50 TABLET ORAL DAILY
Qty: 90 TABLET | Refills: 1 | Status: SHIPPED | OUTPATIENT
Start: 2021-06-21 | End: 2021-09-16 | Stop reason: SDUPTHER

## 2021-06-21 RX ORDER — HYDROCODONE BITARTRATE AND ACETAMINOPHEN 10; 325 MG/1; MG/1
1 TABLET ORAL EVERY 4 HOURS PRN
Qty: 60 TABLET | Refills: 0 | Status: SHIPPED | OUTPATIENT
Start: 2021-06-21 | End: 2021-12-16 | Stop reason: SDUPTHER

## 2021-06-22 DIAGNOSIS — J44.89 COPD WITH ASTHMA: ICD-10-CM

## 2021-07-12 ENCOUNTER — TELEPHONE (OUTPATIENT)
Dept: FAMILY MEDICINE | Facility: CLINIC | Age: 86
End: 2021-07-12

## 2021-07-14 ENCOUNTER — OFFICE VISIT (OUTPATIENT)
Dept: FAMILY MEDICINE | Facility: CLINIC | Age: 86
End: 2021-07-14
Payer: MEDICARE

## 2021-07-14 VITALS
DIASTOLIC BLOOD PRESSURE: 62 MMHG | SYSTOLIC BLOOD PRESSURE: 138 MMHG | HEIGHT: 58 IN | HEART RATE: 80 BPM | WEIGHT: 146 LBS | BODY MASS INDEX: 30.64 KG/M2

## 2021-07-14 DIAGNOSIS — M15.9 PRIMARY OSTEOARTHRITIS INVOLVING MULTIPLE JOINTS: ICD-10-CM

## 2021-07-14 DIAGNOSIS — L03.011 CELLULITIS OF FINGER OF RIGHT HAND: Primary | ICD-10-CM

## 2021-07-14 PROCEDURE — 99213 PR OFFICE/OUTPT VISIT, EST, LEVL III, 20-29 MIN: ICD-10-PCS | Mod: S$GLB,,, | Performed by: NURSE PRACTITIONER

## 2021-07-14 PROCEDURE — 99213 OFFICE O/P EST LOW 20 MIN: CPT | Mod: S$GLB,,, | Performed by: NURSE PRACTITIONER

## 2021-07-14 RX ORDER — CEPHALEXIN 500 MG/1
500 CAPSULE ORAL EVERY 12 HOURS
Qty: 14 CAPSULE | Refills: 0 | Status: SHIPPED | OUTPATIENT
Start: 2021-07-14 | End: 2021-07-21

## 2021-07-14 RX ORDER — MELOXICAM 7.5 MG/1
7.5 TABLET ORAL DAILY
Qty: 30 TABLET | Refills: 1 | Status: SHIPPED | OUTPATIENT
Start: 2021-07-14 | End: 2021-09-16 | Stop reason: SDUPTHER

## 2021-08-23 DIAGNOSIS — J44.89 COPD WITH ASTHMA: ICD-10-CM

## 2021-08-23 RX ORDER — ALBUTEROL SULFATE 90 UG/1
2 POWDER, METERED RESPIRATORY (INHALATION) EVERY 4 HOURS
Qty: 1 EACH | Refills: 2 | Status: SHIPPED | OUTPATIENT
Start: 2021-08-23

## 2021-09-16 ENCOUNTER — TELEPHONE (OUTPATIENT)
Dept: FAMILY MEDICINE | Facility: CLINIC | Age: 86
End: 2021-09-16

## 2021-09-16 ENCOUNTER — OFFICE VISIT (OUTPATIENT)
Dept: FAMILY MEDICINE | Facility: CLINIC | Age: 86
End: 2021-09-16
Payer: MEDICARE

## 2021-09-16 VITALS
BODY MASS INDEX: 30.86 KG/M2 | WEIGHT: 147 LBS | HEIGHT: 58 IN | DIASTOLIC BLOOD PRESSURE: 54 MMHG | SYSTOLIC BLOOD PRESSURE: 118 MMHG | HEART RATE: 72 BPM

## 2021-09-16 DIAGNOSIS — M15.9 PRIMARY OSTEOARTHRITIS INVOLVING MULTIPLE JOINTS: ICD-10-CM

## 2021-09-16 DIAGNOSIS — Z23 NEED FOR INFLUENZA VACCINATION: ICD-10-CM

## 2021-09-16 DIAGNOSIS — Z51.81 ENCOUNTER FOR THERAPEUTIC DRUG MONITORING: ICD-10-CM

## 2021-09-16 DIAGNOSIS — M06.9 RHEUMATOID ARTHRITIS OF OTHER SITE, UNSPECIFIED WHETHER RHEUMATOID FACTOR PRESENT: Primary | ICD-10-CM

## 2021-09-16 DIAGNOSIS — F51.01 PRIMARY INSOMNIA: ICD-10-CM

## 2021-09-16 DIAGNOSIS — E78.1 HYPERTRIGLYCERIDEMIA: ICD-10-CM

## 2021-09-16 DIAGNOSIS — J44.89 COPD WITH ASTHMA: ICD-10-CM

## 2021-09-16 DIAGNOSIS — E03.9 ACQUIRED HYPOTHYROIDISM: ICD-10-CM

## 2021-09-16 DIAGNOSIS — I10 ESSENTIAL HYPERTENSION: Primary | ICD-10-CM

## 2021-09-16 DIAGNOSIS — Z79.899 ENCOUNTER FOR LONG-TERM (CURRENT) USE OF OTHER MEDICATIONS: ICD-10-CM

## 2021-09-16 DIAGNOSIS — N18.32 STAGE 3B CHRONIC KIDNEY DISEASE: ICD-10-CM

## 2021-09-16 DIAGNOSIS — I87.8 VENOUS STASIS OF BOTH LOWER EXTREMITIES: ICD-10-CM

## 2021-09-16 PROCEDURE — G0008 FLU VACCINE - QUADRIVALENT - HIGH DOSE (65+) PRESERVATIVE FREE IM: ICD-10-PCS | Mod: S$GLB,,, | Performed by: FAMILY MEDICINE

## 2021-09-16 PROCEDURE — 90662 FLU VACCINE - QUADRIVALENT - HIGH DOSE (65+) PRESERVATIVE FREE IM: ICD-10-PCS | Mod: S$GLB,,, | Performed by: FAMILY MEDICINE

## 2021-09-16 PROCEDURE — 99214 OFFICE O/P EST MOD 30 MIN: CPT | Mod: 25,S$GLB,, | Performed by: FAMILY MEDICINE

## 2021-09-16 PROCEDURE — G0008 ADMIN INFLUENZA VIRUS VAC: HCPCS | Mod: S$GLB,,, | Performed by: FAMILY MEDICINE

## 2021-09-16 PROCEDURE — 90662 IIV NO PRSV INCREASED AG IM: CPT | Mod: S$GLB,,, | Performed by: FAMILY MEDICINE

## 2021-09-16 PROCEDURE — 99214 PR OFFICE/OUTPT VISIT, EST, LEVL IV, 30-39 MIN: ICD-10-PCS | Mod: 25,S$GLB,, | Performed by: FAMILY MEDICINE

## 2021-09-16 RX ORDER — ALPRAZOLAM 0.5 MG/1
0.5 TABLET ORAL 3 TIMES DAILY PRN
Qty: 90 TABLET | Refills: 5 | Status: SHIPPED | OUTPATIENT
Start: 2021-09-16 | End: 2022-03-15 | Stop reason: SDUPTHER

## 2021-09-16 RX ORDER — LEVOTHYROXINE SODIUM 50 UG/1
50 TABLET ORAL DAILY
Qty: 90 TABLET | Refills: 1 | Status: SHIPPED | OUTPATIENT
Start: 2021-09-16 | End: 2021-12-16 | Stop reason: SDUPTHER

## 2021-09-16 RX ORDER — MELOXICAM 7.5 MG/1
7.5 TABLET ORAL DAILY
Qty: 90 TABLET | Refills: 1 | Status: SHIPPED | OUTPATIENT
Start: 2021-09-16 | End: 2021-12-16 | Stop reason: SDUPTHER

## 2021-09-16 RX ORDER — HYDROCODONE BITARTRATE AND ACETAMINOPHEN 10; 325 MG/1; MG/1
1 TABLET ORAL EVERY 4 HOURS PRN
Qty: 60 TABLET | Refills: 0 | Status: SHIPPED | OUTPATIENT
Start: 2021-10-16 | End: 2021-11-15

## 2021-09-16 RX ORDER — ATENOLOL AND CHLORTHALIDONE TABLET 50; 25 MG/1; MG/1
1 TABLET ORAL DAILY
Qty: 90 TABLET | Refills: 1 | Status: SHIPPED | OUTPATIENT
Start: 2021-09-16 | End: 2021-12-16 | Stop reason: SDUPTHER

## 2021-09-16 RX ORDER — HYDROCODONE BITARTRATE AND ACETAMINOPHEN 10; 325 MG/1; MG/1
1 TABLET ORAL EVERY 4 HOURS PRN
Qty: 60 TABLET | Refills: 0 | Status: SHIPPED | OUTPATIENT
Start: 2021-09-16 | End: 2021-10-16

## 2021-09-16 RX ORDER — HYDROCODONE BITARTRATE AND ACETAMINOPHEN 10; 325 MG/1; MG/1
1 TABLET ORAL EVERY 4 HOURS PRN
Qty: 60 TABLET | Refills: 0 | Status: SHIPPED | OUTPATIENT
Start: 2021-11-15 | End: 2022-03-15 | Stop reason: SDUPTHER

## 2021-09-16 RX ORDER — FUROSEMIDE 20 MG/1
20 TABLET ORAL 2 TIMES DAILY
Qty: 180 TABLET | Refills: 1 | Status: SHIPPED | OUTPATIENT
Start: 2021-09-16 | End: 2021-12-16 | Stop reason: SDUPTHER

## 2021-09-27 RX ORDER — NAPROXEN 375 MG/1
375 TABLET ORAL DAILY PRN
COMMUNITY
End: 2021-09-27 | Stop reason: SDUPTHER

## 2021-09-27 RX ORDER — NAPROXEN 375 MG/1
375 TABLET ORAL DAILY PRN
Qty: 90 TABLET | Refills: 1 | Status: SHIPPED | OUTPATIENT
Start: 2021-09-27 | End: 2021-12-16 | Stop reason: SDUPTHER

## 2021-10-12 ENCOUNTER — IMMUNIZATION (OUTPATIENT)
Dept: PRIMARY CARE CLINIC | Facility: CLINIC | Age: 86
End: 2021-10-12
Payer: MEDICARE

## 2021-10-12 DIAGNOSIS — Z23 NEED FOR VACCINATION: Primary | ICD-10-CM

## 2021-10-12 PROCEDURE — 91300 COVID-19, MRNA, LNP-S, PF, 30 MCG/0.3 ML DOSE VACCINE: CPT | Mod: S$GLB,,, | Performed by: FAMILY MEDICINE

## 2021-10-12 PROCEDURE — 0003A COVID-19, MRNA, LNP-S, PF, 30 MCG/0.3 ML DOSE VACCINE: ICD-10-PCS | Mod: S$GLB,,, | Performed by: FAMILY MEDICINE

## 2021-10-12 PROCEDURE — 91300 COVID-19, MRNA, LNP-S, PF, 30 MCG/0.3 ML DOSE VACCINE: ICD-10-PCS | Mod: S$GLB,,, | Performed by: FAMILY MEDICINE

## 2021-10-12 PROCEDURE — 0003A COVID-19, MRNA, LNP-S, PF, 30 MCG/0.3 ML DOSE VACCINE: CPT | Mod: S$GLB,,, | Performed by: FAMILY MEDICINE

## 2021-10-21 ENCOUNTER — TELEPHONE (OUTPATIENT)
Dept: FAMILY MEDICINE | Facility: CLINIC | Age: 86
End: 2021-10-21

## 2021-10-21 ENCOUNTER — TELEPHONE (OUTPATIENT)
Dept: FAMILY MEDICINE | Facility: CLINIC | Age: 86
End: 2021-10-21
Payer: MEDICARE

## 2021-10-21 DIAGNOSIS — R39.9 UTI SYMPTOMS: Primary | ICD-10-CM

## 2021-10-21 LAB
BILIRUB UR QL STRIP: NEGATIVE
GLUCOSE UR QL STRIP: NEGATIVE
KETONES UR QL STRIP: NEGATIVE
LEUKOCYTE ESTERASE UR QL STRIP: NEGATIVE
PH, POC UA: 6
POC BLOOD, URINE: NEGATIVE
POC NITRATES, URINE: NEGATIVE
PROT UR QL STRIP: NEGATIVE
SP GR UR STRIP: 1.01 (ref 1–1.03)
UROBILINOGEN UR STRIP-ACNC: NORMAL (ref 0.1–1.1)

## 2021-10-21 PROCEDURE — 81003 URINALYSIS AUTO W/O SCOPE: CPT | Mod: QW,S$GLB,, | Performed by: PHYSICIAN ASSISTANT

## 2021-10-21 PROCEDURE — 81003 POCT URINALYSIS, DIPSTICK, AUTOMATED, W/O SCOPE: ICD-10-PCS | Mod: QW,S$GLB,, | Performed by: PHYSICIAN ASSISTANT

## 2021-10-23 LAB — BACTERIA UR CULT: NORMAL

## 2021-10-25 ENCOUNTER — TELEPHONE (OUTPATIENT)
Dept: FAMILY MEDICINE | Facility: CLINIC | Age: 86
End: 2021-10-25
Payer: COMMERCIAL

## 2021-10-25 DIAGNOSIS — B37.31 VAGINAL YEAST INFECTION: Primary | ICD-10-CM

## 2021-10-25 RX ORDER — FLUCONAZOLE 150 MG/1
150 TABLET ORAL DAILY
Qty: 2 TABLET | Refills: 0 | Status: SHIPPED | OUTPATIENT
Start: 2021-10-25 | End: 2021-10-27

## 2021-11-11 ENCOUNTER — TELEPHONE (OUTPATIENT)
Dept: FAMILY MEDICINE | Facility: CLINIC | Age: 86
End: 2021-11-11
Payer: COMMERCIAL

## 2021-12-09 ENCOUNTER — TELEPHONE (OUTPATIENT)
Dept: FAMILY MEDICINE | Facility: CLINIC | Age: 86
End: 2021-12-09
Payer: COMMERCIAL

## 2021-12-11 LAB
ALBUMIN SERPL-MCNC: 4.1 G/DL (ref 3.6–5.1)
ALBUMIN/GLOB SERPL: 1.3 (CALC) (ref 1–2.5)
ALP SERPL-CCNC: 48 U/L (ref 37–153)
ALT SERPL-CCNC: 10 U/L (ref 6–29)
AST SERPL-CCNC: 18 U/L (ref 10–35)
BASOPHILS # BLD AUTO: 50 CELLS/UL (ref 0–200)
BASOPHILS NFR BLD AUTO: 0.6 %
BILIRUB SERPL-MCNC: 0.6 MG/DL (ref 0.2–1.2)
BUN SERPL-MCNC: 26 MG/DL (ref 7–25)
BUN/CREAT SERPL: 19 (CALC) (ref 6–22)
CALCIUM SERPL-MCNC: 9.6 MG/DL (ref 8.6–10.4)
CHLORIDE SERPL-SCNC: 95 MMOL/L (ref 98–110)
CHOLEST SERPL-MCNC: 194 MG/DL
CHOLEST/HDLC SERPL: 4.2 (CALC)
CO2 SERPL-SCNC: 39 MMOL/L (ref 20–32)
CREAT SERPL-MCNC: 1.37 MG/DL (ref 0.6–0.88)
EOSINOPHIL # BLD AUTO: 100 CELLS/UL (ref 15–500)
EOSINOPHIL NFR BLD AUTO: 1.2 %
ERYTHROCYTE [DISTWIDTH] IN BLOOD BY AUTOMATED COUNT: 14.3 % (ref 11–15)
GLOBULIN SER CALC-MCNC: 3.2 G/DL (CALC) (ref 1.9–3.7)
GLUCOSE SERPL-MCNC: 96 MG/DL (ref 65–99)
HCT VFR BLD AUTO: 36 % (ref 35–45)
HDLC SERPL-MCNC: 46 MG/DL
HGB BLD-MCNC: 11.9 G/DL (ref 11.7–15.5)
LDLC SERPL CALC-MCNC: 118 MG/DL (CALC)
LYMPHOCYTES # BLD AUTO: 2415 CELLS/UL (ref 850–3900)
LYMPHOCYTES NFR BLD AUTO: 29.1 %
MCH RBC QN AUTO: 29.5 PG (ref 27–33)
MCHC RBC AUTO-ENTMCNC: 33.1 G/DL (ref 32–36)
MCV RBC AUTO: 89.3 FL (ref 80–100)
MONOCYTES # BLD AUTO: 465 CELLS/UL (ref 200–950)
MONOCYTES NFR BLD AUTO: 5.6 %
NEUTROPHILS # BLD AUTO: 5271 CELLS/UL (ref 1500–7800)
NEUTROPHILS NFR BLD AUTO: 63.5 %
NONHDLC SERPL-MCNC: 148 MG/DL (CALC)
PLATELET # BLD AUTO: 326 THOUSAND/UL (ref 140–400)
PMV BLD REES-ECKER: 9.7 FL (ref 7.5–12.5)
POTASSIUM SERPL-SCNC: 3.5 MMOL/L (ref 3.5–5.3)
PROT SERPL-MCNC: 7.3 G/DL (ref 6.1–8.1)
RBC # BLD AUTO: 4.03 MILLION/UL (ref 3.8–5.1)
SODIUM SERPL-SCNC: 140 MMOL/L (ref 135–146)
TRIGL SERPL-MCNC: 187 MG/DL
WBC # BLD AUTO: 8.3 THOUSAND/UL (ref 3.8–10.8)

## 2021-12-13 ENCOUNTER — TELEPHONE (OUTPATIENT)
Dept: FAMILY MEDICINE | Facility: CLINIC | Age: 86
End: 2021-12-13
Payer: COMMERCIAL

## 2021-12-16 ENCOUNTER — OFFICE VISIT (OUTPATIENT)
Dept: FAMILY MEDICINE | Facility: CLINIC | Age: 86
End: 2021-12-16
Payer: MEDICARE

## 2021-12-16 VITALS
HEIGHT: 58 IN | SYSTOLIC BLOOD PRESSURE: 118 MMHG | BODY MASS INDEX: 31.49 KG/M2 | WEIGHT: 150 LBS | DIASTOLIC BLOOD PRESSURE: 66 MMHG | HEART RATE: 72 BPM

## 2021-12-16 DIAGNOSIS — M15.9 PRIMARY OSTEOARTHRITIS INVOLVING MULTIPLE JOINTS: ICD-10-CM

## 2021-12-16 DIAGNOSIS — K58.2 IRRITABLE BOWEL SYNDROME WITH BOTH CONSTIPATION AND DIARRHEA: ICD-10-CM

## 2021-12-16 DIAGNOSIS — E03.9 ACQUIRED HYPOTHYROIDISM: ICD-10-CM

## 2021-12-16 DIAGNOSIS — Z51.81 ENCOUNTER FOR THERAPEUTIC DRUG MONITORING: ICD-10-CM

## 2021-12-16 DIAGNOSIS — K21.9 GASTROESOPHAGEAL REFLUX DISEASE, UNSPECIFIED WHETHER ESOPHAGITIS PRESENT: ICD-10-CM

## 2021-12-16 DIAGNOSIS — I10 ESSENTIAL HYPERTENSION: Primary | ICD-10-CM

## 2021-12-16 DIAGNOSIS — Z79.899 ENCOUNTER FOR LONG-TERM (CURRENT) USE OF OTHER MEDICATIONS: ICD-10-CM

## 2021-12-16 DIAGNOSIS — N18.32 STAGE 3B CHRONIC KIDNEY DISEASE: ICD-10-CM

## 2021-12-16 PROCEDURE — 99214 PR OFFICE/OUTPT VISIT, EST, LEVL IV, 30-39 MIN: ICD-10-PCS | Mod: S$GLB,,, | Performed by: PHYSICIAN ASSISTANT

## 2021-12-16 PROCEDURE — 99214 OFFICE O/P EST MOD 30 MIN: CPT | Mod: S$GLB,,, | Performed by: PHYSICIAN ASSISTANT

## 2021-12-16 RX ORDER — MELOXICAM 7.5 MG/1
7.5 TABLET ORAL DAILY
Qty: 90 TABLET | Refills: 1 | Status: SHIPPED | OUTPATIENT
Start: 2021-12-16 | End: 2022-04-06 | Stop reason: SDUPTHER

## 2021-12-16 RX ORDER — PANTOPRAZOLE SODIUM 40 MG/1
40 TABLET, DELAYED RELEASE ORAL EVERY MORNING
Qty: 90 TABLET | Refills: 3 | OUTPATIENT
Start: 2021-12-16 | End: 2022-06-21

## 2021-12-16 RX ORDER — LEVOTHYROXINE SODIUM 50 UG/1
50 TABLET ORAL DAILY
Qty: 90 TABLET | Refills: 1 | Status: SHIPPED | OUTPATIENT
Start: 2021-12-16 | End: 2022-06-14 | Stop reason: SDUPTHER

## 2021-12-16 RX ORDER — HYDROCODONE BITARTRATE AND ACETAMINOPHEN 10; 325 MG/1; MG/1
1 TABLET ORAL EVERY 4 HOURS PRN
Qty: 60 TABLET | Refills: 0 | Status: SHIPPED | OUTPATIENT
Start: 2022-02-15 | End: 2022-03-31 | Stop reason: SDUPTHER

## 2021-12-16 RX ORDER — FUROSEMIDE 20 MG/1
20 TABLET ORAL 2 TIMES DAILY
Qty: 180 TABLET | Refills: 1 | Status: SHIPPED | OUTPATIENT
Start: 2021-12-16 | End: 2022-03-31 | Stop reason: SDUPTHER

## 2021-12-16 RX ORDER — NAPROXEN 375 MG/1
375 TABLET ORAL DAILY PRN
Qty: 90 TABLET | Refills: 1 | Status: SHIPPED | OUTPATIENT
Start: 2021-12-16 | End: 2022-03-31 | Stop reason: SDUPTHER

## 2021-12-16 RX ORDER — CHLORDIAZEPOXIDE HYDROCHLORIDE AND CLIDINIUM BROMIDE 5; 2.5 MG/1; MG/1
CAPSULE ORAL
Qty: 90 CAPSULE | Refills: 3 | Status: SHIPPED | OUTPATIENT
Start: 2021-12-16 | End: 2022-03-31 | Stop reason: SDUPTHER

## 2021-12-16 RX ORDER — ATENOLOL AND CHLORTHALIDONE TABLET 50; 25 MG/1; MG/1
1 TABLET ORAL DAILY
Qty: 90 TABLET | Refills: 1 | Status: SHIPPED | OUTPATIENT
Start: 2021-12-16 | End: 2022-03-31 | Stop reason: SDUPTHER

## 2021-12-16 RX ORDER — HYDROCODONE BITARTRATE AND ACETAMINOPHEN 10; 325 MG/1; MG/1
1 TABLET ORAL EVERY 4 HOURS PRN
Qty: 60 TABLET | Refills: 0 | Status: SHIPPED | OUTPATIENT
Start: 2022-01-15 | End: 2022-03-31 | Stop reason: SDUPTHER

## 2021-12-16 RX ORDER — DICLOFENAC SODIUM 10 MG/G
2 GEL TOPICAL DAILY
Qty: 100 G | Refills: 1 | Status: SHIPPED | OUTPATIENT
Start: 2021-12-16 | End: 2022-07-11

## 2021-12-16 RX ORDER — HYDROCODONE BITARTRATE AND ACETAMINOPHEN 10; 325 MG/1; MG/1
1 TABLET ORAL EVERY 4 HOURS PRN
Qty: 60 TABLET | Refills: 0 | Status: SHIPPED | OUTPATIENT
Start: 2021-12-16 | End: 2022-03-31 | Stop reason: SDUPTHER

## 2022-01-14 ENCOUNTER — TELEPHONE (OUTPATIENT)
Dept: FAMILY MEDICINE | Facility: CLINIC | Age: 87
End: 2022-01-14
Payer: COMMERCIAL

## 2022-01-14 NOTE — TELEPHONE ENCOUNTER
----- Message from Silvia Sarbjit sent at 1/14/2022 11:24 AM CST -----  Pt calling said she was supposed to give a urine specimen after her last appt in Dec but the lab was closed so she never did it.  She wants to know does she still need to do it.  # [phone]

## 2022-01-20 ENCOUNTER — TELEPHONE (OUTPATIENT)
Dept: FAMILY MEDICINE | Facility: CLINIC | Age: 87
End: 2022-01-20
Payer: COMMERCIAL

## 2022-01-20 DIAGNOSIS — M17.11 OSTEOARTHRITIS OF RIGHT KNEE, UNSPECIFIED OSTEOARTHRITIS TYPE: ICD-10-CM

## 2022-01-20 DIAGNOSIS — Z51.81 ENCOUNTER FOR THERAPEUTIC DRUG MONITORING: ICD-10-CM

## 2022-01-20 DIAGNOSIS — Z79.899 ENCOUNTER FOR LONG-TERM (CURRENT) USE OF OTHER MEDICATIONS: Primary | ICD-10-CM

## 2022-01-22 LAB
1OH-MIDAZOLAM UR-MCNC: NEGATIVE NG/ML
7AMINOCLONAZEPAM UR-MCNC: NEGATIVE NG/ML
A-OH ALPRAZ UR-MCNC: 131 NG/ML
A-OH-TRIAZOLAM UR-MCNC: NEGATIVE NG/ML
AMPHETAMINES UR QL: NEGATIVE NG/ML
BARBITURATES UR QL: NEGATIVE NG/ML
BENZODIAZ UR QL: POSITIVE NG/ML
BZE UR QL: NEGATIVE NG/ML
CODEINE UR-MCNC: NEGATIVE NG/ML
CREAT UR-MCNC: 61.5 MG/DL
DRUG SCREEN COMMENT UR-IMP: ABNORMAL
DRUG SCREEN COMMENT UR-IMP: ABNORMAL
HYDROCODONE UR-MCNC: 351 NG/ML
HYDROMORPHONE UR-MCNC: 411 NG/ML
LORAZEPAM UR-MCNC: NEGATIVE NG/ML
METHADONE UR QL: NEGATIVE NG/ML
MORPHINE UR-MCNC: NEGATIVE NG/ML
NORDIAZEPAM UR-MCNC: NEGATIVE NG/ML
NORHYDROCODONE UR CFM-MCNC: 513 NG/ML
NOTES AND COMMENTS: ABNORMAL
OH-ETHYLFLURAZ UR-MCNC: NEGATIVE NG/ML
OPIATES UR QL: POSITIVE NG/ML
OXAZEPAM UR-MCNC: 313 NG/ML
OXIDANTS UR QL: NEGATIVE MCG/ML
OXYCODONE UR QL: NEGATIVE NG/ML
PCP UR QL: NEGATIVE NG/ML
PH UR: 6.2 [PH] (ref 4.5–9)
TEMAZEPAM UR-MCNC: NEGATIVE NG/ML

## 2022-02-03 ENCOUNTER — TELEPHONE (OUTPATIENT)
Dept: FAMILY MEDICINE | Facility: CLINIC | Age: 87
End: 2022-02-03
Payer: COMMERCIAL

## 2022-02-03 NOTE — TELEPHONE ENCOUNTER
----- Message from Silvia Schaffer sent at 2/3/2022  2:20 PM CST -----  Pt calling for recent UA test  # [phone]

## 2022-02-03 NOTE — TELEPHONE ENCOUNTER
Spoke to pt advised pt she did a UDS and it came back as expected since she is taking Norco. Pt voiced understanding.

## 2022-03-02 ENCOUNTER — TELEPHONE (OUTPATIENT)
Dept: FAMILY MEDICINE | Facility: CLINIC | Age: 87
End: 2022-03-02
Payer: COMMERCIAL

## 2022-03-02 DIAGNOSIS — Z79.899 ENCOUNTER FOR LONG-TERM (CURRENT) USE OF OTHER MEDICATIONS: Primary | ICD-10-CM

## 2022-03-02 DIAGNOSIS — I10 ESSENTIAL HYPERTENSION: ICD-10-CM

## 2022-03-02 DIAGNOSIS — N18.32 STAGE 3B CHRONIC KIDNEY DISEASE: ICD-10-CM

## 2022-03-02 NOTE — TELEPHONE ENCOUNTER
----- Message from [facility], RT sent at 12/13/2021 11:45 AM CST -----  Regarding: Lab due  Reed Andersen MD   12/12/2021  9:32 PM CST Back to Top      Call patient.  CBC shows no anemia.  Kidney functions shows minimal decrease with GFR down to 34 now.  Encourage increased water and hydration during the day.  Cholesterol stable at 194. Recheck BMP in 3 months for kidney function

## 2022-03-11 ENCOUNTER — TELEPHONE (OUTPATIENT)
Dept: FAMILY MEDICINE | Facility: CLINIC | Age: 87
End: 2022-03-11
Payer: COMMERCIAL

## 2022-03-11 LAB
BUN SERPL-MCNC: 20 MG/DL (ref 7–25)
BUN/CREAT SERPL: 18 (CALC) (ref 6–22)
CALCIUM SERPL-MCNC: 9.3 MG/DL (ref 8.6–10.4)
CHLORIDE SERPL-SCNC: 96 MMOL/L (ref 98–110)
CO2 SERPL-SCNC: 33 MMOL/L (ref 20–32)
CREAT SERPL-MCNC: 1.13 MG/DL (ref 0.6–0.88)
GLUCOSE SERPL-MCNC: 89 MG/DL (ref 65–99)
POTASSIUM SERPL-SCNC: 3.7 MMOL/L (ref 3.5–5.3)
SODIUM SERPL-SCNC: 139 MMOL/L (ref 135–146)

## 2022-03-11 NOTE — TELEPHONE ENCOUNTER
----- Message from Rajendra Devine PA-C sent at 3/11/2022 12:42 PM CST -----  Kidneys have continued to improve slightly. Continue as is.. no changes.

## 2022-03-15 DIAGNOSIS — F51.01 PRIMARY INSOMNIA: ICD-10-CM

## 2022-03-15 DIAGNOSIS — M15.9 PRIMARY OSTEOARTHRITIS INVOLVING MULTIPLE JOINTS: ICD-10-CM

## 2022-03-15 RX ORDER — ALPRAZOLAM 0.5 MG/1
0.5 TABLET ORAL 3 TIMES DAILY PRN
Qty: 90 TABLET | Refills: 5 | Status: SHIPPED | OUTPATIENT
Start: 2022-03-15 | End: 2022-09-06 | Stop reason: SDUPTHER

## 2022-03-15 RX ORDER — HYDROCODONE BITARTRATE AND ACETAMINOPHEN 10; 325 MG/1; MG/1
1 TABLET ORAL EVERY 4 HOURS PRN
Qty: 60 TABLET | Refills: 0 | Status: SHIPPED | OUTPATIENT
Start: 2022-03-15 | End: 2022-06-27 | Stop reason: SDUPTHER

## 2022-03-15 NOTE — TELEPHONE ENCOUNTER
----- Message from Lea Jim sent at 3/15/2022  3:03 PM CDT -----  Patient called back and stated that she need a refill of her alprazolam called into [facility] if any questions please give her a call at [phone]

## 2022-03-15 NOTE — TELEPHONE ENCOUNTER
----- Message from Lea Jim sent at 3/15/2022  2:37 PM CDT -----  Patient called and stated that she need a refill of her hydrocodone-acetaminophen  called into [facility] if any questions please give her a call at [phone]

## 2022-03-29 ENCOUNTER — TELEPHONE (OUTPATIENT)
Dept: FAMILY MEDICINE | Facility: CLINIC | Age: 87
End: 2022-03-29
Payer: COMMERCIAL

## 2022-03-29 NOTE — TELEPHONE ENCOUNTER
----- Message from Lea Jim sent at 3/29/2022  2:21 PM CDT -----  Patient called and stated that she need to reschedule his appointment for tomorrow because of the bad weather she stated she does not want to see him in two weeks she want to come in Thursday or Friday please give her a call at [phone]

## 2022-03-31 ENCOUNTER — OFFICE VISIT (OUTPATIENT)
Dept: FAMILY MEDICINE | Facility: CLINIC | Age: 87
End: 2022-03-31
Payer: MEDICARE

## 2022-03-31 VITALS
WEIGHT: 151 LBS | SYSTOLIC BLOOD PRESSURE: 110 MMHG | HEIGHT: 58 IN | BODY MASS INDEX: 31.7 KG/M2 | DIASTOLIC BLOOD PRESSURE: 66 MMHG | HEART RATE: 64 BPM

## 2022-03-31 DIAGNOSIS — H61.23 BILATERAL HEARING LOSS DUE TO CERUMEN IMPACTION: ICD-10-CM

## 2022-03-31 DIAGNOSIS — E78.1 HYPERTRIGLYCERIDEMIA: ICD-10-CM

## 2022-03-31 DIAGNOSIS — E03.9 ACQUIRED HYPOTHYROIDISM: ICD-10-CM

## 2022-03-31 DIAGNOSIS — M15.9 PRIMARY OSTEOARTHRITIS INVOLVING MULTIPLE JOINTS: Primary | ICD-10-CM

## 2022-03-31 DIAGNOSIS — F11.20 UNCOMPLICATED OPIOID DEPENDENCE: ICD-10-CM

## 2022-03-31 DIAGNOSIS — F51.01 PRIMARY INSOMNIA: ICD-10-CM

## 2022-03-31 DIAGNOSIS — I10 ESSENTIAL HYPERTENSION: ICD-10-CM

## 2022-03-31 DIAGNOSIS — K58.2 IRRITABLE BOWEL SYNDROME WITH BOTH CONSTIPATION AND DIARRHEA: ICD-10-CM

## 2022-03-31 DIAGNOSIS — M15.9 PRIMARY OSTEOARTHRITIS INVOLVING MULTIPLE JOINTS: ICD-10-CM

## 2022-03-31 DIAGNOSIS — N18.32 STAGE 3B CHRONIC KIDNEY DISEASE: ICD-10-CM

## 2022-03-31 DIAGNOSIS — I87.8 VENOUS STASIS OF BOTH LOWER EXTREMITIES: ICD-10-CM

## 2022-03-31 DIAGNOSIS — J44.89 COPD WITH ASTHMA: ICD-10-CM

## 2022-03-31 PROCEDURE — 99214 OFFICE O/P EST MOD 30 MIN: CPT | Mod: 25,S$GLB,, | Performed by: FAMILY MEDICINE

## 2022-03-31 PROCEDURE — 69209 REMOVE IMPACTED EAR WAX UNI: CPT | Mod: 50,S$GLB,, | Performed by: FAMILY MEDICINE

## 2022-03-31 PROCEDURE — 69209 EAR CERUMEN REMOVAL: ICD-10-PCS | Mod: 50,S$GLB,, | Performed by: FAMILY MEDICINE

## 2022-03-31 PROCEDURE — 99214 PR OFFICE/OUTPT VISIT, EST, LEVL IV, 30-39 MIN: ICD-10-PCS | Mod: 25,S$GLB,, | Performed by: FAMILY MEDICINE

## 2022-03-31 RX ORDER — HYDROCODONE BITARTRATE AND ACETAMINOPHEN 10; 325 MG/1; MG/1
1 TABLET ORAL EVERY 4 HOURS PRN
Qty: 60 TABLET | Refills: 0 | Status: SHIPPED | OUTPATIENT
Start: 2022-04-14

## 2022-03-31 RX ORDER — HYDROCODONE BITARTRATE AND ACETAMINOPHEN 10; 325 MG/1; MG/1
1 TABLET ORAL EVERY 4 HOURS PRN
Qty: 60 TABLET | Refills: 0 | Status: SHIPPED | OUTPATIENT
Start: 2022-05-14 | End: 2022-07-02 | Stop reason: SDUPTHER

## 2022-03-31 RX ORDER — FUROSEMIDE 20 MG/1
20 TABLET ORAL 2 TIMES DAILY
Qty: 180 TABLET | Refills: 1 | Status: ON HOLD | OUTPATIENT
Start: 2022-03-31 | End: 2022-07-05 | Stop reason: SDUPTHER

## 2022-03-31 RX ORDER — NAPROXEN 375 MG/1
375 TABLET ORAL DAILY PRN
Qty: 90 TABLET | Refills: 1 | Status: SHIPPED | OUTPATIENT
Start: 2022-03-31 | End: 2022-07-02 | Stop reason: CLARIF

## 2022-03-31 RX ORDER — MELOXICAM 7.5 MG/1
7.5 TABLET ORAL DAILY
Qty: 90 TABLET | Refills: 1 | Status: CANCELLED | OUTPATIENT
Start: 2022-03-31

## 2022-03-31 RX ORDER — CHLORDIAZEPOXIDE HYDROCHLORIDE AND CLIDINIUM BROMIDE 5; 2.5 MG/1; MG/1
CAPSULE ORAL
Qty: 90 CAPSULE | Refills: 3 | Status: SHIPPED | OUTPATIENT
Start: 2022-03-31 | End: 2022-07-02 | Stop reason: CLARIF

## 2022-03-31 RX ORDER — HYDROCODONE BITARTRATE AND ACETAMINOPHEN 10; 325 MG/1; MG/1
1 TABLET ORAL EVERY 4 HOURS PRN
Qty: 60 TABLET | Refills: 0 | Status: SHIPPED | OUTPATIENT
Start: 2022-06-14 | End: 2022-07-02 | Stop reason: SDUPTHER

## 2022-03-31 RX ORDER — ATENOLOL AND CHLORTHALIDONE TABLET 50; 25 MG/1; MG/1
1 TABLET ORAL DAILY
Qty: 90 TABLET | Refills: 1 | Status: SHIPPED | OUTPATIENT
Start: 2022-03-31 | End: 2022-06-14 | Stop reason: SDUPTHER

## 2022-04-02 NOTE — PROCEDURES
"Ear Cerumen Removal    Date/Time: 3/31/2022 3:00 PM  Performed by: Reed Andersen MD  Authorized by: Reed Andersen MD     Time out: Immediately prior to procedure a "time out" was called to verify the correct patient, procedure, equipment, support staff and site/side marked as required.    Consent Done?:  Yes (Verbal)    Local anesthetic:  None  Location details:  Both ears  Procedure type: irrigation    Cerumen  Removal Results:  Cerumen completely removed  Patient tolerance:  Patient tolerated the procedure well with no immediate complications      "

## 2022-04-02 NOTE — PROGRESS NOTES
SUBJECTIVE:    Patient ID: Valerie Jones is a 88 y.o. female.    Chief Complaint: Follow-up (Brought bottles // need refills //Legs pain // abc)    88-year-old female comes in for 3-month checkup.  She still active in drives a car.  Goes on grocery shopping trips.    Asthma/COPD-she quit smoking 16 years ago, does not drink any alcohol.  Sees Dr. Davidson Mack for pulmonary.    Has psoriasis and eczema    Osteoarthritis-walks with a walker/Rollator, meloxicam 7.5 mg not that effective she wishes to transition to naproxen 375 mg she uses Biofreeze as needed for knee aches.    Chronic kidney disease stage 3 GFR improved from 34 up to 43.       Telephone on 03/02/2022   Component Date Value Ref Range Status    Glucose 03/10/2022 89  65 - 99 mg/dL Final    BUN 03/10/2022 20  7 - 25 mg/dL Final    Creatinine 03/10/2022 1.13 (A) 0.60 - 0.88 mg/dL Final    eGFR if non  03/10/2022 43 (A) > OR = 60 mL/min/1.73m2 Final    eGFR if  03/10/2022 50 (A) > OR = 60 mL/min/1.73m2 Final    BUN/Creatinine Ratio 03/10/2022 18  6 - 22 (calc) Final    Sodium 03/10/2022 139  135 - 146 mmol/L Final    Potassium 03/10/2022 3.7  3.5 - 5.3 mmol/L Final    Chloride 03/10/2022 96 (A) 98 - 110 mmol/L Final    CO2 03/10/2022 33 (A) 20 - 32 mmol/L Final    Calcium 03/10/2022 9.3  8.6 - 10.4 mg/dL Final   Telephone on 01/20/2022   Component Date Value Ref Range Status    Amphetamines 01/20/2022 NEGATIVE  <500 ng/mL Final    Barbiturates 01/20/2022 NEGATIVE  <300 ng/mL Final    Benzodiazepines 01/20/2022 POSITIVE (A) <100 ng/mL Final    Alphahydroxyalprazolam 01/20/2022 131 (A) <25 ng/mL Final    Alphahydroxymidazolam 01/20/2022 NEGATIVE  <50 ng/mL Final    Alphahydroxytriazolam 01/20/2022 NEGATIVE  <50 ng/mL Final    Aminoclonazepam 01/20/2022 NEGATIVE  <25 ng/mL Final    hydroxyethylflurazepam UR GC/MS 01/20/2022 NEGATIVE  <50 ng/mL Final    Lorazepam 01/20/2022 NEGATIVE  <50 ng/mL Final     Nordiazepam Lvl 01/20/2022 NEGATIVE  <50 ng/mL Final    Oxazepam 01/20/2022 313 (A) <50 ng/mL Final    Temazepam GC/MS Conf 01/20/2022 NEGATIVE  <50 ng/mL Final    Benzodiazepines Comments 01/20/2022    Final    Cocaine Metabolites 01/20/2022 NEGATIVE  <150 ng/mL Final    Methadone 01/20/2022 NEGATIVE  <100 ng/mL Final    Opiates 01/20/2022 POSITIVE (A) <100 ng/mL Final    Codeine 01/20/2022 NEGATIVE  <50 ng/mL Final    Hydrocodone 01/20/2022 351 (A) <50 ng/mL Final    Hydromorphone 01/20/2022 411 (A) <50 ng/mL Final    Morphine 01/20/2022 NEGATIVE  <50 ng/mL Final    NORHYDROCODONE 01/20/2022 513 (A) <50 ng/mL Final    Opiates Comments 01/20/2022    Final    Oxycodone 01/20/2022 NEGATIVE  <100 ng/mL Final    Phencyclidine 01/20/2022 NEGATIVE  <25 ng/mL Final    Creatinine 01/20/2022 61.5  > or = 20.0 mg/dL Final    pH 01/20/2022 6.2  4.5 - 9.0 Final    Oxidants, Urine (Tox) 01/20/2022 NEGATIVE  <200 mcg/mL Final    Notes and Comments 01/20/2022    Final   Telephone on 10/21/2021   Component Date Value Ref Range Status    POC Blood, Urine 10/21/2021 Negative  Negative Final    POC Bilirubin, Urine 10/21/2021 Negative  Negative Final    POC Urobilinogen, Urine 10/21/2021 neg  0.1 - 1.1 Final    POC Ketones, Urine 10/21/2021 Negative  Negative Final    POC Protein, Urine 10/21/2021 Negative  Negative Final    POC Nitrates, Urine 10/21/2021 Negative  Negative Final    POC Glucose, Urine 10/21/2021 Negative  Negative Final    pH, UA 10/21/2021 6.0   Final    POC Specific Gravity, Urine 10/21/2021 1.015  1.003 - 1.029 Final    POC Leukocytes, Urine 10/21/2021 Negative  Negative Final    Urine Culture, Routine 10/21/2021    Final       Past Medical History:   Diagnosis Date    Anxiety     Arthritis     Asthma     COPD (chronic obstructive pulmonary disease)     Hypertension     Pain in the abdomen 2019    Thyroid disease      Social History     Socioeconomic History    Marital status:     Tobacco Use    Smoking status: Former Smoker     Packs/day: 0.50     Years: 5.00     Pack years: 2.50     Quit date: 2/15/2005     Years since quittin.1    Smokeless tobacco: Never Used   Substance and Sexual Activity    Alcohol use: No    Drug use: No    Sexual activity: Never     Past Surgical History:   Procedure Laterality Date    CATARACT EXTRACTION, BILATERAL  2017    Dr Collins     SECTION      x's2    CHOLECYSTECTOMY      COLONOSCOPY      ESOPHAGOGASTRODUODENOSCOPY (EGD) WITH DILATION      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    LAPAROSCOPIC CHOLECYSTECTOMY N/A 2019    Procedure: CHOLECYSTECTOMY, LAPAROSCOPIC;  Surgeon: Toney Grace III, MD;  Location[facility];  Service: General;  Laterality: N/A;    SKIN CANCER EXCISION      Dr.Weil     Family History   Problem Relation Age of Onset    Diabetes Mother     Depression Maternal Grandmother     Depression Maternal Grandfather        Review of patient's allergies indicates:   Allergen Reactions    Sulfa (sulfonamide antibiotics) Swelling    Androgenic anabolic steroid Swelling     Feet swelling     Aspirin Other (See Comments)     Upset stomach     Erythromycin Itching    Methylprednisolone Other (See Comments)    Penicillins Itching       Current Outpatient Medications:     albuterol sulfate (PROAIR RESPICLICK) 90 mcg/actuation inhaler, Inhale 2 puffs into the lungs every 4 (four) hours. Rescue2 puffs every 4 hours as needed for cough, wheeze, or shortness of breath, Disp: 1 each, Rfl: 2    albuterol-ipratropium (DUO-NEB) 2.5 mg-0.5 mg/3 mL nebulizer solution, Take 3 mLs by nebulization every 6 (six) hours as needed., Disp: 120 vial, Rfl: 1    ALPRAZolam (XANAX) 0.5 MG tablet, Take 1 tablet (0.5 mg total) by mouth 3 (three) times daily as needed for Anxiety., Disp: 90 tablet, Rfl: 5    clobetasoL 0.025 % Crea, Apply topically., Disp: , Rfl:     diclofenac sodium (VOLTAREN) 1 % Gel, Apply 2 g  topically once daily., Disp: 100 g, Rfl: 1    fluticasone-umeclidin-vilanter (TRELEGY ELLIPTA) 100-62.5-25 mcg DsDv, Inhale 1 puff into the lungs once daily., Disp: 60 each, Rfl: 0    HYDROcodone-acetaminophen (NORCO)  mg per tablet, Take 1 tablet by mouth every 4 (four) hours as needed for Pain., Disp: 60 tablet, Rfl: 0    levothyroxine (SYNTHROID) 50 MCG tablet, Take 1 tablet (50 mcg total) by mouth once daily., Disp: 90 tablet, Rfl: 1    meloxicam (MOBIC) 7.5 MG tablet, Take 1 tablet (7.5 mg total) by mouth once daily., Disp: 90 tablet, Rfl: 1    mupirocin (BACTROBAN) 2 % ointment, Apply topically 2 (two) times daily., Disp: 22 g, Rfl: 1    pantoprazole (PROTONIX) 40 MG tablet, Take 1 tablet (40 mg total) by mouth every morning., Disp: 90 tablet, Rfl: 3    atenoloL-chlorthalidone (TENORETIC) 50-25 mg Tab, Take 1 tablet by mouth once daily., Disp: 90 tablet, Rfl: 1    chlordiazepoxide-clidinium 5-2.5 mg (LIBRAX) 5-2.5 mg Cap, TAKE ONE CAPSULE BY MOUTH THREE TIMES DAILY BEFORE MEALS, Disp: 90 capsule, Rfl: 3    furosemide (LASIX) 20 MG tablet, Take 1 tablet (20 mg total) by mouth 2 (two) times daily., Disp: 180 tablet, Rfl: 1    [START ON 4/14/2022] HYDROcodone-acetaminophen (NORCO)  mg per tablet, Take 1 tablet by mouth every 4 (four) hours as needed for Pain., Disp: 60 tablet, Rfl: 0    [START ON 5/14/2022] HYDROcodone-acetaminophen (NORCO)  mg per tablet, Take 1 tablet by mouth every 4 (four) hours as needed for Pain., Disp: 60 tablet, Rfl: 0    [START ON 6/14/2022] HYDROcodone-acetaminophen (NORCO)  mg per tablet, Take 1 tablet by mouth every 4 (four) hours as needed for Pain., Disp: 60 tablet, Rfl: 0    naproxen (NAPROSYN) 375 MG tablet, Take 1 tablet (375 mg total) by mouth daily as needed (arthritis)., Disp: 90 tablet, Rfl: 1    Review of Systems   Constitutional: Negative for appetite change, chills, fatigue, fever and unexpected weight change.   HENT: Negative for  "congestion, ear pain, sinus pain, sore throat and trouble swallowing.    Eyes: Negative for pain, discharge and visual disturbance.   Respiratory: Positive for shortness of breath and wheezing ( occasional wheezing with COPD/asthma). Negative for apnea and cough.    Cardiovascular: Negative for chest pain, palpitations and leg swelling.   Gastrointestinal: Negative for abdominal pain, blood in stool, constipation, diarrhea, nausea and vomiting.   Endocrine: Negative for heat intolerance, polydipsia and polyuria.   Genitourinary: Negative for difficulty urinating, dyspareunia, dysuria, frequency, hematuria and menstrual problem.   Musculoskeletal: Positive for arthralgias (Knees ache bilaterally), back pain and gait problem. Negative for joint swelling and myalgias.   Allergic/Immunologic: Negative for environmental allergies, food allergies and immunocompromised state.   Neurological: Negative for dizziness, tremors, seizures, numbness and headaches.   Psychiatric/Behavioral: Negative for behavioral problems, confusion, hallucinations and suicidal ideas. The patient is not nervous/anxious.           Objective:      Vitals:    03/31/22 1501   BP: 110/66   Pulse: 64   Weight: 68.5 kg (151 lb)   Height: 4' 10" (1.473 m)     Physical Exam  Vitals and nursing note reviewed.   Constitutional:       General: She is not in acute distress.     Appearance: She is well-developed. She is obese. She is not toxic-appearing.      Comments: Elderly female in no apparent distress   HENT:      Head: Normocephalic and atraumatic.      Right Ear: Tympanic membrane and external ear normal.      Left Ear: External ear normal.      Nose: Nose normal.      Mouth/Throat:      Pharynx: Oropharynx is clear.   Eyes:      Pupils: Pupils are equal, round, and reactive to light.   Neck:      Thyroid: No thyromegaly.      Vascular: No carotid bruit.   Cardiovascular:      Rate and Rhythm: Normal rate and regular rhythm.      Heart sounds: Normal " heart sounds. No murmur heard.  Pulmonary:      Effort: Pulmonary effort is normal.      Breath sounds: Normal breath sounds. No wheezing or rales.   Abdominal:      General: Bowel sounds are normal. There is no distension.      Palpations: Abdomen is soft.      Tenderness: There is no abdominal tenderness.   Musculoskeletal:         General: No tenderness or deformity. Normal range of motion.      Cervical back: Normal range of motion and neck supple.      Lumbar back: Normal. No spasms.      Comments: Bends 90 degrees at  waist shoulders have good range of motion, knees are crepitant bilaterally she has a slow cautious gait, no pitting edema to lower extremities   Lymphadenopathy:      Cervical: No cervical adenopathy.   Skin:     General: Skin is warm and dry.      Findings: No rash.   Neurological:      Mental Status: She is alert and oriented to person, place, and time.      Cranial Nerves: No cranial nerve deficit.      Coordination: Coordination normal.   Psychiatric:         Mood and Affect: Mood normal.         Behavior: Behavior normal.         Thought Content: Thought content normal.         Judgment: Judgment normal.           Assessment:       1. Primary osteoarthritis involving multiple joints    2. Irritable bowel syndrome with both constipation and diarrhea    3. Essential hypertension    4. Primary insomnia    5. Acquired hypothyroidism    6. Stage 3b chronic kidney disease    7. Venous stasis of both lower extremities    8. Hypertriglyceridemia    9. COPD with asthma    10. Primary osteoarthritis involving multiple joints    11. Bilateral hearing loss due to cerumen impaction    12. Uncomplicated opioid dependence         Plan:       Primary osteoarthritis involving multiple joints  Comments:  Going to try patient with Voltaren gel.  Can apply to the affected area  Orders:  -     naproxen (NAPROSYN) 375 MG tablet; Take 1 tablet (375 mg total) by mouth daily as needed (arthritis).  Dispense: 90  tablet; Refill: 1  -     HYDROcodone-acetaminophen (NORCO)  mg per tablet; Take 1 tablet by mouth every 4 (four) hours as needed for Pain.  Dispense: 60 tablet; Refill: 0  -     HYDROcodone-acetaminophen (NORCO)  mg per tablet; Take 1 tablet by mouth every 4 (four) hours as needed for Pain.  Dispense: 60 tablet; Refill: 0  -     HYDROcodone-acetaminophen (NORCO)  mg per tablet; Take 1 tablet by mouth every 4 (four) hours as needed for Pain.  Dispense: 60 tablet; Refill: 0  Discontinue meloxicam and changed added naproxen 375 mg  Irritable bowel syndrome with both constipation and diarrhea  -     chlordiazepoxide-clidinium 5-2.5 mg (LIBRAX) 5-2.5 mg Cap; TAKE ONE CAPSULE BY MOUTH THREE TIMES DAILY BEFORE MEALS  Dispense: 90 capsule; Refill: 3    Essential hypertension  Comments:  BP at goal. no changes today  Orders:  -     atenoloL-chlorthalidone (TENORETIC) 50-25 mg Tab; Take 1 tablet by mouth once daily.  Dispense: 90 tablet; Refill: 1  -     furosemide (LASIX) 20 MG tablet; Take 1 tablet (20 mg total) by mouth 2 (two) times daily.  Dispense: 180 tablet; Refill: 1  -     CBC Auto Differential; Future; Expected date: 03/31/2022  -     Comprehensive Metabolic Panel; Future; Expected date: 03/31/2022  -     Lipid Panel; Future; Expected date: 03/31/2022  -     TSH w/reflex to FT4; Future; Expected date: 03/31/2022    Primary insomnia    Acquired hypothyroidism    Stage 3b chronic kidney disease  GFR improved from 34-43  Venous stasis of both lower extremities  No pitting edema today  Hypertriglyceridemia    COPD with asthma  Continue inhalers follow-up with Dr. Mack  Primary osteoarthritis involving multiple joints  -     naproxen (NAPROSYN) 375 MG tablet; Take 1 tablet (375 mg total) by mouth daily as needed (arthritis).  Dispense: 90 tablet; Refill: 1  -     HYDROcodone-acetaminophen (NORCO)  mg per tablet; Take 1 tablet by mouth every 4 (four) hours as needed for Pain.  Dispense: 60 tablet;  Refill: 0  -     HYDROcodone-acetaminophen (NORCO)  mg per tablet; Take 1 tablet by mouth every 4 (four) hours as needed for Pain.  Dispense: 60 tablet; Refill: 0  -     HYDROcodone-acetaminophen (NORCO)  mg per tablet; Take 1 tablet by mouth every 4 (four) hours as needed for Pain.  Dispense: 60 tablet; Refill: 0    Bilateral hearing loss due to cerumen impaction  -     Ear wax removal  Irrigate ears today  Uncomplicated opioid dependence      No follow-ups on file.        4/1/2022 Reed Andersen

## 2022-04-06 DIAGNOSIS — M15.9 PRIMARY OSTEOARTHRITIS INVOLVING MULTIPLE JOINTS: ICD-10-CM

## 2022-04-06 RX ORDER — MELOXICAM 7.5 MG/1
7.5 TABLET ORAL DAILY
Qty: 90 TABLET | Refills: 1 | Status: SHIPPED | OUTPATIENT
Start: 2022-04-06 | End: 2022-07-02 | Stop reason: CLARIF

## 2022-04-06 NOTE — TELEPHONE ENCOUNTER
----- Message from Lea Jim sent at 4/6/2022  2:26 PM CDT -----  Patient called and stated that Keenan put her back on naproxen and she started having issues again she would like to be put back on meloxicam it does not cause her any problems please called into [facility] if any questions please give her a call at [phone]

## 2022-04-18 ENCOUNTER — TELEPHONE (OUTPATIENT)
Dept: FAMILY MEDICINE | Facility: CLINIC | Age: 87
End: 2022-04-18

## 2022-04-18 NOTE — TELEPHONE ENCOUNTER
"----- Message from Silvia Schaffer sent at 4/18/2022 10:32 AM CDT -----  Pt calling said she has a "real bad bladder infection" started with symptoms on Good Friday and wants Cipro and "Urogesic blue pill" she uses [facility] asap cb [phone]    "

## 2022-04-19 ENCOUNTER — OFFICE VISIT (OUTPATIENT)
Dept: FAMILY MEDICINE | Facility: CLINIC | Age: 87
End: 2022-04-19
Payer: MEDICARE

## 2022-04-19 VITALS
HEART RATE: 84 BPM | SYSTOLIC BLOOD PRESSURE: 118 MMHG | HEIGHT: 58 IN | BODY MASS INDEX: 30.57 KG/M2 | OXYGEN SATURATION: 96 % | DIASTOLIC BLOOD PRESSURE: 76 MMHG | WEIGHT: 145.63 LBS

## 2022-04-19 DIAGNOSIS — R39.9 UTI SYMPTOMS: ICD-10-CM

## 2022-04-19 DIAGNOSIS — N30.00 ACUTE CYSTITIS WITHOUT HEMATURIA: Primary | ICD-10-CM

## 2022-04-19 LAB
BILIRUB UR QL STRIP: NEGATIVE
GLUCOSE UR QL STRIP: NEGATIVE
KETONES UR QL STRIP: NEGATIVE
LEUKOCYTE ESTERASE UR QL STRIP: NEGATIVE
PH, POC UA: 6
POC BLOOD, URINE: POSITIVE
POC NITRATES, URINE: NEGATIVE
PROT UR QL STRIP: POSITIVE
SP GR UR STRIP: 1.02 (ref 1–1.03)
UROBILINOGEN UR STRIP-ACNC: 0.2 (ref 0.1–1.1)

## 2022-04-19 PROCEDURE — 99213 OFFICE O/P EST LOW 20 MIN: CPT | Mod: S$GLB,,, | Performed by: NURSE PRACTITIONER

## 2022-04-19 PROCEDURE — 99213 PR OFFICE/OUTPT VISIT, EST, LEVL III, 20-29 MIN: ICD-10-PCS | Mod: S$GLB,,, | Performed by: NURSE PRACTITIONER

## 2022-04-19 PROCEDURE — 81003 URINALYSIS AUTO W/O SCOPE: CPT | Mod: QW,S$GLB,, | Performed by: NURSE PRACTITIONER

## 2022-04-19 PROCEDURE — 81003 POCT URINALYSIS, DIPSTICK, AUTOMATED, W/O SCOPE: ICD-10-PCS | Mod: QW,S$GLB,, | Performed by: NURSE PRACTITIONER

## 2022-04-19 RX ORDER — CIPROFLOXACIN 250 MG/1
250 TABLET, FILM COATED ORAL 2 TIMES DAILY
Qty: 14 TABLET | Refills: 0 | Status: SHIPPED | OUTPATIENT
Start: 2022-04-19 | End: 2022-04-25

## 2022-04-19 RX ORDER — PHENAZOPYRIDINE HYDROCHLORIDE 100 MG/1
100 TABLET, FILM COATED ORAL 3 TIMES DAILY PRN
Qty: 9 TABLET | Refills: 0 | Status: SHIPPED | OUTPATIENT
Start: 2022-04-19 | End: 2022-04-29

## 2022-04-19 NOTE — PROGRESS NOTES
Patient ID: Valerie Jones is a 88 y.o. female.    Chief Complaint: Urinary Tract Infection (No bottles/ uti symptoms x 5 days, taking urisaid//dp)    Patient here for sick visit.  Patient reports started on today with symptoms of dysuria, urinary frequency and some lower suprapubic pressure.  States similar symptoms when she has had previous UTIs denies any recent urine infection or antibiotic use.  Denies hematuria, fever, chills, nausea, vomiting or flank pain.  Patient requesting Cipro prescription, states that is the only antibiotic that she feels has been effective with her previous UTIs.          Past Medical History:   Diagnosis Date    Anxiety     Arthritis     Asthma     COPD (chronic obstructive pulmonary disease)     Hypertension     Pain in the abdomen     Thyroid disease      Past Surgical History:   Procedure Laterality Date    CATARACT EXTRACTION, BILATERAL      Dr Collins     SECTION      x's2    CHOLECYSTECTOMY      COLONOSCOPY  2009    ESOPHAGOGASTRODUODENOSCOPY (EGD) WITH DILATION      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    LAPAROSCOPIC CHOLECYSTECTOMY N/A 2019    Procedure: CHOLECYSTECTOMY, LAPAROSCOPIC;  Surgeon: Toney Grace III, MD;  Location: [facility];  Service: General;  Laterality: N/A;    SKIN CANCER EXCISION      Dr.Weil         Tobacco History:  reports that she quit smoking about 17 years ago. She has a 2.50 pack-year smoking history. She has never used smokeless tobacco.      Review of patient's allergies indicates:   Allergen Reactions    Sulfa (sulfonamide antibiotics) Swelling    Androgenic anabolic steroid Swelling     Feet swelling     Aspirin Other (See Comments)     Upset stomach     Erythromycin Itching    Methylprednisolone Other (See Comments)    Penicillins Itching       Current Outpatient Medications:     albuterol sulfate (PROAIR RESPICLICK) 90 mcg/actuation inhaler, Inhale 2 puffs into the lungs every 4 (four) hours. Rescue2  puffs every 4 hours as needed for cough, wheeze, or shortness of breath, Disp: 1 each, Rfl: 2    ALPRAZolam (XANAX) 0.5 MG tablet, Take 1 tablet (0.5 mg total) by mouth 3 (three) times daily as needed for Anxiety., Disp: 90 tablet, Rfl: 5    atenoloL-chlorthalidone (TENORETIC) 50-25 mg Tab, Take 1 tablet by mouth once daily., Disp: 90 tablet, Rfl: 1    chlordiazepoxide-clidinium 5-2.5 mg (LIBRAX) 5-2.5 mg Cap, TAKE ONE CAPSULE BY MOUTH THREE TIMES DAILY BEFORE MEALS, Disp: 90 capsule, Rfl: 3    clobetasoL 0.025 % Crea, Apply topically., Disp: , Rfl:     diclofenac sodium (VOLTAREN) 1 % Gel, Apply 2 g topically once daily., Disp: 100 g, Rfl: 1    fluticasone-umeclidin-vilanter (TRELEGY ELLIPTA) 100-62.5-25 mcg DsDv, Inhale 1 puff into the lungs once daily., Disp: 60 each, Rfl: 0    furosemide (LASIX) 20 MG tablet, Take 1 tablet (20 mg total) by mouth 2 (two) times daily., Disp: 180 tablet, Rfl: 1    HYDROcodone-acetaminophen (NORCO)  mg per tablet, Take 1 tablet by mouth every 4 (four) hours as needed for Pain., Disp: 60 tablet, Rfl: 0    HYDROcodone-acetaminophen (NORCO)  mg per tablet, Take 1 tablet by mouth every 4 (four) hours as needed for Pain., Disp: 60 tablet, Rfl: 0    [START ON 5/14/2022] HYDROcodone-acetaminophen (NORCO)  mg per tablet, Take 1 tablet by mouth every 4 (four) hours as needed for Pain., Disp: 60 tablet, Rfl: 0    [START ON 6/14/2022] HYDROcodone-acetaminophen (NORCO)  mg per tablet, Take 1 tablet by mouth every 4 (four) hours as needed for Pain., Disp: 60 tablet, Rfl: 0    levothyroxine (SYNTHROID) 50 MCG tablet, Take 1 tablet (50 mcg total) by mouth once daily., Disp: 90 tablet, Rfl: 1    meloxicam (MOBIC) 7.5 MG tablet, Take 1 tablet (7.5 mg total) by mouth once daily., Disp: 90 tablet, Rfl: 1    mupirocin (BACTROBAN) 2 % ointment, Apply topically 2 (two) times daily., Disp: 22 g, Rfl: 1    naproxen (NAPROSYN) 375 MG tablet, Take 1 tablet (375 mg  "total) by mouth daily as needed (arthritis)., Disp: 90 tablet, Rfl: 1    pantoprazole (PROTONIX) 40 MG tablet, Take 1 tablet (40 mg total) by mouth every morning., Disp: 90 tablet, Rfl: 3    albuterol-ipratropium (DUO-NEB) 2.5 mg-0.5 mg/3 mL nebulizer solution, Take 3 mLs by nebulization every 6 (six) hours as needed., Disp: 120 vial, Rfl: 1    ciprofloxacin HCl (CIPRO) 250 MG tablet, Take 1 tablet (250 mg total) by mouth 2 (two) times daily., Disp: 14 tablet, Rfl: 0    phenazopyridine (PYRIDIUM) 100 MG tablet, Take 1 tablet (100 mg total) by mouth 3 (three) times daily as needed (urinary pain)., Disp: 9 tablet, Rfl: 0    Review of Systems   Constitutional: Negative for appetite change, chills and fever.   Respiratory: Negative for cough and shortness of breath.    Cardiovascular: Negative for chest pain.   Gastrointestinal: Negative for diarrhea and vomiting.   Genitourinary: Positive for dysuria, frequency and urgency. Negative for difficulty urinating, flank pain and hematuria.   Skin: Negative for rash.   Neurological: Negative for dizziness and weakness.          Objective:      Vitals:    04/19/22 1551   BP: 118/76   Pulse: 84   SpO2: 96%   Weight: 66 kg (145 lb 9.6 oz)   Height: 4' 10" (1.473 m)     Physical Exam  Vitals and nursing note reviewed.   Constitutional:       General: She is not in acute distress.     Appearance: Normal appearance. She is well-developed. She is not toxic-appearing.   HENT:      Head: Normocephalic.   Cardiovascular:      Rate and Rhythm: Normal rate and regular rhythm.   Pulmonary:      Breath sounds: Normal breath sounds. No wheezing or rales.   Abdominal:      Palpations: Abdomen is soft.      Tenderness: There is no abdominal tenderness. There is no right CVA tenderness or left CVA tenderness.   Skin:     General: Skin is warm and dry.      Findings: No rash.   Neurological:      General: No focal deficit present.      Mental Status: She is alert and oriented to person, " place, and time.           Assessment:       1. Acute cystitis without hematuria    2. UTI symptoms           Plan:       Acute cystitis without hematuria  Comments:  UA + for LE/RBCs, will start trt with cipro and send for culture- call for any worsening  Orders:  -     POCT Urinalysis, Dipstick, Automated, W/O Scope  -     ciprofloxacin HCl (CIPRO) 250 MG tablet; Take 1 tablet (250 mg total) by mouth 2 (two) times daily.  Dispense: 14 tablet; Refill: 0  -     phenazopyridine (PYRIDIUM) 100 MG tablet; Take 1 tablet (100 mg total) by mouth 3 (three) times daily as needed (urinary pain).  Dispense: 9 tablet; Refill: 0  -     Urine culture; Future; Expected date: 04/19/2022    UTI symptoms      Follow up if symptoms worsen or fail to improve, for as scheduled.        4/20/2022 Nimo Butterfield NP

## 2022-04-22 LAB — BACTERIA UR CULT: ABNORMAL

## 2022-04-25 ENCOUNTER — TELEPHONE (OUTPATIENT)
Dept: FAMILY MEDICINE | Facility: CLINIC | Age: 87
End: 2022-04-25

## 2022-04-25 RX ORDER — NITROFURANTOIN 25; 75 MG/1; MG/1
100 CAPSULE ORAL 2 TIMES DAILY
Qty: 14 CAPSULE | Refills: 0 | Status: SHIPPED | OUTPATIENT
Start: 2022-04-25 | End: 2022-05-03

## 2022-04-25 NOTE — TELEPHONE ENCOUNTER
----- Message from Nimo Butterfield NP sent at 4/25/2022  8:08 AM CDT -----  Please call patient and let her know final urine culture shows the bacteria is resistant to Cipro, recommend we switch her to nitrofurantoin 100 mg b.i.d. for 7 days

## 2022-04-27 DIAGNOSIS — J44.89 COPD WITH ASTHMA: ICD-10-CM

## 2022-04-27 NOTE — TELEPHONE ENCOUNTER
Sent RX to provider    ----- Message from Heather Grijalva sent at 4/27/2022  3:05 PM CDT -----  Contact: pt  Type:  RX Refill Request    Who Called: pt  Refill or New Rx:  refill  RX Name and Strength:  fluticasone-umeclidin-vilanter (TRELEGY ELLIPTA) 100-62.5-25 mcg DsDv  Preferred Pharmacy with phone number:   [facility] - [place] - [address]  [address] [place] [address]  Phone: [phone] Fax: [phone]  Best Call Back Number: [phone]   Additional Information:needs to be fill please

## 2022-05-02 ENCOUNTER — TELEPHONE (OUTPATIENT)
Dept: FAMILY MEDICINE | Facility: CLINIC | Age: 87
End: 2022-05-02

## 2022-05-02 NOTE — TELEPHONE ENCOUNTER
Pt denies chest pain and fever. Pt states she is just coughing and its causing her chest to be tight. Pt has been scheduled with Dr. Andersen 5/3/22

## 2022-05-02 NOTE — TELEPHONE ENCOUNTER
----- Message from Silvia Schaffer sent at 5/2/2022 11:54 AM CDT -----  Pt calling said she has been sick for about 3 weeks with vomiting diarrhea, UTI those have resolved and now she is having chest tightness, cough and congestion. Said she has a history of pneumonia and wants to see Dr. Andersen or Rajendra only.  # [phone]

## 2022-05-03 ENCOUNTER — OFFICE VISIT (OUTPATIENT)
Dept: FAMILY MEDICINE | Facility: CLINIC | Age: 87
End: 2022-05-03
Payer: MEDICARE

## 2022-05-03 VITALS
DIASTOLIC BLOOD PRESSURE: 70 MMHG | WEIGHT: 146 LBS | BODY MASS INDEX: 30.64 KG/M2 | OXYGEN SATURATION: 94 % | HEART RATE: 72 BPM | SYSTOLIC BLOOD PRESSURE: 120 MMHG | HEIGHT: 58 IN

## 2022-05-03 DIAGNOSIS — J43.1 PANLOBULAR EMPHYSEMA: ICD-10-CM

## 2022-05-03 DIAGNOSIS — J20.9 ACUTE BRONCHITIS, UNSPECIFIED ORGANISM: Primary | ICD-10-CM

## 2022-05-03 DIAGNOSIS — I87.8 VENOUS STASIS OF BOTH LOWER EXTREMITIES: ICD-10-CM

## 2022-05-03 DIAGNOSIS — N18.32 STAGE 3B CHRONIC KIDNEY DISEASE: ICD-10-CM

## 2022-05-03 DIAGNOSIS — N30.00 ACUTE CYSTITIS WITHOUT HEMATURIA: ICD-10-CM

## 2022-05-03 DIAGNOSIS — E78.1 HYPERTRIGLYCERIDEMIA: ICD-10-CM

## 2022-05-03 DIAGNOSIS — I10 ESSENTIAL HYPERTENSION: ICD-10-CM

## 2022-05-03 PROCEDURE — 99214 OFFICE O/P EST MOD 30 MIN: CPT | Mod: S$GLB,,, | Performed by: FAMILY MEDICINE

## 2022-05-03 PROCEDURE — 99214 PR OFFICE/OUTPT VISIT, EST, LEVL IV, 30-39 MIN: ICD-10-PCS | Mod: S$GLB,,, | Performed by: FAMILY MEDICINE

## 2022-05-03 RX ORDER — PHENAZOPYRIDINE HYDROCHLORIDE 200 MG/1
200 TABLET, FILM COATED ORAL 3 TIMES DAILY PRN
Qty: 21 TABLET | Refills: 0 | Status: SHIPPED | OUTPATIENT
Start: 2022-05-03 | End: 2022-05-13

## 2022-05-03 RX ORDER — CODEINE PHOSPHATE AND GUAIFENESIN 10; 100 MG/5ML; MG/5ML
5 SOLUTION ORAL 3 TIMES DAILY PRN
Qty: 300 ML | Refills: 0 | Status: SHIPPED | OUTPATIENT
Start: 2022-05-03 | End: 2022-05-13

## 2022-05-03 RX ORDER — DOXYCYCLINE 100 MG/1
100 CAPSULE ORAL 2 TIMES DAILY
Qty: 20 CAPSULE | Refills: 0 | Status: SHIPPED | OUTPATIENT
Start: 2022-05-03 | End: 2022-07-02 | Stop reason: CLARIF

## 2022-05-03 NOTE — PROGRESS NOTES
SUBJECTIVE:    Patient ID: Valerie Jones is a 88 y.o. female.    Chief Complaint: Cough (No bottles // since 3 weeks // abc)    This 88-year-old female has had 3 weeks of various illnesses.  Three weeks ago she started with nausea vomiting and diarrhea.  She had decreased appetite, survived mostly on pineapple juice and other juices.    Two weeks ago she had UTI E coli, initially treated with Cipro but then found to be resistant to this.  Now switched to Macrobid 100 mg b.i.d..    One week ago she developed chest congestion cough productive of yellow sputum.  Some wheezing and shortness of breath as well.  Does not want to go the hospital.      Office Visit on 04/19/2022   Component Date Value Ref Range Status    POC Blood, Urine 04/19/2022 Positive (A) Negative Final    POC Bilirubin, Urine 04/19/2022 Negative  Negative Final    POC Urobilinogen, Urine 04/19/2022 0.2  0.1 - 1.1 Final    POC Ketones, Urine 04/19/2022 Negative  Negative Final    POC Protein, Urine 04/19/2022 Positive (A) Negative Final    POC Nitrates, Urine 04/19/2022 Negative  Negative Final    POC Glucose, Urine 04/19/2022 Negative  Negative Final    pH, UA 04/19/2022 6.0   Final    POC Specific Gravity, Urine 04/19/2022 1.020  1.003 - 1.029 Final    POC Leukocytes, Urine 04/19/2022 Negative  Negative Final    Urine Culture, Routine 04/19/2022  (A)  Final   Telephone on 03/02/2022   Component Date Value Ref Range Status    Glucose 03/10/2022 89  65 - 99 mg/dL Final    BUN 03/10/2022 20  7 - 25 mg/dL Final    Creatinine 03/10/2022 1.13 (A) 0.60 - 0.88 mg/dL Final    eGFR if non  03/10/2022 43 (A) > OR = 60 mL/min/1.73m2 Final    eGFR if  03/10/2022 50 (A) > OR = 60 mL/min/1.73m2 Final    BUN/Creatinine Ratio 03/10/2022 18  6 - 22 (calc) Final    Sodium 03/10/2022 139  135 - 146 mmol/L Final    Potassium 03/10/2022 3.7  3.5 - 5.3 mmol/L Final    Chloride 03/10/2022 96 (A) 98 - 110 mmol/L Final     CO2 03/10/2022 33 (A) 20 - 32 mmol/L Final    Calcium 03/10/2022 9.3  8.6 - 10.4 mg/dL Final   Telephone on 01/20/2022   Component Date Value Ref Range Status    Amphetamines 01/20/2022 NEGATIVE  <500 ng/mL Final    Barbiturates 01/20/2022 NEGATIVE  <300 ng/mL Final    Benzodiazepines 01/20/2022 POSITIVE (A) <100 ng/mL Final    Alphahydroxyalprazolam 01/20/2022 131 (A) <25 ng/mL Final    Alphahydroxymidazolam 01/20/2022 NEGATIVE  <50 ng/mL Final    Alphahydroxytriazolam 01/20/2022 NEGATIVE  <50 ng/mL Final    Aminoclonazepam 01/20/2022 NEGATIVE  <25 ng/mL Final    hydroxyethylflurazepam UR GC/MS 01/20/2022 NEGATIVE  <50 ng/mL Final    Lorazepam 01/20/2022 NEGATIVE  <50 ng/mL Final    Nordiazepam Lvl 01/20/2022 NEGATIVE  <50 ng/mL Final    Oxazepam 01/20/2022 313 (A) <50 ng/mL Final    Temazepam GC/MS Conf 01/20/2022 NEGATIVE  <50 ng/mL Final    Benzodiazepines Comments 01/20/2022    Final    Cocaine Metabolites 01/20/2022 NEGATIVE  <150 ng/mL Final    Methadone 01/20/2022 NEGATIVE  <100 ng/mL Final    Opiates 01/20/2022 POSITIVE (A) <100 ng/mL Final    Codeine 01/20/2022 NEGATIVE  <50 ng/mL Final    Hydrocodone 01/20/2022 351 (A) <50 ng/mL Final    Hydromorphone 01/20/2022 411 (A) <50 ng/mL Final    Morphine 01/20/2022 NEGATIVE  <50 ng/mL Final    NORHYDROCODONE 01/20/2022 513 (A) <50 ng/mL Final    Opiates Comments 01/20/2022    Final    Oxycodone 01/20/2022 NEGATIVE  <100 ng/mL Final    Phencyclidine 01/20/2022 NEGATIVE  <25 ng/mL Final    Creatinine 01/20/2022 61.5  > or = 20.0 mg/dL Final    pH 01/20/2022 6.2  4.5 - 9.0 Final    Oxidants, Urine (Tox) 01/20/2022 NEGATIVE  <200 mcg/mL Final    Notes and Comments 01/20/2022    Final       Past Medical History:   Diagnosis Date    Anxiety     Arthritis     Asthma     COPD (chronic obstructive pulmonary disease)     Hypertension     Pain in the abdomen 2019    Thyroid disease      Social History     Socioeconomic History     Marital status:    Tobacco Use    Smoking status: Former Smoker     Packs/day: 0.50     Years: 5.00     Pack years: 2.50     Quit date: 2/15/2005     Years since quittin.2    Smokeless tobacco: Never Used   Substance and Sexual Activity    Alcohol use: No    Drug use: No    Sexual activity: Never     Past Surgical History:   Procedure Laterality Date    CATARACT EXTRACTION, BILATERAL      Dr Collins     SECTION      x's2    CHOLECYSTECTOMY      COLONOSCOPY      ESOPHAGOGASTRODUODENOSCOPY (EGD) WITH DILATION      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    LAPAROSCOPIC CHOLECYSTECTOMY N/A 2019    Procedure: CHOLECYSTECTOMY, LAPAROSCOPIC;  Surgeon: Toney Grace III, MD;  Location: [facility] OR;  Service: General;  Laterality: N/A;    SKIN CANCER EXCISION      Dr.Weil     Family History   Problem Relation Age of Onset    Diabetes Mother     Depression Maternal Grandmother     Depression Maternal Grandfather        Review of patient's allergies indicates:   Allergen Reactions    Sulfa (sulfonamide antibiotics) Swelling    Androgenic anabolic steroid Swelling     Feet swelling     Aspirin Other (See Comments)     Upset stomach     Erythromycin Itching    Methylprednisolone Other (See Comments)    Penicillins Itching       Current Outpatient Medications:     albuterol sulfate (PROAIR RESPICLICK) 90 mcg/actuation inhaler, Inhale 2 puffs into the lungs every 4 (four) hours. Rescue2 puffs every 4 hours as needed for cough, wheeze, or shortness of breath, Disp: 1 each, Rfl: 2    ALPRAZolam (XANAX) 0.5 MG tablet, Take 1 tablet (0.5 mg total) by mouth 3 (three) times daily as needed for Anxiety., Disp: 90 tablet, Rfl: 5    atenoloL-chlorthalidone (TENORETIC) 50-25 mg Tab, Take 1 tablet by mouth once daily., Disp: 90 tablet, Rfl: 1    chlordiazepoxide-clidinium 5-2.5 mg (LIBRAX) 5-2.5 mg Cap, TAKE ONE CAPSULE BY MOUTH THREE TIMES DAILY BEFORE MEALS, Disp: 90 capsule, Rfl:  3    clobetasoL 0.025 % Crea, Apply topically., Disp: , Rfl:     diclofenac sodium (VOLTAREN) 1 % Gel, Apply 2 g topically once daily., Disp: 100 g, Rfl: 1    fluticasone-umeclidin-vilanter (TRELEGY ELLIPTA) 100-62.5-25 mcg DsDv, Inhale 1 puff into the lungs once daily., Disp: 60 each, Rfl: 0    furosemide (LASIX) 20 MG tablet, Take 1 tablet (20 mg total) by mouth 2 (two) times daily., Disp: 180 tablet, Rfl: 1    HYDROcodone-acetaminophen (NORCO)  mg per tablet, Take 1 tablet by mouth every 4 (four) hours as needed for Pain., Disp: 60 tablet, Rfl: 0    levothyroxine (SYNTHROID) 50 MCG tablet, Take 1 tablet (50 mcg total) by mouth once daily., Disp: 90 tablet, Rfl: 1    meloxicam (MOBIC) 7.5 MG tablet, Take 1 tablet (7.5 mg total) by mouth once daily., Disp: 90 tablet, Rfl: 1    mupirocin (BACTROBAN) 2 % ointment, Apply topically 2 (two) times daily., Disp: 22 g, Rfl: 1    naproxen (NAPROSYN) 375 MG tablet, Take 1 tablet (375 mg total) by mouth daily as needed (arthritis)., Disp: 90 tablet, Rfl: 1    pantoprazole (PROTONIX) 40 MG tablet, Take 1 tablet (40 mg total) by mouth every morning., Disp: 90 tablet, Rfl: 3    albuterol-ipratropium (DUO-NEB) 2.5 mg-0.5 mg/3 mL nebulizer solution, Take 3 mLs by nebulization every 6 (six) hours as needed., Disp: 120 vial, Rfl: 1    doxycycline (MONODOX) 100 MG capsule, Take 1 capsule (100 mg total) by mouth 2 (two) times daily., Disp: 20 capsule, Rfl: 0    guaiFENesin-codeine 100-10 mg/5 ml (TUSSI-ORGANIDIN NR)  mg/5 mL syrup, Take 5 mLs by mouth 3 (three) times daily as needed for Cough., Disp: 300 mL, Rfl: 0    HYDROcodone-acetaminophen (NORCO)  mg per tablet, Take 1 tablet by mouth every 4 (four) hours as needed for Pain., Disp: 60 tablet, Rfl: 0    [START ON 5/14/2022] HYDROcodone-acetaminophen (NORCO)  mg per tablet, Take 1 tablet by mouth every 4 (four) hours as needed for Pain., Disp: 60 tablet, Rfl: 0    [START ON 6/14/2022]  "HYDROcodone-acetaminophen (NORCO)  mg per tablet, Take 1 tablet by mouth every 4 (four) hours as needed for Pain., Disp: 60 tablet, Rfl: 0    nitrofurantoin, macrocrystal-monohydrate, (MACROBID) 100 MG capsule, Take 1 capsule (100 mg total) by mouth 2 (two) times daily. for 7 days (Patient not taking: Reported on 5/3/2022), Disp: 14 capsule, Rfl: 0    phenazopyridine (PYRIDIUM) 200 MG tablet, Take 1 tablet (200 mg total) by mouth 3 (three) times daily as needed for Pain., Disp: 21 tablet, Rfl: 0    Review of Systems   Constitutional: Positive for chills, fatigue and fever. Negative for appetite change and unexpected weight change.   HENT: Positive for voice change (hoarse). Negative for congestion, ear pain, sinus pain, sore throat and trouble swallowing.    Eyes: Negative for pain, discharge and visual disturbance.   Respiratory: Positive for cough and shortness of breath. Negative for apnea and wheezing.    Cardiovascular: Negative for chest pain, palpitations and leg swelling.   Gastrointestinal: Positive for diarrhea (resolved) and vomiting. Negative for abdominal pain, blood in stool, constipation and nausea.   Endocrine: Negative for heat intolerance, polydipsia and polyuria.   Genitourinary: Positive for dysuria. Negative for difficulty urinating, dyspareunia, frequency, hematuria and menstrual problem.   Musculoskeletal: Negative for arthralgias, back pain, gait problem, joint swelling and myalgias.   Allergic/Immunologic: Negative for environmental allergies, food allergies and immunocompromised state.   Neurological: Negative for dizziness, tremors, seizures, numbness and headaches.   Psychiatric/Behavioral: Negative for behavioral problems, confusion, hallucinations and suicidal ideas. The patient is not nervous/anxious.           Objective:      Vitals:    05/03/22 1543 05/03/22 1926   BP: 120/70    Pulse: 72    SpO2:  (!) 94%   Weight: 66.2 kg (146 lb)    Height: 4' 10" (1.473 m)      Physical " Exam  Vitals and nursing note reviewed.   Constitutional:       General: She is not in acute distress.     Appearance: She is well-developed. She is obese. She is not toxic-appearing.   HENT:      Head: Normocephalic and atraumatic.      Right Ear: Tympanic membrane and external ear normal.      Left Ear: Tympanic membrane and external ear normal.      Nose: Nose normal.      Mouth/Throat:      Pharynx: Oropharynx is clear.   Eyes:      Pupils: Pupils are equal, round, and reactive to light.   Neck:      Thyroid: No thyromegaly.      Vascular: No carotid bruit.   Cardiovascular:      Rate and Rhythm: Normal rate and regular rhythm.      Heart sounds: Normal heart sounds. No murmur heard.  Pulmonary:      Effort: Pulmonary effort is normal.      Breath sounds: Wheezing (Few mild wheezes noted in the bases.) present. No rales.   Abdominal:      General: Bowel sounds are normal. There is no distension.      Palpations: Abdomen is soft.      Tenderness: There is no abdominal tenderness.   Musculoskeletal:         General: No tenderness or deformity. Normal range of motion.      Cervical back: Normal range of motion and neck supple.      Lumbar back: Normal. No spasms.      Comments: Bends 90 degrees at  waist knees good range of motion no pitting edema to lower extremities.   Lymphadenopathy:      Cervical: No cervical adenopathy.   Skin:     General: Skin is warm and dry.      Findings: No rash.   Neurological:      Mental Status: She is alert and oriented to person, place, and time.      Cranial Nerves: No cranial nerve deficit.      Coordination: Coordination normal.   Psychiatric:         Behavior: Behavior normal.         Thought Content: Thought content normal.         Judgment: Judgment normal.           Assessment:       1. Acute bronchitis, unspecified organism    2. Acute cystitis without hematuria    3. Panlobular emphysema    4. Essential hypertension    5. Hypertriglyceridemia    6. Venous stasis of both  lower extremities    7. Stage 3b chronic kidney disease         Plan:       Acute bronchitis, unspecified organism  -     doxycycline (MONODOX) 100 MG capsule; Take 1 capsule (100 mg total) by mouth 2 (two) times daily.  Dispense: 20 capsule; Refill: 0  -     guaiFENesin-codeine 100-10 mg/5 ml (TUSSI-ORGANIDIN NR)  mg/5 mL syrup; Take 5 mLs by mouth 3 (three) times daily as needed for Cough.  Dispense: 300 mL; Refill: 0  Will treat acute bronchitis with doxycycline, Cheratussin AC  Acute cystitis without hematuria  -     phenazopyridine (PYRIDIUM) 200 MG tablet; Take 1 tablet (200 mg total) by mouth 3 (three) times daily as needed for Pain.  Dispense: 21 tablet; Refill: 0  Finish out the Macrobid and use Pyridium for discomfort, E coli sensitive to Macrobid  Panlobular emphysema    Essential hypertension  Blood pressure well controlled  Hypertriglyceridemia    Venous stasis of both lower extremities    Stage 3b chronic kidney disease      Follow up in about 3 months (around 8/3/2022), or [facility].        5/3/2022 Reed Andersen

## 2022-06-13 ENCOUNTER — TELEPHONE (OUTPATIENT)
Dept: FAMILY MEDICINE | Facility: CLINIC | Age: 87
End: 2022-06-13

## 2022-06-13 NOTE — TELEPHONE ENCOUNTER
----- Message from Lea Jim sent at 6/13/2022  2:12 PM CDT -----  Patient called and stated that she received a call to reschedule her appointment with [facility] she stated that she only want to see Dr Andersen please give her a call so she can reschedule her appointment [phone]

## 2022-06-14 ENCOUNTER — OFFICE VISIT (OUTPATIENT)
Dept: FAMILY MEDICINE | Facility: CLINIC | Age: 87
End: 2022-06-14
Payer: MEDICARE

## 2022-06-14 VITALS
BODY MASS INDEX: 28.97 KG/M2 | TEMPERATURE: 98 F | SYSTOLIC BLOOD PRESSURE: 120 MMHG | HEART RATE: 70 BPM | WEIGHT: 138 LBS | DIASTOLIC BLOOD PRESSURE: 60 MMHG | HEIGHT: 58 IN

## 2022-06-14 DIAGNOSIS — M15.9 PRIMARY OSTEOARTHRITIS INVOLVING MULTIPLE JOINTS: ICD-10-CM

## 2022-06-14 DIAGNOSIS — N18.32 STAGE 3B CHRONIC KIDNEY DISEASE: ICD-10-CM

## 2022-06-14 DIAGNOSIS — K57.92 DIVERTICULITIS: Primary | ICD-10-CM

## 2022-06-14 DIAGNOSIS — E03.9 ACQUIRED HYPOTHYROIDISM: ICD-10-CM

## 2022-06-14 DIAGNOSIS — J43.1 PANLOBULAR EMPHYSEMA: ICD-10-CM

## 2022-06-14 DIAGNOSIS — I10 ESSENTIAL HYPERTENSION: ICD-10-CM

## 2022-06-14 DIAGNOSIS — R30.0 DYSURIA: ICD-10-CM

## 2022-06-14 LAB
BILIRUB UR QL STRIP: NEGATIVE
GLUCOSE UR QL STRIP: NEGATIVE
KETONES UR QL STRIP: NEGATIVE
LEUKOCYTE ESTERASE UR QL STRIP: POSITIVE
PH, POC UA: 6.5
POC BLOOD, URINE: POSITIVE
POC NITRATES, URINE: NEGATIVE
PROT UR QL STRIP: POSITIVE
SP GR UR STRIP: 1.01 (ref 1–1.03)
UROBILINOGEN UR STRIP-ACNC: 0.2 (ref 0.1–1.1)

## 2022-06-14 PROCEDURE — 81003 URINALYSIS AUTO W/O SCOPE: CPT | Mod: QW,S$GLB,, | Performed by: FAMILY MEDICINE

## 2022-06-14 PROCEDURE — 99214 PR OFFICE/OUTPT VISIT, EST, LEVL IV, 30-39 MIN: ICD-10-PCS | Mod: S$GLB,,, | Performed by: FAMILY MEDICINE

## 2022-06-14 PROCEDURE — 81003 POCT URINALYSIS, DIPSTICK, AUTOMATED, W/O SCOPE: ICD-10-PCS | Mod: QW,S$GLB,, | Performed by: FAMILY MEDICINE

## 2022-06-14 PROCEDURE — 99214 OFFICE O/P EST MOD 30 MIN: CPT | Mod: S$GLB,,, | Performed by: FAMILY MEDICINE

## 2022-06-14 RX ORDER — MELOXICAM 7.5 MG/1
7.5 TABLET ORAL DAILY
Qty: 90 TABLET | Refills: 1 | Status: CANCELLED | OUTPATIENT
Start: 2022-06-14

## 2022-06-14 RX ORDER — NAPROXEN 375 MG/1
375 TABLET ORAL DAILY PRN
Qty: 90 TABLET | Refills: 1 | Status: CANCELLED | OUTPATIENT
Start: 2022-06-14

## 2022-06-14 RX ORDER — ONDANSETRON 4 MG/1
4 TABLET, ORALLY DISINTEGRATING ORAL EVERY 6 HOURS PRN
Qty: 30 TABLET | Refills: 0 | Status: SHIPPED | OUTPATIENT
Start: 2022-06-14 | End: 2022-06-27 | Stop reason: SDUPTHER

## 2022-06-14 RX ORDER — METRONIDAZOLE 500 MG/1
500 TABLET ORAL 3 TIMES DAILY
Qty: 21 TABLET | Refills: 0 | Status: SHIPPED | OUTPATIENT
Start: 2022-06-14 | End: 2022-07-02 | Stop reason: CLARIF

## 2022-06-14 RX ORDER — ATENOLOL AND CHLORTHALIDONE TABLET 50; 25 MG/1; MG/1
1 TABLET ORAL DAILY
Qty: 90 TABLET | Refills: 1 | Status: SHIPPED | OUTPATIENT
Start: 2022-06-14 | End: 2022-06-27 | Stop reason: SDUPTHER

## 2022-06-14 RX ORDER — LEVOTHYROXINE SODIUM 50 UG/1
50 TABLET ORAL DAILY
Qty: 90 TABLET | Refills: 1 | Status: SHIPPED | OUTPATIENT
Start: 2022-06-14 | End: 2022-06-14

## 2022-06-14 RX ORDER — LEVOTHYROXINE SODIUM 50 UG/1
50 TABLET ORAL DAILY
Qty: 90 TABLET | Refills: 1 | Status: SHIPPED | OUTPATIENT
Start: 2022-06-14 | End: 2022-07-11 | Stop reason: SDUPTHER

## 2022-06-14 RX ORDER — ATENOLOL AND CHLORTHALIDONE TABLET 50; 25 MG/1; MG/1
1 TABLET ORAL DAILY
Qty: 90 TABLET | Refills: 1 | Status: SHIPPED | OUTPATIENT
Start: 2022-06-14 | End: 2022-06-14

## 2022-06-14 RX ORDER — CIPROFLOXACIN 500 MG/1
500 TABLET ORAL 2 TIMES DAILY
Qty: 20 TABLET | Refills: 0 | Status: SHIPPED | OUTPATIENT
Start: 2022-06-14 | End: 2022-07-02 | Stop reason: CLARIF

## 2022-06-14 NOTE — PROGRESS NOTES
SUBJECTIVE:    Patient ID: Valerie Jones is a 88 y.o. female.    Chief Complaint: Emesis (No bottles // stomach problem , cramping // Vomiting // Nausea // since 10 days // Constipation//   abc)    This 88-year-old female has been sick for 10 days.  She complains of lower abdominal pain and discomfort cramping.  She has had decreased appetite belching and nausea.  She vomited x1.  She is constipated now.  Has some hard shaking chills 2 nights ago.  She has been living mostly on soup broth and toast.  Cold pops up pulse also her staying down.  Complains of some burning urine also.    She has had 2 COVID vaccine in 1 boost her.    SHe has lost 8 lb since the last visit.      Office Visit on 04/19/2022   Component Date Value Ref Range Status    POC Blood, Urine 04/19/2022 Positive (A) Negative Final    POC Bilirubin, Urine 04/19/2022 Negative  Negative Final    POC Urobilinogen, Urine 04/19/2022 0.2  0.1 - 1.1 Final    POC Ketones, Urine 04/19/2022 Negative  Negative Final    POC Protein, Urine 04/19/2022 Positive (A) Negative Final    POC Nitrates, Urine 04/19/2022 Negative  Negative Final    POC Glucose, Urine 04/19/2022 Negative  Negative Final    pH, UA 04/19/2022 6.0   Final    POC Specific Gravity, Urine 04/19/2022 1.020  1.003 - 1.029 Final    POC Leukocytes, Urine 04/19/2022 Negative  Negative Final    Urine Culture, Routine 04/19/2022  (A)  Final   Telephone on 03/02/2022   Component Date Value Ref Range Status    Glucose 03/10/2022 89  65 - 99 mg/dL Final    BUN 03/10/2022 20  7 - 25 mg/dL Final    Creatinine 03/10/2022 1.13 (A) 0.60 - 0.88 mg/dL Final    eGFR if non  03/10/2022 43 (A) > OR = 60 mL/min/1.73m2 Final    eGFR if  03/10/2022 50 (A) > OR = 60 mL/min/1.73m2 Final    BUN/Creatinine Ratio 03/10/2022 18  6 - 22 (calc) Final    Sodium 03/10/2022 139  135 - 146 mmol/L Final    Potassium 03/10/2022 3.7  3.5 - 5.3 mmol/L Final    Chloride 03/10/2022 96  (A) 98 - 110 mmol/L Final    CO2 03/10/2022 33 (A) 20 - 32 mmol/L Final    Calcium 03/10/2022 9.3  8.6 - 10.4 mg/dL Final   Telephone on 01/20/2022   Component Date Value Ref Range Status    Amphetamines 01/20/2022 NEGATIVE  <500 ng/mL Final    Barbiturates 01/20/2022 NEGATIVE  <300 ng/mL Final    Benzodiazepines 01/20/2022 POSITIVE (A) <100 ng/mL Final    Alphahydroxyalprazolam 01/20/2022 131 (A) <25 ng/mL Final    Alphahydroxymidazolam 01/20/2022 NEGATIVE  <50 ng/mL Final    Alphahydroxytriazolam 01/20/2022 NEGATIVE  <50 ng/mL Final    Aminoclonazepam 01/20/2022 NEGATIVE  <25 ng/mL Final    hydroxyethylflurazepam UR GC/MS 01/20/2022 NEGATIVE  <50 ng/mL Final    Lorazepam 01/20/2022 NEGATIVE  <50 ng/mL Final    Nordiazepam Lvl 01/20/2022 NEGATIVE  <50 ng/mL Final    Oxazepam 01/20/2022 313 (A) <50 ng/mL Final    Temazepam GC/MS Conf 01/20/2022 NEGATIVE  <50 ng/mL Final    Benzodiazepines Comments 01/20/2022    Final    Cocaine Metabolites 01/20/2022 NEGATIVE  <150 ng/mL Final    Methadone 01/20/2022 NEGATIVE  <100 ng/mL Final    Opiates 01/20/2022 POSITIVE (A) <100 ng/mL Final    Codeine 01/20/2022 NEGATIVE  <50 ng/mL Final    Hydrocodone 01/20/2022 351 (A) <50 ng/mL Final    Hydromorphone 01/20/2022 411 (A) <50 ng/mL Final    Morphine 01/20/2022 NEGATIVE  <50 ng/mL Final    NORHYDROCODONE 01/20/2022 513 (A) <50 ng/mL Final    Opiates Comments 01/20/2022    Final    Oxycodone 01/20/2022 NEGATIVE  <100 ng/mL Final    Phencyclidine 01/20/2022 NEGATIVE  <25 ng/mL Final    Creatinine 01/20/2022 61.5  > or = 20.0 mg/dL Final    pH 01/20/2022 6.2  4.5 - 9.0 Final    Oxidants, Urine (Tox) 01/20/2022 NEGATIVE  <200 mcg/mL Final    Notes and Comments 01/20/2022    Final       Past Medical History:   Diagnosis Date    Anxiety     Arthritis     Asthma     COPD (chronic obstructive pulmonary disease)     Hypertension     Pain in the abdomen 2019    Thyroid disease      Social History      Socioeconomic History    Marital status:    Tobacco Use    Smoking status: Former Smoker     Packs/day: 0.50     Years: 5.00     Pack years: 2.50     Quit date: 2/15/2005     Years since quittin.3    Smokeless tobacco: Never Used   Substance and Sexual Activity    Alcohol use: No    Drug use: No    Sexual activity: Never     Past Surgical History:   Procedure Laterality Date    CATARACT EXTRACTION, BILATERAL      Dr Collins     SECTION      x's2    CHOLECYSTECTOMY      COLONOSCOPY      ESOPHAGOGASTRODUODENOSCOPY (EGD) WITH DILATION      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    LAPAROSCOPIC CHOLECYSTECTOMY N/A 2019    Procedure: CHOLECYSTECTOMY, LAPAROSCOPIC;  Surgeon: Toney Grace III, MD;  Location: [facility];  Service: General;  Laterality: N/A;    SKIN CANCER EXCISION      Dr.Weil     Family History   Problem Relation Age of Onset    Diabetes Mother     Depression Maternal Grandmother     Depression Maternal Grandfather        Review of patient's allergies indicates:   Allergen Reactions    Sulfa (sulfonamide antibiotics) Swelling    Androgenic anabolic steroid Swelling     Feet swelling     Aspirin Other (See Comments)     Upset stomach     Erythromycin Itching    Methylprednisolone Other (See Comments)    Penicillins Itching       Current Outpatient Medications:     albuterol sulfate (PROAIR RESPICLICK) 90 mcg/actuation inhaler, Inhale 2 puffs into the lungs every 4 (four) hours. Rescue2 puffs every 4 hours as needed for cough, wheeze, or shortness of breath, Disp: 1 each, Rfl: 2    albuterol-ipratropium (DUO-NEB) 2.5 mg-0.5 mg/3 mL nebulizer solution, Take 3 mLs by nebulization every 6 (six) hours as needed., Disp: 120 vial, Rfl: 1    ALPRAZolam (XANAX) 0.5 MG tablet, Take 1 tablet (0.5 mg total) by mouth 3 (three) times daily as needed for Anxiety., Disp: 90 tablet, Rfl: 5    chlordiazepoxide-clidinium 5-2.5 mg (LIBRAX) 5-2.5 mg Cap, TAKE ONE  CAPSULE BY MOUTH THREE TIMES DAILY BEFORE MEALS, Disp: 90 capsule, Rfl: 3    clobetasoL 0.025 % Crea, Apply topically., Disp: , Rfl:     diclofenac sodium (VOLTAREN) 1 % Gel, Apply 2 g topically once daily., Disp: 100 g, Rfl: 1    doxycycline (MONODOX) 100 MG capsule, Take 1 capsule (100 mg total) by mouth 2 (two) times daily., Disp: 20 capsule, Rfl: 0    fluticasone-umeclidin-vilanter (TRELEGY ELLIPTA) 100-62.5-25 mcg DsDv, Inhale 1 puff into the lungs once daily., Disp: 60 each, Rfl: 0    furosemide (LASIX) 20 MG tablet, Take 1 tablet (20 mg total) by mouth 2 (two) times daily., Disp: 180 tablet, Rfl: 1    HYDROcodone-acetaminophen (NORCO)  mg per tablet, Take 1 tablet by mouth every 4 (four) hours as needed for Pain., Disp: 60 tablet, Rfl: 0    meloxicam (MOBIC) 7.5 MG tablet, Take 1 tablet (7.5 mg total) by mouth once daily., Disp: 90 tablet, Rfl: 1    mupirocin (BACTROBAN) 2 % ointment, Apply topically 2 (two) times daily., Disp: 22 g, Rfl: 1    naproxen (NAPROSYN) 375 MG tablet, Take 1 tablet (375 mg total) by mouth daily as needed (arthritis)., Disp: 90 tablet, Rfl: 1    pantoprazole (PROTONIX) 40 MG tablet, Take 1 tablet (40 mg total) by mouth every morning., Disp: 90 tablet, Rfl: 3    atenoloL-chlorthalidone (TENORETIC) 50-25 mg Tab, Take 1 tablet by mouth once daily., Disp: 90 tablet, Rfl: 1    ciprofloxacin HCl (CIPRO) 500 MG tablet, Take 1 tablet (500 mg total) by mouth 2 (two) times daily., Disp: 20 tablet, Rfl: 0    HYDROcodone-acetaminophen (NORCO)  mg per tablet, Take 1 tablet by mouth every 4 (four) hours as needed for Pain., Disp: 60 tablet, Rfl: 0    HYDROcodone-acetaminophen (NORCO)  mg per tablet, Take 1 tablet by mouth every 4 (four) hours as needed for Pain., Disp: 60 tablet, Rfl: 0    HYDROcodone-acetaminophen (NORCO)  mg per tablet, Take 1 tablet by mouth every 4 (four) hours as needed for Pain., Disp: 60 tablet, Rfl: 0    levothyroxine (SYNTHROID) 50  "MCG tablet, Take 1 tablet (50 mcg total) by mouth once daily., Disp: 90 tablet, Rfl: 1    metroNIDAZOLE (FLAGYL) 500 MG tablet, Take 1 tablet (500 mg total) by mouth 3 (three) times daily., Disp: 21 tablet, Rfl: 0    ondansetron (ZOFRAN-ODT) 4 MG TbDL, Take 1 tablet (4 mg total) by mouth every 6 (six) hours as needed (nausea)., Disp: 30 tablet, Rfl: 0    Review of Systems   Constitutional: Positive for appetite change ( very poor appetite) and chills. Negative for fatigue, fever and unexpected weight change.   HENT: Negative for congestion, ear pain, sinus pain, sore throat and trouble swallowing.    Eyes: Negative for pain, discharge and visual disturbance.   Respiratory: Negative for apnea, cough, shortness of breath and wheezing.    Cardiovascular: Negative for chest pain, palpitations and leg swelling.   Gastrointestinal: Positive for abdominal pain (Right and left lower quadrant abdominal tenderness), constipation and nausea. Negative for blood in stool, diarrhea and vomiting.   Endocrine: Negative for heat intolerance, polydipsia and polyuria.   Genitourinary: Negative for difficulty urinating, dyspareunia, dysuria, frequency, hematuria and menstrual problem.   Musculoskeletal: Positive for gait problem. Negative for arthralgias, back pain, joint swelling and myalgias.   Allergic/Immunologic: Negative for environmental allergies, food allergies and immunocompromised state.   Neurological: Negative for dizziness, tremors, seizures, numbness and headaches.   Psychiatric/Behavioral: Negative for behavioral problems, confusion, hallucinations and suicidal ideas. The patient is not nervous/anxious.           Objective:      Vitals:    06/14/22 1430   BP: 120/60   Pulse: 70   Temp: 98.1 °F (36.7 °C)   Weight: 62.6 kg (138 lb)   Height: 4' 10" (1.473 m)     Physical Exam  Vitals and nursing note reviewed.   Constitutional:       Appearance: She is well-developed. She is ill-appearing (Elderly female looking weak). "   HENT:      Head: Normocephalic and atraumatic.      Right Ear: Tympanic membrane and external ear normal.      Left Ear: Tympanic membrane and external ear normal.      Nose: Nose normal.      Mouth/Throat:      Pharynx: Oropharynx is clear.   Eyes:      Pupils: Pupils are equal, round, and reactive to light.   Neck:      Thyroid: No thyromegaly.      Vascular: No carotid bruit.   Cardiovascular:      Rate and Rhythm: Normal rate and regular rhythm.      Heart sounds: Normal heart sounds. No murmur heard.  Pulmonary:      Effort: Pulmonary effort is normal.      Breath sounds: Normal breath sounds. No wheezing or rales.   Abdominal:      General: Bowel sounds are normal. There is no distension.      Palpations: Abdomen is soft. There is no mass.      Tenderness: There is abdominal tenderness ( right and left lower quadrant mildly tender.). There is no guarding or rebound.      Hernia: No hernia is present.   Musculoskeletal:         General: No tenderness or deformity. Normal range of motion.      Cervical back: Normal range of motion and neck supple.      Lumbar back: Normal. No spasms.      Comments: Bends 90 degrees at  waist knees are somewhat stiff and crepitant.  No pitting edema to lower extremities.   Lymphadenopathy:      Cervical: No cervical adenopathy.   Skin:     General: Skin is warm and dry.      Findings: No rash.   Neurological:      Mental Status: She is alert and oriented to person, place, and time.      Cranial Nerves: No cranial nerve deficit.      Coordination: Coordination normal.   Psychiatric:         Behavior: Behavior normal.         Thought Content: Thought content normal.         Judgment: Judgment normal.           Assessment:       1. Diverticulitis    2. Essential hypertension    3. Acquired hypothyroidism    4. Primary osteoarthritis involving multiple joints    5. Dysuria    6. Panlobular emphysema    7. Stage 3b chronic kidney disease         Plan:       Diverticulitis  -      ciprofloxacin HCl (CIPRO) 500 MG tablet; Take 1 tablet (500 mg total) by mouth 2 (two) times daily.  Dispense: 20 tablet; Refill: 0  -     metroNIDAZOLE (FLAGYL) 500 MG tablet; Take 1 tablet (500 mg total) by mouth 3 (three) times daily.  Dispense: 21 tablet; Refill: 0  -     ondansetron (ZOFRAN-ODT) 4 MG TbDL; Take 1 tablet (4 mg total) by mouth every 6 (six) hours as needed (nausea).  Dispense: 30 tablet; Refill: 0  Will treat presumed diverticulitis with Cipro and Flagyl.  Zofran for nausea, Pedialyte and Gatorade for hydration.  CT scan of the abdomen can be done if not improving on oral medications.  Essential hypertension  Comments:  BP at goal. no changes today  Orders:  -     atenoloL-chlorthalidone (TENORETIC) 50-25 mg Tab; Take 1 tablet by mouth once daily.  Dispense: 90 tablet; Refill: 1    Acquired hypothyroidism  Comments:  Stable, continue current medication as is  Orders:  -     levothyroxine (SYNTHROID) 50 MCG tablet; Take 1 tablet (50 mcg total) by mouth once daily.  Dispense: 90 tablet; Refill: 1    Primary osteoarthritis involving multiple joints      Dysuria  -     POCT urine dipstick without microscope  Will do dip UA and send for culture is infected.UA shows  2+ lekocytes , 2+ blood,  Panlobular emphysema    Stage 3b chronic kidney disease  Encouraged patient to stay well hydrated    No follow-ups on file.        6/14/2022 Reed Andersen

## 2022-06-16 ENCOUNTER — TELEPHONE (OUTPATIENT)
Dept: FAMILY MEDICINE | Facility: CLINIC | Age: 87
End: 2022-06-16

## 2022-06-16 DIAGNOSIS — Z79.899 ENCOUNTER FOR LONG-TERM (CURRENT) USE OF OTHER MEDICATIONS: ICD-10-CM

## 2022-06-16 DIAGNOSIS — E78.1 HYPERTRIGLYCERIDEMIA: ICD-10-CM

## 2022-06-16 DIAGNOSIS — K57.92 DIVERTICULITIS: ICD-10-CM

## 2022-06-16 DIAGNOSIS — I10 ESSENTIAL HYPERTENSION: Primary | ICD-10-CM

## 2022-06-16 DIAGNOSIS — E03.9 ACQUIRED HYPOTHYROIDISM: ICD-10-CM

## 2022-06-16 DIAGNOSIS — N18.32 STAGE 3B CHRONIC KIDNEY DISEASE: ICD-10-CM

## 2022-06-16 LAB — BACTERIA UR CULT: ABNORMAL

## 2022-06-16 NOTE — TELEPHONE ENCOUNTER
Spoke to patient that yearly labs are due from overdue results. Said that she can't eat and Dr Andersen said he would order CT at her recent visit if she didn't get better. Said she wants CT ordered please. States medication isn't helping. Said she can only withstand liquids and would like CT ASAP. Orders pended for yearly labs. She will get done in the next couple of weeks. She said she thinks it's a CT he wanted to order. Would like a call once order is sent. Wants CT done today or tomorrow.

## 2022-06-16 NOTE — TELEPHONE ENCOUNTER
----- Message from Silvia Schaffer sent at 6/16/2022 11:29 AM CDT -----  Pt calling said she was seen recently sick she is not better she wants further test and to speak to the nurse.  [phone]

## 2022-06-17 ENCOUNTER — TELEPHONE (OUTPATIENT)
Dept: FAMILY MEDICINE | Facility: CLINIC | Age: 87
End: 2022-06-17

## 2022-06-17 ENCOUNTER — HOSPITAL ENCOUNTER (OUTPATIENT)
Dept: RADIOLOGY | Facility: HOSPITAL | Age: 87
Discharge: HOME | End: 2022-06-17
Attending: FAMILY MEDICINE
Payer: MEDICARE

## 2022-06-17 DIAGNOSIS — K57.92 DIVERTICULITIS: ICD-10-CM

## 2022-06-17 PROCEDURE — 74176 CT ABD & PELVIS W/O CONTRAST: CPT | Mod: TC

## 2022-06-19 NOTE — PROGRESS NOTES
Call patient.  Her CT scan showed a mild case of diverticulitis in the colon.  Her urine culture grew out E coli bladder infection.  She has 2 types of infections.  Her antibiotics should cure those.

## 2022-06-20 ENCOUNTER — TELEPHONE (OUTPATIENT)
Dept: FAMILY MEDICINE | Facility: CLINIC | Age: 87
End: 2022-06-20

## 2022-06-20 NOTE — PROGRESS NOTES
Call pt.CT scan abdomen shows mild diverticulitis, you also have an E coli UTI. The  antibiotics  should help both infections. Let us know if you are not feeling better

## 2022-06-20 NOTE — TELEPHONE ENCOUNTER
Spoke with pt who states she got the Zofran delivered today and she is feeling a little better. I explained the below per Keenan since she was not responding to abx - [facility] States she does not have a car right now and she has someone coming out to look at her dryer so there is no way she can go tonight. States if she is not feeling any better in the morning she will go. Pt verbalized understanding on the risks of waiting.

## 2022-06-20 NOTE — TELEPHONE ENCOUNTER
At her age with worsening sxs not responsive to abx therapy she should get to the ER for further eval of a possible perferation. I would not delay with this.

## 2022-06-20 NOTE — TELEPHONE ENCOUNTER
Spoke to patient regarding her results per Dr. Andersen and she verbalized understanding. She states that the bladder infection

## 2022-06-20 NOTE — TELEPHONE ENCOUNTER
Spoke with pt who states the antibiotics are not helping with diverticulitis. States she is having a hard time eating or drinking. She is so weak she can barely get around. States she has not had regular food in a week, she is trying to drink boost.  She wants to know what else she can do.

## 2022-06-20 NOTE — TELEPHONE ENCOUNTER
----- Message from Reed Andersen MD sent at 6/19/2022  8:07 AM CDT -----  Call patient.  Her CT scan showed a mild case of diverticulitis in the colon.  Her urine culture grew out E coli bladder infection.  She has 2 types of infections.  Her antibiotics should cure those.

## 2022-06-20 NOTE — TELEPHONE ENCOUNTER
----- Message from Lea Jim sent at 6/20/2022  2:53 PM CDT -----  Patient called and stated that she got her medicine and she would like to know what she need to know what can help with her stomach please give her a call at [phone]

## 2022-06-20 NOTE — TELEPHONE ENCOUNTER
----- Message from Nimo Tabor sent at 6/20/2022 12:34 PM CDT -----  Pt is returning the office call. Pt #[phone]

## 2022-06-20 NOTE — TELEPHONE ENCOUNTER
----- Message from Lea Jim sent at 6/20/2022 11:23 AM CDT -----  Patient called and stated that she had her test on Friday and she would like to know if the doctor was able to get her results please give her a call at [phone]

## 2022-06-21 ENCOUNTER — TELEPHONE (OUTPATIENT)
Dept: FAMILY MEDICINE | Facility: CLINIC | Age: 87
End: 2022-06-21

## 2022-06-21 ENCOUNTER — HOSPITAL ENCOUNTER (EMERGENCY)
Facility: HOSPITAL | Age: 87
Discharge: HOME | End: 2022-06-21
Attending: EMERGENCY MEDICINE
Payer: MEDICARE

## 2022-06-21 VITALS
TEMPERATURE: 98 F | SYSTOLIC BLOOD PRESSURE: 116 MMHG | HEART RATE: 78 BPM | OXYGEN SATURATION: 97 % | RESPIRATION RATE: 18 BRPM | DIASTOLIC BLOOD PRESSURE: 56 MMHG

## 2022-06-21 DIAGNOSIS — R11.2 NON-INTRACTABLE VOMITING WITH NAUSEA, UNSPECIFIED VOMITING TYPE: ICD-10-CM

## 2022-06-21 DIAGNOSIS — E87.6 HYPOKALEMIA: Primary | ICD-10-CM

## 2022-06-21 DIAGNOSIS — N30.00 ACUTE CYSTITIS WITHOUT HEMATURIA: ICD-10-CM

## 2022-06-21 LAB
ALBUMIN SERPL BCP-MCNC: 3.8 G/DL (ref 3.5–5.2)
ALP SERPL-CCNC: 45 U/L (ref 55–135)
ALT SERPL W/O P-5'-P-CCNC: 14 U/L (ref 10–44)
ANION GAP SERPL CALC-SCNC: 10 MMOL/L (ref 8–16)
AST SERPL-CCNC: 28 U/L (ref 10–40)
BACTERIA #/AREA URNS HPF: NEGATIVE /HPF
BASOPHILS # BLD AUTO: 0.09 K/UL (ref 0–0.2)
BASOPHILS NFR BLD: 0.9 % (ref 0–1.9)
BILIRUB SERPL-MCNC: 0.6 MG/DL (ref 0.1–1)
BILIRUB UR QL STRIP: NEGATIVE
BUN SERPL-MCNC: 21 MG/DL (ref 8–23)
CALCIUM SERPL-MCNC: 9.2 MG/DL (ref 8.7–10.5)
CHLORIDE SERPL-SCNC: 93 MMOL/L (ref 95–110)
CLARITY UR: CLEAR
CO2 SERPL-SCNC: 31 MMOL/L (ref 23–29)
COLOR UR: YELLOW
CREAT SERPL-MCNC: 1.2 MG/DL (ref 0.5–1.4)
CREAT SERPL-MCNC: 1.2 MG/DL (ref 0.5–1.4)
DIFFERENTIAL METHOD: ABNORMAL
EOSINOPHIL # BLD AUTO: 0.2 K/UL (ref 0–0.5)
EOSINOPHIL NFR BLD: 1.7 % (ref 0–8)
ERYTHROCYTE [DISTWIDTH] IN BLOOD BY AUTOMATED COUNT: 15.1 % (ref 11.5–14.5)
EST. GFR  (AFRICAN AMERICAN): 46.6 ML/MIN/1.73 M^2
EST. GFR  (NON AFRICAN AMERICAN): 40.4 ML/MIN/1.73 M^2
GLUCOSE SERPL-MCNC: 104 MG/DL (ref 70–110)
GLUCOSE UR QL STRIP: NEGATIVE
HCT VFR BLD AUTO: 38.2 % (ref 37–48.5)
HGB BLD-MCNC: 12.6 G/DL (ref 12–16)
HGB UR QL STRIP: NEGATIVE
HYALINE CASTS #/AREA URNS LPF: 19 /LPF
IMM GRANULOCYTES # BLD AUTO: 0.02 K/UL (ref 0–0.04)
IMM GRANULOCYTES NFR BLD AUTO: 0.2 % (ref 0–0.5)
KETONES UR QL STRIP: NEGATIVE
LEUKOCYTE ESTERASE UR QL STRIP: ABNORMAL
LIPASE SERPL-CCNC: 37 U/L (ref 4–60)
LYMPHOCYTES # BLD AUTO: 1.4 K/UL (ref 1–4.8)
LYMPHOCYTES NFR BLD: 13.7 % (ref 18–48)
MCH RBC QN AUTO: 29 PG (ref 27–31)
MCHC RBC AUTO-ENTMCNC: 33 G/DL (ref 32–36)
MCV RBC AUTO: 88 FL (ref 82–98)
MICROSCOPIC COMMENT: ABNORMAL
MONOCYTES # BLD AUTO: 0.7 K/UL (ref 0.3–1)
MONOCYTES NFR BLD: 7.1 % (ref 4–15)
NEUTROPHILS # BLD AUTO: 7.7 K/UL (ref 1.8–7.7)
NEUTROPHILS NFR BLD: 76.4 % (ref 38–73)
NITRITE UR QL STRIP: NEGATIVE
NRBC BLD-RTO: 0 /100 WBC
PH UR STRIP: 6 [PH] (ref 5–8)
PLATELET # BLD AUTO: 476 K/UL (ref 150–450)
PMV BLD AUTO: 9.4 FL (ref 9.2–12.9)
POTASSIUM SERPL-SCNC: 2.9 MMOL/L (ref 3.5–5.1)
PROT SERPL-MCNC: 7.8 G/DL (ref 6–8.4)
PROT UR QL STRIP: NEGATIVE
RBC # BLD AUTO: 4.34 M/UL (ref 4–5.4)
RBC #/AREA URNS HPF: 3 /HPF (ref 0–4)
SAMPLE: NORMAL
SODIUM SERPL-SCNC: 134 MMOL/L (ref 136–145)
SP GR UR STRIP: 1.01 (ref 1–1.03)
SQUAMOUS #/AREA URNS HPF: 5 /HPF
URN SPEC COLLECT METH UR: ABNORMAL
UROBILINOGEN UR STRIP-ACNC: NEGATIVE EU/DL
WBC # BLD AUTO: 10.07 K/UL (ref 3.9–12.7)
WBC #/AREA URNS HPF: 13 /HPF (ref 0–5)

## 2022-06-21 PROCEDURE — 96375 TX/PRO/DX INJ NEW DRUG ADDON: CPT

## 2022-06-21 PROCEDURE — 96361 HYDRATE IV INFUSION ADD-ON: CPT

## 2022-06-21 PROCEDURE — 99285 EMERGENCY DEPT VISIT HI MDM: CPT | Mod: 25

## 2022-06-21 PROCEDURE — 80053 COMPREHEN METABOLIC PANEL: CPT | Performed by: NURSE PRACTITIONER

## 2022-06-21 PROCEDURE — 25000003 PHARM REV CODE 250: Performed by: EMERGENCY MEDICINE

## 2022-06-21 PROCEDURE — 81001 URINALYSIS AUTO W/SCOPE: CPT | Performed by: NURSE PRACTITIONER

## 2022-06-21 PROCEDURE — C9113 INJ PANTOPRAZOLE SODIUM, VIA: HCPCS | Performed by: EMERGENCY MEDICINE

## 2022-06-21 PROCEDURE — 63600175 PHARM REV CODE 636 W HCPCS: Performed by: EMERGENCY MEDICINE

## 2022-06-21 PROCEDURE — 85025 COMPLETE CBC W/AUTO DIFF WBC: CPT | Performed by: NURSE PRACTITIONER

## 2022-06-21 PROCEDURE — 25500020 PHARM REV CODE 255: Performed by: EMERGENCY MEDICINE

## 2022-06-21 PROCEDURE — 96365 THER/PROPH/DIAG IV INF INIT: CPT | Mod: 59

## 2022-06-21 PROCEDURE — 87086 URINE CULTURE/COLONY COUNT: CPT | Performed by: NURSE PRACTITIONER

## 2022-06-21 PROCEDURE — 96368 THER/DIAG CONCURRENT INF: CPT

## 2022-06-21 PROCEDURE — 83690 ASSAY OF LIPASE: CPT | Performed by: NURSE PRACTITIONER

## 2022-06-21 RX ORDER — CEFUROXIME AXETIL 500 MG/1
500 TABLET ORAL EVERY 12 HOURS
Qty: 6 TABLET | Refills: 0 | Status: SHIPPED | OUTPATIENT
Start: 2022-06-21 | End: 2022-06-24

## 2022-06-21 RX ORDER — ONDANSETRON 4 MG/1
4 TABLET, FILM COATED ORAL EVERY 6 HOURS PRN
Qty: 20 TABLET | Refills: 1 | Status: SHIPPED | OUTPATIENT
Start: 2022-06-21 | End: 2022-07-11 | Stop reason: SDUPTHER

## 2022-06-21 RX ORDER — PANTOPRAZOLE SODIUM 20 MG/1
40 TABLET, DELAYED RELEASE ORAL DAILY
Qty: 30 TABLET | Refills: 0 | Status: SHIPPED | OUTPATIENT
Start: 2022-06-21 | End: 2022-07-21

## 2022-06-21 RX ORDER — PANTOPRAZOLE SODIUM 40 MG/10ML
40 INJECTION, POWDER, LYOPHILIZED, FOR SOLUTION INTRAVENOUS
Status: COMPLETED | OUTPATIENT
Start: 2022-06-21 | End: 2022-06-21

## 2022-06-21 RX ORDER — POTASSIUM CHLORIDE 750 MG/1
50 CAPSULE, EXTENDED RELEASE ORAL ONCE
Status: COMPLETED | OUTPATIENT
Start: 2022-06-21 | End: 2022-06-21

## 2022-06-21 RX ORDER — ACETAMINOPHEN 500 MG
500 TABLET ORAL NIGHTLY PRN
COMMUNITY

## 2022-06-21 RX ORDER — ONDANSETRON 2 MG/ML
4 INJECTION INTRAMUSCULAR; INTRAVENOUS
Status: COMPLETED | OUTPATIENT
Start: 2022-06-21 | End: 2022-06-21

## 2022-06-21 RX ORDER — POTASSIUM CHLORIDE 7.45 MG/ML
10 INJECTION INTRAVENOUS ONCE
Status: COMPLETED | OUTPATIENT
Start: 2022-06-21 | End: 2022-06-21

## 2022-06-21 RX ADMIN — IOHEXOL 100 ML: 350 INJECTION, SOLUTION INTRAVENOUS at 04:06

## 2022-06-21 RX ADMIN — CEFTRIAXONE 2 G: 2 INJECTION, SOLUTION INTRAVENOUS at 08:06

## 2022-06-21 RX ADMIN — POTASSIUM CHLORIDE 50 MEQ: 750 CAPSULE, EXTENDED RELEASE ORAL at 08:06

## 2022-06-21 RX ADMIN — SODIUM CHLORIDE, SODIUM LACTATE, POTASSIUM CHLORIDE, AND CALCIUM CHLORIDE 1000 ML: .6; .31; .03; .02 INJECTION, SOLUTION INTRAVENOUS at 05:06

## 2022-06-21 RX ADMIN — POTASSIUM CHLORIDE 10 MEQ: 7.46 INJECTION, SOLUTION INTRAVENOUS at 08:06

## 2022-06-21 RX ADMIN — PANTOPRAZOLE SODIUM 40 MG: 40 INJECTION, POWDER, FOR SOLUTION INTRAVENOUS at 03:06

## 2022-06-21 RX ADMIN — ONDANSETRON 4 MG: 2 INJECTION INTRAMUSCULAR; INTRAVENOUS at 03:06

## 2022-06-21 NOTE — DISCHARGE INSTRUCTIONS
Stop ciprofloxacin and Flagyl.  Rest.  Drink lots of fluids.  Return to emergency department for worsening symptoms or any problems

## 2022-06-21 NOTE — ED PROVIDER NOTES
Encounter Date: 2022       History     Chief Complaint   Patient presents with    Diverticulitis     Sent by Dr. Andersen's office for possible diverticulation perferation     88-year-old female presented emergency department sent by Dr. Andersen's office for evaluation for diverticulitis.  Patient has history of diverticulitis and recently started taking Cipro for diverticulitis.  Patient has improvement of abdominal pain however still has nausea and not eating or drinking much given problems from diverticulitis.  Patient had abdominal pain however abdominal pain now improved.  Denies fever or chills or chest pain or shortness of breath or dysuria or hematuria.  Complains of generalized malaise and weakness as has decreased oral intake.        Review of patient's allergies indicates:   Allergen Reactions    Sulfa (sulfonamide antibiotics) Swelling    Androgenic anabolic steroid Swelling     Feet swelling     Aspirin Other (See Comments)     Upset stomach     Erythromycin Itching    Methylprednisolone Other (See Comments)    Penicillins Itching     Past Medical History:   Diagnosis Date    Anxiety     Arthritis     Asthma     COPD (chronic obstructive pulmonary disease)     Hypertension     Pain in the abdomen     Thyroid disease      Past Surgical History:   Procedure Laterality Date    CATARACT EXTRACTION, BILATERAL      Dr Collins     SECTION      x's2    CHOLECYSTECTOMY      COLONOSCOPY  2009    ESOPHAGOGASTRODUODENOSCOPY (EGD) WITH DILATION      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    LAPAROSCOPIC CHOLECYSTECTOMY N/A 2019    Procedure: CHOLECYSTECTOMY, LAPAROSCOPIC;  Surgeon: Toney Grace III, MD;  Location: [facility] OR;  Service: General;  Laterality: N/A;    SKIN CANCER EXCISION      Dr.Weil     Family History   Problem Relation Age of Onset    Diabetes Mother     Depression Maternal Grandmother     Depression Maternal Grandfather      Social History     Tobacco  Use    Smoking status: Former Smoker     Packs/day: 0.50     Years: 5.00     Pack years: 2.50     Quit date: 2/15/2005     Years since quittin.3    Smokeless tobacco: Never Used   Substance Use Topics    Alcohol use: No    Drug use: No     Review of Systems   Constitutional: Positive for fatigue.   HENT: Negative.    Eyes: Negative.    Respiratory: Negative.    Cardiovascular: Negative.  Negative for chest pain.   Gastrointestinal: Positive for nausea and vomiting.   Endocrine: Negative.    Genitourinary: Negative.    Musculoskeletal: Negative.    Skin: Negative.    Allergic/Immunologic: Negative.    Neurological: Positive for weakness.   Hematological: Negative.    Psychiatric/Behavioral: Negative.    All other systems reviewed and are negative.      Physical Exam     Initial Vitals [22 1436]   BP Pulse Resp Temp SpO2   (!) 116/56 78 18 98.2 °F (36.8 °C) (!) 94 %      MAP       --         Physical Exam    Nursing note and vitals reviewed.  Constitutional: She appears well-developed and well-nourished.   HENT:   Head: Normocephalic and atraumatic.   Nose: Nose normal.   Mouth/Throat: Oropharynx is clear and moist.   Eyes: Conjunctivae and EOM are normal. Pupils are equal, round, and reactive to light.   Neck: Neck supple. No thyromegaly present. No tracheal deviation present. No JVD present.   Normal range of motion.  Cardiovascular: Normal rate, regular rhythm, normal heart sounds and intact distal pulses.   No murmur heard.  Pulmonary/Chest: Breath sounds normal. No stridor. No respiratory distress. She has no wheezes. She has no rales.   Abdominal: Abdomen is soft. Bowel sounds are normal.   Musculoskeletal:         General: No edema. Normal range of motion.      Cervical back: Normal range of motion and neck supple.     Neurological: She is alert and oriented to person, place, and time.   Skin: Skin is warm and dry. Capillary refill takes less than 2 seconds.   Psychiatric: She has a normal mood  and affect. Thought content normal.         ED Course   Procedures  Labs Reviewed   CBC W/ AUTO DIFFERENTIAL - Abnormal; Notable for the following components:       Result Value    RDW 15.1 (*)     Platelets 476 (*)     Gran % 76.4 (*)     Lymph % 13.7 (*)     All other components within normal limits   COMPREHENSIVE METABOLIC PANEL - Abnormal; Notable for the following components:    Sodium 134 (*)     Potassium 2.9 (*)     Chloride 93 (*)     CO2 31 (*)     Alkaline Phosphatase 45 (*)     eGFR if  46.6 (*)     eGFR if non  40.4 (*)     All other components within normal limits    Narrative:     K critical result(s) repeated. Called and verbal readback obtained   from Dr. Wells/ED by S 06/21/2022 17:14   URINALYSIS, REFLEX TO URINE CULTURE - Abnormal; Notable for the following components:    Leukocytes, UA 2+ (*)     All other components within normal limits    Narrative:     Specimen Source->Urine   URINALYSIS MICROSCOPIC - Abnormal; Notable for the following components:    WBC, UA 13 (*)     Hyaline Casts, UA 19 (*)     All other components within normal limits    Narrative:     Specimen Source->Urine   CULTURE, URINE   LIPASE   ISTAT CREATININE   POCT CREATININE          Imaging Results          CT Abdomen Pelvis With Contrast (Final result)  Result time 06/21/22 16:19:00    Final result by Johan Byrne MD (06/21/22 16:19:00)                 Narrative:    CT ABDOMEN AND PELVIS WITH CONTRAST    HISTORY: Left lower quadrant pain. Comparison to June 17.    CMS MANDATED QUALITY DATA - CT RADIATION  436    All CT scans at this facility utilize dose modulation, iterative reconstruction, and/or weight based dosing when appropriate to reduce radiation dose to as low as reasonably achievable    FINDINGS:    Post infusion images were obtained from the lung bases to the pubic symphysis. 100 cc nonionic contrast was administered for the examination.    Reticulonodular opacities in  the right middle lobe are unchanged. The left lung base is unremarkable.    There is mild hepatic steatosis. No liver mass or contour abnormality is identified. The gallbladder is absent. The biliary tree is nondilated. The spleen is normal in size and appearance. The pancreas and adrenal glands are normal. Bilateral simple renal cysts are noted. The abdominal aorta is densely calcified without aneurysm.    There are multiple colonic diverticula at the level of the cecum. No focal inflammatory reaction is identified at this level on today's examination. The appendix is normal. All other bowel structures in the upper and mid abdomen are within normal limits.    Images of the lower abdomen and pelvis demonstrates sigmoid diverticulosis. There is no evidence of focal inflammatory reaction on today's examination to indicate diverticulitis. No free air or free fluid is identified. The urinary bladder is unremarkable.    No acute osseous abnormality is demonstrated. Multilevel lumbar degenerative disc and facet disease is noted.    IMPRESSION:      1. Diverticulosis of the cecum and sigmoid colon. No CT evidence of acute diverticulitis on today's examination. No perforation or abscess.  2. Additional chronic findings as discussed above.    Electronically signed by:  Johan Byrne MD  6/21/2022 4:19 PM CDT Workstation: [id number]                               Medications   lactated ringers bolus 1,000 mL (1,000 mLs Intravenous New Bag 6/21/22 9365)   potassium chloride CR capsule 50 mEq (has no administration in time range)   potassium chloride 10 mEq in 100 mL IVPB (has no administration in time range)   cefTRIAXone (ROCEPHIN) 2 g/50 mL D5W IVPB (has no administration in time range)   pantoprazole injection 40 mg (40 mg Intravenous Given 6/21/22 1513)   ondansetron injection 4 mg (4 mg Intravenous Given 6/21/22 1513)   iohexoL (OMNIPAQUE 350) injection 100 mL (100 mLs Intravenous Given 6/21/22 1600)     Medical  Decision Making:   Differential Diagnosis:   88-year-old female with nausea and vomiting.  Abdomen is soft and nontender at this time.  Patient currently being treated for diverticulitis.  No evidence of diverticulitis clinically at this time and patient did have urinary symptoms when this started.  Patient does have leukocytes in urine.  CT does not show evidence of diverticulitis.  Patient otherwise nontoxic and hemodynamically stable.  Given the nausea vomiting likely developed hypokalemia which is evident on blood work.  Hypokalemia treated.  Patient hydrated.  Patient did have improvement of symptoms.  Patient has symptoms and complains of belching and epigastric discomfort which could be gastritis so will treat for gastritis.  Given patient feeling well and almost completely asymptomatic at this time will discharge and treat as outpatient.  Return precautions given.  Clinical Tests:   Lab Tests: Reviewed  Radiological Study: Reviewed                      Clinical Impression:   Final diagnoses:  [E87.6] Hypokalemia (Primary)  [R11.2] Non-intractable vomiting with nausea, unspecified vomiting type  [N30.00] Acute cystitis without hematuria          ED Disposition Condition    Discharge Stable        ED Prescriptions     Medication Sig Dispense Start Date End Date Auth. Provider    cefUROXime (CEFTIN) 500 MG tablet Take 1 tablet (500 mg total) by mouth every 12 (twelve) hours. for 3 days 6 tablet 6/21/2022 6/24/2022 Crystal Powers MD    ondansetron (ZOFRAN) 4 MG tablet Take 1 tablet (4 mg total) by mouth every 6 (six) hours as needed. 20 tablet 6/21/2022 6/21/2023 Crystal Powers MD    pantoprazole (PROTONIX) 20 MG tablet Take 2 tablets (40 mg total) by mouth once daily. 30 tablet 6/21/2022 7/21/2022 Crystal Powers MD        Follow-up Information     Follow up With Specialties Details Why Contact Info    Rajendra Devine PA-C Family Medicine In 2 days  [address]  [address]  [place] [address]  [phone]              Crystal Powers MD  06/21/22 6742

## 2022-06-21 NOTE — FIRST PROVIDER EVALUATION
Medical screening exam completed.  I have conducted a focused provider triage encounter, findings are as follows:    Brief history of present illness: sent for CT abdomen for diverticulitis per PCP  She is currently on antibiotics but PCP is concerns she has a perforation    Vitals:    06/21/22 1436   BP: (!) 116/56   BP Location: Right arm   Patient Position: Sitting   Pulse: 78   Resp: 18   Temp: 98.2 °F (36.8 °C)   TempSrc: Oral   SpO2: (!) 94%       Pertinent physical exam:  Abdomen soft Neg for distention or pain with palpation     Brief workup plan:  CT abdomen     Preliminary workup initiated; this workup will be continued and followed by the physician or advanced practice provider that is assigned to the patient when roomed.  
[facility]

## 2022-06-21 NOTE — TELEPHONE ENCOUNTER
----- Message from Silvia Schaffer sent at 6/21/2022 12:24 PM CDT -----  Pt calling said she needs to speak to the nurse about if she is supposed to still go to the Er for most test said she has not eaten in two weeks and is very weak. Said please to call her right away she is ready to walk out the door just waiting on our call.  # [phone]

## 2022-06-21 NOTE — TELEPHONE ENCOUNTER
----- Message from Karrie Serrano MA sent at 6/21/2022 10:20 AM CDT -----    ----- Message -----  From: Lea Jim  Sent: 6/21/2022  10:15 AM CDT  To: [facility] Staff    Patient called and stated that she was not able to go the ER yesterday for the xray Keenan wanted her to have she want to know do she have to go through the ER or can it be scheduled she does not want to sit in the ER all day please give her a call at [phone]

## 2022-06-22 ENCOUNTER — TELEPHONE (OUTPATIENT)
Dept: FAMILY MEDICINE | Facility: CLINIC | Age: 87
End: 2022-06-22

## 2022-06-22 NOTE — TELEPHONE ENCOUNTER
----- Message from Lea Jim sent at 6/22/2022 12:29 PM CDT -----  Patient called and stated that the doctor from the hospital advised the patient to see Keenan tomorrow please give her a call at [phone]

## 2022-06-23 LAB
BACTERIA UR CULT: NORMAL
BACTERIA UR CULT: NORMAL

## 2022-06-27 ENCOUNTER — OFFICE VISIT (OUTPATIENT)
Dept: FAMILY MEDICINE | Facility: CLINIC | Age: 87
End: 2022-06-27
Payer: MEDICARE

## 2022-06-27 VITALS
BODY MASS INDEX: 27.92 KG/M2 | HEART RATE: 78 BPM | WEIGHT: 133 LBS | SYSTOLIC BLOOD PRESSURE: 120 MMHG | HEIGHT: 58 IN | DIASTOLIC BLOOD PRESSURE: 70 MMHG

## 2022-06-27 DIAGNOSIS — F51.01 PRIMARY INSOMNIA: ICD-10-CM

## 2022-06-27 DIAGNOSIS — K57.92 DIVERTICULITIS: ICD-10-CM

## 2022-06-27 DIAGNOSIS — N30.00 ACUTE CYSTITIS WITHOUT HEMATURIA: Primary | ICD-10-CM

## 2022-06-27 DIAGNOSIS — J96.11 CHRONIC HYPOXEMIC RESPIRATORY FAILURE: ICD-10-CM

## 2022-06-27 DIAGNOSIS — I10 ESSENTIAL HYPERTENSION: ICD-10-CM

## 2022-06-27 DIAGNOSIS — F11.20 UNCOMPLICATED OPIOID DEPENDENCE: ICD-10-CM

## 2022-06-27 DIAGNOSIS — M15.9 PRIMARY OSTEOARTHRITIS INVOLVING MULTIPLE JOINTS: ICD-10-CM

## 2022-06-27 DIAGNOSIS — N18.32 STAGE 3B CHRONIC KIDNEY DISEASE: ICD-10-CM

## 2022-06-27 PROCEDURE — 99214 OFFICE O/P EST MOD 30 MIN: CPT | Mod: S$GLB,,, | Performed by: FAMILY MEDICINE

## 2022-06-27 PROCEDURE — 99214 PR OFFICE/OUTPT VISIT, EST, LEVL IV, 30-39 MIN: ICD-10-PCS | Mod: S$GLB,,, | Performed by: FAMILY MEDICINE

## 2022-06-27 RX ORDER — ONDANSETRON 4 MG/1
4 TABLET, FILM COATED ORAL EVERY 6 HOURS PRN
Qty: 20 TABLET | Refills: 1 | Status: CANCELLED | OUTPATIENT
Start: 2022-06-27 | End: 2023-06-27

## 2022-06-27 RX ORDER — ONDANSETRON 4 MG/1
4 TABLET, ORALLY DISINTEGRATING ORAL EVERY 6 HOURS PRN
Qty: 30 TABLET | Refills: 2 | Status: SHIPPED | OUTPATIENT
Start: 2022-06-27 | End: 2022-07-02 | Stop reason: CLARIF

## 2022-06-27 RX ORDER — CEFUROXIME AXETIL 500 MG/1
500 TABLET ORAL 2 TIMES DAILY
Qty: 14 TABLET | Refills: 0 | Status: ON HOLD | OUTPATIENT
Start: 2022-06-27 | End: 2022-07-05 | Stop reason: HOSPADM

## 2022-06-27 RX ORDER — HYDROCODONE BITARTRATE AND ACETAMINOPHEN 10; 325 MG/1; MG/1
1 TABLET ORAL EVERY 4 HOURS PRN
Qty: 60 TABLET | Refills: 0 | Status: SHIPPED | OUTPATIENT
Start: 2022-07-14 | End: 2022-07-02 | Stop reason: SDUPTHER

## 2022-06-27 RX ORDER — POTASSIUM CHLORIDE 20 MEQ/1
20 TABLET, EXTENDED RELEASE ORAL DAILY
Qty: 30 TABLET | Refills: 0 | Status: SHIPPED | OUTPATIENT
Start: 2022-06-27 | End: 2022-07-11

## 2022-06-27 RX ORDER — ATENOLOL AND CHLORTHALIDONE TABLET 50; 25 MG/1; MG/1
1 TABLET ORAL DAILY
Qty: 90 TABLET | Refills: 1 | Status: SHIPPED | OUTPATIENT
Start: 2022-06-27 | End: 2022-07-05

## 2022-06-27 NOTE — PROGRESS NOTES
SUBJECTIVE:    Patient ID: Valerie Jones is a 88 y.o. female.    Chief Complaint: Hospital Follow Up (Brought bottles // Weakness // abc)    88-year-old female was recently in the emergency room with diverticulitis and a E coli UTI.  Hypokalemia with  potassium at 2.9.  Treated with Ceftin 500 mg b.i.d. Zofran 4 mg pantoprazole 40 mg she was having constipation but now has more occasional diarrhea.  No vomiting no fever.  She could not tolerate the effervescent potassium 25 mEq tablets.    Initial CT showed mild diverticulitis, a 2nd CT scan a week later showed only diverticulosis and no diverticulitis.    She is still weak and has not advanced the diet beyond full liquids / clear liquids.  She drinks soup, broth, boost  Supplements    Dr WEil-sneha does cryo surgery to her actinic keratosis on her forehead          Admission on 06/21/2022, Discharged on 06/21/2022   Component Date Value Ref Range Status    WBC 06/21/2022 10.07  3.90 - 12.70 K/uL Final    RBC 06/21/2022 4.34  4.00 - 5.40 M/uL Final    Hemoglobin 06/21/2022 12.6  12.0 - 16.0 g/dL Final    Hematocrit 06/21/2022 38.2  37.0 - 48.5 % Final    MCV 06/21/2022 88  82 - 98 fL Final    MCH 06/21/2022 29.0  27.0 - 31.0 pg Final    MCHC 06/21/2022 33.0  32.0 - 36.0 g/dL Final    RDW 06/21/2022 15.1 (A) 11.5 - 14.5 % Final    Platelets 06/21/2022 476 (A) 150 - 450 K/uL Final    MPV 06/21/2022 9.4  9.2 - 12.9 fL Final    Immature Granulocytes 06/21/2022 0.2  0.0 - 0.5 % Final    Gran # (ANC) 06/21/2022 7.7  1.8 - 7.7 K/uL Final    Immature Grans (Abs) 06/21/2022 0.02  0.00 - 0.04 K/uL Final    Lymph # 06/21/2022 1.4  1.0 - 4.8 K/uL Final    Mono # 06/21/2022 0.7  0.3 - 1.0 K/uL Final    Eos # 06/21/2022 0.2  0.0 - 0.5 K/uL Final    Baso # 06/21/2022 0.09  0.00 - 0.20 K/uL Final    nRBC 06/21/2022 0  0 /100 WBC Final    Gran % 06/21/2022 76.4 (A) 38.0 - 73.0 % Final    Lymph % 06/21/2022 13.7 (A) 18.0 - 48.0 % Final    Mono %  06/21/2022 7.1  4.0 - 15.0 % Final    Eosinophil % 06/21/2022 1.7  0.0 - 8.0 % Final    Basophil % 06/21/2022 0.9  0.0 - 1.9 % Final    Differential Method 06/21/2022 Automated   Final    Sodium 06/21/2022 134 (A) 136 - 145 mmol/L Final    Potassium 06/21/2022 2.9 (A) 3.5 - 5.1 mmol/L Final    Chloride 06/21/2022 93 (A) 95 - 110 mmol/L Final    CO2 06/21/2022 31 (A) 23 - 29 mmol/L Final    Glucose 06/21/2022 104  70 - 110 mg/dL Final    BUN 06/21/2022 21  8 - 23 mg/dL Final    Creatinine 06/21/2022 1.2  0.5 - 1.4 mg/dL Final    Calcium 06/21/2022 9.2  8.7 - 10.5 mg/dL Final    Total Protein 06/21/2022 7.8  6.0 - 8.4 g/dL Final    Albumin 06/21/2022 3.8  3.5 - 5.2 g/dL Final    Total Bilirubin 06/21/2022 0.6  0.1 - 1.0 mg/dL Final    Alkaline Phosphatase 06/21/2022 45 (A) 55 - 135 U/L Final    AST 06/21/2022 28  10 - 40 U/L Final    ALT 06/21/2022 14  10 - 44 U/L Final    Anion Gap 06/21/2022 10  8 - 16 mmol/L Final    eGFR if  06/21/2022 46.6 (A) >60 mL/min/1.73 m^2 Final    eGFR if non African American 06/21/2022 40.4 (A) >60 mL/min/1.73 m^2 Final    Lipase 06/21/2022 37  4 - 60 U/L Final    Specimen UA 06/21/2022 Urine, Clean Catch   Final    Color, UA 06/21/2022 Yellow  Yellow, Straw, Amanda Final    Appearance, UA 06/21/2022 Clear  Clear Final    pH, UA 06/21/2022 6.0  5.0 - 8.0 Final    Specific Gravity, UA 06/21/2022 1.015  1.005 - 1.030 Final    Protein, UA 06/21/2022 Negative  Negative Final    Glucose, UA 06/21/2022 Negative  Negative Final    Ketones, UA 06/21/2022 Negative  Negative Final    Bilirubin (UA) 06/21/2022 Negative  Negative Final    Occult Blood UA 06/21/2022 Negative  Negative Final    Nitrite, UA 06/21/2022 Negative  Negative Final    Urobilinogen, UA 06/21/2022 Negative  Negative EU/dL Final    Leukocytes, UA 06/21/2022 2+ (A) Negative Final    POC Creatinine 06/21/2022 1.2  0.5 - 1.4 mg/dL Final    Sample 06/21/2022 VENOUS   Final    RBC,  UA 06/21/2022 3  0 - 4 /hpf Final    WBC, UA 06/21/2022 13 (A) 0 - 5 /hpf Final    Bacteria 06/21/2022 Negative  None-Occ /hpf Final    Squam Epithel, UA 06/21/2022 5  /hpf Final    Hyaline Casts, UA 06/21/2022 19 (A) 0-1/lpf /lpf Final    Microscopic Comment 06/21/2022 SEE COMMENT   Final    Urine Culture, Routine 06/21/2022 Multiple organisms isolated. None in predominance.  Repeat if   Final    Urine Culture, Routine 06/21/2022 clinically necessary.   Final   Office Visit on 06/14/2022   Component Date Value Ref Range Status    POC Blood, Urine 06/14/2022 Positive (A) Negative Final    POC Bilirubin, Urine 06/14/2022 Negative  Negative Final    POC Urobilinogen, Urine 06/14/2022 0.2  0.1 - 1.1 Final    POC Ketones, Urine 06/14/2022 Negative  Negative Final    POC Protein, Urine 06/14/2022 Positive (A) Negative Final    POC Nitrates, Urine 06/14/2022 Negative  Negative Final    POC Glucose, Urine 06/14/2022 Negative  Negative Final    pH, UA 06/14/2022 6.5   Final    POC Specific Gravity, Urine 06/14/2022 1.015  1.003 - 1.029 Final    POC Leukocytes, Urine 06/14/2022 Positive (A) Negative Final    Urine Culture, Routine 06/14/2022  (A)  Final   Office Visit on 04/19/2022   Component Date Value Ref Range Status    POC Blood, Urine 04/19/2022 Positive (A) Negative Final    POC Bilirubin, Urine 04/19/2022 Negative  Negative Final    POC Urobilinogen, Urine 04/19/2022 0.2  0.1 - 1.1 Final    POC Ketones, Urine 04/19/2022 Negative  Negative Final    POC Protein, Urine 04/19/2022 Positive (A) Negative Final    POC Nitrates, Urine 04/19/2022 Negative  Negative Final    POC Glucose, Urine 04/19/2022 Negative  Negative Final    pH, UA 04/19/2022 6.0   Final    POC Specific Gravity, Urine 04/19/2022 1.020  1.003 - 1.029 Final    POC Leukocytes, Urine 04/19/2022 Negative  Negative Final    Urine Culture, Routine 04/19/2022  (A)  Final   Telephone on 03/02/2022   Component Date Value Ref Range  Status    Glucose 03/10/2022 89  65 - 99 mg/dL Final    BUN 03/10/2022 20  7 - 25 mg/dL Final    Creatinine 03/10/2022 1.13 (A) 0.60 - 0.88 mg/dL Final    eGFR if non  03/10/2022 43 (A) > OR = 60 mL/min/1.73m2 Final    eGFR if  03/10/2022 50 (A) > OR = 60 mL/min/1.73m2 Final    BUN/Creatinine Ratio 03/10/2022 18  6 - 22 (calc) Final    Sodium 03/10/2022 139  135 - 146 mmol/L Final    Potassium 03/10/2022 3.7  3.5 - 5.3 mmol/L Final    Chloride 03/10/2022 96 (A) 98 - 110 mmol/L Final    CO2 03/10/2022 33 (A) 20 - 32 mmol/L Final    Calcium 03/10/2022 9.3  8.6 - 10.4 mg/dL Final   Telephone on 01/20/2022   Component Date Value Ref Range Status    Amphetamines 01/20/2022 NEGATIVE  <500 ng/mL Final    Barbiturates 01/20/2022 NEGATIVE  <300 ng/mL Final    Benzodiazepines 01/20/2022 POSITIVE (A) <100 ng/mL Final    Alphahydroxyalprazolam 01/20/2022 131 (A) <25 ng/mL Final    Alphahydroxymidazolam 01/20/2022 NEGATIVE  <50 ng/mL Final    Alphahydroxytriazolam 01/20/2022 NEGATIVE  <50 ng/mL Final    Aminoclonazepam 01/20/2022 NEGATIVE  <25 ng/mL Final    hydroxyethylflurazepam UR GC/MS 01/20/2022 NEGATIVE  <50 ng/mL Final    Lorazepam 01/20/2022 NEGATIVE  <50 ng/mL Final    Nordiazepam Lvl 01/20/2022 NEGATIVE  <50 ng/mL Final    Oxazepam 01/20/2022 313 (A) <50 ng/mL Final    Temazepam GC/MS Conf 01/20/2022 NEGATIVE  <50 ng/mL Final    Benzodiazepines Comments 01/20/2022    Final    Cocaine Metabolites 01/20/2022 NEGATIVE  <150 ng/mL Final    Methadone 01/20/2022 NEGATIVE  <100 ng/mL Final    Opiates 01/20/2022 POSITIVE (A) <100 ng/mL Final    Codeine 01/20/2022 NEGATIVE  <50 ng/mL Final    Hydrocodone 01/20/2022 351 (A) <50 ng/mL Final    Hydromorphone 01/20/2022 411 (A) <50 ng/mL Final    Morphine 01/20/2022 NEGATIVE  <50 ng/mL Final    NORHYDROCODONE 01/20/2022 513 (A) <50 ng/mL Final    Opiates Comments 01/20/2022    Final    Oxycodone 01/20/2022 NEGATIVE   <100 ng/mL Final    Phencyclidine 2022 NEGATIVE  <25 ng/mL Final    Creatinine 2022 61.5  > or = 20.0 mg/dL Final    pH 2022 6.2  4.5 - 9.0 Final    Oxidants, Urine (Tox) 2022 NEGATIVE  <200 mcg/mL Final    Notes and Comments 2022    Final       Past Medical History:   Diagnosis Date    Anxiety     Arthritis     Asthma     COPD (chronic obstructive pulmonary disease)     Hypertension     Pain in the abdomen     Thyroid disease      Social History     Socioeconomic History    Marital status:    Tobacco Use    Smoking status: Former Smoker     Packs/day: 0.50     Years: 5.00     Pack years: 2.50     Quit date: 2/15/2005     Years since quittin.3    Smokeless tobacco: Never Used   Substance and Sexual Activity    Alcohol use: No    Drug use: No    Sexual activity: Never     Past Surgical History:   Procedure Laterality Date    CATARACT EXTRACTION, BILATERAL      Dr oCllins     SECTION      x's2    CHOLECYSTECTOMY      COLONOSCOPY      ESOPHAGOGASTRODUODENOSCOPY (EGD) WITH DILATION      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    LAPAROSCOPIC CHOLECYSTECTOMY N/A 2019    Procedure: CHOLECYSTECTOMY, LAPAROSCOPIC;  Surgeon: Toney Grace III, MD;  Location: [facility];  Service: General;  Laterality: N/A;    SKIN CANCER EXCISION      Dr.Weil     Family History   Problem Relation Age of Onset    Diabetes Mother     Depression Maternal Grandmother     Depression Maternal Grandfather        Review of patient's allergies indicates:   Allergen Reactions    Sulfa (sulfonamide antibiotics) Swelling    Androgenic anabolic steroid Swelling     Feet swelling     Aspirin Other (See Comments)     Upset stomach     Erythromycin Itching    Methylprednisolone Other (See Comments)    Penicillins Itching       Current Outpatient Medications:     acetaminophen (TYLENOL) 500 MG tablet, Take 500 mg by mouth nightly as needed for Pain., Disp: ,  Rfl:     albuterol sulfate (PROAIR RESPICLICK) 90 mcg/actuation inhaler, Inhale 2 puffs into the lungs every 4 (four) hours. Rescue2 puffs every 4 hours as needed for cough, wheeze, or shortness of breath, Disp: 1 each, Rfl: 2    albuterol-ipratropium (DUO-NEB) 2.5 mg-0.5 mg/3 mL nebulizer solution, Take 3 mLs by nebulization every 6 (six) hours as needed., Disp: 120 vial, Rfl: 1    ALPRAZolam (XANAX) 0.5 MG tablet, Take 1 tablet (0.5 mg total) by mouth 3 (three) times daily as needed for Anxiety., Disp: 90 tablet, Rfl: 5    chlordiazepoxide-clidinium 5-2.5 mg (LIBRAX) 5-2.5 mg Cap, TAKE ONE CAPSULE BY MOUTH THREE TIMES DAILY BEFORE MEALS (Patient taking differently: Take 1 capsule by mouth 3 (three) times daily with meals. TAKE ONE CAPSULE BY MOUTH THREE TIMES DAILY BEFORE MEALS), Disp: 90 capsule, Rfl: 3    ciprofloxacin HCl (CIPRO) 500 MG tablet, Take 1 tablet (500 mg total) by mouth 2 (two) times daily., Disp: 20 tablet, Rfl: 0    clobetasoL 0.025 % Crea, Apply topically., Disp: , Rfl:     doxycycline (MONODOX) 100 MG capsule, Take 1 capsule (100 mg total) by mouth 2 (two) times daily., Disp: 20 capsule, Rfl: 0    fluticasone-umeclidin-vilanter (TRELEGY ELLIPTA) 100-62.5-25 mcg DsDv, Inhale 1 puff into the lungs once daily., Disp: 60 each, Rfl: 0    furosemide (LASIX) 20 MG tablet, Take 1 tablet (20 mg total) by mouth 2 (two) times daily., Disp: 180 tablet, Rfl: 1    levothyroxine (SYNTHROID) 50 MCG tablet, Take 1 tablet (50 mcg total) by mouth once daily., Disp: 90 tablet, Rfl: 1    meloxicam (MOBIC) 7.5 MG tablet, Take 1 tablet (7.5 mg total) by mouth once daily., Disp: 90 tablet, Rfl: 1    metroNIDAZOLE (FLAGYL) 500 MG tablet, Take 1 tablet (500 mg total) by mouth 3 (three) times daily., Disp: 21 tablet, Rfl: 0    mupirocin (BACTROBAN) 2 % ointment, Apply topically 2 (two) times daily., Disp: 22 g, Rfl: 1    naproxen (NAPROSYN) 375 MG tablet, Take 1 tablet (375 mg total) by mouth daily as needed  (arthritis)., Disp: 90 tablet, Rfl: 1    ondansetron (ZOFRAN) 4 MG tablet, Take 1 tablet (4 mg total) by mouth every 6 (six) hours as needed., Disp: 20 tablet, Rfl: 1    pantoprazole (PROTONIX) 20 MG tablet, Take 2 tablets (40 mg total) by mouth once daily., Disp: 30 tablet, Rfl: 0    potassium bicarbonate (K-LYTE) disintegrating tablet, Take 1 tablet (25 mEq total) by mouth once daily., Disp: 10 tablet, Rfl: 0    atenoloL-chlorthalidone (TENORETIC) 50-25 mg Tab, Take 1 tablet by mouth once daily., Disp: 90 tablet, Rfl: 1    cefUROXime (CEFTIN) 500 MG tablet, Take 1 tablet (500 mg total) by mouth 2 (two) times daily., Disp: 14 tablet, Rfl: 0    diclofenac sodium (VOLTAREN) 1 % Gel, Apply 2 g topically once daily., Disp: 100 g, Rfl: 1    HYDROcodone-acetaminophen (NORCO)  mg per tablet, Take 1 tablet by mouth every 4 (four) hours as needed for Pain., Disp: 60 tablet, Rfl: 0    HYDROcodone-acetaminophen (NORCO)  mg per tablet, Take 1 tablet by mouth every 4 (four) hours as needed for Pain., Disp: 60 tablet, Rfl: 0    HYDROcodone-acetaminophen (NORCO)  mg per tablet, Take 1 tablet by mouth every 4 (four) hours as needed for Pain., Disp: 60 tablet, Rfl: 0    [START ON 7/14/2022] HYDROcodone-acetaminophen (NORCO)  mg per tablet, Take 1 tablet by mouth every 4 (four) hours as needed for Pain., Disp: 60 tablet, Rfl: 0    ondansetron (ZOFRAN-ODT) 4 MG TbDL, Take 1 tablet (4 mg total) by mouth every 6 (six) hours as needed (nausea)., Disp: 30 tablet, Rfl: 2    potassium chloride SA (K-DUR,KLOR-CON) 20 MEQ tablet, Take 1 tablet (20 mEq total) by mouth once daily., Disp: 30 tablet, Rfl: 0    Review of Systems   Constitutional: Negative for appetite change, chills, fatigue, fever and unexpected weight change.   HENT: Negative for congestion, ear pain, sinus pain, sore throat and trouble swallowing.    Eyes: Negative for pain, discharge and visual disturbance.   Respiratory: Negative for  "apnea, cough, shortness of breath and wheezing.    Cardiovascular: Negative for chest pain, palpitations and leg swelling.   Gastrointestinal: Positive for abdominal pain, diarrhea and nausea. Negative for blood in stool, constipation and vomiting.   Endocrine: Negative for heat intolerance, polydipsia and polyuria.   Genitourinary: Negative for difficulty urinating, dyspareunia, dysuria, frequency, hematuria and menstrual problem.   Musculoskeletal: Negative for arthralgias, back pain, gait problem, joint swelling and myalgias.   Allergic/Immunologic: Negative for environmental allergies, food allergies and immunocompromised state.   Neurological: Positive for weakness. Negative for dizziness, tremors, seizures, numbness and headaches.   Psychiatric/Behavioral: Negative for behavioral problems, confusion, hallucinations and suicidal ideas. The patient is not nervous/anxious.           Objective:      Vitals:    06/27/22 0845   BP: 120/70   Pulse: 78   Weight: 60.3 kg (133 lb)   Height: 4' 10" (1.473 m)     Physical Exam  Vitals and nursing note reviewed.   Constitutional:       Appearance: She is well-developed. She is ill-appearing. She is not toxic-appearing.   HENT:      Head: Normocephalic and atraumatic.      Right Ear: Tympanic membrane and external ear normal.      Left Ear: Tympanic membrane and external ear normal.      Nose: Nose normal.      Mouth/Throat:      Pharynx: Oropharynx is clear.   Eyes:      Pupils: Pupils are equal, round, and reactive to light.   Neck:      Thyroid: No thyromegaly.      Vascular: No carotid bruit.   Cardiovascular:      Rate and Rhythm: Normal rate and regular rhythm.      Heart sounds: Normal heart sounds. No murmur heard.  Pulmonary:      Effort: Pulmonary effort is normal.      Breath sounds: Normal breath sounds. No wheezing or rales.   Abdominal:      General: Bowel sounds are normal. There is no distension.      Palpations: Abdomen is soft.      Tenderness: There is no " abdominal tenderness. There is no guarding or rebound.   Musculoskeletal:         General: No tenderness or deformity. Normal range of motion.      Cervical back: Normal range of motion and neck supple.      Lumbar back: Normal. No spasms.      Right lower leg: No edema.      Left lower leg: No edema.      Comments: Bends 90 degrees at  waist   Lymphadenopathy:      Cervical: No cervical adenopathy.   Skin:     General: Skin is warm and dry.      Findings: No rash.   Neurological:      Mental Status: She is alert and oriented to person, place, and time.      Cranial Nerves: No cranial nerve deficit.      Coordination: Coordination normal.   Psychiatric:         Mood and Affect: Mood normal.         Behavior: Behavior normal.         Thought Content: Thought content normal.         Judgment: Judgment normal.           Assessment:       1. Acute cystitis without hematuria    2. Diverticulitis    3. Essential hypertension    4. Uncomplicated opioid dependence    5. Chronic hypoxemic respiratory failure    6. Stage 3b chronic kidney disease    7. Primary osteoarthritis involving multiple joints    8. Primary insomnia         Plan:       Acute cystitis without hematuria  -     cefUROXime (CEFTIN) 500 MG tablet; Take 1 tablet (500 mg total) by mouth 2 (two) times daily.  Dispense: 14 tablet; Refill: 0  Will prescribe 1 more week of Ceftin  Diverticulitis  -     ondansetron (ZOFRAN-ODT) 4 MG TbDL; Take 1 tablet (4 mg total) by mouth every 6 (six) hours as needed (nausea).  Dispense: 30 tablet; Refill: 2  Refill Zofran, injected patient to gradually increase her diet back to soft foods and regular consistency foods /avoid seeds and nuts  Essential hypertension  Comments:  BP at goal. no changes today  Orders:  -     atenoloL-chlorthalidone (TENORETIC) 50-25 mg Tab; Take 1 tablet by mouth once daily.  Dispense: 90 tablet; Refill: 1  -     potassium chloride SA (K-DUR,KLOR-CON) 20 MEQ tablet; Take 1 tablet (20 mEq total) by  mouth once daily.  Dispense: 30 tablet; Refill: 0  Changed to potassium chloride 20 mEq tablets  Uncomplicated opioid dependence  Refill pain medications  Chronic hypoxemic respiratory failure    Stage 3b chronic kidney disease    Primary osteoarthritis involving multiple joints  -     HYDROcodone-acetaminophen (NORCO)  mg per tablet; Take 1 tablet by mouth every 4 (four) hours as needed for Pain.  Dispense: 60 tablet; Refill: 0    Primary insomnia      Follow up in about 3 months (around 9/27/2022).        6/27/2022 Reed Andersen

## 2022-06-30 ENCOUNTER — TELEPHONE (OUTPATIENT)
Dept: FAMILY MEDICINE | Facility: CLINIC | Age: 87
End: 2022-06-30

## 2022-06-30 NOTE — TELEPHONE ENCOUNTER
Spoke with pt in regards to message sent. Verbalized per Dr. Andersen she needs to stay off the Mobic. She can take her Hydrocodone BID and ES Acetaminophen BID for her joint pain. Pt acknowledge understanding.

## 2022-06-30 NOTE — TELEPHONE ENCOUNTER
----- Message from Silvia Schaffer sent at 6/30/2022 11:47 AM CDT -----  Pt calling wants to speak to the nurse about being taken off her Mobic said her joints are all sore and swollen she wants to know what else she can take.  # [phone]

## 2022-07-01 ENCOUNTER — HOSPITAL ENCOUNTER (INPATIENT)
Facility: HOSPITAL | Age: 87
LOS: 3 days | Discharge: HOME | DRG: 683 | End: 2022-07-05
Attending: EMERGENCY MEDICINE | Admitting: FAMILY MEDICINE
Payer: MEDICARE

## 2022-07-01 DIAGNOSIS — T14.90XA TRAUMA: ICD-10-CM

## 2022-07-01 DIAGNOSIS — N17.9 AKI (ACUTE KIDNEY INJURY): ICD-10-CM

## 2022-07-01 DIAGNOSIS — I10 ESSENTIAL HYPERTENSION: ICD-10-CM
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
## 2022-07-01 DIAGNOSIS — R07.9 CHEST PAIN: ICD-10-CM

## 2022-07-01 DIAGNOSIS — E87.1 HYPONATREMIA: ICD-10-CM

## 2022-07-01 DIAGNOSIS — L03.119 CELLULITIS OF FOOT: Primary | ICD-10-CM

## 2022-07-01 DIAGNOSIS — N30.00 ACUTE CYSTITIS WITHOUT HEMATURIA: ICD-10-CM

## 2022-07-01 LAB
ALBUMIN SERPL BCP-MCNC: 3.2 G/DL (ref 3.5–5.2)
ALP SERPL-CCNC: 43 U/L (ref 55–135)
ALT SERPL W/O P-5'-P-CCNC: 13 U/L (ref 10–44)
ANION GAP SERPL CALC-SCNC: 10 MMOL/L (ref 8–16)
AST SERPL-CCNC: 25 U/L (ref 10–40)
BASOPHILS # BLD AUTO: 0.03 K/UL (ref 0–0.2)
BASOPHILS NFR BLD: 0.2 % (ref 0–1.9)
BILIRUB SERPL-MCNC: 1.4 MG/DL (ref 0.1–1)
BNP SERPL-MCNC: 149 PG/ML (ref 0–99)
BUN SERPL-MCNC: 32 MG/DL (ref 8–23)
CALCIUM SERPL-MCNC: 8.9 MG/DL (ref 8.7–10.5)
CHLORIDE SERPL-SCNC: 86 MMOL/L (ref 95–110)
CO2 SERPL-SCNC: 33 MMOL/L (ref 23–29)
CREAT SERPL-MCNC: 1.5 MG/DL (ref 0.5–1.4)
DIFFERENTIAL METHOD: ABNORMAL
EOSINOPHIL # BLD AUTO: 0 K/UL (ref 0–0.5)
EOSINOPHIL NFR BLD: 0 % (ref 0–8)
ERYTHROCYTE [DISTWIDTH] IN BLOOD BY AUTOMATED COUNT: 14.7 % (ref 11.5–14.5)
EST. GFR  (AFRICAN AMERICAN): 35.6 ML/MIN/1.73 M^2
EST. GFR  (NON AFRICAN AMERICAN): 30.9 ML/MIN/1.73 M^2
GLUCOSE SERPL-MCNC: 115 MG/DL (ref 70–110)
HCT VFR BLD AUTO: 32.5 % (ref 37–48.5)
HGB BLD-MCNC: 10.9 G/DL (ref 12–16)
IMM GRANULOCYTES # BLD AUTO: 0.06 K/UL (ref 0–0.04)
IMM GRANULOCYTES NFR BLD AUTO: 0.4 % (ref 0–0.5)
LYMPHOCYTES # BLD AUTO: 1.6 K/UL (ref 1–4.8)
LYMPHOCYTES NFR BLD: 11.2 % (ref 18–48)
MAGNESIUM SERPL-MCNC: 2.1 MG/DL (ref 1.6–2.6)
MCH RBC QN AUTO: 29 PG (ref 27–31)
MCHC RBC AUTO-ENTMCNC: 33.5 G/DL (ref 32–36)
MCV RBC AUTO: 86 FL (ref 82–98)
MONOCYTES # BLD AUTO: 1.3 K/UL (ref 0.3–1)
MONOCYTES NFR BLD: 9.3 % (ref 4–15)
NEUTROPHILS # BLD AUTO: 11.4 K/UL (ref 1.8–7.7)
NEUTROPHILS NFR BLD: 78.9 % (ref 38–73)
NRBC BLD-RTO: 0 /100 WBC
PLATELET # BLD AUTO: 473 K/UL (ref 150–450)
PMV BLD AUTO: 9.9 FL (ref 9.2–12.9)
POTASSIUM SERPL-SCNC: 3.5 MMOL/L (ref 3.5–5.1)
PROT SERPL-MCNC: 7.8 G/DL (ref 6–8.4)
RBC # BLD AUTO: 3.76 M/UL (ref 4–5.4)
SARS-COV-2 RDRP RESP QL NAA+PROBE: NEGATIVE
SODIUM SERPL-SCNC: 129 MMOL/L (ref 136–145)
WBC # BLD AUTO: 14.43 K/UL (ref 3.9–12.7)

## 2022-07-01 PROCEDURE — 80053 COMPREHEN METABOLIC PANEL: CPT | Performed by: EMERGENCY MEDICINE

## 2022-07-01 PROCEDURE — 87086 URINE CULTURE/COLONY COUNT: CPT | Performed by: EMERGENCY MEDICINE

## 2022-07-01 PROCEDURE — 87186 SC STD MICRODIL/AGAR DIL: CPT | Performed by: EMERGENCY MEDICINE

## 2022-07-01 PROCEDURE — 25000003 PHARM REV CODE 250: Performed by: EMERGENCY MEDICINE

## 2022-07-01 PROCEDURE — 83880 ASSAY OF NATRIURETIC PEPTIDE: CPT | Performed by: EMERGENCY MEDICINE

## 2022-07-01 PROCEDURE — 96375 TX/PRO/DX INJ NEW DRUG ADDON: CPT

## 2022-07-01 PROCEDURE — 86140 C-REACTIVE PROTEIN: CPT | Performed by: EMERGENCY MEDICINE

## 2022-07-01 PROCEDURE — 81001 URINALYSIS AUTO W/SCOPE: CPT | Performed by: EMERGENCY MEDICINE

## 2022-07-01 PROCEDURE — 85025 COMPLETE CBC W/AUTO DIFF WBC: CPT | Performed by: EMERGENCY MEDICINE

## 2022-07-01 PROCEDURE — U0002 COVID-19 LAB TEST NON-CDC: HCPCS | Performed by: EMERGENCY MEDICINE

## 2022-07-01 PROCEDURE — 87077 CULTURE AEROBIC IDENTIFY: CPT | Performed by: EMERGENCY MEDICINE

## 2022-07-01 PROCEDURE — 99285 EMERGENCY DEPT VISIT HI MDM: CPT | Mod: 25

## 2022-07-01 PROCEDURE — 63600175 PHARM REV CODE 636 W HCPCS: Performed by: EMERGENCY MEDICINE

## 2022-07-01 PROCEDURE — 96365 THER/PROPH/DIAG IV INF INIT: CPT

## 2022-07-01 PROCEDURE — 83735 ASSAY OF MAGNESIUM: CPT | Performed by: EMERGENCY MEDICINE

## 2022-07-01 RX ORDER — ONDANSETRON 4 MG/1
4 TABLET, ORALLY DISINTEGRATING ORAL
Status: COMPLETED | OUTPATIENT
Start: 2022-07-01 | End: 2022-07-01

## 2022-07-01 RX ORDER — HYDROCODONE BITARTRATE AND ACETAMINOPHEN 5; 325 MG/1; MG/1
1 TABLET ORAL
Status: COMPLETED | OUTPATIENT
Start: 2022-07-01 | End: 2022-07-01

## 2022-07-01 RX ADMIN — HYDROCODONE BITARTRATE AND ACETAMINOPHEN 1 TABLET: 5; 325 TABLET ORAL at 11:07

## 2022-07-01 RX ADMIN — CEFTRIAXONE 1 G: 1 INJECTION, SOLUTION INTRAVENOUS at 11:07

## 2022-07-01 RX ADMIN — ONDANSETRON 4 MG: 4 TABLET, ORALLY DISINTEGRATING ORAL at 11:07

## 2022-07-02 PROBLEM — N18.30 ACUTE RENAL FAILURE SUPERIMPOSED ON STAGE 3 CHRONIC KIDNEY DISEASE: Status: ACTIVE | Noted: 2022-07-02

## 2022-07-02 PROBLEM — E87.1 HYPONATREMIA: Status: ACTIVE | Noted: 2022-07-02

## 2022-07-02 PROBLEM — N17.9 ACUTE RENAL FAILURE SUPERIMPOSED ON STAGE 3 CHRONIC KIDNEY DISEASE: Status: ACTIVE | Noted: 2022-07-02

## 2022-07-02 PROBLEM — N39.0 UTI (URINARY TRACT INFECTION): Status: ACTIVE | Noted: 2022-07-02

## 2022-07-02 PROBLEM — M19.90 ARTHRITIS: Status: ACTIVE | Noted: 2022-07-02

## 2022-07-02 PROBLEM — E86.0 DEHYDRATION: Status: ACTIVE | Noted: 2022-07-02

## 2022-07-02 LAB
ANION GAP SERPL CALC-SCNC: 10 MMOL/L (ref 8–16)
BACTERIA #/AREA URNS HPF: NEGATIVE /HPF
BASOPHILS # BLD AUTO: 0.04 K/UL (ref 0–0.2)
BASOPHILS NFR BLD: 0.3 % (ref 0–1.9)
BILIRUB UR QL STRIP: NEGATIVE
BUN SERPL-MCNC: 30 MG/DL (ref 8–23)
CALCIUM SERPL-MCNC: 8.9 MG/DL (ref 8.7–10.5)
CHLORIDE SERPL-SCNC: 87 MMOL/L (ref 95–110)
CLARITY UR: CLEAR
CO2 SERPL-SCNC: 34 MMOL/L (ref 23–29)
COLOR UR: YELLOW
CREAT SERPL-MCNC: 1.4 MG/DL (ref 0.5–1.4)
CRP SERPL-MCNC: 21.2 MG/DL
DIFFERENTIAL METHOD: ABNORMAL
EOSINOPHIL # BLD AUTO: 0 K/UL (ref 0–0.5)
EOSINOPHIL NFR BLD: 0 % (ref 0–8)
ERYTHROCYTE [DISTWIDTH] IN BLOOD BY AUTOMATED COUNT: 14.6 % (ref 11.5–14.5)
ERYTHROCYTE [SEDIMENTATION RATE] IN BLOOD BY WESTERGREN METHOD: >90 MM/HR (ref 0–20)
EST. GFR  (AFRICAN AMERICAN): 38.7 ML/MIN/1.73 M^2
EST. GFR  (NON AFRICAN AMERICAN): 33.6 ML/MIN/1.73 M^2
GLUCOSE SERPL-MCNC: 98 MG/DL (ref 70–110)
GLUCOSE UR QL STRIP: NEGATIVE
HCT VFR BLD AUTO: 34.3 % (ref 37–48.5)
HGB BLD-MCNC: 11.3 G/DL (ref 12–16)
HGB UR QL STRIP: ABNORMAL
HYALINE CASTS #/AREA URNS LPF: 5 /LPF
IMM GRANULOCYTES # BLD AUTO: 0.04 K/UL (ref 0–0.04)
IMM GRANULOCYTES NFR BLD AUTO: 0.3 % (ref 0–0.5)
KETONES UR QL STRIP: NEGATIVE
LACTATE SERPL-SCNC: 0.9 MMOL/L (ref 0.5–1.9)
LEUKOCYTE ESTERASE UR QL STRIP: ABNORMAL
LYMPHOCYTES # BLD AUTO: 2.3 K/UL (ref 1–4.8)
LYMPHOCYTES NFR BLD: 18.2 % (ref 18–48)
MAGNESIUM SERPL-MCNC: 2.2 MG/DL (ref 1.6–2.6)
MCH RBC QN AUTO: 28.6 PG (ref 27–31)
MCHC RBC AUTO-ENTMCNC: 32.9 G/DL (ref 32–36)
MCV RBC AUTO: 87 FL (ref 82–98)
MICROSCOPIC COMMENT: ABNORMAL
MONOCYTES # BLD AUTO: 1.3 K/UL (ref 0.3–1)
MONOCYTES NFR BLD: 10.5 % (ref 4–15)
NEUTROPHILS # BLD AUTO: 8.9 K/UL (ref 1.8–7.7)
NEUTROPHILS NFR BLD: 70.7 % (ref 38–73)
NITRITE UR QL STRIP: NEGATIVE
NRBC BLD-RTO: 0 /100 WBC
PH UR STRIP: 6 [PH] (ref 5–8)
PLATELET # BLD AUTO: 464 K/UL (ref 150–450)
PMV BLD AUTO: 10.2 FL (ref 9.2–12.9)
POTASSIUM SERPL-SCNC: 3.2 MMOL/L (ref 3.5–5.1)
PROCALCITONIN SERPL IA-MCNC: 1.01 NG/ML (ref 0–0.5)
PROT UR QL STRIP: ABNORMAL
RBC # BLD AUTO: 3.95 M/UL (ref 4–5.4)
RBC #/AREA URNS HPF: 1 /HPF (ref 0–4)
SODIUM SERPL-SCNC: 131 MMOL/L (ref 136–145)
SP GR UR STRIP: 1.01 (ref 1–1.03)
SQUAMOUS #/AREA URNS HPF: 1 /HPF
URN SPEC COLLECT METH UR: ABNORMAL
UROBILINOGEN UR STRIP-ACNC: NEGATIVE EU/DL
WBC # BLD AUTO: 12.66 K/UL (ref 3.9–12.7)
WBC #/AREA URNS HPF: 31 /HPF (ref 0–5)

## 2022-07-02 PROCEDURE — 25000003 PHARM REV CODE 250: Performed by: INTERNAL MEDICINE

## 2022-07-02 PROCEDURE — 25000003 PHARM REV CODE 250: Performed by: NURSE PRACTITIONER

## 2022-07-02 PROCEDURE — 96375 TX/PRO/DX INJ NEW DRUG ADDON: CPT

## 2022-07-02 PROCEDURE — 99900031 HC PATIENT EDUCATION (STAT)

## 2022-07-02 PROCEDURE — 83735 ASSAY OF MAGNESIUM: CPT | Performed by: NURSE PRACTITIONER

## 2022-07-02 PROCEDURE — 87040 BLOOD CULTURE FOR BACTERIA: CPT | Performed by: EMERGENCY MEDICINE

## 2022-07-02 PROCEDURE — 63600175 PHARM REV CODE 636 W HCPCS: Performed by: FAMILY MEDICINE

## 2022-07-02 PROCEDURE — 96376 TX/PRO/DX INJ SAME DRUG ADON: CPT

## 2022-07-02 PROCEDURE — 85025 COMPLETE CBC W/AUTO DIFF WBC: CPT | Performed by: NURSE PRACTITIONER

## 2022-07-02 PROCEDURE — 96366 THER/PROPH/DIAG IV INF ADDON: CPT

## 2022-07-02 PROCEDURE — 99900035 HC TECH TIME PER 15 MIN (STAT)

## 2022-07-02 PROCEDURE — 63600175 PHARM REV CODE 636 W HCPCS: Performed by: NURSE PRACTITIONER

## 2022-07-02 PROCEDURE — 96367 TX/PROPH/DG ADDL SEQ IV INF: CPT

## 2022-07-02 PROCEDURE — 83605 ASSAY OF LACTIC ACID: CPT | Performed by: EMERGENCY MEDICINE

## 2022-07-02 PROCEDURE — 85651 RBC SED RATE NONAUTOMATED: CPT | Performed by: NURSE PRACTITIONER

## 2022-07-02 PROCEDURE — 80048 BASIC METABOLIC PNL TOTAL CA: CPT | Performed by: NURSE PRACTITIONER

## 2022-07-02 PROCEDURE — 94761 N-INVAS EAR/PLS OXIMETRY MLT: CPT

## 2022-07-02 PROCEDURE — 87040 BLOOD CULTURE FOR BACTERIA: CPT | Mod: 59 | Performed by: EMERGENCY MEDICINE

## 2022-07-02 PROCEDURE — 96361 HYDRATE IV INFUSION ADD-ON: CPT

## 2022-07-02 PROCEDURE — 36415 COLL VENOUS BLD VENIPUNCTURE: CPT | Performed by: EMERGENCY MEDICINE

## 2022-07-02 PROCEDURE — 12000002 HC ACUTE/MED SURGE SEMI-PRIVATE ROOM

## 2022-07-02 PROCEDURE — 27000221 HC OXYGEN, UP TO 24 HOURS

## 2022-07-02 PROCEDURE — 84145 PROCALCITONIN (PCT): CPT | Performed by: NURSE PRACTITIONER

## 2022-07-02 RX ORDER — ENOXAPARIN SODIUM 100 MG/ML
40 INJECTION SUBCUTANEOUS EVERY 24 HOURS
Status: DISCONTINUED | OUTPATIENT
Start: 2022-07-02 | End: 2022-07-02

## 2022-07-02 RX ORDER — POTASSIUM CHLORIDE 20 MEQ/1
20 TABLET, EXTENDED RELEASE ORAL
Status: DISCONTINUED | OUTPATIENT
Start: 2022-07-02 | End: 2022-07-05 | Stop reason: HOSPADM

## 2022-07-02 RX ORDER — AMOXICILLIN 250 MG
1 CAPSULE ORAL 2 TIMES DAILY
Status: DISCONTINUED | OUTPATIENT
Start: 2022-07-02 | End: 2022-07-05 | Stop reason: HOSPADM

## 2022-07-02 RX ORDER — IPRATROPIUM BROMIDE AND ALBUTEROL SULFATE 2.5; .5 MG/3ML; MG/3ML
3 SOLUTION RESPIRATORY (INHALATION) EVERY 6 HOURS PRN
Status: DISCONTINUED | OUTPATIENT
Start: 2022-07-02 | End: 2022-07-05 | Stop reason: HOSPADM

## 2022-07-02 RX ORDER — ACETAMINOPHEN 325 MG/1
650 TABLET ORAL EVERY 4 HOURS PRN
Status: DISCONTINUED | OUTPATIENT
Start: 2022-07-02 | End: 2022-07-05 | Stop reason: HOSPADM

## 2022-07-02 RX ORDER — POLYETHYLENE GLYCOL 3350 17 G/17G
17 POWDER, FOR SOLUTION ORAL 2 TIMES DAILY PRN
Status: DISCONTINUED | OUTPATIENT
Start: 2022-07-02 | End: 2022-07-05 | Stop reason: HOSPADM

## 2022-07-02 RX ORDER — ONDANSETRON 2 MG/ML
4 INJECTION INTRAMUSCULAR; INTRAVENOUS EVERY 8 HOURS PRN
Status: DISCONTINUED | OUTPATIENT
Start: 2022-07-02 | End: 2022-07-02

## 2022-07-02 RX ORDER — ENOXAPARIN SODIUM 100 MG/ML
30 INJECTION SUBCUTANEOUS EVERY 24 HOURS
Status: DISCONTINUED | OUTPATIENT
Start: 2022-07-02 | End: 2022-07-05 | Stop reason: HOSPADM

## 2022-07-02 RX ORDER — NALOXONE HCL 0.4 MG/ML
0.02 VIAL (ML) INJECTION
Status: DISCONTINUED | OUTPATIENT
Start: 2022-07-02 | End: 2022-07-05 | Stop reason: HOSPADM

## 2022-07-02 RX ORDER — ALPRAZOLAM 0.5 MG/1
0.5 TABLET ORAL NIGHTLY PRN
Status: DISCONTINUED | OUTPATIENT
Start: 2022-07-02 | End: 2022-07-05 | Stop reason: HOSPADM

## 2022-07-02 RX ORDER — ONDANSETRON 4 MG/1
4 TABLET, ORALLY DISINTEGRATING ORAL EVERY 8 HOURS PRN
Status: DISCONTINUED | OUTPATIENT
Start: 2022-07-02 | End: 2022-07-05 | Stop reason: HOSPADM

## 2022-07-02 RX ORDER — LANOLIN ALCOHOL/MO/W.PET/CERES
800 CREAM (GRAM) TOPICAL
Status: DISCONTINUED | OUTPATIENT
Start: 2022-07-02 | End: 2022-07-05 | Stop reason: HOSPADM

## 2022-07-02 RX ORDER — LEVOTHYROXINE SODIUM 25 UG/1
50 TABLET ORAL DAILY
Status: DISCONTINUED | OUTPATIENT
Start: 2022-07-02 | End: 2022-07-05 | Stop reason: HOSPADM

## 2022-07-02 RX ORDER — MAGNESIUM SULFATE HEPTAHYDRATE 40 MG/ML
4 INJECTION, SOLUTION INTRAVENOUS
Status: DISCONTINUED | OUTPATIENT
Start: 2022-07-02 | End: 2022-07-05 | Stop reason: HOSPADM

## 2022-07-02 RX ORDER — POTASSIUM CHLORIDE 20 MEQ/1
40 TABLET, EXTENDED RELEASE ORAL
Status: DISCONTINUED | OUTPATIENT
Start: 2022-07-02 | End: 2022-07-05 | Stop reason: HOSPADM

## 2022-07-02 RX ORDER — TALC
6 POWDER (GRAM) TOPICAL NIGHTLY PRN
Status: DISCONTINUED | OUTPATIENT
Start: 2022-07-02 | End: 2022-07-05 | Stop reason: HOSPADM

## 2022-07-02 RX ORDER — PANTOPRAZOLE SODIUM 40 MG/1
40 TABLET, DELAYED RELEASE ORAL DAILY
Status: DISCONTINUED | OUTPATIENT
Start: 2022-07-02 | End: 2022-07-05 | Stop reason: HOSPADM

## 2022-07-02 RX ORDER — MAGNESIUM SULFATE 1 G/100ML
1 INJECTION INTRAVENOUS
Status: DISCONTINUED | OUTPATIENT
Start: 2022-07-02 | End: 2022-07-05 | Stop reason: HOSPADM

## 2022-07-02 RX ORDER — HYDROCODONE BITARTRATE AND ACETAMINOPHEN 5; 325 MG/1; MG/1
1 TABLET ORAL EVERY 6 HOURS PRN
Status: DISCONTINUED | OUTPATIENT
Start: 2022-07-02 | End: 2022-07-05 | Stop reason: HOSPADM

## 2022-07-02 RX ORDER — DEXAMETHASONE SODIUM PHOSPHATE 4 MG/ML
4 INJECTION, SOLUTION INTRA-ARTICULAR; INTRALESIONAL; INTRAMUSCULAR; INTRAVENOUS; SOFT TISSUE EVERY 12 HOURS
Status: DISCONTINUED | OUTPATIENT
Start: 2022-07-02 | End: 2022-07-05 | Stop reason: HOSPADM

## 2022-07-02 RX ORDER — MAGNESIUM SULFATE HEPTAHYDRATE 40 MG/ML
2 INJECTION, SOLUTION INTRAVENOUS
Status: DISCONTINUED | OUTPATIENT
Start: 2022-07-02 | End: 2022-07-05 | Stop reason: HOSPADM

## 2022-07-02 RX ORDER — LEVOFLOXACIN 5 MG/ML
750 INJECTION, SOLUTION INTRAVENOUS
Status: DISCONTINUED | OUTPATIENT
Start: 2022-07-02 | End: 2022-07-04

## 2022-07-02 RX ORDER — SODIUM CHLORIDE 0.9 % (FLUSH) 0.9 %
10 SYRINGE (ML) INJECTION EVERY 12 HOURS PRN
Status: DISCONTINUED | OUTPATIENT
Start: 2022-07-02 | End: 2022-07-05 | Stop reason: HOSPADM

## 2022-07-02 RX ORDER — ATENOLOL 50 MG/1
50 TABLET ORAL DAILY
Status: DISCONTINUED | OUTPATIENT
Start: 2022-07-02 | End: 2022-07-05 | Stop reason: HOSPADM

## 2022-07-02 RX ADMIN — DEXAMETHASONE SODIUM PHOSPHATE 4 MG: 4 INJECTION, SOLUTION INTRA-ARTICULAR; INTRALESIONAL; INTRAMUSCULAR; INTRAVENOUS; SOFT TISSUE at 11:07

## 2022-07-02 RX ADMIN — SENNOSIDES AND DOCUSATE SODIUM 1 TABLET: 50; 8.6 TABLET ORAL at 09:07

## 2022-07-02 RX ADMIN — ATENOLOL 50 MG: 50 TABLET ORAL at 08:07

## 2022-07-02 RX ADMIN — ONDANSETRON 4 MG: 2 INJECTION INTRAMUSCULAR; INTRAVENOUS at 08:07

## 2022-07-02 RX ADMIN — ALPRAZOLAM 0.5 MG: 0.5 TABLET ORAL at 03:07

## 2022-07-02 RX ADMIN — POTASSIUM CHLORIDE 40 MEQ: 1500 TABLET, EXTENDED RELEASE ORAL at 06:07

## 2022-07-02 RX ADMIN — ACETAMINOPHEN 650 MG: 325 TABLET ORAL at 03:07

## 2022-07-02 RX ADMIN — PANTOPRAZOLE SODIUM 40 MG: 40 TABLET, DELAYED RELEASE ORAL at 06:07

## 2022-07-02 RX ADMIN — LEVOTHYROXINE SODIUM 50 MCG: 0.03 TABLET ORAL at 06:07

## 2022-07-02 RX ADMIN — ONDANSETRON 4 MG: 4 TABLET, ORALLY DISINTEGRATING ORAL at 06:07

## 2022-07-02 RX ADMIN — DEXAMETHASONE SODIUM PHOSPHATE 4 MG: 4 INJECTION, SOLUTION INTRA-ARTICULAR; INTRALESIONAL; INTRAMUSCULAR; INTRAVENOUS; SOFT TISSUE at 02:07

## 2022-07-02 RX ADMIN — SODIUM CHLORIDE 250 ML: 9 INJECTION, SOLUTION INTRAVENOUS at 03:07

## 2022-07-02 RX ADMIN — ENOXAPARIN SODIUM 30 MG: 30 INJECTION SUBCUTANEOUS at 04:07

## 2022-07-02 RX ADMIN — HYDROCODONE BITARTRATE AND ACETAMINOPHEN 1 TABLET: 5; 325 TABLET ORAL at 10:07

## 2022-07-02 RX ADMIN — DEXAMETHASONE SODIUM PHOSPHATE 4 MG: 4 INJECTION, SOLUTION INTRA-ARTICULAR; INTRALESIONAL; INTRAMUSCULAR; INTRAVENOUS; SOFT TISSUE at 08:07

## 2022-07-02 RX ADMIN — LEVOFLOXACIN 750 MG: 750 INJECTION, SOLUTION INTRAVENOUS at 03:07

## 2022-07-02 RX ADMIN — SENNOSIDES AND DOCUSATE SODIUM 1 TABLET: 50; 8.6 TABLET ORAL at 08:07

## 2022-07-02 NOTE — H&P
"[facility] Medicine  History & Physical    DOS: 07/02/2022  1:34 AM      Patient Name: Valerie Jones  MRN: [id number]  Patient Class: OP- Observation  Admission Date: 7/1/2022  Attending Physician: Dr. Rod  Primary Care Provider: Rajendra Devine PA-C         Patient information was obtained from patient, relative(s), past medical records and ER records.     Subjective:     Principal Problem:Acute renal failure superimposed on stage 3 chronic kidney disease    Chief Complaint:   Chief Complaint   Patient presents with    Fall     Ground level fall 1 hour pta, pt states her walker tipped over to the right and "took her with it", pt denies hitting head, denies loc, denies any pain from pain from fall    Hand Pain     Bilateral finger pain after being taken off of mobic last monday        HPI: Ms. Jones is an 88 year old female with a history of HTN, osteoarthritis, COPD, and hypothyroidism who presents today with complaints of rt foot pain. It is moderate. It is associated with erythema, gait disturbance, anorexia, and finger swelling and pain. She denies fever, chills, N/V/D, chest pain, or SOB. She was recently treated for diverticulitis on 6/14 with cipro, and she reported 10 days of n/v and decreased PO intake prior to that. She also had an E. Coli UTI that was pansensitive. Symptoms of anorexia and nausea persisted and she came to the ED on 6/21 for evaluation and CT showed resolution of diverticulitis, but possible UTI and she was started on cefuroxime for 3 days and the Rx was extended by PCP on 6/27 for 7 days. She is still taking it. At that time,  PCP refilled atenolol-chlorthalidone and told her to stop mobic d/t worsening renal function and prescribed norco. She is also taking lasix. Since then, she's had worsening joint pain, then her rt foot became swollen and erythematous and she cannot walk on it. She states both of her feet are painful to bear weight. Her rt 1st " "finger and left middle finger have become swollen and tender as well. She states she's never had this problem in the past. She lives in a house up some stairs that daughter calls a "tree house" and cannot get up the stairs, which was never a problem until about 2 days ago. Today, she also suffered a ground level fall d/t the pain from trying to walk. She's put herself on a liquid diet and has decreased PO intake as well. In the ED WBC 14K, creatinine 1.5 with baseline 1.2 and sodium is 129. Of note, there was concern on her previous CT abd/pelvis of possible mycobacterium infection in the lungs.       Past Medical History:   Diagnosis Date    Anxiety     Arthritis     Asthma     COPD (chronic obstructive pulmonary disease)     Hypertension     Pain in the abdomen     Thyroid disease        Past Surgical History:   Procedure Laterality Date    CATARACT EXTRACTION, BILATERAL      Dr Collins     SECTION      x's2    CHOLECYSTECTOMY      COLONOSCOPY      ESOPHAGOGASTRODUODENOSCOPY (EGD) WITH DILATION      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    LAPAROSCOPIC CHOLECYSTECTOMY N/A 2019    Procedure: CHOLECYSTECTOMY, LAPAROSCOPIC;  Surgeon: Toney Grace III, MD;  Location: [facility];  Service: General;  Laterality: N/A;    SKIN CANCER EXCISION      Dr.Weil       Review of patient's allergies indicates:   Allergen Reactions    Sulfa (sulfonamide antibiotics) Swelling    Androgenic anabolic steroid Swelling     Feet swelling     Aspirin Other (See Comments)     Upset stomach     Erythromycin Itching    Methylprednisolone Other (See Comments)    Penicillins Itching       No current facility-administered medications on file prior to encounter.     Current Outpatient Medications on File Prior to Encounter   Medication Sig    ALPRAZolam (XANAX) 0.5 MG tablet Take 1 tablet (0.5 mg total) by mouth 3 (three) times daily as needed for Anxiety.    atenoloL-chlorthalidone (TENORETIC) " 50-25 mg Tab Take 1 tablet by mouth once daily.    cefUROXime (CEFTIN) 500 MG tablet Take 1 tablet (500 mg total) by mouth 2 (two) times daily.    fluticasone-umeclidin-vilanter (TRELEGY ELLIPTA) 100-62.5-25 mcg DsDv Inhale 1 puff into the lungs once daily.    furosemide (LASIX) 20 MG tablet Take 1 tablet (20 mg total) by mouth 2 (two) times daily.    HYDROcodone-acetaminophen (NORCO)  mg per tablet Take 1 tablet by mouth every 4 (four) hours as needed for Pain. (Patient taking differently: Take 0.5 tablets by mouth every 4 (four) hours as needed for Pain.)    levothyroxine (SYNTHROID) 50 MCG tablet Take 1 tablet (50 mcg total) by mouth once daily.    ondansetron (ZOFRAN) 4 MG tablet Take 1 tablet (4 mg total) by mouth every 6 (six) hours as needed.    pantoprazole (PROTONIX) 20 MG tablet Take 2 tablets (40 mg total) by mouth once daily.    acetaminophen (TYLENOL) 500 MG tablet Take 500 mg by mouth nightly as needed for Pain.    albuterol sulfate (PROAIR RESPICLICK) 90 mcg/actuation inhaler Inhale 2 puffs into the lungs every 4 (four) hours. Rescue2 puffs every 4 hours as needed for cough, wheeze, or shortness of breath    albuterol-ipratropium (DUO-NEB) 2.5 mg-0.5 mg/3 mL nebulizer solution Take 3 mLs by nebulization every 6 (six) hours as needed.    clobetasoL 0.025 % Crea Apply topically.    diclofenac sodium (VOLTAREN) 1 % Gel Apply 2 g topically once daily.    potassium bicarbonate (K-LYTE) disintegrating tablet Take 1 tablet (25 mEq total) by mouth once daily.    potassium chloride SA (K-DUR,KLOR-CON) 20 MEQ tablet Take 1 tablet (20 mEq total) by mouth once daily.    [DISCONTINUED] chlordiazepoxide-clidinium 5-2.5 mg (LIBRAX) 5-2.5 mg Cap TAKE ONE CAPSULE BY MOUTH THREE TIMES DAILY BEFORE MEALS (Patient taking differently: Take 1 capsule by mouth 3 (three) times daily with meals. TAKE ONE CAPSULE BY MOUTH THREE TIMES DAILY BEFORE MEALS)    [DISCONTINUED] ciprofloxacin HCl (CIPRO) 500 MG  tablet Take 1 tablet (500 mg total) by mouth 2 (two) times daily.    [DISCONTINUED] doxycycline (MONODOX) 100 MG capsule Take 1 capsule (100 mg total) by mouth 2 (two) times daily.    [DISCONTINUED] HYDROcodone-acetaminophen (NORCO)  mg per tablet Take 1 tablet by mouth every 4 (four) hours as needed for Pain.    [DISCONTINUED] HYDROcodone-acetaminophen (NORCO)  mg per tablet Take 1 tablet by mouth every 4 (four) hours as needed for Pain.    [DISCONTINUED] HYDROcodone-acetaminophen (NORCO)  mg per tablet Take 1 tablet by mouth every 4 (four) hours as needed for Pain.    [DISCONTINUED] meloxicam (MOBIC) 7.5 MG tablet Take 1 tablet (7.5 mg total) by mouth once daily.    [DISCONTINUED] metroNIDAZOLE (FLAGYL) 500 MG tablet Take 1 tablet (500 mg total) by mouth 3 (three) times daily.    [DISCONTINUED] mupirocin (BACTROBAN) 2 % ointment Apply topically 2 (two) times daily.    [DISCONTINUED] naproxen (NAPROSYN) 375 MG tablet Take 1 tablet (375 mg total) by mouth daily as needed (arthritis).    [DISCONTINUED] ondansetron (ZOFRAN-ODT) 4 MG TbDL Take 1 tablet (4 mg total) by mouth every 6 (six) hours as needed (nausea).     Family History       Problem Relation (Age of Onset)    Depression Maternal Grandmother, Maternal Grandfather    Diabetes Mother          Tobacco Use    Smoking status: Former Smoker     Packs/day: 0.50     Years: 5.00     Pack years: 2.50     Quit date: 2/15/2005     Years since quittin.3    Smokeless tobacco: Never Used   Substance and Sexual Activity    Alcohol use: No    Drug use: No    Sexual activity: Never     Review of Systems   Constitutional:  Positive for appetite change and fatigue. Negative for activity change, chills, diaphoresis, fever and unexpected weight change.   HENT:  Negative for congestion, ear pain, facial swelling, hearing loss, sore throat and trouble swallowing.    Eyes:  Negative for pain and discharge.   Respiratory:  Negative for cough,  chest tightness, shortness of breath and wheezing.    Cardiovascular:  Negative for chest pain, palpitations and leg swelling.   Gastrointestinal:  Positive for nausea. Negative for abdominal pain, blood in stool, diarrhea and vomiting.   Endocrine: Negative for polydipsia, polyphagia and polyuria.   Genitourinary:  Negative for difficulty urinating, dysuria, flank pain, frequency and urgency.   Musculoskeletal:  Positive for arthralgias, gait problem and joint swelling. Negative for back pain, neck pain and neck stiffness.   Skin:  Positive for color change. Negative for rash and wound.   Allergic/Immunologic: Negative for environmental allergies and immunocompromised state.   Neurological:  Positive for weakness. Negative for dizziness, seizures, syncope, speech difficulty, light-headedness, numbness and headaches.   Hematological:  Negative for adenopathy.   Psychiatric/Behavioral:  Negative for sleep disturbance and suicidal ideas. The patient is not nervous/anxious.    All other systems reviewed and are negative.  Objective:     Vital Signs (Most Recent):  Temp: 98.5 °F (36.9 °C) (07/01/22 2049)  Pulse: 71 (07/02/22 0130)  Resp: 18 (07/01/22 2353)  BP: (!) 110/55 (07/02/22 0130)  SpO2: 95 % (07/02/22 0130)   Vital Signs (24h Range):  Temp:  [98.5 °F (36.9 °C)] 98.5 °F (36.9 °C)  Pulse:  [71-86] 71  Resp:  [18] 18  SpO2:  [93 %-98 %] 95 %  BP: (108-120)/(53-78) 110/55     Weight: 60.5 kg (133 lb 6.1 oz)  Body mass index is 26.94 kg/m².    Physical Exam  Vitals and nursing note reviewed.   Constitutional:       Appearance: Normal appearance.   HENT:      Head: Normocephalic and atraumatic.      Nose: Nose normal.      Mouth/Throat:      Mouth: Mucous membranes are moist.      Pharynx: Oropharynx is clear.   Eyes:      Extraocular Movements: Extraocular movements intact.      Pupils: Pupils are equal, round, and reactive to light.   Cardiovascular:      Rate and Rhythm: Normal rate and regular rhythm.      Pulses:  Normal pulses.   Pulmonary:      Effort: Pulmonary effort is normal.      Breath sounds: Wheezing present.      Comments: Mild exp wheeze in bilat upper lobes  Abdominal:      General: Bowel sounds are normal.      Palpations: Abdomen is soft.   Musculoskeletal:         General: Swelling present. Normal range of motion.      Cervical back: Normal range of motion and neck supple.      Right lower leg: Edema present.      Left lower leg: Edema present.      Comments: Rt 1st finger swelling at the joints, left middle finger swelling at the joints, rt foot 3+ edema, erythematous and tender to touch   Skin:     General: Skin is warm and dry.      Capillary Refill: Capillary refill takes less than 2 seconds.      Findings: Erythema present.   Neurological:      General: No focal deficit present.      Mental Status: She is alert and oriented to person, place, and time.   Psychiatric:         Mood and Affect: Mood normal.         Behavior: Behavior normal.         CRANIAL NERVES     CN III, IV, VI   Pupils are equal, round, and reactive to light.     Significant Labs: All pertinent labs within the past 24 hours have been reviewed.  CBC:   Recent Labs   Lab 07/01/22  2214   WBC 14.43*   HGB 10.9*   HCT 32.5*   *     CMP:   Recent Labs   Lab 07/01/22  2214   *   K 3.5   CL 86*   CO2 33*   *   BUN 32*   CREATININE 1.5*   CALCIUM 8.9   PROT 7.8   ALBUMIN 3.2*   BILITOT 1.4*   ALKPHOS 43*   AST 25   ALT 13   ANIONGAP 10   EGFRNONAA 30.9*       Urine Studies:   Recent Labs   Lab 07/01/22  2223   COLORU Yellow   APPEARANCEUA Clear   PHUR 6.0   SPECGRAV 1.010   PROTEINUA Trace*   GLUCUA Negative   KETONESU Negative   BILIRUBINUA Negative   OCCULTUA 1+*   NITRITE Negative   UROBILINOGEN Negative   LEUKOCYTESUR 2+*   RBCUA 1   WBCUA 31*   BACTERIA Negative   SQUAMEPITHEL 1   HYALINECASTS 5*       Significant Imaging: I have reviewed all pertinent imaging results/findings within the past 24 hours.  CXR: I have  reviewed all pertinent results/findings within the past 24 hours and my personal findings are:  awaiting official radiologist review, no acute consolidation noted    CT Abdomen Pelvis With Contrast    Result Date: 6/21/2022  CT ABDOMEN AND PELVIS WITH CONTRAST HISTORY: Left lower quadrant pain. Comparison to June 17. CMS MANDATED QUALITY DATA - CT RADIATION  436 All CT scans at this facility utilize dose modulation, iterative reconstruction, and/or weight based dosing when appropriate to reduce radiation dose to as low as reasonably achievable FINDINGS: Post infusion images were obtained from the lung bases to the pubic symphysis. 100 cc nonionic contrast was administered for the examination. Reticulonodular opacities in the right middle lobe are unchanged. The left lung base is unremarkable. There is mild hepatic steatosis. No liver mass or contour abnormality is identified. The gallbladder is absent. The biliary tree is nondilated. The spleen is normal in size and appearance. The pancreas and adrenal glands are normal. Bilateral simple renal cysts are noted. The abdominal aorta is densely calcified without aneurysm. There are multiple colonic diverticula at the level of the cecum. No focal inflammatory reaction is identified at this level on today's examination. The appendix is normal. All other bowel structures in the upper and mid abdomen are within normal limits. Images of the lower abdomen and pelvis demonstrates sigmoid diverticulosis. There is no evidence of focal inflammatory reaction on today's examination to indicate diverticulitis. No free air or free fluid is identified. The urinary bladder is unremarkable. No acute osseous abnormality is demonstrated. Multilevel lumbar degenerative disc and facet disease is noted. IMPRESSION: 1. Diverticulosis of the cecum and sigmoid colon. No CT evidence of acute diverticulitis on today's examination. No perforation or abscess. 2. Additional chronic findings as  discussed above. Electronically signed by:  Johan Byrne MD  6/21/2022 4:19 PM CDT Workstation: [id number]    CT Abdomen Pelvis  Without Contrast    Result Date: 6/17/2022  CMS MANDATED QUALITY DATA - CT RADIATION  436 All CT scans at this facility utilize dose modulation, iterative reconstruction, and/or weight based dosing when appropriate to reduce radiation dose to as low as reasonably achievable. CLINICAL HISTORY: 88 years (9/20/1933) Female Abdominal abscess/infection suspected TECHNIQUE: CT ABDOMEN PELVIS WITHOUT IV CONTRAST. 216 images obtained. Axial CT images of the abdomen and pelvis were obtained from the dome of the diaphragm to the proximal thigh. CONTRAST: No IV contrast was administered COMPARISON: None available. FINDINGS: Evaluation of the solid organs and vasculature is suboptimal without contrast. Lower Thorax: The heart is normal in size. No pleural or pericardial effusion is seen. There are tiny tree-in-bud opacities in the anterior right middle lobe (possibly from atypical mycobacterial infection) consider nonemergent follow-up CT. CT Abdomen: Liver: The liver is normal in size and imaging appearance. Gallbladder: The gallbladder is surgically absent. Biliary Tree: No intra or extrahepatic ductal dilation. Spleen: Within normal limits. Pancreas: The pancreas is normal. Adrenal Glands: The adrenal glands appear within normal limits. Kidneys: There is a 15 mm simple fluid attenuation structure likely representing a cyst in the upper pole the left kidney. No hydronephrosis, radiopaque stone or evidence of obstructive uropathy. Vasculature: Circumferential calcified plaques are seen in the abdominal aorta with no aneurysm. Lymph nodes: No abdominal lymphadenopathy is seen. Intraperitoneal structures: There is no ascites. Bowel: Marked colonic diverticulosis in the cecum and sigmoid colon and findings of mild diverticular inflammation in the cecum (image 110), and to a lesser degree in the  sigmoid colon (image 143). No finding of obstruction, intra-abdominal free air or abscess. The appendix is normal (image 127) Abdominal wall: The abdominal wall and musculature are normal. Musculoskeletal: There is degenerative disc disease and facet arthropathy in the lumbar spine with no aggressive appearing lytic or blastic lesions There is diffusely decreased osseous mineralization (suggesting osteoporosis/osteopenia). CT Pelvis: Bladder: The urinary bladder is within normal limits. Reproductive Organs: The uterus and ovaries are unremarkable. Pelvic Lymph nodes: No pelvic lymphadenopathy or pelvic mass is identified. IMPRESSION: 1. Marked sigmoid and ascending colonic diverticulosis, with findings of mild acute uncomplicated diverticulitis. No finding of obstruction, intra-abdominal free air or abscess. 2. Small reticulonodular opacities in the anterior right middle lobe suggestive of chronic mycobacterial infection, consider correlation with outside/priors. 3. Numerous additional and incidental findings as noted above. . Electronically signed by:  Ventura Dee MD  6/17/2022 12:33 PM CDT Workstation: [id number]       Right foot     Left hand       Rt hand   Assessment/Plan:     * Acute renal failure superimposed on stage 3 chronic kidney disease  Admit to med/surg  Likely d/t decreased oral intake with concurrent diuretic use and recent diverticulitis  Recently stopped mobic   Will give 250mL fluid bolus   Hold chlorthalidone and lasix   Daily wt/accurate I&O  Trend chemistries        Hyponatremia  Likely d/t dehydration   Saline bolus   Monitor daily chemistries      UTI (urinary tract infection)  Recent + E. Coli   Has been on cefuroxime and likely treated  Started on rocephin in ED   Will change to renally dosed levaquin as previous culture is sensitive and this will have broader skin and lung coverage in the event she does, in fact, have cellulitis       Arthritis  RA vs. Gout vs. Possibly cellulitis    Lower suspicion of cellulitis as patient just finished cipro then is currently on cefuroxime   Blood cultures drawn in ED   Check CRP/ESR/procal  Will start decadron, pt states she's allergic to methylprednisolone and had difficulty with prednisone as they made her itchy, but was able to tolerate in lower doses  Pt consult for gait disturbance     Dehydration  250 mL fluid bolus  Push oral rehydration   Hold diuretics       COPD with asthma  Appears at baseline  CT with concern of mycobacterium - would benefit from pulm f/u for diagnosis - avoiding azithromycin at this time as this can give false negative cultures   Continue home controllers  Albuterol PRN       VTE Risk Mitigation (From admission, onward)    None             Kandi Pierre, NP  Department of Hospital Medicine   [facility]

## 2022-07-02 NOTE — SUBJECTIVE & OBJECTIVE
Past Medical History:   Diagnosis Date    Anxiety     Arthritis     Asthma     COPD (chronic obstructive pulmonary disease)     Hypertension     Pain in the abdomen     Thyroid disease        Past Surgical History:   Procedure Laterality Date    CATARACT EXTRACTION, BILATERAL  2017    Dr Collins     SECTION      x's2    CHOLECYSTECTOMY      COLONOSCOPY  2009    ESOPHAGOGASTRODUODENOSCOPY (EGD) WITH DILATION      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    LAPAROSCOPIC CHOLECYSTECTOMY N/A 2019    Procedure: CHOLECYSTECTOMY, LAPAROSCOPIC;  Surgeon: Toney Grace III, MD;  Location: [facility];  Service: General;  Laterality: N/A;    SKIN CANCER EXCISION      Dr.Weil       Review of patient's allergies indicates:   Allergen Reactions    Sulfa (sulfonamide antibiotics) Swelling    Androgenic anabolic steroid Swelling     Feet swelling     Aspirin Other (See Comments)     Upset stomach     Erythromycin Itching    Methylprednisolone Other (See Comments)    Penicillins Itching       No current facility-administered medications on file prior to encounter.     Current Outpatient Medications on File Prior to Encounter   Medication Sig    ALPRAZolam (XANAX) 0.5 MG tablet Take 1 tablet (0.5 mg total) by mouth 3 (three) times daily as needed for Anxiety.    atenoloL-chlorthalidone (TENORETIC) 50-25 mg Tab Take 1 tablet by mouth once daily.    cefUROXime (CEFTIN) 500 MG tablet Take 1 tablet (500 mg total) by mouth 2 (two) times daily.    fluticasone-umeclidin-vilanter (TRELEGY ELLIPTA) 100-62.5-25 mcg DsDv Inhale 1 puff into the lungs once daily.    furosemide (LASIX) 20 MG tablet Take 1 tablet (20 mg total) by mouth 2 (two) times daily.    HYDROcodone-acetaminophen (NORCO)  mg per tablet Take 1 tablet by mouth every 4 (four) hours as needed for Pain. (Patient taking differently: Take 0.5 tablets by mouth every 4 (four) hours as needed for Pain.)    levothyroxine (SYNTHROID) 50 MCG tablet Take 1 tablet (50 mcg  total) by mouth once daily.    ondansetron (ZOFRAN) 4 MG tablet Take 1 tablet (4 mg total) by mouth every 6 (six) hours as needed.    pantoprazole (PROTONIX) 20 MG tablet Take 2 tablets (40 mg total) by mouth once daily.    acetaminophen (TYLENOL) 500 MG tablet Take 500 mg by mouth nightly as needed for Pain.    albuterol sulfate (PROAIR RESPICLICK) 90 mcg/actuation inhaler Inhale 2 puffs into the lungs every 4 (four) hours. Rescue2 puffs every 4 hours as needed for cough, wheeze, or shortness of breath    albuterol-ipratropium (DUO-NEB) 2.5 mg-0.5 mg/3 mL nebulizer solution Take 3 mLs by nebulization every 6 (six) hours as needed.    clobetasoL 0.025 % Crea Apply topically.    diclofenac sodium (VOLTAREN) 1 % Gel Apply 2 g topically once daily.    potassium bicarbonate (K-LYTE) disintegrating tablet Take 1 tablet (25 mEq total) by mouth once daily.    potassium chloride SA (K-DUR,KLOR-CON) 20 MEQ tablet Take 1 tablet (20 mEq total) by mouth once daily.    [DISCONTINUED] chlordiazepoxide-clidinium 5-2.5 mg (LIBRAX) 5-2.5 mg Cap TAKE ONE CAPSULE BY MOUTH THREE TIMES DAILY BEFORE MEALS (Patient taking differently: Take 1 capsule by mouth 3 (three) times daily with meals. TAKE ONE CAPSULE BY MOUTH THREE TIMES DAILY BEFORE MEALS)    [DISCONTINUED] ciprofloxacin HCl (CIPRO) 500 MG tablet Take 1 tablet (500 mg total) by mouth 2 (two) times daily.    [DISCONTINUED] doxycycline (MONODOX) 100 MG capsule Take 1 capsule (100 mg total) by mouth 2 (two) times daily.    [DISCONTINUED] HYDROcodone-acetaminophen (NORCO)  mg per tablet Take 1 tablet by mouth every 4 (four) hours as needed for Pain.    [DISCONTINUED] HYDROcodone-acetaminophen (NORCO)  mg per tablet Take 1 tablet by mouth every 4 (four) hours as needed for Pain.    [DISCONTINUED] HYDROcodone-acetaminophen (NORCO)  mg per tablet Take 1 tablet by mouth every 4 (four) hours as needed for Pain.    [DISCONTINUED] meloxicam (MOBIC) 7.5 MG tablet Take 1  tablet (7.5 mg total) by mouth once daily.    [DISCONTINUED] metroNIDAZOLE (FLAGYL) 500 MG tablet Take 1 tablet (500 mg total) by mouth 3 (three) times daily.    [DISCONTINUED] mupirocin (BACTROBAN) 2 % ointment Apply topically 2 (two) times daily.    [DISCONTINUED] naproxen (NAPROSYN) 375 MG tablet Take 1 tablet (375 mg total) by mouth daily as needed (arthritis).    [DISCONTINUED] ondansetron (ZOFRAN-ODT) 4 MG TbDL Take 1 tablet (4 mg total) by mouth every 6 (six) hours as needed (nausea).     Family History       Problem Relation (Age of Onset)    Depression Maternal Grandmother, Maternal Grandfather    Diabetes Mother          Tobacco Use    Smoking status: Former Smoker     Packs/day: 0.50     Years: 5.00     Pack years: 2.50     Quit date: 2/15/2005     Years since quittin.3    Smokeless tobacco: Never Used   Substance and Sexual Activity    Alcohol use: No    Drug use: No    Sexual activity: Never     Review of Systems   Constitutional:  Positive for appetite change and fatigue. Negative for activity change, chills, diaphoresis, fever and unexpected weight change.   HENT:  Negative for congestion, ear pain, facial swelling, hearing loss, sore throat and trouble swallowing.    Eyes:  Negative for pain and discharge.   Respiratory:  Negative for cough, chest tightness, shortness of breath and wheezing.    Cardiovascular:  Negative for chest pain, palpitations and leg swelling.   Gastrointestinal:  Positive for nausea. Negative for abdominal pain, blood in stool, diarrhea and vomiting.   Endocrine: Negative for polydipsia, polyphagia and polyuria.   Genitourinary:  Negative for difficulty urinating, dysuria, flank pain, frequency and urgency.   Musculoskeletal:  Positive for arthralgias, gait problem and joint swelling. Negative for back pain, neck pain and neck stiffness.   Skin:  Positive for color change. Negative for rash and wound.   Allergic/Immunologic: Negative for environmental allergies and  immunocompromised state.   Neurological:  Positive for weakness. Negative for dizziness, seizures, syncope, speech difficulty, light-headedness, numbness and headaches.   Hematological:  Negative for adenopathy.   Psychiatric/Behavioral:  Negative for sleep disturbance and suicidal ideas. The patient is not nervous/anxious.    All other systems reviewed and are negative.  Objective:     Vital Signs (Most Recent):  Temp: 98.5 °F (36.9 °C) (07/01/22 2049)  Pulse: 71 (07/02/22 0130)  Resp: 18 (07/01/22 2353)  BP: (!) 110/55 (07/02/22 0130)  SpO2: 95 % (07/02/22 0130)   Vital Signs (24h Range):  Temp:  [98.5 °F (36.9 °C)] 98.5 °F (36.9 °C)  Pulse:  [71-86] 71  Resp:  [18] 18  SpO2:  [93 %-98 %] 95 %  BP: (108-120)/(53-78) 110/55     Weight: 60.5 kg (133 lb 6.1 oz)  Body mass index is 26.94 kg/m².    Physical Exam  Vitals and nursing note reviewed.   Constitutional:       Appearance: Normal appearance.   HENT:      Head: Normocephalic and atraumatic.      Nose: Nose normal.      Mouth/Throat:      Mouth: Mucous membranes are moist.      Pharynx: Oropharynx is clear.   Eyes:      Extraocular Movements: Extraocular movements intact.      Pupils: Pupils are equal, round, and reactive to light.   Cardiovascular:      Rate and Rhythm: Normal rate and regular rhythm.      Pulses: Normal pulses.   Pulmonary:      Effort: Pulmonary effort is normal.      Breath sounds: Wheezing present.      Comments: Mild exp wheeze in bilat upper lobes  Abdominal:      General: Bowel sounds are normal.      Palpations: Abdomen is soft.   Musculoskeletal:         General: Swelling present. Normal range of motion.      Cervical back: Normal range of motion and neck supple.      Right lower leg: Edema present.      Left lower leg: Edema present.      Comments: Rt 1st finger swelling at the joints, left middle finger swelling at the joints, rt foot 3+ edema, erythematous and tender to touch   Skin:     General: Skin is warm and dry.      Capillary  Refill: Capillary refill takes less than 2 seconds.      Findings: Erythema present.   Neurological:      General: No focal deficit present.      Mental Status: She is alert and oriented to person, place, and time.   Psychiatric:         Mood and Affect: Mood normal.         Behavior: Behavior normal.         CRANIAL NERVES     CN III, IV, VI   Pupils are equal, round, and reactive to light.     Significant Labs: All pertinent labs within the past 24 hours have been reviewed.  CBC:   Recent Labs   Lab 07/01/22 2214   WBC 14.43*   HGB 10.9*   HCT 32.5*   *     CMP:   Recent Labs   Lab 07/01/22 2214   *   K 3.5   CL 86*   CO2 33*   *   BUN 32*   CREATININE 1.5*   CALCIUM 8.9   PROT 7.8   ALBUMIN 3.2*   BILITOT 1.4*   ALKPHOS 43*   AST 25   ALT 13   ANIONGAP 10   EGFRNONAA 30.9*       Urine Studies:   Recent Labs   Lab 07/01/22 2223   COLORU Yellow   APPEARANCEUA Clear   PHUR 6.0   SPECGRAV 1.010   PROTEINUA Trace*   GLUCUA Negative   KETONESU Negative   BILIRUBINUA Negative   OCCULTUA 1+*   NITRITE Negative   UROBILINOGEN Negative   LEUKOCYTESUR 2+*   RBCUA 1   WBCUA 31*   BACTERIA Negative   SQUAMEPITHEL 1   HYALINECASTS 5*       Significant Imaging: I have reviewed all pertinent imaging results/findings within the past 24 hours.  CXR: I have reviewed all pertinent results/findings within the past 24 hours and my personal findings are:  awaiting official radiologist review, no acute consolidation noted    CT Abdomen Pelvis With Contrast    Result Date: 6/21/2022  CT ABDOMEN AND PELVIS WITH CONTRAST HISTORY: Left lower quadrant pain. Comparison to June 17. CMS MANDATED QUALITY DATA - CT RADIATION  436 All CT scans at this facility utilize dose modulation, iterative reconstruction, and/or weight based dosing when appropriate to reduce radiation dose to as low as reasonably achievable FINDINGS: Post infusion images were obtained from the lung bases to the pubic symphysis. 100 cc nonionic contrast  was administered for the examination. Reticulonodular opacities in the right middle lobe are unchanged. The left lung base is unremarkable. There is mild hepatic steatosis. No liver mass or contour abnormality is identified. The gallbladder is absent. The biliary tree is nondilated. The spleen is normal in size and appearance. The pancreas and adrenal glands are normal. Bilateral simple renal cysts are noted. The abdominal aorta is densely calcified without aneurysm. There are multiple colonic diverticula at the level of the cecum. No focal inflammatory reaction is identified at this level on today's examination. The appendix is normal. All other bowel structures in the upper and mid abdomen are within normal limits. Images of the lower abdomen and pelvis demonstrates sigmoid diverticulosis. There is no evidence of focal inflammatory reaction on today's examination to indicate diverticulitis. No free air or free fluid is identified. The urinary bladder is unremarkable. No acute osseous abnormality is demonstrated. Multilevel lumbar degenerative disc and facet disease is noted. IMPRESSION: 1. Diverticulosis of the cecum and sigmoid colon. No CT evidence of acute diverticulitis on today's examination. No perforation or abscess. 2. Additional chronic findings as discussed above. Electronically signed by:  Johan Byrne MD  6/21/2022 4:19 PM CDT Workstation: [id number]    CT Abdomen Pelvis  Without Contrast    Result Date: 6/17/2022  CMS MANDATED QUALITY DATA - CT RADIATION  436 All CT scans at this facility utilize dose modulation, iterative reconstruction, and/or weight based dosing when appropriate to reduce radiation dose to as low as reasonably achievable. CLINICAL HISTORY: 88 years (9/20/1933) Female Abdominal abscess/infection suspected TECHNIQUE: CT ABDOMEN PELVIS WITHOUT IV CONTRAST. 216 images obtained. Axial CT images of the abdomen and pelvis were obtained from the dome of the diaphragm to the proximal  thigh. CONTRAST: No IV contrast was administered COMPARISON: None available. FINDINGS: Evaluation of the solid organs and vasculature is suboptimal without contrast. Lower Thorax: The heart is normal in size. No pleural or pericardial effusion is seen. There are tiny tree-in-bud opacities in the anterior right middle lobe (possibly from atypical mycobacterial infection) consider nonemergent follow-up CT. CT Abdomen: Liver: The liver is normal in size and imaging appearance. Gallbladder: The gallbladder is surgically absent. Biliary Tree: No intra or extrahepatic ductal dilation. Spleen: Within normal limits. Pancreas: The pancreas is normal. Adrenal Glands: The adrenal glands appear within normal limits. Kidneys: There is a 15 mm simple fluid attenuation structure likely representing a cyst in the upper pole the left kidney. No hydronephrosis, radiopaque stone or evidence of obstructive uropathy. Vasculature: Circumferential calcified plaques are seen in the abdominal aorta with no aneurysm. Lymph nodes: No abdominal lymphadenopathy is seen. Intraperitoneal structures: There is no ascites. Bowel: Marked colonic diverticulosis in the cecum and sigmoid colon and findings of mild diverticular inflammation in the cecum (image 110), and to a lesser degree in the sigmoid colon (image 143). No finding of obstruction, intra-abdominal free air or abscess. The appendix is normal (image 127) Abdominal wall: The abdominal wall and musculature are normal. Musculoskeletal: There is degenerative disc disease and facet arthropathy in the lumbar spine with no aggressive appearing lytic or blastic lesions There is diffusely decreased osseous mineralization (suggesting osteoporosis/osteopenia). CT Pelvis: Bladder: The urinary bladder is within normal limits. Reproductive Organs: The uterus and ovaries are unremarkable. Pelvic Lymph nodes: No pelvic lymphadenopathy or pelvic mass is identified. IMPRESSION: 1. Marked sigmoid and  ascending colonic diverticulosis, with findings of mild acute uncomplicated diverticulitis. No finding of obstruction, intra-abdominal free air or abscess. 2. Small reticulonodular opacities in the anterior right middle lobe suggestive of chronic mycobacterial infection, consider correlation with outside/priors. 3. Numerous additional and incidental findings as noted above. . Electronically signed by:  Ventura Dee MD  6/17/2022 12:33 PM CDT Workstation: [id number]

## 2022-07-02 NOTE — ASSESSMENT & PLAN NOTE
Recent + E. Coli   Has been on cefuroxime and likely treated  Started on rocephin in ED   Will change to renally dosed levaquin as previous culture is sensitive and this will have broader skin and lung coverage in the event she does, in fact, have cellulitis

## 2022-07-02 NOTE — PROGRESS NOTES
Pharmacist Renal Dose Adjustment Note    Valerie Jones is a 88 y.o. female being treated with Enoxaparin     Patient Data:    Vital Signs (Most Recent):  Temp: 98.4 °F (36.9 °C) (07/02/22 0245)  Pulse: 71 (07/02/22 0245)  Resp: 18 (07/02/22 0245)  BP: 113/64 (07/02/22 0245)  SpO2: (!) 92 % (07/02/22 0245)   Vital Signs (72h Range):  Temp:  [98.4 °F (36.9 °C)-98.5 °F (36.9 °C)]   Pulse:  [71-86]   Resp:  [18]   BP: (108-120)/(53-78)   SpO2:  [92 %-98 %]      Recent Labs   Lab 07/01/22 2214   CREATININE 1.5*     Serum creatinine: 1.5 mg/dL (H) 07/01/22 2214  Estimated creatinine clearance: 21.6 mL/min (A)   Body mass index is 26.14 kg/m².      Enoxaparin 40 mg SQ once daily will be changed to Enoxaparin 30 mg SQ once daily per pharmacy protocol.     Pharmacist's Name: Shaina Swenson  Pharmacist's Extension: [phone]

## 2022-07-02 NOTE — SUBJECTIVE & OBJECTIVE
Interval History:     Review of Systems  Objective:     Vital Signs (Most Recent):  Temp: 97.9 °F (36.6 °C) (07/02/22 1223)  Pulse: 68 (07/02/22 1223)  Resp: 15 (07/02/22 1223)  BP: (!) 101/57 (07/02/22 1223)  SpO2: (!) 93 % (07/02/22 1223)   Vital Signs (24h Range):  Temp:  [97.8 °F (36.6 °C)-98.5 °F (36.9 °C)] 97.9 °F (36.6 °C)  Pulse:  [68-86] 68  Resp:  [15-18] 15  SpO2:  [92 %-98 %] 93 %  BP: (101-120)/(53-78) 101/57     Weight: 58.7 kg (129 lb 6.6 oz)  Body mass index is 26.14 kg/m².    Intake/Output Summary (Last 24 hours) at 7/2/2022 1530  Last data filed at 7/2/2022 0148  Gross per 24 hour   Intake 50 ml   Output --   Net 50 ml      Physical Exam  Vitals and nursing note reviewed.   HENT:      Head: Normocephalic and atraumatic.   Eyes:      Conjunctiva/sclera: Conjunctivae normal.   Neck:      Vascular: No JVD.   Cardiovascular:      Heart sounds: Normal heart sounds.   Pulmonary:      Effort: Pulmonary effort is normal.      Breath sounds: Normal breath sounds.   Abdominal:      General: Bowel sounds are normal.      Palpations: Abdomen is soft.   Skin:     General: Skin is warm.   Neurological:      Mental Status: She is alert and oriented to person, place, and time.   Psychiatric:         Mood and Affect: Mood normal.       Significant Labs: All pertinent labs within the past 24 hours have been reviewed.  CBC:   Recent Labs   Lab 07/01/22 2214 07/02/22  0337   WBC 14.43* 12.66   HGB 10.9* 11.3*   HCT 32.5* 34.3*   * 464*     CMP:   Recent Labs   Lab 07/01/22 2214 07/02/22  0337   * 131*   K 3.5 3.2*   CL 86* 87*   CO2 33* 34*   * 98   BUN 32* 30*   CREATININE 1.5* 1.4   CALCIUM 8.9 8.9   PROT 7.8  --    ALBUMIN 3.2*  --    BILITOT 1.4*  --    ALKPHOS 43*  --    AST 25  --    ALT 13  --    ANIONGAP 10 10   EGFRNONAA 30.9* 33.6*     Cardiac Markers:   Recent Labs   Lab 07/01/22  2214   *       Significant Imaging: I have reviewed all pertinent imaging results/findings within  the past 24 hours.

## 2022-07-02 NOTE — ASSESSMENT & PLAN NOTE
Appear OA .  R/o RA vs. Gout    PLAN     Blood cultures drawn in ED   Start IV methylprednisone    allergic to methylprednisolone and startedprednisone. Will give gastric protection coverage   Pt refuse PT   Pt and family declined placement

## 2022-07-02 NOTE — HPI
"Ms. Jones is an 88 year old female with a history of HTN, osteoarthritis, COPD, and hypothyroidism who presents today with complaints of rt foot pain. It is moderate. It is associated with erythema, gait disturbance, anorexia, and finger swelling and pain. She denies fever, chills, N/V/D, chest pain, or SOB. She was recently treated for diverticulitis on 6/14 with cipro, and she reported 10 days of n/v and decreased PO intake prior to that. She also had an E. Coli UTI that was pansensitive. Symptoms of anorexia and nausea persisted and she came to the ED on 6/21 for evaluation and CT showed resolution of diverticulitis, but possible UTI and she was started on cefuroxime for 3 days and the Rx was extended by PCP on 6/27 for 7 days. She is still taking it. At that time,  PCP refilled atenolol-chlorthalidone and told her to stop mobic d/t worsening renal function and prescribed norco. She is also taking lasix. Since then, she's had worsening joint pain, then her rt foot became swollen and erythematous and she cannot walk on it. She states both of her feet are painful to bear weight. Her rt 1st finger and left middle finger have become swollen and tender as well. She states she's never had this problem in the past. She lives in a house up some stairs that daughter calls a "tree house" and cannot get up the stairs, which was never a problem until about 2 days ago. Today, she also suffered a ground level fall d/t the pain from trying to walk. She's put herself on a liquid diet and has decreased PO intake as well. In the ED WBC 14K, creatinine 1.5 with baseline 1.2 and sodium is 129. Of note, there was concern on her previous CT abd/pelvis of possible mycobacterium infection in the lungs.   "

## 2022-07-02 NOTE — CARE UPDATE
07/02/22 0935   Patient Assessment/Suction   Level of Consciousness (AVPU) alert   All Lung Fields Breath Sounds clear   PRE-TX-O2   O2 Device (Oxygen Therapy) nasal cannula   $ Is the patient on Low Flow Oxygen? Yes   Flow (L/min) 2   SpO2 96 %   Pulse Oximetry Type Intermittent   $ Pulse Oximetry - Multiple Charge Pulse Oximetry - Multiple   Pulse 69   Resp 15   Aerosol Therapy   $ Aerosol Therapy Charges PRN treatment not required   Education   $ Education Bronchodilator;15 min   Respiratory Evaluation   $ Care Plan Tech Time 15 min

## 2022-07-02 NOTE — ASSESSMENT & PLAN NOTE
Appears at baseline  CT with concern of mycobacterium - would benefit from pulm f/u for diagnosis - avoiding azithromycin at this time as this can give false negative cultures   Continue home controllers  Albuterol PRN

## 2022-07-02 NOTE — ASSESSMENT & PLAN NOTE
Appears at baseline  Previous CT with concern of mycobacterium - pulm f/u as outpatient   Albuterol PRN

## 2022-07-02 NOTE — HOSPITAL COURSE
"     HPI per admitting : Ms. Jones is an 88 year old female with a history of HTN, osteoarthritis, COPD, and hypothyroidism who presents today with complaints of rt foot pain. It is moderate. It is associated with erythema, gait disturbance, anorexia, and finger swelling and pain. She denies fever, chills, N/V/D, chest pain, or SOB. She was recently treated for diverticulitis on 6/14 with cipro, and she reported 10 days of n/v and decreased PO intake prior to that. She also had an E. Coli UTI that was pansensitive. Symptoms of anorexia and nausea persisted and she came to the ED on 6/21 for evaluation and CT showed resolution of diverticulitis, but possible UTI and she was started on cefuroxime for 3 days and the Rx was extended by PCP on 6/27 for 7 days. She is still taking it. At that time,  PCP refilled atenolol-chlorthalidone and told her to stop mobic d/t worsening renal function and prescribed norco. She is also taking lasix. Since then, she's had worsening joint pain, then her rt foot became swollen and erythematous and she cannot walk on it. She states both of her feet are painful to bear weight. Her rt 1st finger and left middle finger have become swollen and tender as well. She states she's never had this problem in the past. She lives in a house up some stairs that daughter calls a "tree house" and cannot get up the stairs, which was never a problem until about 2 days ago. Today, she also suffered a ground level fall d/t the pain from trying to walk. She's put herself on a liquid diet and has decreased PO intake as well. In the ED WBC 14K, creatinine 1.5 with baseline 1.2 and sodium is 129. Of note, there was concern on her previous CT abd/pelvis of possible mycobacterium infection in the lungs.     Hospital course  Patient was admitted with mild acute renal failure and hyponatremia.  She has history of urinary tract infection in the past with ESBL and currently growing ESBL again too.  Patient was " started on IV antibiotics since admission and later adjusted to cover ESBL.  She was given hydration and renal function back to normal.  She was on Lasix and chlorthalidone atenolol combination  medication and chlorthalidone was discontinued permanently and later resumed Lasix.  She is having arthritis changes in the fingers and also at the foot and started Decadron and patient did well and inflammatory markers came down and apparent improvement of the swelling of the fingers and foot.  At discharge taper Decadron was given along with gastric protection.  Urine culture came back ESBL E coli and patient received meropenem in hospital and discharged with nitrofurantoin .  No leukocytosis and no fever a patient is awake alert oriented and did well with physical therapy.

## 2022-07-02 NOTE — ASSESSMENT & PLAN NOTE
Admit to med/surg  Likely d/t decreased oral intake with concurrent diuretic use and recent diverticulitis  Recently stopped mobic   Will give 250mL fluid bolus   Hold chlorthalidone and lasix   Daily wt/accurate I&O  Trend chemistries

## 2022-07-02 NOTE — ASSESSMENT & PLAN NOTE
RA vs. Gout vs. Possibly cellulitis   Lower suspicion of cellulitis as patient just finished cipro then is currently on cefuroxime   Blood cultures drawn in ED   Check CRP/ESR/procal  Will start decadron, pt states she's allergic to methylprednisolone and had difficulty with prednisone as they made her itchy, but was able to tolerate in lower doses  Pt consult for gait disturbance

## 2022-07-02 NOTE — PROGRESS NOTES
"[facility]  Progress Note    Patient Name: Valerie Jones  MRN: [id number]  Patient Class: OP- Observation   Admission Date: 7/1/2022  Length of Stay: 0 days  Attending Physician: Nate Valencia MD  Primary Care Provider: Rajendra Devine PA-C        Subjective:     Principal Problem:Acute renal failure superimposed on stage 3 chronic kidney disease        HPI:  Ms. Jones is an 88 year old female with a history of HTN, osteoarthritis, COPD, and hypothyroidism who presents today with complaints of rt foot pain. It is moderate. It is associated with erythema, gait disturbance, anorexia, and finger swelling and pain. She denies fever, chills, N/V/D, chest pain, or SOB. She was recently treated for diverticulitis on 6/14 with cipro, and she reported 10 days of n/v and decreased PO intake prior to that. She also had an E. Coli UTI that was pansensitive. Symptoms of anorexia and nausea persisted and she came to the ED on 6/21 for evaluation and CT showed resolution of diverticulitis, but possible UTI and she was started on cefuroxime for 3 days and the Rx was extended by PCP on 6/27 for 7 days. She is still taking it. At that time,  PCP refilled atenolol-chlorthalidone and told her to stop mobic d/t worsening renal function and prescribed norco. She is also taking lasix. Since then, she's had worsening joint pain, then her rt foot became swollen and erythematous and she cannot walk on it. She states both of her feet are painful to bear weight. Her rt 1st finger and left middle finger have become swollen and tender as well. She states she's never had this problem in the past. She lives in a house up some stairs that daughter calls a "tree house" and cannot get up the stairs, which was never a problem until about 2 days ago. Today, she also suffered a ground level fall d/t the pain from trying to walk. She's put herself on a liquid diet and has decreased PO intake as well. In the ED WBC 14K, " creatinine 1.5 with baseline 1.2 and sodium is 129. Of note, there was concern on her previous CT abd/pelvis of possible mycobacterium infection in the lungs.       Overview/Hospital Course:  Patient was seen and examined.  Joint swellings noted.  Severely elevated inflammatory markers noted.  She was not able to eat much for previous 2 weeks and only on liquid diet.  Making some nausea and decreased appetite.  Was taking over-the-counter and see how these prior to it..  She is to follow-up with Dr. Bah and he did not recommend EGD at this time.  Otherwise no chest pain, shortness of breath.  Mild burning sensation at urination      Interval History:     Review of Systems  Objective:     Vital Signs (Most Recent):  Temp: 97.9 °F (36.6 °C) (07/02/22 1223)  Pulse: 68 (07/02/22 1223)  Resp: 15 (07/02/22 1223)  BP: (!) 101/57 (07/02/22 1223)  SpO2: (!) 93 % (07/02/22 1223)   Vital Signs (24h Range):  Temp:  [97.8 °F (36.6 °C)-98.5 °F (36.9 °C)] 97.9 °F (36.6 °C)  Pulse:  [68-86] 68  Resp:  [15-18] 15  SpO2:  [92 %-98 %] 93 %  BP: (101-120)/(53-78) 101/57     Weight: 58.7 kg (129 lb 6.6 oz)  Body mass index is 26.14 kg/m².    Intake/Output Summary (Last 24 hours) at 7/2/2022 1530  Last data filed at 7/2/2022 0148  Gross per 24 hour   Intake 50 ml   Output --   Net 50 ml      Physical Exam  Vitals and nursing note reviewed.   HENT:      Head: Normocephalic and atraumatic.   Eyes:      Conjunctiva/sclera: Conjunctivae normal.   Neck:      Vascular: No JVD.   Cardiovascular:      Heart sounds: Normal heart sounds.   Pulmonary:      Effort: Pulmonary effort is normal.      Breath sounds: Normal breath sounds.   Abdominal:      General: Bowel sounds are normal.      Palpations: Abdomen is soft.   Skin:     General: Skin is warm.   Neurological:      Mental Status: She is alert and oriented to person, place, and time.   Psychiatric:         Mood and Affect: Mood normal.       Significant Labs: All pertinent labs within  the past 24 hours have been reviewed.  CBC:   Recent Labs   Lab 07/01/22 2214 07/02/22 0337   WBC 14.43* 12.66   HGB 10.9* 11.3*   HCT 32.5* 34.3*   * 464*     CMP:   Recent Labs   Lab 07/01/22 2214 07/02/22 0337   * 131*   K 3.5 3.2*   CL 86* 87*   CO2 33* 34*   * 98   BUN 32* 30*   CREATININE 1.5* 1.4   CALCIUM 8.9 8.9   PROT 7.8  --    ALBUMIN 3.2*  --    BILITOT 1.4*  --    ALKPHOS 43*  --    AST 25  --    ALT 13  --    ANIONGAP 10 10   EGFRNONAA 30.9* 33.6*     Cardiac Markers:   Recent Labs   Lab 07/01/22 2214   *       Significant Imaging: I have reviewed all pertinent imaging results/findings within the past 24 hours.      Assessment/Plan:      * Acute renal failure superimposed on stage 3 chronic kidney disease  better  Likely d/t decreased oral intake with concurrent diuretic use and recent diverticulitis  Recently stopped mobic     Plan   S/p bolus and better  Hold chlorthalidone and lasix   Daily wt/accurate I&O          Hyponatremia  Likely d/t dehydration , hypovolemia         UTI (urinary tract infection)  Recent + E. Coli  And symptoms+    PLAN    levaquin   Follow urine and blood culture     Arthritis  Appear OA .  R/o RA vs. Gout    PLAN     Blood cultures drawn in ED   Start IV methylprednisone    allergic to methylprednisolone and startedprednisone. Will give gastric protection coverage   Pt refuse PT   Pt and family declined placement     Dehydration  s/p250 mL fluid bolus  Push oral rehydration   Hold diuretics       COPD with asthma  Appears at baseline  Previous CT with concern of mycobacterium - pulm f/u as outpatient   Albuterol PRN         VTE Risk Mitigation (From admission, onward)         Ordered     enoxaparin injection 30 mg  Daily         07/02/22 0314     IP VTE HIGH RISK PATIENT  Once         07/02/22 0248     Place sequential compression device  Until discontinued         07/02/22 0248                Discharge Planning   MYLA:      Code Status: Full  Code   Is the patient medically ready for discharge?:     Reason for patient still in hospital (select all that apply): Treatment                     Nate Valencia MD  Department of Hospital Medicine   Formerly [facility]

## 2022-07-02 NOTE — PT/OT/SLP PROGRESS
Physical Therapy      Patient Name:  Valerie Jones   MRN:  [id number]    Patient not seen today secondary to Patient fatigue, Pain, requested eval tomorrow. Will follow-up 7/3/22.

## 2022-07-02 NOTE — ED PROVIDER NOTES
"Encounter Date: 2022       History     Chief Complaint   Patient presents with    Fall     Ground level fall 1 hour pta, pt states her walker tipped over to the right and "took her with it", pt denies hitting head, denies loc, denies any pain from pain from fall    Hand Pain     Bilateral finger pain after being taken off of mobic last monday     88-year-old female with a history of arthritis, COPD, hypertension, presents to emergency department with a fall.  She says that she has a heavy purse and was walking with her walker and cold her walker and down and she hit.  Says that she went on her buttocks.  She did not hit her head.  Says that the only thing that hurts is her left 3rd finger.  Her feet bilaterally heard as well.  She says that this is because her doctor took her off the Mobic about a week ago because it was affecting her kidneys.  She says that she has had swelling in her feet bilaterally right greater than left .  She does not think that she injured her right foot or ankle in the fall.  Daughter states that she has been fatigued as well for the last week.  Patient also endorses  an occasional dry cough.  No fever no chills.          Review of patient's allergies indicates:   Allergen Reactions    Sulfa (sulfonamide antibiotics) Swelling    Androgenic anabolic steroid Swelling     Feet swelling     Aspirin Other (See Comments)     Upset stomach     Erythromycin Itching    Methylprednisolone Other (See Comments)    Penicillins Itching     Past Medical History:   Diagnosis Date    Anxiety     Arthritis     Asthma     COPD (chronic obstructive pulmonary disease)     Hypertension     Pain in the abdomen 2019    Thyroid disease      Past Surgical History:   Procedure Laterality Date    CATARACT EXTRACTION, BILATERAL  2017    Dr Collins     SECTION      x's2    CHOLECYSTECTOMY      COLONOSCOPY  2009    ESOPHAGOGASTRODUODENOSCOPY (EGD) WITH DILATION  2017     " [place]-esophageal web    LAPAROSCOPIC CHOLECYSTECTOMY N/A 2019    Procedure: CHOLECYSTECTOMY, LAPAROSCOPIC;  Surgeon: Toney Grace III, MD;  Location: [facility];  Service: General;  Laterality: N/A;    SKIN CANCER EXCISION      Dr.Weil     Family History   Problem Relation Age of Onset    Diabetes Mother     Depression Maternal Grandmother     Depression Maternal Grandfather      Social History     Tobacco Use    Smoking status: Former Smoker     Packs/day: 0.50     Years: 5.00     Pack years: 2.50     Quit date: 2/15/2005     Years since quittin.3    Smokeless tobacco: Never Used   Substance Use Topics    Alcohol use: No    Drug use: No     Review of Systems   Constitutional: Positive for fatigue. Negative for chills and fever.   HENT: Negative for sore throat.    Respiratory: Positive for cough. Negative for shortness of breath.    Cardiovascular: Positive for leg swelling (Feet swelling). Negative for chest pain and palpitations.   Gastrointestinal: Negative for abdominal pain, nausea and vomiting.   Genitourinary: Negative for dysuria.   Musculoskeletal: Positive for arthralgias. Negative for back pain.   Skin: Negative for rash.   Neurological: Negative for weakness and headaches.   Hematological: Does not bruise/bleed easily.   All other systems reviewed and are negative.      Physical Exam     Initial Vitals [22]   BP Pulse Resp Temp SpO2   116/78 86 18 98.5 °F (36.9 °C) 95 %      MAP       --         Physical Exam    Nursing note and vitals reviewed.  Constitutional: She appears well-developed and well-nourished. No distress.   HENT:   Head: Normocephalic and atraumatic.   Mouth/Throat: No oropharyngeal exudate.   Eyes: Conjunctivae and EOM are normal. Pupils are equal, round, and reactive to light.   Neck: Neck supple. No tracheal deviation present.   Normal range of motion.  Cardiovascular: Normal rate, regular rhythm, normal heart sounds and intact distal pulses.    No murmur heard.  Pulmonary/Chest: Breath sounds normal. No respiratory distress. She has no wheezes. She has no rhonchi. She has no rales.   Abdominal: Abdomen is soft. She exhibits no distension. There is no abdominal tenderness. There is no rebound.   Musculoskeletal:         General: Edema (Bilateral feet right greater than left.) present. No tenderness. Normal range of motion.      Cervical back: Normal range of motion and neck supple.      Comments: Right foot with a mild amount of erythema to the lateral aspect of the right foot and ankle.  Tenderness with range of motion in the right ankle and tenderness on the lateral aspect of the right foot.    Left 3rd finger.  There is some swelling and some redness in the DIP joint in the distal 3rd finger.  Patient is able to bend the joint/range it with minimal difficulty.     Neurological: She is alert and oriented to person, place, and time. She has normal strength. No cranial nerve deficit or sensory deficit. GCS score is 15. GCS eye subscore is 4. GCS verbal subscore is 5. GCS motor subscore is 6.   Skin: Skin is warm and dry. Capillary refill takes less than 2 seconds. No rash noted. No erythema. No pallor.   Psychiatric: She has a normal mood and affect. Thought content normal.         ED Course   Procedures  Labs Reviewed   CBC W/ AUTO DIFFERENTIAL - Abnormal; Notable for the following components:       Result Value    WBC 14.43 (*)     RBC 3.76 (*)     Hemoglobin 10.9 (*)     Hematocrit 32.5 (*)     RDW 14.7 (*)     Platelets 473 (*)     Gran # (ANC) 11.4 (*)     Immature Grans (Abs) 0.06 (*)     Mono # 1.3 (*)     Gran % 78.9 (*)     Lymph % 11.2 (*)     All other components within normal limits   COMPREHENSIVE METABOLIC PANEL - Abnormal; Notable for the following components:    Sodium 129 (*)     Chloride 86 (*)     CO2 33 (*)     Glucose 115 (*)     BUN 32 (*)     Creatinine 1.5 (*)     Albumin 3.2 (*)     Total Bilirubin 1.4 (*)     Alkaline Phosphatase  43 (*)     eGFR if  35.6 (*)     eGFR if non  30.9 (*)     All other components within normal limits   URINALYSIS, REFLEX TO URINE CULTURE - Abnormal; Notable for the following components:    Protein, UA Trace (*)     Occult Blood UA 1+ (*)     Leukocytes, UA 2+ (*)     All other components within normal limits    Narrative:     Specimen Source->Urine   B-TYPE NATRIURETIC PEPTIDE - Abnormal; Notable for the following components:     (*)     All other components within normal limits   URINALYSIS MICROSCOPIC - Abnormal; Notable for the following components:    WBC, UA 31 (*)     Hyaline Casts, UA 5 (*)     All other components within normal limits    Narrative:     Specimen Source->Urine   C-REACTIVE PROTEIN - Abnormal; Notable for the following components:    CRP 21.20 (*)     All other components within normal limits   CULTURE, BLOOD   CULTURE, BLOOD   CULTURE, URINE   SARS-COV-2 RNA AMPLIFICATION, QUAL   MAGNESIUM   C-REACTIVE PROTEIN   LACTIC ACID, PLASMA   PROCALCITONIN   SEDIMENTATION RATE           Results for orders placed or performed during the hospital encounter of 07/01/22   CBC auto differential   Result Value Ref Range    WBC 14.43 (H) 3.90 - 12.70 K/uL    RBC 3.76 (L) 4.00 - 5.40 M/uL    Hemoglobin 10.9 (L) 12.0 - 16.0 g/dL    Hematocrit 32.5 (L) 37.0 - 48.5 %    MCV 86 82 - 98 fL    MCH 29.0 27.0 - 31.0 pg    MCHC 33.5 32.0 - 36.0 g/dL    RDW 14.7 (H) 11.5 - 14.5 %    Platelets 473 (H) 150 - 450 K/uL    MPV 9.9 9.2 - 12.9 fL    Immature Granulocytes 0.4 0.0 - 0.5 %    Gran # (ANC) 11.4 (H) 1.8 - 7.7 K/uL    Immature Grans (Abs) 0.06 (H) 0.00 - 0.04 K/uL    Lymph # 1.6 1.0 - 4.8 K/uL    Mono # 1.3 (H) 0.3 - 1.0 K/uL    Eos # 0.0 0.0 - 0.5 K/uL    Baso # 0.03 0.00 - 0.20 K/uL    nRBC 0 0 /100 WBC    Gran % 78.9 (H) 38.0 - 73.0 %    Lymph % 11.2 (L) 18.0 - 48.0 %    Mono % 9.3 4.0 - 15.0 %    Eosinophil % 0.0 0.0 - 8.0 %    Basophil % 0.2 0.0 - 1.9 %    Differential  Method Automated    Comprehensive metabolic panel   Result Value Ref Range    Sodium 129 (L) 136 - 145 mmol/L    Potassium 3.5 3.5 - 5.1 mmol/L    Chloride 86 (L) 95 - 110 mmol/L    CO2 33 (H) 23 - 29 mmol/L    Glucose 115 (H) 70 - 110 mg/dL    BUN 32 (H) 8 - 23 mg/dL    Creatinine 1.5 (H) 0.5 - 1.4 mg/dL    Calcium 8.9 8.7 - 10.5 mg/dL    Total Protein 7.8 6.0 - 8.4 g/dL    Albumin 3.2 (L) 3.5 - 5.2 g/dL    Total Bilirubin 1.4 (H) 0.1 - 1.0 mg/dL    Alkaline Phosphatase 43 (L) 55 - 135 U/L    AST 25 10 - 40 U/L    ALT 13 10 - 44 U/L    Anion Gap 10 8 - 16 mmol/L    eGFR if African American 35.6 (A) >60 mL/min/1.73 m^2    eGFR if non African American 30.9 (A) >60 mL/min/1.73 m^2   Urinalysis, Reflex to Urine Culture Urine, Clean Catch    Specimen: Urine, Clean Catch   Result Value Ref Range    Specimen UA Urine, Clean Catch     Color, UA Yellow Yellow, Straw, Amanda    Appearance, UA Clear Clear    pH, UA 6.0 5.0 - 8.0    Specific Gravity, UA 1.010 1.005 - 1.030    Protein, UA Trace (A) Negative    Glucose, UA Negative Negative    Ketones, UA Negative Negative    Bilirubin (UA) Negative Negative    Occult Blood UA 1+ (A) Negative    Nitrite, UA Negative Negative    Urobilinogen, UA Negative Negative EU/dL    Leukocytes, UA 2+ (A) Negative   COVID-19 Rapid Screening   Result Value Ref Range    SARS-CoV-2 RNA, Amplification, Qual Negative Negative   Magnesium   Result Value Ref Range    Magnesium 2.1 1.6 - 2.6 mg/dL   Brain natriuretic peptide   Result Value Ref Range     (H) 0 - 99 pg/mL   Urinalysis Microscopic   Result Value Ref Range    RBC, UA 1 0 - 4 /hpf    WBC, UA 31 (H) 0 - 5 /hpf    Bacteria Negative None-Occ /hpf    Squam Epithel, UA 1 /hpf    Hyaline Casts, UA 5 (A) 0-1/lpf /lpf    Microscopic Comment SEE COMMENT    C-Reactive Protein   Result Value Ref Range    CRP 21.20 (H) <0.76 mg/dL     X-Ray Ankle Complete Right   ED Interpretation   No fracture       X-Ray Chest 1 View   ED Interpretation   No  acute process       X-Ray Finger 2 or More Views Left   ED Interpretation   DJD ,no fracture        X-Ray Foot Complete Right   ED Interpretation   No fracture           Imaging Results          X-Ray Ankle Complete Right (Preliminary result)  Result time 07/01/22 23:54:29    ED Interpretation by Mushtaq Berger DO (07/01/22 23:54:29, [facility], Emergency Medicine)    No fracture                              X-Ray Chest 1 View (Preliminary result)  Result time 07/01/22 23:55:12    ED Interpretation by Mushtaq Berger DO (07/01/22 23:55:12, [facility], Emergency Medicine)    No acute process                              X-Ray Finger 2 or More Views Left (Preliminary result)  Result time 07/01/22 23:54:40    ED Interpretation by Mushtaq Berger DO (07/01/22 23:54:40, [facility], Emergency Medicine)    DJD ,no fracture                               X-Ray Foot Complete Right (Preliminary result)  Result time 07/01/22 23:54:49    ED Interpretation by Mushtaq Berger DO (07/01/22 23:54:49, [facility], Emergency Medicine)    No fracture                                Medications   dexamethasone injection 4 mg (has no administration in time range)   cefTRIAXone (ROCEPHIN) 1 g/50 mL D5W IVPB (has no administration in time range)   HYDROcodone-acetaminophen 5-325 mg per tablet 1 tablet (1 tablet Oral Given 7/1/22 2353)   ondansetron disintegrating tablet 4 mg (4 mg Oral Given 7/1/22 2353)   cefTRIAXone (ROCEPHIN) 1 g/50 mL D5W IVPB (0 g Intravenous Stopped 7/2/22 0148)     Medical Decision Making:   ED Management:  80-year-old female presents emergency department after a fall with some foot pain finger pain after being taken off of her meloxicam 1 week ago.  She is well-appearing, nontoxic no distress.  She does have a 14,000 white count, right foot has some mild cellulitis verses inflammation  secondary to process such as gout or some other arthritis.  Doubt septic arthritis.  Patient also has inflammation to her D IP joint no finger which could be consistent with gout as well.  Patient is dehydrated with some JOHN.  She also is mildly hyponatremic.  I referred to the hospitalist for admission for fluids I did give her antibiotics for possible.             ED Course as of 07/02/22 0228   Sat Jul 02, 2022   0112 Kandi will admit the patient to the hospital [JR]      ED Course User Index  [JR] Mushtaq Berger DO             Clinical Impression:   Final diagnoses:  [T14.90XA] Trauma  [W19.XXXA] Fall  [L03.119] Cellulitis of foot (Primary)  [E87.1] Hyponatremia  [N17.9] JOHN (acute kidney injury)  [N30.00] Acute cystitis without hematuria          ED Disposition Condition    Observation               Mushtaq Berger DO  07/02/22 0228

## 2022-07-03 LAB
ANION GAP SERPL CALC-SCNC: 8 MMOL/L (ref 8–16)
BASOPHILS # BLD AUTO: 0.01 K/UL (ref 0–0.2)
BASOPHILS NFR BLD: 0.1 % (ref 0–1.9)
BUN SERPL-MCNC: 37 MG/DL (ref 8–23)
CALCIUM SERPL-MCNC: 8.9 MG/DL (ref 8.7–10.5)
CHLORIDE SERPL-SCNC: 92 MMOL/L (ref 95–110)
CO2 SERPL-SCNC: 33 MMOL/L (ref 23–29)
CREAT SERPL-MCNC: 1.1 MG/DL (ref 0.5–1.4)
CRP SERPL-MCNC: 19.78 MG/DL
DIFFERENTIAL METHOD: ABNORMAL
EOSINOPHIL # BLD AUTO: 0 K/UL (ref 0–0.5)
EOSINOPHIL NFR BLD: 0 % (ref 0–8)
ERYTHROCYTE [DISTWIDTH] IN BLOOD BY AUTOMATED COUNT: 14.5 % (ref 11.5–14.5)
EST. GFR  (AFRICAN AMERICAN): 51.8 ML/MIN/1.73 M^2
EST. GFR  (NON AFRICAN AMERICAN): 44.9 ML/MIN/1.73 M^2
GLUCOSE SERPL-MCNC: 127 MG/DL (ref 70–110)
HCT VFR BLD AUTO: 31.6 % (ref 37–48.5)
HGB BLD-MCNC: 10.3 G/DL (ref 12–16)
IMM GRANULOCYTES # BLD AUTO: 0.07 K/UL (ref 0–0.04)
IMM GRANULOCYTES NFR BLD AUTO: 0.6 % (ref 0–0.5)
LYMPHOCYTES # BLD AUTO: 0.7 K/UL (ref 1–4.8)
LYMPHOCYTES NFR BLD: 5.8 % (ref 18–48)
MAGNESIUM SERPL-MCNC: 2.3 MG/DL (ref 1.6–2.6)
MCH RBC QN AUTO: 28.9 PG (ref 27–31)
MCHC RBC AUTO-ENTMCNC: 32.6 G/DL (ref 32–36)
MCV RBC AUTO: 89 FL (ref 82–98)
MONOCYTES # BLD AUTO: 0.5 K/UL (ref 0.3–1)
MONOCYTES NFR BLD: 4.2 % (ref 4–15)
NEUTROPHILS # BLD AUTO: 10.9 K/UL (ref 1.8–7.7)
NEUTROPHILS NFR BLD: 89.3 % (ref 38–73)
NRBC BLD-RTO: 0 /100 WBC
PLATELET # BLD AUTO: 485 K/UL (ref 150–450)
PMV BLD AUTO: 10.2 FL (ref 9.2–12.9)
POTASSIUM SERPL-SCNC: 4 MMOL/L (ref 3.5–5.1)
RBC # BLD AUTO: 3.57 M/UL (ref 4–5.4)
SODIUM SERPL-SCNC: 133 MMOL/L (ref 136–145)
WBC # BLD AUTO: 12.17 K/UL (ref 3.9–12.7)

## 2022-07-03 PROCEDURE — 99900031 HC PATIENT EDUCATION (STAT)

## 2022-07-03 PROCEDURE — 25000003 PHARM REV CODE 250: Performed by: INTERNAL MEDICINE

## 2022-07-03 PROCEDURE — 99900035 HC TECH TIME PER 15 MIN (STAT)

## 2022-07-03 PROCEDURE — 25000003 PHARM REV CODE 250: Performed by: NURSE PRACTITIONER

## 2022-07-03 PROCEDURE — 12000002 HC ACUTE/MED SURGE SEMI-PRIVATE ROOM

## 2022-07-03 PROCEDURE — 36415 COLL VENOUS BLD VENIPUNCTURE: CPT | Performed by: NURSE PRACTITIONER

## 2022-07-03 PROCEDURE — 80048 BASIC METABOLIC PNL TOTAL CA: CPT | Performed by: NURSE PRACTITIONER

## 2022-07-03 PROCEDURE — 63600175 PHARM REV CODE 636 W HCPCS: Performed by: NURSE PRACTITIONER

## 2022-07-03 PROCEDURE — 36415 COLL VENOUS BLD VENIPUNCTURE: CPT | Performed by: EMERGENCY MEDICINE

## 2022-07-03 PROCEDURE — 63600175 PHARM REV CODE 636 W HCPCS: Performed by: FAMILY MEDICINE

## 2022-07-03 PROCEDURE — 94761 N-INVAS EAR/PLS OXIMETRY MLT: CPT

## 2022-07-03 PROCEDURE — 97161 PT EVAL LOW COMPLEX 20 MIN: CPT

## 2022-07-03 PROCEDURE — 27000221 HC OXYGEN, UP TO 24 HOURS

## 2022-07-03 PROCEDURE — 83735 ASSAY OF MAGNESIUM: CPT | Performed by: NURSE PRACTITIONER

## 2022-07-03 PROCEDURE — 97530 THERAPEUTIC ACTIVITIES: CPT

## 2022-07-03 PROCEDURE — 85025 COMPLETE CBC W/AUTO DIFF WBC: CPT | Performed by: NURSE PRACTITIONER

## 2022-07-03 PROCEDURE — 94799 UNLISTED PULMONARY SVC/PX: CPT

## 2022-07-03 PROCEDURE — 86140 C-REACTIVE PROTEIN: CPT | Performed by: EMERGENCY MEDICINE

## 2022-07-03 RX ADMIN — ONDANSETRON 4 MG: 4 TABLET, ORALLY DISINTEGRATING ORAL at 05:07

## 2022-07-03 RX ADMIN — DEXAMETHASONE SODIUM PHOSPHATE 4 MG: 4 INJECTION, SOLUTION INTRA-ARTICULAR; INTRALESIONAL; INTRAMUSCULAR; INTRAVENOUS; SOFT TISSUE at 09:07

## 2022-07-03 RX ADMIN — ATENOLOL 50 MG: 50 TABLET ORAL at 09:07

## 2022-07-03 RX ADMIN — ALPRAZOLAM 0.5 MG: 0.5 TABLET ORAL at 01:07

## 2022-07-03 RX ADMIN — HYDROCODONE BITARTRATE AND ACETAMINOPHEN 1 TABLET: 5; 325 TABLET ORAL at 05:07

## 2022-07-03 RX ADMIN — PANTOPRAZOLE SODIUM 40 MG: 40 TABLET, DELAYED RELEASE ORAL at 05:07

## 2022-07-03 RX ADMIN — ONDANSETRON 4 MG: 4 TABLET, ORALLY DISINTEGRATING ORAL at 01:07

## 2022-07-03 RX ADMIN — HYDROCODONE BITARTRATE AND ACETAMINOPHEN 1 TABLET: 5; 325 TABLET ORAL at 12:07

## 2022-07-03 RX ADMIN — SENNOSIDES AND DOCUSATE SODIUM 1 TABLET: 50; 8.6 TABLET ORAL at 09:07

## 2022-07-03 RX ADMIN — ACETAMINOPHEN 650 MG: 325 TABLET ORAL at 01:07

## 2022-07-03 RX ADMIN — LEVOTHYROXINE SODIUM 50 MCG: 0.03 TABLET ORAL at 05:07

## 2022-07-03 RX ADMIN — ENOXAPARIN SODIUM 30 MG: 30 INJECTION SUBCUTANEOUS at 05:07

## 2022-07-03 NOTE — SUBJECTIVE & OBJECTIVE
Interval History:     Review of Systems  Objective:     Vital Signs (Most Recent):  Temp: 97.9 °F (36.6 °C) (07/03/22 1115)  Pulse: 65 (07/03/22 1115)  Resp: 18 (07/03/22 1115)  BP: (!) 98/56 (07/03/22 1115)  SpO2: (!) 90 % (07/03/22 1115)   Vital Signs (24h Range):  Temp:  [97.4 °F (36.3 °C)-98.5 °F (36.9 °C)] 97.9 °F (36.6 °C)  Pulse:  [64-74] 65  Resp:  [15-18] 18  SpO2:  [90 %-99 %] 90 %  BP: ()/(54-66) 98/56     Weight: 58.7 kg (129 lb 6.6 oz)  Body mass index is 26.14 kg/m².    Intake/Output Summary (Last 24 hours) at 7/3/2022 1131  Last data filed at 7/2/2022 2020  Gross per 24 hour   Intake 600 ml   Output --   Net 600 ml      Physical Exam  Vitals and nursing note reviewed.   HENT:      Head: Normocephalic and atraumatic.   Eyes:      Conjunctiva/sclera: Conjunctivae normal.   Neck:      Vascular: No JVD.   Cardiovascular:      Heart sounds: Normal heart sounds.   Pulmonary:      Effort: Pulmonary effort is normal.      Breath sounds: Normal breath sounds.   Abdominal:      General: Bowel sounds are normal.      Palpations: Abdomen is soft.   Skin:     General: Skin is warm.   Neurological:      Mental Status: She is alert and oriented to person, place, and time.   Psychiatric:         Mood and Affect: Mood normal.       Significant Labs: All pertinent labs within the past 24 hours have been reviewed.  CBC:   Recent Labs   Lab 07/01/22 2214 07/02/22  0337 07/03/22  0450   WBC 14.43* 12.66 12.17   HGB 10.9* 11.3* 10.3*   HCT 32.5* 34.3* 31.6*   * 464* 485*     CMP:   Recent Labs   Lab 07/01/22 2214 07/02/22  0337 07/03/22  0450   * 131* 133*   K 3.5 3.2* 4.0   CL 86* 87* 92*   CO2 33* 34* 33*   * 98 127*   BUN 32* 30* 37*   CREATININE 1.5* 1.4 1.1   CALCIUM 8.9 8.9 8.9   PROT 7.8  --   --    ALBUMIN 3.2*  --   --    BILITOT 1.4*  --   --    ALKPHOS 43*  --   --    AST 25  --   --    ALT 13  --   --    ANIONGAP 10 10 8   EGFRNONAA 30.9* 33.6* 44.9*       Significant Imaging: I have  reviewed all pertinent imaging results/findings within the past 24 hours.

## 2022-07-03 NOTE — CARE UPDATE
07/03/22 0852   Patient Assessment/Suction   Level of Consciousness (AVPU) alert   Respiratory Effort Normal;Unlabored   Expansion/Accessory Muscles/Retractions no use of accessory muscles   All Lung Fields Breath Sounds diminished   Rhythm/Pattern, Respiratory no shortness of breath reported   PRE-TX-O2   O2 Device (Oxygen Therapy) nasal cannula   $ Is the patient on Low Flow Oxygen? Yes   Flow (L/min) 1   SpO2 (!) 94 %   Pulse Oximetry Type Intermittent   $ Pulse Oximetry - Multiple Charge Pulse Oximetry - Multiple   Pulse 64   Resp 18   Aerosol Therapy   $ Aerosol Therapy Charges PRN treatment not required   Inhaler   $ Inhaler Charges Other (see comments)  (waiting on med from home)   Education   $ Education Other (see comment);15 min   Respiratory Evaluation   $ Care Plan Tech Time 15 min   $ Eval/Re-eval Charges Evaluation

## 2022-07-03 NOTE — PT/OT/SLP EVAL
Physical Therapy Evaluation    Patient Name:  Valerie Jones   MRN:  [id number]    Recommendations:     Discharge Recommendations:  home health PT   Discharge Equipment Recommendations: 3-in-1 commode   Barriers to discharge: Pt lives in a raised house, but will have assist with all mobility     Assessment:     Valerie Jones is a 88 y.o. female admitted with a medical diagnosis of Acute renal failure superimposed on stage 3 chronic kidney disease.  She presents with the following impairments/functional limitations:  weakness, impaired endurance, impaired functional mobilty, gait instability, impaired balance, decreased lower extremity function, pain, impaired cardiopulmonary response to activity.    Pt found HOB elevated and agreeable to working with PT. Pt A & O x  4 and has the following co-morbidities: COPD, RA(per pt), UTI.  Pt tolerated session fairly due to c/o pain in the bottoms of both feet since she was taken off Mobic which typically controls her RA pain. Pt required moderate to minimal A for safe mobilization during session today. Pt would benefit from acute PT during hospitalization to increase strength, endurance and safety with mobility and would benefit from HH PT upon  discharge home.      Rehab Prognosis: Fair; patient would benefit from acute skilled PT services to address these deficits and reach maximum level of function.    Recent Surgery: * No surgery found *      Plan:     During this hospitalization, patient to be seen daily to address the identified rehab impairments via gait training, therapeutic activities, therapeutic exercises and progress toward the following goals:    · Plan of Care Expires:  08/07/22    Subjective     Chief Complaint: pain in both feet  Patient/Family Comments/goals: HH PT  Pain/Comfort:  · Pain Rating 1:  (not rated)  · Location - Side 1: Bilateral  · Location 1: foot  · Pain Addressed 1: Reposition, Distraction, Cessation of Activity, Nurse notified    Patients  cultural, spiritual, Anglican conflicts given the current situation:      Living Environment:  Pt lives in a raised house (12 step entry) with a son who is with her full time.  Prior to admission, patients level of function was independent to modified independent with mobility & drives.  Equipment used at home: walker, rolling, rollator, shower chair.  DME owned (not currently used): none.  Upon discharge, patient will have assistance from her sons and DIL.    Objective:     Communicated with RN Aidan prior to session.  Patient found HOB elevated with bed alarm, oxygen, peripheral IV, telemetry  upon PT entry to room.    General Precautions: Standard, fall   Orthopedic Precautions:N/A   Braces: N/A  Respiratory Status: Nasal cannula, flow 1 L/min    Exams:  · Cognitive Exam:  Patient is oriented to Person, Place, Time and Situation  · RLE ROM: WFL  · RLE Strength: WFL  · LLE ROM: WFL  · LLE Strength: WFL    Functional Mobility:  · Bed Mobility:     · Scooting: minimum assistance  · Supine to Sit: minimum assistance and moderate assistance  · Sit to Supine: minimum assistance and moderate assistance  · Transfers:     · Sit to Stand:  minimum assistance and moderate assistance with hand-held assist and rolling walker  · Bed to Chair: minimum assistance with  rolling walker  using  Step Transfer  · Toilet Transfer: minimum assistance with  rolling walker  using  Step Transfer; pt req mod A for brief management and hygiene    Therapeutic Activities and Exercises:   Pt was educated on the following: call light use, importance of OOB activity and functional mobility to negate the negative effects of prolonged bed rest during this hospitalization, safe transfers/ambulation and discharge planning recommendations/options.      AM-PAC 6 CLICK MOBILITY  Total Score:17     Patient left up in chair with all lines intact, call button in reach, chair alarm on and RN notified.    GOALS:   Multidisciplinary Problems     Physical  Therapy Goals        Problem: Physical Therapy    Goal Priority Disciplines Outcome Goal Variances Interventions   Physical Therapy Goal     PT, PT/OT      Description: Goals to be met by: 22     Patient will increase functional independence with mobility by performin. Supine to sit with Supervision  2. Sit to stand transfer with Supervision  3. Bed to chair transfer with Supervision using Rolling Walker  4. Gait  x 150 feet with Supervision using Rolling Walker.   5. Ascend/Descend 12 steps with B handrails and SBA                         History:     Past Medical History:   Diagnosis Date    Anxiety     Arthritis     Asthma     COPD (chronic obstructive pulmonary disease)     Hypertension     Pain in the abdomen     Thyroid disease        Past Surgical History:   Procedure Laterality Date    CATARACT EXTRACTION, BILATERAL      Dr Collins     SECTION      x's2    CHOLECYSTECTOMY      COLONOSCOPY  2009    ESOPHAGOGASTRODUODENOSCOPY (EGD) WITH DILATION      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    LAPAROSCOPIC CHOLECYSTECTOMY N/A 2019    Procedure: CHOLECYSTECTOMY, LAPAROSCOPIC;  Surgeon: Toney Grace III, MD;  Location: [facility];  Service: General;  Laterality: N/A;    SKIN CANCER EXCISION      Dr.Weil       Time Tracking:     PT Received On: 22  PT Start Time: 939     PT Stop Time: 1005  PT Total Time (min): 26 min     Billable Minutes: Evaluation 10 and Therapeutic Activity 16      2022

## 2022-07-03 NOTE — PROGRESS NOTES
"Replaced by [facility]  Progress Note    Patient Name: Valerie Jones  MRN: [id number]  Patient Class: IP- Inpatient   Admission Date: 7/1/2022  Length of Stay: 1 days  Attending Physician: Nate Valencia MD  Primary Care Provider: Rajendra Devine PA-C        Subjective:     Principal Problem:Acute renal failure superimposed on stage 3 chronic kidney disease        HPI:  Ms. Jones is an 88 year old female with a history of HTN, osteoarthritis, COPD, and hypothyroidism who presents today with complaints of rt foot pain. It is moderate. It is associated with erythema, gait disturbance, anorexia, and finger swelling and pain. She denies fever, chills, N/V/D, chest pain, or SOB. She was recently treated for diverticulitis on 6/14 with cipro, and she reported 10 days of n/v and decreased PO intake prior to that. She also had an E. Coli UTI that was pansensitive. Symptoms of anorexia and nausea persisted and she came to the ED on 6/21 for evaluation and CT showed resolution of diverticulitis, but possible UTI and she was started on cefuroxime for 3 days and the Rx was extended by PCP on 6/27 for 7 days. She is still taking it. At that time,  PCP refilled atenolol-chlorthalidone and told her to stop mobic d/t worsening renal function and prescribed norco. She is also taking lasix. Since then, she's had worsening joint pain, then her rt foot became swollen and erythematous and she cannot walk on it. She states both of her feet are painful to bear weight. Her rt 1st finger and left middle finger have become swollen and tender as well. She states she's never had this problem in the past. She lives in a house up some stairs that daughter calls a "tree house" and cannot get up the stairs, which was never a problem until about 2 days ago. Today, she also suffered a ground level fall d/t the pain from trying to walk. She's put herself on a liquid diet and has decreased PO intake as well. In the ED WBC 14K, " creatinine 1.5 with baseline 1.2 and sodium is 129. Of note, there was concern on her previous CT abd/pelvis of possible mycobacterium infection in the lungs.       Overview/Hospital Course:  Patient was seen and examined.  Joint swellings noted.  Severely elevated inflammatory markers noted.  She was not able to eat much for previous 2 weeks and only on liquid diet.  Making some nausea and decreased appetite.  Was taking over-the-counter and see how these prior to it..  She is to follow-up with Dr. Bah and he did not recommend EGD at this time.  Otherwise no chest pain, shortness of breath.  Mild burning sensation at urination    7/3  UC neg  Feeling better  JOHN resolved   Sitting up in chair .  Discussed about discharge plan , she does not want to go until tomorrow         Interval History:     Review of Systems  Objective:     Vital Signs (Most Recent):  Temp: 97.9 °F (36.6 °C) (07/03/22 1115)  Pulse: 65 (07/03/22 1115)  Resp: 18 (07/03/22 1115)  BP: (!) 98/56 (07/03/22 1115)  SpO2: (!) 90 % (07/03/22 1115)   Vital Signs (24h Range):  Temp:  [97.4 °F (36.3 °C)-98.5 °F (36.9 °C)] 97.9 °F (36.6 °C)  Pulse:  [64-74] 65  Resp:  [15-18] 18  SpO2:  [90 %-99 %] 90 %  BP: ()/(54-66) 98/56     Weight: 58.7 kg (129 lb 6.6 oz)  Body mass index is 26.14 kg/m².    Intake/Output Summary (Last 24 hours) at 7/3/2022 1131  Last data filed at 7/2/2022 2020  Gross per 24 hour   Intake 600 ml   Output --   Net 600 ml      Physical Exam  Vitals and nursing note reviewed.   HENT:      Head: Normocephalic and atraumatic.   Eyes:      Conjunctiva/sclera: Conjunctivae normal.   Neck:      Vascular: No JVD.   Cardiovascular:      Heart sounds: Normal heart sounds.   Pulmonary:      Effort: Pulmonary effort is normal.      Breath sounds: Normal breath sounds.   Abdominal:      General: Bowel sounds are normal.      Palpations: Abdomen is soft.   Skin:     General: Skin is warm.   Neurological:      Mental Status: She is alert  and oriented to person, place, and time.   Psychiatric:         Mood and Affect: Mood normal.       Significant Labs: All pertinent labs within the past 24 hours have been reviewed.  CBC:   Recent Labs   Lab 07/01/22 2214 07/02/22 0337 07/03/22  0450   WBC 14.43* 12.66 12.17   HGB 10.9* 11.3* 10.3*   HCT 32.5* 34.3* 31.6*   * 464* 485*     CMP:   Recent Labs   Lab 07/01/22 2214 07/02/22 0337 07/03/22  0450   * 131* 133*   K 3.5 3.2* 4.0   CL 86* 87* 92*   CO2 33* 34* 33*   * 98 127*   BUN 32* 30* 37*   CREATININE 1.5* 1.4 1.1   CALCIUM 8.9 8.9 8.9   PROT 7.8  --   --    ALBUMIN 3.2*  --   --    BILITOT 1.4*  --   --    ALKPHOS 43*  --   --    AST 25  --   --    ALT 13  --   --    ANIONGAP 10 10 8   EGFRNONAA 30.9* 33.6* 44.9*       Significant Imaging: I have reviewed all pertinent imaging results/findings within the past 24 hours.      Assessment/Plan:      * Acute renal failure superimposed on stage 3 chronic kidney disease  resolved  Likely d/t decreased oral intake with concurrent diuretic use and recent diverticulitis  Recently stopped mobic     Plan   S/p bolus and better  Hold chlorthalidone and lasix until tomorrow   Daily wt/accurate I&O          Hyponatremia  Likely d/t dehydration , hypovolemia         UTI (urinary tract infection)  Recent + E. Coli  And symptoms+    PLAN    levaquin   Urine culture negative . May be partially treated     Arthritis  Appear OA .  R/o RA vs. Gout    PLAN     Blood cultures negative    allergic to methylprednisolone and startedprednisone. Will give gastric protection coverage   Pt refuse PT   Pt and family declined placement     Dehydration  s/p250 mL fluid bolus  Push oral rehydration   Hold diuretics       COPD with asthma  Appears at baseline  Previous CT with concern of mycobacterium - pulm f/u as outpatient   Albuterol PRN   She had oxygen at home . Prescribed by dr tran        VTE Risk Mitigation (From admission, onward)         Ordered      enoxaparin injection 30 mg  Daily         07/02/22 0314     IP VTE HIGH RISK PATIENT  Once         07/02/22 0248     Place sequential compression device  Until discontinued         07/02/22 0248                Discharge Planning   MYLA:      Code Status: Full Code   Is the patient medically ready for discharge?:     Reason for patient still in hospital (select all that apply): Treatment                     Nate Valencia MD  Department of Hospital Medicine   [facility]

## 2022-07-03 NOTE — PLAN OF CARE
Goals to be met by: 22     Patient will increase functional independence with mobility by performin. Supine to sit with Supervision  2. Sit to stand transfer with Supervision  3. Bed to chair transfer with Supervision using Rolling Walker  4. Gait  x 150 feet with Supervision using Rolling Walker.   5. Ascend/Descend 12 steps with B handrails and SBA

## 2022-07-03 NOTE — PLAN OF CARE
[facility]  Initial Discharge Assessment       Primary Care Provider: Reed Andersen MD    Admission Diagnosis: Cellulitis of foot [L03.119]    Admission Date: 7/1/2022  Expected Discharge Date:       met with patient at bedside to complete assessment. Demographics, PCP and insurance verified. Patient oriented xs 4. Patient lives with son. Patient has RW and Rollator at home. No current HH. No dialysis. Patient will be transported home by family. No further needs to discuss at this time. Case management to assist with any additional needs at discharge.     Discharge Barriers Identified: None    Payor: MEDICARE / Plan: MEDICARE PART A & B / Product Type: Government /     Extended Emergency Contact Information  Primary Emergency Contact: Danita Jones  Address: [address]           [place] [address] [place]  Mobile Phone: [phone]  Relation: Daughter  Preferred language: English   needed? No  Secondary Emergency Contact: Ventura Jones  Mobile Phone: [phone]  Relation: Son  Preferred language: English   needed? No    Discharge Plan A: Home with family  Discharge Plan B: [facility]      [facility] - [place] - [address]  [address] [address]  Phone: [phone] Fax: [phone]      Initial Assessment (most recent)     Adult Discharge Assessment - 07/03/22 1410        Discharge Assessment    Assessment Type Discharge Planning Assessment     Confirmed/corrected address, phone number and insurance Yes     Confirmed Demographics Correct on Facesheet     Source of Information patient;family     Lives With child(gerald), adult     Do you expect to return to your current living situation? Yes     Do you have help at home or someone to help you manage your care at home? Yes     Who are your caregiver(s) and their phone number(s)? Family     Prior to hospitilization cognitive status:  Alert/Oriented     Current cognitive status: Alert/Oriented     Walking or Climbing Stairs Difficulty ambulation difficulty, requires equipment     Mobility Management RW or Rollator     Dressing/Bathing Difficulty none     Home Accessibility wheelchair accessible     Equipment Currently Used at Home walker, rolling;rollator;shower chair     Patient currently being followed by outpatient case management? No     Do you currently have service(s) that help you manage your care at home? No     Do you take prescription medications? Yes     Do you have prescription coverage? Yes     Do you have any problems affording any of your prescribed medications? No     Is the patient taking medications as prescribed? yes     Who is going to help you get home at discharge? Family     How do you get to doctors appointments? family or friend will provide     Are you on dialysis? No     Do you take coumadin? No     Discharge Plan A Home with family     Discharge Plan B Home Health     DME Needed Upon Discharge  bedside commode     Discharge Plan discussed with: Patient;Adult children     Discharge Barriers Identified None

## 2022-07-03 NOTE — ASSESSMENT & PLAN NOTE
Appear OA .  R/o RA vs. Gout    PLAN     Blood cultures negative    allergic to methylprednisolone and startedprednisone. Will give gastric protection coverage   Pt refuse PT   Pt and family declined placement

## 2022-07-03 NOTE — ASSESSMENT & PLAN NOTE
Appears at baseline  Previous CT with concern of mycobacterium - pulm f/u as outpatient   Albuterol PRN   She had oxygen at home . Prescribed by dr tran

## 2022-07-03 NOTE — ASSESSMENT & PLAN NOTE
Recent + E. Coli  And symptoms+    PLAN    levaquin   Urine culture negative . May be partially treated

## 2022-07-03 NOTE — CARE UPDATE
07/03/22 0107   Patient Assessment/Suction   Level of Consciousness (AVPU) alert   Respiratory Effort Normal;Unlabored   Expansion/Accessory Muscles/Retractions no use of accessory muscles   All Lung Fields Breath Sounds clear   PRE-TX-O2   O2 Device (Oxygen Therapy) nasal cannula   Flow (L/min) 2   SpO2 (!) 94 %   Pulse Oximetry Type Intermittent   $ Pulse Oximetry - Multiple Charge Pulse Oximetry - Multiple   Pulse 74   Resp 16   Aerosol Therapy   $ Aerosol Therapy Charges PRN treatment not required   Daily Review of Necessity (SVN) completed   Respiratory Treatment Status (SVN) PRN treatment not required   Respiratory Evaluation   $ Care Plan Tech Time 15 min   Continue tx. As ordered

## 2022-07-04 LAB
ANION GAP SERPL CALC-SCNC: 10 MMOL/L (ref 8–16)
BACTERIA UR CULT: ABNORMAL
BASOPHILS # BLD AUTO: 0.01 K/UL (ref 0–0.2)
BASOPHILS NFR BLD: 0.1 % (ref 0–1.9)
BUN SERPL-MCNC: 52 MG/DL (ref 8–23)
CALCIUM SERPL-MCNC: 8.9 MG/DL (ref 8.7–10.5)
CHLORIDE SERPL-SCNC: 91 MMOL/L (ref 95–110)
CO2 SERPL-SCNC: 33 MMOL/L (ref 23–29)
CREAT SERPL-MCNC: 1 MG/DL (ref 0.5–1.4)
DIFFERENTIAL METHOD: ABNORMAL
EOSINOPHIL # BLD AUTO: 0 K/UL (ref 0–0.5)
EOSINOPHIL NFR BLD: 0 % (ref 0–8)
ERYTHROCYTE [DISTWIDTH] IN BLOOD BY AUTOMATED COUNT: 14.7 % (ref 11.5–14.5)
EST. GFR  (AFRICAN AMERICAN): 58.1 ML/MIN/1.73 M^2
EST. GFR  (NON AFRICAN AMERICAN): 50.4 ML/MIN/1.73 M^2
GLUCOSE SERPL-MCNC: 139 MG/DL (ref 70–110)
HCT VFR BLD AUTO: 31 % (ref 37–48.5)
HGB BLD-MCNC: 10.2 G/DL (ref 12–16)
IMM GRANULOCYTES # BLD AUTO: 0.07 K/UL (ref 0–0.04)
IMM GRANULOCYTES NFR BLD AUTO: 0.6 % (ref 0–0.5)
LYMPHOCYTES # BLD AUTO: 0.7 K/UL (ref 1–4.8)
LYMPHOCYTES NFR BLD: 6.1 % (ref 18–48)
MAGNESIUM SERPL-MCNC: 2.4 MG/DL (ref 1.6–2.6)
MCH RBC QN AUTO: 28.9 PG (ref 27–31)
MCHC RBC AUTO-ENTMCNC: 32.9 G/DL (ref 32–36)
MCV RBC AUTO: 88 FL (ref 82–98)
MONOCYTES # BLD AUTO: 0.5 K/UL (ref 0.3–1)
MONOCYTES NFR BLD: 4.2 % (ref 4–15)
NEUTROPHILS # BLD AUTO: 10.7 K/UL (ref 1.8–7.7)
NEUTROPHILS NFR BLD: 89 % (ref 38–73)
NRBC BLD-RTO: 0 /100 WBC
PLATELET # BLD AUTO: 494 K/UL (ref 150–450)
PMV BLD AUTO: 10 FL (ref 9.2–12.9)
POTASSIUM SERPL-SCNC: 4 MMOL/L (ref 3.5–5.1)
RBC # BLD AUTO: 3.53 M/UL (ref 4–5.4)
SODIUM SERPL-SCNC: 134 MMOL/L (ref 136–145)
WBC # BLD AUTO: 12.04 K/UL (ref 3.9–12.7)

## 2022-07-04 PROCEDURE — 25000003 PHARM REV CODE 250: Performed by: NURSE PRACTITIONER

## 2022-07-04 PROCEDURE — 85025 COMPLETE CBC W/AUTO DIFF WBC: CPT | Performed by: NURSE PRACTITIONER

## 2022-07-04 PROCEDURE — 99900031 HC PATIENT EDUCATION (STAT)

## 2022-07-04 PROCEDURE — 94799 UNLISTED PULMONARY SVC/PX: CPT

## 2022-07-04 PROCEDURE — 36415 COLL VENOUS BLD VENIPUNCTURE: CPT | Performed by: NURSE PRACTITIONER

## 2022-07-04 PROCEDURE — 94640 AIRWAY INHALATION TREATMENT: CPT

## 2022-07-04 PROCEDURE — 97116 GAIT TRAINING THERAPY: CPT

## 2022-07-04 PROCEDURE — 63600175 PHARM REV CODE 636 W HCPCS: Performed by: INTERNAL MEDICINE

## 2022-07-04 PROCEDURE — 12000002 HC ACUTE/MED SURGE SEMI-PRIVATE ROOM

## 2022-07-04 PROCEDURE — 80048 BASIC METABOLIC PNL TOTAL CA: CPT | Performed by: NURSE PRACTITIONER

## 2022-07-04 PROCEDURE — 83735 ASSAY OF MAGNESIUM: CPT | Performed by: NURSE PRACTITIONER

## 2022-07-04 PROCEDURE — 99900035 HC TECH TIME PER 15 MIN (STAT)

## 2022-07-04 PROCEDURE — 94761 N-INVAS EAR/PLS OXIMETRY MLT: CPT

## 2022-07-04 PROCEDURE — 25000242 PHARM REV CODE 250 ALT 637 W/ HCPCS: Performed by: NURSE PRACTITIONER

## 2022-07-04 PROCEDURE — 97530 THERAPEUTIC ACTIVITIES: CPT

## 2022-07-04 PROCEDURE — 63600175 PHARM REV CODE 636 W HCPCS: Performed by: NURSE PRACTITIONER

## 2022-07-04 PROCEDURE — 63600175 PHARM REV CODE 636 W HCPCS: Performed by: FAMILY MEDICINE

## 2022-07-04 RX ORDER — MEROPENEM AND SODIUM CHLORIDE 1 G/50ML
1 INJECTION, SOLUTION INTRAVENOUS
Status: DISCONTINUED | OUTPATIENT
Start: 2022-07-04 | End: 2022-07-05 | Stop reason: HOSPADM

## 2022-07-04 RX ADMIN — ACETAMINOPHEN 650 MG: 325 TABLET ORAL at 02:07

## 2022-07-04 RX ADMIN — SENNOSIDES AND DOCUSATE SODIUM 1 TABLET: 50; 8.6 TABLET ORAL at 10:07

## 2022-07-04 RX ADMIN — DEXAMETHASONE SODIUM PHOSPHATE 4 MG: 4 INJECTION, SOLUTION INTRA-ARTICULAR; INTRALESIONAL; INTRAMUSCULAR; INTRAVENOUS; SOFT TISSUE at 09:07

## 2022-07-04 RX ADMIN — MEROPENEM AND SODIUM CHLORIDE 1 G: 1 INJECTION, SOLUTION INTRAVENOUS at 09:07

## 2022-07-04 RX ADMIN — MEROPENEM AND SODIUM CHLORIDE 1 G: 1 INJECTION, SOLUTION INTRAVENOUS at 10:07

## 2022-07-04 RX ADMIN — LEVOFLOXACIN 750 MG: 750 INJECTION, SOLUTION INTRAVENOUS at 04:07

## 2022-07-04 RX ADMIN — ATENOLOL 50 MG: 50 TABLET ORAL at 09:07

## 2022-07-04 RX ADMIN — SENNOSIDES AND DOCUSATE SODIUM 1 TABLET: 50; 8.6 TABLET ORAL at 09:07

## 2022-07-04 RX ADMIN — LEVOTHYROXINE SODIUM 50 MCG: 0.03 TABLET ORAL at 05:07

## 2022-07-04 RX ADMIN — ENOXAPARIN SODIUM 30 MG: 30 INJECTION SUBCUTANEOUS at 04:07

## 2022-07-04 RX ADMIN — DEXAMETHASONE SODIUM PHOSPHATE 4 MG: 4 INJECTION, SOLUTION INTRA-ARTICULAR; INTRALESIONAL; INTRAMUSCULAR; INTRAVENOUS; SOFT TISSUE at 10:07

## 2022-07-04 RX ADMIN — ALPRAZOLAM 0.5 MG: 0.5 TABLET ORAL at 02:07

## 2022-07-04 RX ADMIN — IPRATROPIUM BROMIDE AND ALBUTEROL SULFATE 3 ML: .5; 3 SOLUTION RESPIRATORY (INHALATION) at 09:07

## 2022-07-04 RX ADMIN — PANTOPRAZOLE SODIUM 40 MG: 40 TABLET, DELAYED RELEASE ORAL at 05:07

## 2022-07-04 RX ADMIN — HYDROCODONE BITARTRATE AND ACETAMINOPHEN 1 TABLET: 5; 325 TABLET ORAL at 10:07

## 2022-07-04 NOTE — SUBJECTIVE & OBJECTIVE
nterval History:     Review of Systems  Objective:     Vital Signs (Most Recent):  Temp: 98.5 °F (36.9 °C) (07/04/22 1121)  Pulse: 76 (07/04/22 1121)  Resp: 18 (07/04/22 1121)  BP: (!) 108/53 (07/04/22 1121)  SpO2: 96 % (07/04/22 1121)   Vital Signs (24h Range):  Temp:  [98 °F (36.7 °C)-98.5 °F (36.9 °C)] 98.5 °F (36.9 °C)  Pulse:  [66-76] 76  Resp:  [16-18] 18  SpO2:  [91 %-96 %] 96 %  BP: (105-127)/(53-65) 108/53     Weight: 58.7 kg (129 lb 6.6 oz)  Body mass index is 26.14 kg/m².    Intake/Output Summary (Last 24 hours) at 7/4/2022 1143  Last data filed at 7/4/2022 0501  Gross per 24 hour   Intake 480 ml   Output 350 ml   Net 130 ml      Physical Exam  Vitals and nursing note reviewed.   HENT:      Head: Normocephalic and atraumatic.   Eyes:      Conjunctiva/sclera: Conjunctivae normal.   Neck:      Vascular: No JVD.   Cardiovascular:      Rate and Rhythm: Normal rate.      Heart sounds: Normal heart sounds.   Pulmonary:      Effort: Pulmonary effort is normal.      Breath sounds: Normal breath sounds.   Abdominal:      General: Bowel sounds are normal.      Palpations: Abdomen is soft.   Musculoskeletal:         General: Normal range of motion.   Skin:     General: Skin is warm.   Neurological:      Mental Status: She is alert and oriented to person, place, and time.       Significant Labs: All pertinent labs within the past 24 hours have been reviewed.  BMP:   Recent Labs   Lab 07/04/22 0448   *   *   K 4.0   CL 91*   CO2 33*   BUN 52*   CREATININE 1.0   CALCIUM 8.9   MG 2.4     CBC:   Recent Labs   Lab 07/03/22 0450 07/04/22 0449   WBC 12.17 12.04   HGB 10.3* 10.2*   HCT 31.6* 31.0*   * 494*     CMP:   Recent Labs   Lab 07/03/22  0450 07/04/22  0448   * 134*   K 4.0 4.0   CL 92* 91*   CO2 33* 33*   * 139*   BUN 37* 52*   CREATININE 1.1 1.0   CALCIUM 8.9 8.9   ANIONGAP 8 10   EGFRNONAA 44.9* 50.4*     Cardiac Markers: No results for input(s): CKMB, MYOGLOBIN, BNP, TROPISTAT  in the last 48 hours.    Significant Imaging: I have reviewed all pertinent imaging results/findings within the past 24 hours.

## 2022-07-04 NOTE — PLAN OF CARE
Problem: Adult Inpatient Plan of Care  Goal: Patient-Specific Goal (Individualized)  Outcome: Ongoing, Progressing     Problem: Adult Inpatient Plan of Care  Goal: Absence of Hospital-Acquired Illness or Injury  Outcome: Ongoing, Progressing     Problem: Adult Inpatient Plan of Care  Goal: Optimal Comfort and Wellbeing  Outcome: Ongoing, Progressing     Problem: Fluid and Electrolyte Imbalance (Acute Kidney Injury/Impairment)  Goal: Fluid and Electrolyte Balance  Outcome: Ongoing, Progressing     Problem: Oral Intake Inadequate (Acute Kidney Injury/Impairment)  Goal: Optimal Nutrition Intake  Outcome: Ongoing, Progressing

## 2022-07-04 NOTE — ASSESSMENT & PLAN NOTE
better  Likely d/t decreased oral intake with concurrent diuretic use and recent diverticulitis  Recently stopped mobic     Plan   S/p bolus and better  Hold chlorthalidone and lasix

## 2022-07-04 NOTE — PROGRESS NOTES
"[facility] Medicine  Progress Note    Patient Name: Valerie Jones  MRN: [id number]  Patient Class: IP- Inpatient   Admission Date: 7/1/2022  Length of Stay: 2 days  Attending Physician: Nate Valencia MD  Primary Care Provider: Reed Andersen MD        Subjective:     Principal Problem:Acute renal failure superimposed on stage 3 chronic kidney disease        HPI:  Ms. Jones is an 88 year old female with a history of HTN, osteoarthritis, COPD, and hypothyroidism who presents today with complaints of rt foot pain. It is moderate. It is associated with erythema, gait disturbance, anorexia, and finger swelling and pain. She denies fever, chills, N/V/D, chest pain, or SOB. She was recently treated for diverticulitis on 6/14 with cipro, and she reported 10 days of n/v and decreased PO intake prior to that. She also had an E. Coli UTI that was pansensitive. Symptoms of anorexia and nausea persisted and she came to the ED on 6/21 for evaluation and CT showed resolution of diverticulitis, but possible UTI and she was started on cefuroxime for 3 days and the Rx was extended by PCP on 6/27 for 7 days. She is still taking it. At that time,  PCP refilled atenolol-chlorthalidone and told her to stop mobic d/t worsening renal function and prescribed norco. She is also taking lasix. Since then, she's had worsening joint pain, then her rt foot became swollen and erythematous and she cannot walk on it. She states both of her feet are painful to bear weight. Her rt 1st finger and left middle finger have become swollen and tender as well. She states she's never had this problem in the past. She lives in a house up some stairs that daughter calls a "tree house" and cannot get up the stairs, which was never a problem until about 2 days ago. Today, she also suffered a ground level fall d/t the pain from trying to walk. She's put herself on a liquid diet and has decreased PO intake as well. In the ED WBC 14K, " creatinine 1.5 with baseline 1.2 and sodium is 129. Of note, there was concern on her previous CT abd/pelvis of possible mycobacterium infection in the lungs.       Overview/Hospital Course:  Patient was seen and examined.  Joint swellings noted.  Severely elevated inflammatory markers noted.  She was not able to eat much for previous 2 weeks and only on liquid diet.  Making some nausea and decreased appetite.  Was taking over-the-counter and see how these prior to it..  She is to follow-up with Dr. Bah and he did not recommend EGD at this time.  Otherwise no chest pain, shortness of breath.  Mild burning sensation at urination    7/3  UC neg  Feeling better  JOHN resolved   Sitting up in chair .  Discussed about discharge plan , she does not want to go until tomorrow     7/4  Swelling and pain at joints and foot better   CRP decreased   UC 7/1 with ESBL ecoli resistant to Levaquin changed to lelo      nterval History:     Review of Systems  Objective:     Vital Signs (Most Recent):  Temp: 98.5 °F (36.9 °C) (07/04/22 1121)  Pulse: 76 (07/04/22 1121)  Resp: 18 (07/04/22 1121)  BP: (!) 108/53 (07/04/22 1121)  SpO2: 96 % (07/04/22 1121)   Vital Signs (24h Range):  Temp:  [98 °F (36.7 °C)-98.5 °F (36.9 °C)] 98.5 °F (36.9 °C)  Pulse:  [66-76] 76  Resp:  [16-18] 18  SpO2:  [91 %-96 %] 96 %  BP: (105-127)/(53-65) 108/53     Weight: 58.7 kg (129 lb 6.6 oz)  Body mass index is 26.14 kg/m².    Intake/Output Summary (Last 24 hours) at 7/4/2022 1143  Last data filed at 7/4/2022 0501  Gross per 24 hour   Intake 480 ml   Output 350 ml   Net 130 ml      Physical Exam  Vitals and nursing note reviewed.   HENT:      Head: Normocephalic and atraumatic.   Eyes:      Conjunctiva/sclera: Conjunctivae normal.   Neck:      Vascular: No JVD.   Cardiovascular:      Rate and Rhythm: Normal rate.      Heart sounds: Normal heart sounds.   Pulmonary:      Effort: Pulmonary effort is normal.      Breath sounds: Normal breath sounds.    Abdominal:      General: Bowel sounds are normal.      Palpations: Abdomen is soft.   Musculoskeletal:         General: Normal range of motion.   Skin:     General: Skin is warm.   Neurological:      Mental Status: She is alert and oriented to person, place, and time.       Significant Labs: All pertinent labs within the past 24 hours have been reviewed.  BMP:   Recent Labs   Lab 07/04/22 0448   *   *   K 4.0   CL 91*   CO2 33*   BUN 52*   CREATININE 1.0   CALCIUM 8.9   MG 2.4     CBC:   Recent Labs   Lab 07/03/22 0450 07/04/22 0449   WBC 12.17 12.04   HGB 10.3* 10.2*   HCT 31.6* 31.0*   * 494*     CMP:   Recent Labs   Lab 07/03/22 0450 07/04/22 0448   * 134*   K 4.0 4.0   CL 92* 91*   CO2 33* 33*   * 139*   BUN 37* 52*   CREATININE 1.1 1.0   CALCIUM 8.9 8.9   ANIONGAP 8 10   EGFRNONAA 44.9* 50.4*     Cardiac Markers: No results for input(s): CKMB, MYOGLOBIN, BNP, TROPISTAT in the last 48 hours.    Significant Imaging: I have reviewed all pertinent imaging results/findings within the past 24 hours.      Assessment/Plan:      * Acute renal failure superimposed on stage 3 chronic kidney disease  better  Likely d/t decreased oral intake with concurrent diuretic use and recent diverticulitis  Recently stopped mobic     Plan   S/p bolus and better  Hold chlorthalidone and lasix             Hyponatremia  Likely d/t dehydration , hypovolemia         UTI (urinary tract infection) ESBL   Recent + E. Coli  And symptoms+    PLAN   lelo  Urine culture negative . May be partially treated   Possible DC with nitrofurantoin     Arthritis  Appear OA .  R/o RA vs. Gout    PLAN     Blood cultures negative    allergic to methylprednisolone and startedprednisone. Will give gastric protection coverage   Pt and family declined placement     Dehydration  s/p250 mL fluid bolus  Push oral rehydration   Hold diuretics       COPD with asthma  Appears at baseline  Previous CT with concern of mycobacterium  - pulm f/u as outpatient   Albuterol PRN   She had oxygen at home . Prescribed by dr tran        VTE Risk Mitigation (From admission, onward)         Ordered     enoxaparin injection 30 mg  Daily         07/02/22 0314     IP VTE HIGH RISK PATIENT  Once         07/02/22 0248     Place sequential compression device  Until discontinued         07/02/22 0248                Discharge Planning   MYLA:      Code Status: Full Code   Is the patient medically ready for discharge?:     Reason for patient still in hospital (select all that apply): Treatment  Discharge Plan A: Home with family                  Nate Valencia MD  Department of Hospital Medicine   [facility]

## 2022-07-04 NOTE — RESPIRATORY THERAPY
07/03/22 2015   Patient Assessment/Suction   Level of Consciousness (AVPU) alert   Respiratory Effort Normal;Unlabored   Expansion/Accessory Muscles/Retractions no use of accessory muscles   All Lung Fields Breath Sounds equal bilaterally   PRE-TX-O2   O2 Device (Oxygen Therapy) nasal cannula   Flow (L/min) 2   SpO2 96 %   Pulse Oximetry Type Intermittent   $ Pulse Oximetry - Multiple Charge Pulse Oximetry - Multiple   Pulse 76   Resp 18   Aerosol Therapy   $ Aerosol Therapy Charges PRN treatment not required   Education   $ Education Bronchodilator;15 min   Respiratory Evaluation   $ Care Plan Tech Time 15 min   $ Eval/Re-eval Charges Re-evaluation   Evaluation For Re-Eval 3 day

## 2022-07-04 NOTE — ASSESSMENT & PLAN NOTE
Appear OA .  R/o RA vs. Gout    PLAN     Blood cultures negative    allergic to methylprednisolone and startedprednisone. Will give gastric protection coverage   Pt and family declined placement

## 2022-07-04 NOTE — PT/OT/SLP PROGRESS
Physical Therapy Treatment    Patient Name:  Valerie Jones   MRN:  [id number]    Recommendations:     Discharge Recommendations:  home health PT   Discharge Equipment Recommendations: 3-in-1 commode   Barriers to discharge: Increased mob and transfers need    Assessment:     Valerie Jones is a 88 y.o. female admitted with a medical diagnosis of Acute renal failure superimposed on stage 3 chronic kidney disease.  She presents with the following impairments/functional limitations:  weakness, impaired endurance, impaired self care skills, impaired functional mobilty, gait instability, impaired balance, pain, impaired cardiopulmonary response to activity . Upon arrival, pt was elevated HOB on room air. RT present w/in room. Pt was sup<>sit CGA and Jose sit<>stand w/ RW. Pt amb 25ft w/ RW Jose.      Rehab Prognosis: Good; patient would benefit from acute skilled PT services to address these deficits and reach maximum level of function.    Recent Surgery: * No surgery found *      Plan:     During this hospitalization, patient to be seen daily to address the identified rehab impairments via gait training, therapeutic activities, therapeutic exercises, neuromuscular re-education and progress toward the following goals:    · Plan of Care Expires:  08/07/22    Subjective     Chief Complaint: pain on top part of R foot   Patient/Family Comments/goals: None stated  Pain/Comfort:  · Pain Rating 1: 0/10  · Location - Side 1: Right  · Location 1: foot  · Pain Addressed 1: Nurse notified      Objective:     Communicated with Olivia CARRASCO prior to session.  Patient found HOB elevated with bed alarm, peripheral IV, telemetry upon PT entry to room.     General Precautions: Standard, fall   Orthopedic Precautions:N/A   Braces: N/A  Respiratory Status: Room air     Functional Mobility:  · Bed Mobility:     · Supine to Sit: minimum assistance  · Transfers:     · Sit to Stand:  minimum assistance with rolling walker  · Gait: 35ft w/ RW Jose    · Balance: Good static and Fair+ dynamic      AM-PAC 6 CLICK MOBILITY          Therapeutic Activities and Exercises:   Importance of mob, safety awareness, gait training for increase functional mob, transfer training for OOB mob    Patient left up in chair with all lines intact, call button in reach, chair alarm on and LPN notified..    GOALS:   Multidisciplinary Problems     Physical Therapy Goals        Problem: Physical Therapy    Goal Priority Disciplines Outcome Goal Variances Interventions   Physical Therapy Goal     PT, PT/OT      Description: Goals to be met by: 22     Patient will increase functional independence with mobility by performin. Supine to sit with Supervision  2. Sit to stand transfer with Supervision  3. Bed to chair transfer with Supervision using Rolling Walker  4. Gait  x 150 feet with Supervision using Rolling Walker.   5. Ascend/Descend 12 steps with B handrails and SBA                         Time Tracking:     PT Received On: 22  PT Start Time: 927     PT Stop Time: 955  PT Total Time (min): 28 min     Billable Minutes: Gait Training 18 and Therapeutic Activity 10    Treatment Type: Treatment  PT/PTA: PT     PTA Visit Number: 0     2022

## 2022-07-04 NOTE — PLAN OF CARE
Pt up to chair with 1 person assist. Pt states she feels much better today. Family and friends at bedside visiting. Call light within pt reach, pt instructed to call staff for any needed assistance, safety maintained.

## 2022-07-04 NOTE — ASSESSMENT & PLAN NOTE
Recent + E. Coli  And symptoms+    PLAN   lelo  Urine culture negative . May be partially treated   Possible DC with nitrofurantoin

## 2022-07-05 ENCOUNTER — TELEPHONE (OUTPATIENT)
Dept: FAMILY MEDICINE | Facility: CLINIC | Age: 87
End: 2022-07-05

## 2022-07-05 VITALS
TEMPERATURE: 98 F | DIASTOLIC BLOOD PRESSURE: 62 MMHG | HEART RATE: 79 BPM | SYSTOLIC BLOOD PRESSURE: 129 MMHG | HEIGHT: 59 IN | RESPIRATION RATE: 18 BRPM | WEIGHT: 129.44 LBS | OXYGEN SATURATION: 94 % | BODY MASS INDEX: 26.09 KG/M2

## 2022-07-05 LAB
ANION GAP SERPL CALC-SCNC: 6 MMOL/L (ref 8–16)
BASOPHILS # BLD AUTO: 0 K/UL (ref 0–0.2)
BASOPHILS NFR BLD: 0 % (ref 0–1.9)
BUN SERPL-MCNC: 43 MG/DL (ref 8–23)
CALCIUM SERPL-MCNC: 8.9 MG/DL (ref 8.7–10.5)
CHLORIDE SERPL-SCNC: 95 MMOL/L (ref 95–110)
CO2 SERPL-SCNC: 32 MMOL/L (ref 23–29)
CREAT SERPL-MCNC: 0.9 MG/DL (ref 0.5–1.4)
DIFFERENTIAL METHOD: ABNORMAL
EOSINOPHIL # BLD AUTO: 0 K/UL (ref 0–0.5)
EOSINOPHIL NFR BLD: 0 % (ref 0–8)
ERYTHROCYTE [DISTWIDTH] IN BLOOD BY AUTOMATED COUNT: 14.8 % (ref 11.5–14.5)
EST. GFR  (AFRICAN AMERICAN): >60 ML/MIN/1.73 M^2
EST. GFR  (NON AFRICAN AMERICAN): 57.3 ML/MIN/1.73 M^2
GLUCOSE SERPL-MCNC: 111 MG/DL (ref 70–110)
HCT VFR BLD AUTO: 33.2 % (ref 37–48.5)
HGB BLD-MCNC: 10.7 G/DL (ref 12–16)
IMM GRANULOCYTES # BLD AUTO: 0.05 K/UL (ref 0–0.04)
IMM GRANULOCYTES NFR BLD AUTO: 0.5 % (ref 0–0.5)
LYMPHOCYTES # BLD AUTO: 1.3 K/UL (ref 1–4.8)
LYMPHOCYTES NFR BLD: 12.4 % (ref 18–48)
MAGNESIUM SERPL-MCNC: 2.2 MG/DL (ref 1.6–2.6)
MCH RBC QN AUTO: 28.1 PG (ref 27–31)
MCHC RBC AUTO-ENTMCNC: 32.2 G/DL (ref 32–36)
MCV RBC AUTO: 87 FL (ref 82–98)
MONOCYTES # BLD AUTO: 0.7 K/UL (ref 0.3–1)
MONOCYTES NFR BLD: 6.6 % (ref 4–15)
NEUTROPHILS # BLD AUTO: 8.5 K/UL (ref 1.8–7.7)
NEUTROPHILS NFR BLD: 80.5 % (ref 38–73)
NRBC BLD-RTO: 0 /100 WBC
PLATELET # BLD AUTO: 514 K/UL (ref 150–450)
PMV BLD AUTO: 9.6 FL (ref 9.2–12.9)
POTASSIUM SERPL-SCNC: 4.1 MMOL/L (ref 3.5–5.1)
RBC # BLD AUTO: 3.81 M/UL (ref 4–5.4)
SODIUM SERPL-SCNC: 133 MMOL/L (ref 136–145)
WBC # BLD AUTO: 10.53 K/UL (ref 3.9–12.7)

## 2022-07-05 PROCEDURE — 63600175 PHARM REV CODE 636 W HCPCS: Performed by: NURSE PRACTITIONER

## 2022-07-05 PROCEDURE — 94799 UNLISTED PULMONARY SVC/PX: CPT

## 2022-07-05 PROCEDURE — 36415 COLL VENOUS BLD VENIPUNCTURE: CPT | Performed by: NURSE PRACTITIONER

## 2022-07-05 PROCEDURE — 94761 N-INVAS EAR/PLS OXIMETRY MLT: CPT

## 2022-07-05 PROCEDURE — 83735 ASSAY OF MAGNESIUM: CPT | Performed by: NURSE PRACTITIONER

## 2022-07-05 PROCEDURE — 99900031 HC PATIENT EDUCATION (STAT)

## 2022-07-05 PROCEDURE — 80048 BASIC METABOLIC PNL TOTAL CA: CPT | Performed by: NURSE PRACTITIONER

## 2022-07-05 PROCEDURE — 85025 COMPLETE CBC W/AUTO DIFF WBC: CPT | Performed by: NURSE PRACTITIONER

## 2022-07-05 PROCEDURE — 97116 GAIT TRAINING THERAPY: CPT | Mod: CQ

## 2022-07-05 PROCEDURE — 25000003 PHARM REV CODE 250: Performed by: NURSE PRACTITIONER

## 2022-07-05 PROCEDURE — 99900035 HC TECH TIME PER 15 MIN (STAT)

## 2022-07-05 PROCEDURE — 63600175 PHARM REV CODE 636 W HCPCS: Performed by: INTERNAL MEDICINE

## 2022-07-05 RX ORDER — NITROFURANTOIN 25; 75 MG/1; MG/1
100 CAPSULE ORAL 2 TIMES DAILY
Qty: 14 CAPSULE | Refills: 0 | Status: SHIPPED | OUTPATIENT
Start: 2022-07-05 | End: 2022-07-11 | Stop reason: SDUPTHER

## 2022-07-05 RX ORDER — DEXAMETHASONE 6 MG/1
TABLET ORAL
Qty: 15 TABLET | Refills: 0 | Status: SHIPPED | OUTPATIENT
Start: 2022-07-05 | End: 2022-07-11 | Stop reason: SDUPTHER

## 2022-07-05 RX ORDER — FUROSEMIDE 20 MG/1
20 TABLET ORAL DAILY
Qty: 80 TABLET | Refills: 0 | Status: SHIPPED | OUTPATIENT
Start: 2022-07-05

## 2022-07-05 RX ORDER — ATENOLOL 50 MG/1
50 TABLET ORAL DAILY
Qty: 90 TABLET | Refills: 0 | Status: SHIPPED | OUTPATIENT
Start: 2022-07-06 | End: 2022-10-04

## 2022-07-05 RX ADMIN — SENNOSIDES AND DOCUSATE SODIUM 1 TABLET: 50; 8.6 TABLET ORAL at 08:07

## 2022-07-05 RX ADMIN — ALPRAZOLAM 0.5 MG: 0.5 TABLET ORAL at 02:07

## 2022-07-05 RX ADMIN — ATENOLOL 50 MG: 50 TABLET ORAL at 08:07

## 2022-07-05 RX ADMIN — MEROPENEM AND SODIUM CHLORIDE 1 G: 1 INJECTION, SOLUTION INTRAVENOUS at 08:07

## 2022-07-05 RX ADMIN — PANTOPRAZOLE SODIUM 40 MG: 40 TABLET, DELAYED RELEASE ORAL at 06:07

## 2022-07-05 RX ADMIN — ACETAMINOPHEN 650 MG: 325 TABLET ORAL at 02:07

## 2022-07-05 RX ADMIN — DEXAMETHASONE SODIUM PHOSPHATE 4 MG: 4 INJECTION, SOLUTION INTRA-ARTICULAR; INTRALESIONAL; INTRAMUSCULAR; INTRAVENOUS; SOFT TISSUE at 08:07

## 2022-07-05 RX ADMIN — LEVOTHYROXINE SODIUM 50 MCG: 0.03 TABLET ORAL at 06:07

## 2022-07-05 NOTE — CARE UPDATE
07/04/22 1948   Patient Assessment/Suction   Level of Consciousness (AVPU) alert   Respiratory Effort Normal;Unlabored   Expansion/Accessory Muscles/Retractions no use of accessory muscles   All Lung Fields Breath Sounds clear   Rhythm/Pattern, Respiratory unlabored;pattern regular   PRE-TX-O2   O2 Device (Oxygen Therapy) room air   SpO2 95 %   Pulse Oximetry Type Intermittent   $ Pulse Oximetry - Multiple Charge Pulse Oximetry - Multiple   Aerosol Therapy   $ Aerosol Therapy Charges PRN treatment not required   Respiratory Evaluation   $ Care Plan Tech Time 15 min

## 2022-07-05 NOTE — PLAN OF CARE
Patient cleared for discharge from case management standpoint.    CM contacted Dr Andersen's office for follow up appointment.  making appointment and will call patient with details.    Chart and discharge orders reviewed.  Patient discharged home with no further case management needs.       07/05/22 1417   Final Note   Assessment Type Final Discharge Note   Anticipated Discharge Disposition Home   What phone number can be called within the next 1-3 days to see how you are doing after discharge? [phone]   Hospital Resources/Appts/Education Provided Provided patient/caregiver with written discharge plan information;Appointments scheduled by Navigator/Coordinator   Post-Acute Status   Discharge Delays (!) Personal Transportation

## 2022-07-05 NOTE — CARE UPDATE
07/05/22 0900   Patient Assessment/Suction   Level of Consciousness (AVPU) alert   Respiratory Effort Unlabored;Normal   Expansion/Accessory Muscles/Retractions no use of accessory muscles   All Lung Fields Breath Sounds clear   Rhythm/Pattern, Respiratory unlabored;pattern regular;depth regular   Cough Frequency infrequent   PRE-TX-O2   O2 Device (Oxygen Therapy) room air   SpO2 (!) 94 %   Pulse Oximetry Type Intermittent   $ Pulse Oximetry - Multiple Charge Pulse Oximetry - Multiple   Pulse 71   Resp 20   Aerosol Therapy   $ Aerosol Therapy Charges PRN treatment not required   Inhaler   $ Inhaler Charges On hold   Education   $ Education Bronchodilator;15 min   Respiratory Evaluation   $ Care Plan Tech Time 15 min   $ Eval/Re-eval Charges Re-evaluation   Evaluation For Re-Eval 3 day

## 2022-07-05 NOTE — PT/OT/SLP PROGRESS
Physical Therapy Treatment    Patient Name:  Valerie Jones   MRN:  [id number]    Recommendations:     Discharge Recommendations:  home health PT   Discharge Equipment Recommendations: 3-in-1 commode   Barriers to discharge: increased assist wt mobility    Assessment:     Valerie Jones is a 88 y.o. female admitted with a medical diagnosis of Acute renal failure superimposed on stage 3 chronic kidney disease.  She presents with the following impairments/functional limitations:  weakness, impaired endurance, impaired self care skills, impaired functional mobilty, gait instability, impaired balance, pain, impaired cardiopulmonary response to activity.  Pt up in chair and agreeable to visit. Pt feeling much better today with no c/o pain in B foot. Pt ambulated 100' with RW and contact guard assist which is an improvement from 25' feet yesterday. Pt ready to go home. Pt left up in chair with all needs within reach.    Rehab Prognosis: Fair; patient would benefit from acute skilled PT services to address these deficits and reach maximum level of function.    Recent Surgery: * No surgery found *      Plan:     During this hospitalization, patient to be seen daily to address the identified rehab impairments via therapeutic activities, therapeutic exercises, neuromuscular re-education and progress toward the following goals:    · Plan of Care Expires:  08/07/22    Subjective     Chief Complaint: none  Patient/Family Comments/goals: Pt feeling much better and ready to return home  Pain/Comfort:  · Pain Rating 1: 0/10      Objective:     Communicated with RN prior to session.  Patient found up in chair with peripheral IV, telemetry upon PT entry to room.     General Precautions: Standard, fall   Orthopedic Precautions:N/A   Braces:    Respiratory Status: Room air     Functional Mobility:  · Transfers:     · Sit to Stand:  contact guard assistance with rolling walker  · Gait: x 100' with RW and CGA for safety.      AM-PAC 6 CLICK  MOBILITY          Therapeutic Activities and Exercises:   Pt educated on importance of time OOB, importance of intermittent mobility, safe techniques for transfers/ambulation, discharge recommendations/options, and use of call light for assistance and fall prevention.      Patient left up in chair with all lines intact, call button in reach and RN notified..    GOALS:   Multidisciplinary Problems     Physical Therapy Goals     Not on file          Multidisciplinary Problems (Resolved)        Problem: Physical Therapy    Goal Priority Disciplines Outcome Goal Variances Interventions   Physical Therapy Goal   (Resolved)     PT, PT/OT Met     Description: Goals to be met by: 22     Patient will increase functional independence with mobility by performin. Supine to sit with Supervision  2. Sit to stand transfer with Supervision  3. Bed to chair transfer with Supervision using Rolling Walker  4. Gait  x 150 feet with Supervision using Rolling Walker.   5. Ascend/Descend 12 steps with B handrails and SBA                         Time Tracking:     PT Received On: 22  PT Start Time: 906     PT Stop Time: 15  PT Total Time (min): 9 min     Billable Minutes: Gait Training 9    Treatment Type: Treatment  PT/PTA: PTA     PTA Visit Number: 1     2022

## 2022-07-05 NOTE — NURSING
[id number]  Denies any needs and no distress noted.  VSS.  IV and tele dc'd.  Waiting for clearance from CM to print DC paperwork and complete DC teaching.  Per MD pt's ride will pick her up approximately 3pm.

## 2022-07-05 NOTE — TELEPHONE ENCOUNTER
----- Message from Lea Jim sent at 7/5/2022  2:15 PM CDT -----  Nickie sheridan [facility] called and stated that the patient need a seven day hospital follow up she is being discharged today at 3 pm please call the patient to schedule

## 2022-07-05 NOTE — DISCHARGE SUMMARY
"[facility] Medicine  Discharge Summary      Patient Name: Valerie Jones  MRN: [id number]  Patient Class: IP- Inpatient  Admission Date: 7/1/2022  Hospital Length of Stay: 3 days  Discharge Date and Time:  07/05/2022 4:16 PM  Attending Physician: Nate Valencia MD   Discharging Provider: Nate Valencia MD  Primary Care Provider: Reed Andersen MD      HPI:   Ms. Jones is an 88 year old female with a history of HTN, osteoarthritis, COPD, and hypothyroidism who presents today with complaints of rt foot pain. It is moderate. It is associated with erythema, gait disturbance, anorexia, and finger swelling and pain. She denies fever, chills, N/V/D, chest pain, or SOB. She was recently treated for diverticulitis on 6/14 with cipro, and she reported 10 days of n/v and decreased PO intake prior to that. She also had an E. Coli UTI that was pansensitive. Symptoms of anorexia and nausea persisted and she came to the ED on 6/21 for evaluation and CT showed resolution of diverticulitis, but possible UTI and she was started on cefuroxime for 3 days and the Rx was extended by PCP on 6/27 for 7 days. She is still taking it. At that time,  PCP refilled atenolol-chlorthalidone and told her to stop mobic d/t worsening renal function and prescribed norco. She is also taking lasix. Since then, she's had worsening joint pain, then her rt foot became swollen and erythematous and she cannot walk on it. She states both of her feet are painful to bear weight. Her rt 1st finger and left middle finger have become swollen and tender as well. She states she's never had this problem in the past. She lives in a house up some stairs that daughter calls a "tree house" and cannot get up the stairs, which was never a problem until about 2 days ago. Today, she also suffered a ground level fall d/t the pain from trying to walk. She's put herself on a liquid diet and has decreased PO intake as well. In the ED WBC 14K, creatinine 1.5 " "with baseline 1.2 and sodium is 129. Of note, there was concern on her previous CT abd/pelvis of possible mycobacterium infection in the lungs.       * No surgery found *      Hospital Course:        HPI per admitting : Ms. Jones is an 88 year old female with a history of HTN, osteoarthritis, COPD, and hypothyroidism who presents today with complaints of rt foot pain. It is moderate. It is associated with erythema, gait disturbance, anorexia, and finger swelling and pain. She denies fever, chills, N/V/D, chest pain, or SOB. She was recently treated for diverticulitis on 6/14 with cipro, and she reported 10 days of n/v and decreased PO intake prior to that. She also had an E. Coli UTI that was pansensitive. Symptoms of anorexia and nausea persisted and she came to the ED on 6/21 for evaluation and CT showed resolution of diverticulitis, but possible UTI and she was started on cefuroxime for 3 days and the Rx was extended by PCP on 6/27 for 7 days. She is still taking it. At that time,  PCP refilled atenolol-chlorthalidone and told her to stop mobic d/t worsening renal function and prescribed norco. She is also taking lasix. Since then, she's had worsening joint pain, then her rt foot became swollen and erythematous and she cannot walk on it. She states both of her feet are painful to bear weight. Her rt 1st finger and left middle finger have become swollen and tender as well. She states she's never had this problem in the past. She lives in a house up some stairs that daughter calls a "tree house" and cannot get up the stairs, which was never a problem until about 2 days ago. Today, she also suffered a ground level fall d/t the pain from trying to walk. She's put herself on a liquid diet and has decreased PO intake as well. In the ED WBC 14K, creatinine 1.5 with baseline 1.2 and sodium is 129. Of note, there was concern on her previous CT abd/pelvis of possible mycobacterium infection in the lungs.     Hospital " course  Patient was admitted with mild acute renal failure and hyponatremia.  She has history of urinary tract infection in the past with ESBL and currently growing ESBL again too.  Patient was started on IV antibiotics since admission and later adjusted to cover ESBL.  She was given hydration and renal function back to normal.  She was on Lasix and chlorthalidone atenolol combination  medication and chlorthalidone was discontinued permanently and later resumed Lasix.  She is having arthritis changes in the fingers and also at the foot and started Decadron and patient did well and inflammatory markers came down and apparent improvement of the swelling of the fingers and foot.  At discharge taper Decadron was given along with gastric protection.  Urine culture came back ESBL E coli and patient received meropenem in hospital and discharged with nitrofurantoin .  No leukocytosis and no fever a patient is awake alert oriented and did well with physical therapy.         Goals of Care Treatment Preferences:  Code Status: Full Code      Consults:     No new Assessment & Plan notes have been filed under this hospital service since the last note was generated.  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    Specialties: Family Medicine, Home Health Services, Hospice Services  Why: Dr Guerrero office will call patient with follow up appointment  details.  Contact information:  [address] [facility]  [address]  [facility]  [place] [address]  [phone]                       Patient Instructions:      Diet Cardiac     Activity as tolerated       Significant Diagnostic Studies: Labs:   CMP   Recent Labs   Lab 07/04/22  0448 07/05/22  0633   * 133*   K 4.0 4.1   CL 91* 95   CO2 33* 32*   * 111*   BUN 52* 43*   CREATININE 1.0 0.9   CALCIUM 8.9 8.9   ANIONGAP 10 6*   ESTGFRAFRICA 58.1* >60.0   EGFRNONAA 50.4* 57.3*    and CBC   Recent Labs   Lab 07/04/22  0449 07/05/22  0633   WBC 12.04 10.53   HGB 10.2* 10.7*   HCT 31.0* 33.2*   * 514*       Pending Diagnostic Studies:     None         Medications:  Reconciled Home Medications:      Medication List      START taking these medications    atenoloL 50 MG tablet  Commonly known as: TENORMIN  Take 1 tablet (50 mg total) by mouth once daily.  Start taking on: July 6, 2022     dexAMETHasone 6 MG tablet  Commonly known as: DECADRON  Take 1 tablet (6 mg total) by mouth 2 (two) times daily with meals for 5 days, THEN 0.5 tablets (3 mg total) 2 (two) times daily with meals for 5 days.  Start taking on: July 5, 2022     nitrofurantoin (macrocrystal-monohydrate) 100 MG capsule  Commonly known as: MACROBID  Take 1 capsule (100 mg total) by mouth 2 (two) times daily.        CHANGE how you take these medications    HYDROcodone-acetaminophen  mg per tablet  Commonly known as: NORCO  Take 1 tablet by mouth every 4 (four) hours as needed for Pain.  What changed: how much to take        CONTINUE taking these medications    acetaminophen 500 MG tablet  Commonly known as: TYLENOL  Take 500 mg by mouth nightly as needed for Pain.     albuterol-ipratropium 2.5 mg-0.5 mg/3 mL nebulizer solution  Commonly known as: DUO-NEB  Take 3 mLs by nebulization every 6 (six) hours as needed.     ALPRAZolam 0.5 MG tablet  Commonly known as: XANAX  Take 1 tablet (0.5 mg total) by mouth 3 (three) times daily as  needed for Anxiety.     clobetasoL 0.025 % Crea  Apply topically.     diclofenac sodium 1 % Gel  Commonly known as: VOLTAREN  Apply 2 g topically once daily.     fluticasone-umeclidin-vilanter 100-62.5-25 mcg Dsdv  Commonly known as: TRELEGY ELLIPTA  Inhale 1 puff into the lungs once daily.     furosemide 20 MG tablet  Commonly known as: LASIX  Take 1 tablet (20 mg total) by mouth once daily.     levothyroxine 50 MCG tablet  Commonly known as: SYNTHROID  Take 1 tablet (50 mcg total) by mouth once daily.     ondansetron 4 MG tablet  Commonly known as: ZOFRAN  Take 1 tablet (4 mg total) by mouth every 6 (six) hours as needed.     pantoprazole 20 MG tablet  Commonly known as: PROTONIX  Take 2 tablets (40 mg total) by mouth once daily.     potassium bicarbonate disintegrating tablet  Commonly known as: K-LYTE  Take 1 tablet (25 mEq total) by mouth once daily.     potassium chloride SA 20 MEQ tablet  Commonly known as: K-DUR,KLOR-CON  Take 1 tablet (20 mEq total) by mouth once daily.     PROAIR RESPICLICK 90 mcg/actuation inhaler  Generic drug: albuterol sulfate  Inhale 2 puffs into the lungs every 4 (four) hours. Rescue2 puffs every 4 hours as needed for cough, wheeze, or shortness of breath        STOP taking these medications    atenoloL-chlorthalidone 50-25 mg Tab  Commonly known as: TENORETIC     cefUROXime 500 MG tablet  Commonly known as: CEFTIN            Indwelling Lines/Drains at time of discharge:   Lines/Drains/Airways     None             General: Patient resting comfortably in no acute distress. Appears as stated age. Calm  Eyes: EOM intact. No conjunctivae injection. No scleral icterus.  ENT: Hearing grossly intact. No discharge from ears. No nasal discharge.   CVS: RRR. No LE edema BL.  Lungs:Good breath sounds. No accessory muscle use. No acute respiratory distress  Neuro: non focal , Follows commands. Responds appropriately    Time spent on the discharge of patient: 22 minutes         Nate Valencia  MD  Department of Hospital Medicine  [facility]

## 2022-07-06 ENCOUNTER — PATIENT OUTREACH (OUTPATIENT)
Dept: FAMILY MEDICINE | Facility: CLINIC | Age: 87
End: 2022-07-06

## 2022-07-06 NOTE — PROGRESS NOTES
Discharge Information     Discharge Date:   7/5/22    Primary Discharge Diagnosis:  cellulitis      Discharge Summary:  Reviewed      Medication & Order Review     Were medication changes made or new medications added?   Yes    If so, has the patient filled the prescriptions?  Yes     Was Home Health ordered? No    If so, has Home Health contacted patient and/or initiated services?  No    Name of Home Health Agency? N/A    Durable Medical Equipment ordered?  No     If so, has the DME provider contacted patient and delivered equipment?  n/a    Follow Up               Any problems since discharge? No    How is the patient feeling since returning home?      Have you set up recommended follow up appointments?  (cardiology, surgery, etc.)    Schedule Hospital Follow-up appointment within 7-14 days (preferably 7).      Notes:  Spoke with pt and got her scheduled with Dr. Andersen, states they did not order HH or PT. States she isn't having any trouble at this time and will keep us updated            Karrie Serrano

## 2022-07-07 LAB
BACTERIA BLD CULT: NORMAL
BACTERIA BLD CULT: NORMAL

## 2022-07-08 ENCOUNTER — TELEPHONE (OUTPATIENT)
Dept: FAMILY MEDICINE | Facility: CLINIC | Age: 87
End: 2022-07-08

## 2022-07-08 NOTE — TELEPHONE ENCOUNTER
She should keep taking all prescriptions from the hospital until she sees Dr. Andersen next week for her hospital follow-up

## 2022-07-08 NOTE — TELEPHONE ENCOUNTER
----- Message from Lea Jim sent at 7/8/2022 10:52 AM CDT -----  Patient called and want to know if she need to keep taking the fluid pill she was given at the hospital please give her a call at [phone]

## 2022-07-11 ENCOUNTER — OFFICE VISIT (OUTPATIENT)
Dept: FAMILY MEDICINE | Facility: CLINIC | Age: 87
End: 2022-07-11
Payer: MEDICARE

## 2022-07-11 VITALS
SYSTOLIC BLOOD PRESSURE: 130 MMHG | HEART RATE: 70 BPM | HEIGHT: 59 IN | WEIGHT: 132 LBS | BODY MASS INDEX: 26.61 KG/M2 | DIASTOLIC BLOOD PRESSURE: 68 MMHG

## 2022-07-11 DIAGNOSIS — N30.00 ACUTE CYSTITIS WITHOUT HEMATURIA: ICD-10-CM

## 2022-07-11 DIAGNOSIS — N18.32 STAGE 3B CHRONIC KIDNEY DISEASE: ICD-10-CM

## 2022-07-11 DIAGNOSIS — E03.9 ACQUIRED HYPOTHYROIDISM: ICD-10-CM

## 2022-07-11 DIAGNOSIS — M15.9 PRIMARY OSTEOARTHRITIS INVOLVING MULTIPLE JOINTS: ICD-10-CM

## 2022-07-11 DIAGNOSIS — D69.2 OTHER NONTHROMBOCYTOPENIC PURPURA: ICD-10-CM

## 2022-07-11 DIAGNOSIS — E78.1 HYPERTRIGLYCERIDEMIA: ICD-10-CM

## 2022-07-11 DIAGNOSIS — F51.01 PRIMARY INSOMNIA: ICD-10-CM

## 2022-07-11 DIAGNOSIS — L03.115 CELLULITIS OF RIGHT ANKLE: Primary | ICD-10-CM

## 2022-07-11 DIAGNOSIS — I10 ESSENTIAL HYPERTENSION: ICD-10-CM

## 2022-07-11 DIAGNOSIS — Z09 HOSPITAL DISCHARGE FOLLOW-UP: ICD-10-CM

## 2022-07-11 DIAGNOSIS — J44.89 COPD WITH ASTHMA: ICD-10-CM

## 2022-07-11 DIAGNOSIS — I87.8 VENOUS STASIS OF BOTH LOWER EXTREMITIES: ICD-10-CM

## 2022-07-11 PROCEDURE — 99496 TRANSJ CARE MGMT HIGH F2F 7D: CPT | Mod: S$GLB,,, | Performed by: FAMILY MEDICINE

## 2022-07-11 PROCEDURE — 99496 TRANSITIONAL CARE MANAGE SERVICE 7 DAY DISCHARGE: ICD-10-PCS | Mod: S$GLB,,, | Performed by: FAMILY MEDICINE

## 2022-07-11 RX ORDER — LEVOTHYROXINE SODIUM 50 UG/1
50 TABLET ORAL DAILY
Qty: 90 TABLET | Refills: 1 | Status: SHIPPED | OUTPATIENT
Start: 2022-07-11 | End: 2023-01-07

## 2022-07-11 RX ORDER — DEXAMETHASONE 6 MG/1
TABLET ORAL
Qty: 15 TABLET | Refills: 0 | Status: SHIPPED | OUTPATIENT
Start: 2022-07-11

## 2022-07-11 RX ORDER — POTASSIUM CHLORIDE 20 MEQ/15ML
20 SOLUTION ORAL DAILY
Qty: 450 ML | Refills: 1 | Status: SHIPPED | OUTPATIENT
Start: 2022-07-11

## 2022-07-11 RX ORDER — CETIRIZINE HYDROCHLORIDE 10 MG/1
10 TABLET ORAL DAILY
Qty: 30 TABLET | Refills: 3 | Status: SHIPPED | OUTPATIENT
Start: 2022-07-11 | End: 2023-07-11

## 2022-07-11 RX ORDER — ONDANSETRON 4 MG/1
4 TABLET, FILM COATED ORAL EVERY 6 HOURS PRN
Qty: 30 TABLET | Refills: 1 | Status: SHIPPED | OUTPATIENT
Start: 2022-07-11 | End: 2023-07-11

## 2022-07-11 RX ORDER — NITROFURANTOIN 25; 75 MG/1; MG/1
100 CAPSULE ORAL 2 TIMES DAILY
Qty: 20 CAPSULE | Refills: 0 | Status: SHIPPED | OUTPATIENT
Start: 2022-07-11 | End: 2023-05-13 | Stop reason: CLARIF

## 2022-07-11 NOTE — PROGRESS NOTES
SUBJECTIVE:    Patient ID: Valerie Jones is a 88 y.o. female.    Chief Complaint: Hospital Follow Up (Brought bottles // discuss about medication //right foot pain //osteoarthritis multiple joins // abc)    This 88-year-old female was admitted to the hospital 7 /1 through 07/05/2022 for cellulitis of the right ankle.  She has gained 3 lb since our last visit.  She was treated with IV antibiotics and cellulitis did subside.    Hospital course  Patient was admitted with mild acute renal failure and hyponatremia.  She has history of urinary tract infection in the past with ESBL and currently growing ESBL again too.  Patient was started on IV antibiotics since admission and later adjusted to cover ESBL.  She was given hydration and renal function back to normal.  She was on Lasix and chlorthalidone atenolol combination  medication and chlorthalidone was discontinued permanently and later resumed Lasix.  She is having arthritis changes in the fingers and also at the foot and started Decadron and patient did well and inflammatory markers came down and apparent improvement of the swelling of the fingers and foot.  At discharge taper Decadron was given along with gastric protection.  Urine culture came back ESBL E coli and patient received meropenem in hospital and discharged with nitrofurantoin .  No leukocytosis and no fever a patient is awake alert oriented and did well with physical therapy.    She was taken off of Tenoretic and placed on atenolol 50 mg q.a.m..  She does not like the KCl 20 mEq tablets and would like a liquid preparation.      Admit Date: 7/1/22   Discharge Date: 7/5/22  Discharge Facility: Hospital    Medication Reconciliation:  Medications changed/added/deleted.  Macrobid and Decadron  New Prescriptions filled after discharge: yes  Discharge summary reviewed:  yes  Pending test results at discharge reviewed:   yes  Follow up appointments scheduled:  yes              with Pulmonology  Follow up  labs/tests ordered:   yes  Home Health ordered on discharge:   not applicable  Home Health company name:   DME ordered at discharge:   not applicable  How patient is feeling since discharge from the hospital?  cellulitis has resolved.       Patient follow up phone call documented on separate encounter.      No results displayed because visit has over 200 results.      Admission on 06/21/2022, Discharged on 06/21/2022   Component Date Value Ref Range Status    WBC 06/21/2022 10.07  3.90 - 12.70 K/uL Final    RBC 06/21/2022 4.34  4.00 - 5.40 M/uL Final    Hemoglobin 06/21/2022 12.6  12.0 - 16.0 g/dL Final    Hematocrit 06/21/2022 38.2  37.0 - 48.5 % Final    MCV 06/21/2022 88  82 - 98 fL Final    MCH 06/21/2022 29.0  27.0 - 31.0 pg Final    MCHC 06/21/2022 33.0  32.0 - 36.0 g/dL Final    RDW 06/21/2022 15.1 (A) 11.5 - 14.5 % Final    Platelets 06/21/2022 476 (A) 150 - 450 K/uL Final    MPV 06/21/2022 9.4  9.2 - 12.9 fL Final    Immature Granulocytes 06/21/2022 0.2  0.0 - 0.5 % Final    Gran # (ANC) 06/21/2022 7.7  1.8 - 7.7 K/uL Final    Immature Grans (Abs) 06/21/2022 0.02  0.00 - 0.04 K/uL Final    Lymph # 06/21/2022 1.4  1.0 - 4.8 K/uL Final    Mono # 06/21/2022 0.7  0.3 - 1.0 K/uL Final    Eos # 06/21/2022 0.2  0.0 - 0.5 K/uL Final    Baso # 06/21/2022 0.09  0.00 - 0.20 K/uL Final    nRBC 06/21/2022 0  0 /100 WBC Final    Gran % 06/21/2022 76.4 (A) 38.0 - 73.0 % Final    Lymph % 06/21/2022 13.7 (A) 18.0 - 48.0 % Final    Mono % 06/21/2022 7.1  4.0 - 15.0 % Final    Eosinophil % 06/21/2022 1.7  0.0 - 8.0 % Final    Basophil % 06/21/2022 0.9  0.0 - 1.9 % Final    Differential Method 06/21/2022 Automated   Final    Sodium 06/21/2022 134 (A) 136 - 145 mmol/L Final    Potassium 06/21/2022 2.9 (A) 3.5 - 5.1 mmol/L Final    Chloride 06/21/2022 93 (A) 95 - 110 mmol/L Final    CO2 06/21/2022 31 (A) 23 - 29 mmol/L Final    Glucose 06/21/2022 104  70 - 110 mg/dL Final    BUN 06/21/2022 21  8 -  23 mg/dL Final    Creatinine 06/21/2022 1.2  0.5 - 1.4 mg/dL Final    Calcium 06/21/2022 9.2  8.7 - 10.5 mg/dL Final    Total Protein 06/21/2022 7.8  6.0 - 8.4 g/dL Final    Albumin 06/21/2022 3.8  3.5 - 5.2 g/dL Final    Total Bilirubin 06/21/2022 0.6  0.1 - 1.0 mg/dL Final    Alkaline Phosphatase 06/21/2022 45 (A) 55 - 135 U/L Final    AST 06/21/2022 28  10 - 40 U/L Final    ALT 06/21/2022 14  10 - 44 U/L Final    Anion Gap 06/21/2022 10  8 - 16 mmol/L Final    eGFR if  06/21/2022 46.6 (A) >60 mL/min/1.73 m^2 Final    eGFR if non African American 06/21/2022 40.4 (A) >60 mL/min/1.73 m^2 Final    Lipase 06/21/2022 37  4 - 60 U/L Final    Specimen UA 06/21/2022 Urine, Clean Catch   Final    Color, UA 06/21/2022 Yellow  Yellow, Straw, Amanda Final    Appearance, UA 06/21/2022 Clear  Clear Final    pH, UA 06/21/2022 6.0  5.0 - 8.0 Final    Specific Gravity, UA 06/21/2022 1.015  1.005 - 1.030 Final    Protein, UA 06/21/2022 Negative  Negative Final    Glucose, UA 06/21/2022 Negative  Negative Final    Ketones, UA 06/21/2022 Negative  Negative Final    Bilirubin (UA) 06/21/2022 Negative  Negative Final    Occult Blood UA 06/21/2022 Negative  Negative Final    Nitrite, UA 06/21/2022 Negative  Negative Final    Urobilinogen, UA 06/21/2022 Negative  Negative EU/dL Final    Leukocytes, UA 06/21/2022 2+ (A) Negative Final    POC Creatinine 06/21/2022 1.2  0.5 - 1.4 mg/dL Final    Sample 06/21/2022 VENOUS   Final    RBC, UA 06/21/2022 3  0 - 4 /hpf Final    WBC, UA 06/21/2022 13 (A) 0 - 5 /hpf Final    Bacteria 06/21/2022 Negative  None-Occ /hpf Final    Squam Epithel, UA 06/21/2022 5  /hpf Final    Hyaline Casts, UA 06/21/2022 19 (A) 0-1/lpf /lpf Final    Microscopic Comment 06/21/2022 SEE COMMENT   Final    Urine Culture, Routine 06/21/2022 Multiple organisms isolated. None in predominance.  Repeat if   Final    Urine Culture, Routine 06/21/2022 clinically necessary.   Final    Office Visit on 06/14/2022   Component Date Value Ref Range Status    POC Blood, Urine 06/14/2022 Positive (A) Negative Final    POC Bilirubin, Urine 06/14/2022 Negative  Negative Final    POC Urobilinogen, Urine 06/14/2022 0.2  0.1 - 1.1 Final    POC Ketones, Urine 06/14/2022 Negative  Negative Final    POC Protein, Urine 06/14/2022 Positive (A) Negative Final    POC Nitrates, Urine 06/14/2022 Negative  Negative Final    POC Glucose, Urine 06/14/2022 Negative  Negative Final    pH, UA 06/14/2022 6.5   Final    POC Specific Gravity, Urine 06/14/2022 1.015  1.003 - 1.029 Final    POC Leukocytes, Urine 06/14/2022 Positive (A) Negative Final    Urine Culture, Routine 06/14/2022  (A)  Final   Office Visit on 04/19/2022   Component Date Value Ref Range Status    POC Blood, Urine 04/19/2022 Positive (A) Negative Final    POC Bilirubin, Urine 04/19/2022 Negative  Negative Final    POC Urobilinogen, Urine 04/19/2022 0.2  0.1 - 1.1 Final    POC Ketones, Urine 04/19/2022 Negative  Negative Final    POC Protein, Urine 04/19/2022 Positive (A) Negative Final    POC Nitrates, Urine 04/19/2022 Negative  Negative Final    POC Glucose, Urine 04/19/2022 Negative  Negative Final    pH, UA 04/19/2022 6.0   Final    POC Specific Gravity, Urine 04/19/2022 1.020  1.003 - 1.029 Final    POC Leukocytes, Urine 04/19/2022 Negative  Negative Final    Urine Culture, Routine 04/19/2022  (A)  Final   Telephone on 03/02/2022   Component Date Value Ref Range Status    Glucose 03/10/2022 89  65 - 99 mg/dL Final    BUN 03/10/2022 20  7 - 25 mg/dL Final    Creatinine 03/10/2022 1.13 (A) 0.60 - 0.88 mg/dL Final    eGFR if non  03/10/2022 43 (A) > OR = 60 mL/min/1.73m2 Final    eGFR if  03/10/2022 50 (A) > OR = 60 mL/min/1.73m2 Final    BUN/Creatinine Ratio 03/10/2022 18  6 - 22 (calc) Final    Sodium 03/10/2022 139  135 - 146 mmol/L Final    Potassium 03/10/2022 3.7  3.5 - 5.3 mmol/L Final     Chloride 03/10/2022 96 (A) 98 - 110 mmol/L Final    CO2 03/10/2022 33 (A) 20 - 32 mmol/L Final    Calcium 03/10/2022 9.3  8.6 - 10.4 mg/dL Final   Telephone on 01/20/2022   Component Date Value Ref Range Status    Amphetamines 01/20/2022 NEGATIVE  <500 ng/mL Final    Barbiturates 01/20/2022 NEGATIVE  <300 ng/mL Final    Benzodiazepines 01/20/2022 POSITIVE (A) <100 ng/mL Final    Alphahydroxyalprazolam 01/20/2022 131 (A) <25 ng/mL Final    Alphahydroxymidazolam 01/20/2022 NEGATIVE  <50 ng/mL Final    Alphahydroxytriazolam 01/20/2022 NEGATIVE  <50 ng/mL Final    Aminoclonazepam 01/20/2022 NEGATIVE  <25 ng/mL Final    hydroxyethylflurazepam UR GC/MS 01/20/2022 NEGATIVE  <50 ng/mL Final    Lorazepam 01/20/2022 NEGATIVE  <50 ng/mL Final    Nordiazepam Lvl 01/20/2022 NEGATIVE  <50 ng/mL Final    Oxazepam 01/20/2022 313 (A) <50 ng/mL Final    Temazepam GC/MS Conf 01/20/2022 NEGATIVE  <50 ng/mL Final    Benzodiazepines Comments 01/20/2022    Final    Cocaine Metabolites 01/20/2022 NEGATIVE  <150 ng/mL Final    Methadone 01/20/2022 NEGATIVE  <100 ng/mL Final    Opiates 01/20/2022 POSITIVE (A) <100 ng/mL Final    Codeine 01/20/2022 NEGATIVE  <50 ng/mL Final    Hydrocodone 01/20/2022 351 (A) <50 ng/mL Final    Hydromorphone 01/20/2022 411 (A) <50 ng/mL Final    Morphine 01/20/2022 NEGATIVE  <50 ng/mL Final    NORHYDROCODONE 01/20/2022 513 (A) <50 ng/mL Final    Opiates Comments 01/20/2022    Final    Oxycodone 01/20/2022 NEGATIVE  <100 ng/mL Final    Phencyclidine 01/20/2022 NEGATIVE  <25 ng/mL Final    Creatinine 01/20/2022 61.5  > or = 20.0 mg/dL Final    pH 01/20/2022 6.2  4.5 - 9.0 Final    Oxidants, Urine (Tox) 01/20/2022 NEGATIVE  <200 mcg/mL Final    Notes and Comments 01/20/2022    Final       Past Medical History:   Diagnosis Date    Anxiety     Arthritis     Asthma     COPD (chronic obstructive pulmonary disease)     Hypertension     Pain in the abdomen 2019    Thyroid disease       Past Surgical History:   Procedure Laterality Date    CATARACT EXTRACTION, BILATERAL  2017    Dr Collins     SECTION      x's2    CHOLECYSTECTOMY      COLONOSCOPY  2009    ESOPHAGOGASTRODUODENOSCOPY (EGD) WITH DILATION      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    LAPAROSCOPIC CHOLECYSTECTOMY N/A 2019    Procedure: CHOLECYSTECTOMY, LAPAROSCOPIC;  Surgeon: Toney Grace III, MD;  Location: [facility];  Service: General;  Laterality: N/A;    SKIN CANCER EXCISION      Dr.Weil     Family History   Problem Relation Age of Onset    Diabetes Mother     Depression Maternal Grandmother     Depression Maternal Grandfather        Marital Status:   Alcohol History:  reports no history of alcohol use.  Tobacco History:  reports that she quit smoking about 17 years ago. She has a 2.50 pack-year smoking history. She has never used smokeless tobacco.  Drug History:  reports no history of drug use.    Review of patient's allergies indicates:   Allergen Reactions    Sulfa (sulfonamide antibiotics) Swelling    Androgenic anabolic steroid Swelling     Feet swelling     Aspirin Other (See Comments)     Upset stomach     Erythromycin Itching    Methylprednisolone Other (See Comments)    Penicillins Itching       Current Outpatient Medications:     acetaminophen (TYLENOL) 500 MG tablet, Take 500 mg by mouth nightly as needed for Pain., Disp: , Rfl:     albuterol sulfate (PROAIR RESPICLICK) 90 mcg/actuation inhaler, Inhale 2 puffs into the lungs every 4 (four) hours. Rescue2 puffs every 4 hours as needed for cough, wheeze, or shortness of breath, Disp: 1 each, Rfl: 2    albuterol-ipratropium (DUO-NEB) 2.5 mg-0.5 mg/3 mL nebulizer solution, Take 3 mLs by nebulization every 6 (six) hours as needed., Disp: 120 vial, Rfl: 1    ALPRAZolam (XANAX) 0.5 MG tablet, Take 1 tablet (0.5 mg total) by mouth 3 (three) times daily as needed for Anxiety., Disp: 90 tablet, Rfl: 5    atenoloL (TENORMIN) 50 MG  tablet, Take 1 tablet (50 mg total) by mouth once daily., Disp: 90 tablet, Rfl: 0    clobetasoL 0.025 % Crea, Apply topically., Disp: , Rfl:     diclofenac sodium (VOLTAREN) 1 % Gel, Apply 2 g topically once daily., Disp: 100 g, Rfl: 1    fluticasone-umeclidin-vilanter (TRELEGY ELLIPTA) 100-62.5-25 mcg DsDv, Inhale 1 puff into the lungs once daily., Disp: 60 each, Rfl: 0    furosemide (LASIX) 20 MG tablet, Take 1 tablet (20 mg total) by mouth once daily., Disp: 80 tablet, Rfl: 0    HYDROcodone-acetaminophen (NORCO)  mg per tablet, Take 1 tablet by mouth every 4 (four) hours as needed for Pain. (Patient taking differently: Take 0.5 tablets by mouth every 4 (four) hours as needed for Pain.), Disp: 60 tablet, Rfl: 0    pantoprazole (PROTONIX) 20 MG tablet, Take 2 tablets (40 mg total) by mouth once daily., Disp: 30 tablet, Rfl: 0    potassium bicarbonate (K-LYTE) disintegrating tablet, Take 1 tablet (25 mEq total) by mouth once daily., Disp: 10 tablet, Rfl: 0    cetirizine (ZYRTEC) 10 MG tablet, Take 1 tablet (10 mg total) by mouth once daily., Disp: 30 tablet, Rfl: 3    dexAMETHasone (DECADRON) 6 MG tablet, Take 1 tablet daily, Disp: 15 tablet, Rfl: 0    levothyroxine (SYNTHROID) 50 MCG tablet, Take 1 tablet (50 mcg total) by mouth once daily., Disp: 90 tablet, Rfl: 1    nitrofurantoin, macrocrystal-monohydrate, (MACROBID) 100 MG capsule, Take 1 capsule (100 mg total) by mouth 2 (two) times daily., Disp: 20 capsule, Rfl: 0    ondansetron (ZOFRAN) 4 MG tablet, Take 1 tablet (4 mg total) by mouth every 6 (six) hours as needed., Disp: 30 tablet, Rfl: 1    potassium chloride 10% (KAYCIEL) 20 mEq/15 mL oral solution, Take 15 mLs (20 mEq total) by mouth once daily., Disp: 450 mL, Rfl: 1    Review of Systems   Constitutional: Negative for appetite change, chills, fatigue, fever and unexpected weight change.   HENT: Negative for congestion, ear pain, sinus pain, sore throat and trouble swallowing.    Eyes:  "Negative for pain, discharge and visual disturbance.   Respiratory: Negative for apnea, cough, shortness of breath and wheezing.    Cardiovascular: Negative for chest pain, palpitations and leg swelling.   Gastrointestinal: Negative for abdominal pain, blood in stool, constipation, diarrhea, nausea and vomiting.   Endocrine: Negative for heat intolerance, polydipsia and polyuria.   Genitourinary: Negative for difficulty urinating, dyspareunia, dysuria, frequency, hematuria and menstrual problem.        Acute kidney insufficiency in the hospital   Musculoskeletal: Negative for arthralgias, back pain, gait problem, joint swelling and myalgias.   Skin:        Cellulitis right ankle   Allergic/Immunologic: Negative for environmental allergies, food allergies and immunocompromised state.   Neurological: Negative for dizziness, tremors, seizures, numbness and headaches.   Psychiatric/Behavioral: Negative for behavioral problems, confusion, hallucinations and suicidal ideas. The patient is not nervous/anxious.         Objective:      Vitals:    07/11/22 1153   BP: 130/68   Pulse: 70   Weight: 59.9 kg (132 lb)   Height: 4' 11" (1.499 m)     Physical Exam  Vitals and nursing note reviewed.   Constitutional:       General: She is not in acute distress.     Appearance: Normal appearance. She is well-developed. She is not toxic-appearing.   HENT:      Head: Normocephalic and atraumatic.      Right Ear: Tympanic membrane and external ear normal.      Left Ear: Tympanic membrane and external ear normal.      Nose: Nose normal.      Mouth/Throat:      Pharynx: Oropharynx is clear.   Eyes:      Pupils: Pupils are equal, round, and reactive to light.   Neck:      Thyroid: No thyromegaly.      Vascular: No carotid bruit.   Cardiovascular:      Rate and Rhythm: Normal rate and regular rhythm.      Heart sounds: Normal heart sounds. No murmur heard.  Pulmonary:      Effort: Pulmonary effort is normal.      Breath sounds: Normal breath " sounds. No wheezing or rales.   Abdominal:      General: Bowel sounds are normal. There is no distension.      Palpations: Abdomen is soft.      Tenderness: There is no abdominal tenderness.   Musculoskeletal:         General: No tenderness or deformity. Normal range of motion.      Cervical back: Normal range of motion and neck supple.      Lumbar back: Normal. No spasms.      Comments: Bends 60 degrees at  waist, shoulders and knees good range of motion without crepitance.  No pitting edema to lower extremities.   Lymphadenopathy:      Cervical: No cervical adenopathy.   Skin:     General: Skin is warm and dry.      Findings: No rash.      Comments: Cellulitis of the right ankle has resolved.   Neurological:      Mental Status: She is alert and oriented to person, place, and time.      Cranial Nerves: No cranial nerve deficit.      Coordination: Coordination normal.   Psychiatric:         Behavior: Behavior normal.         Thought Content: Thought content normal.         Judgment: Judgment normal.         Assessment:       1. Cellulitis of right ankle    2. Acquired hypothyroidism    3. Essential hypertension    4. Acute cystitis without hematuria    5. COPD with asthma    6. Hypertriglyceridemia    7. Venous stasis of both lower extremities    8. Stage 3b chronic kidney disease    9. Primary osteoarthritis involving multiple joints    10. Primary insomnia    11. Other nonthrombocytopenic purpura    12. Hospital discharge follow-up         Plan:       Cellulitis of right ankle  Cellulitis has resolved with the use of antibiotics.  Acquired hypothyroidism  Comments:  Stable, continue current medication as is    Essential hypertension  -     levothyroxine (SYNTHROID) 50 MCG tablet; Take 1 tablet (50 mcg total) by mouth once daily.  Dispense: 90 tablet; Refill: 1  -     potassium chloride 10% (KAYCIEL) 20 mEq/15 mL oral solution; Take 15 mLs (20 mEq total) by mouth once daily.  Dispense: 450 mL; Refill: 1  -      dexAMETHasone (DECADRON) 6 MG tablet; Take 1 tablet daily  Dispense: 15 tablet; Refill: 0  -     nitrofurantoin, macrocrystal-monohydrate, (MACROBID) 100 MG capsule; Take 1 capsule (100 mg total) by mouth 2 (two) times daily.  Dispense: 20 capsule; Refill: 0  -     ondansetron (ZOFRAN) 4 MG tablet; Take 1 tablet (4 mg total) by mouth every 6 (six) hours as needed.  Dispense: 30 tablet; Refill: 1  -     cetirizine (ZYRTEC) 10 MG tablet; Take 1 tablet (10 mg total) by mouth once daily.  Dispense: 30 tablet; Refill: 3  Blood pressure well controlled, change potassium to liquid preparation  Acute cystitis without hematuria  -     nitrofurantoin, macrocrystal-monohydrate, (MACROBID) 100 MG capsule; Take 1 capsule (100 mg total) by mouth 2 (two) times daily.  Dispense: 20 capsule; Refill: 0  Continue nitrofurantoin another week.  COPD with asthma    Hypertriglyceridemia    Venous stasis of both lower extremities    Stage 3b chronic kidney disease  GFR dropped down to 35 during hospital stay but then rebounded back up to 60 currently.  Primary osteoarthritis involving multiple joints    Primary insomnia    Other nonthrombocytopenic purpura    Hospital discharge follow-up  Hospital medications reconciled with home medications.    Follow up in about 2 months (around 9/11/2022), or -[place]- htn.

## 2022-08-08 ENCOUNTER — TELEPHONE (OUTPATIENT)
Dept: FAMILY MEDICINE | Facility: CLINIC | Age: 87
End: 2022-08-08

## 2022-08-08 NOTE — TELEPHONE ENCOUNTER
----- Message from Lea Jim sent at 8/8/2022  3:43 PM CDT -----  Patient called and stated that she is concerned about her mother she stated that she is forgetting things and that she is not taking her medicine like she need to please give her a call at [phone]

## 2022-08-08 NOTE — TELEPHONE ENCOUNTER
She was discharged from the hospital last month and she has no appetite. She will eat oatmeal in the morning but other than that she doesn't want to eat anything or take her medicine. Her memory is going down. She says she is sponge bathing every other day. Her daughter states that she drinks plenty of water and doesn't think she has a UTI or anything. She does live with her son who happens to be an alcoholic so its not the best living situation. Her daughter wants to know if she can come in earlier to see you sooner than 9/1. She also has a cough that the patient says started yesterday and her daughter wants to know if you can send something in for her or should she take something like Coricidin OTC. Please advise.

## 2022-08-09 RX ORDER — CODEINE PHOSPHATE AND GUAIFENESIN 10; 100 MG/5ML; MG/5ML
5 SOLUTION ORAL EVERY 6 HOURS PRN
Qty: 180 ML | Refills: 0 | Status: SHIPPED | OUTPATIENT
Start: 2022-08-09 | End: 2022-08-19

## 2022-08-09 NOTE — TELEPHONE ENCOUNTER
Spoke to patient's daughter and she said that her mom had some left over cough syrup (guaiFENesin-codeine 100-10 mg/5 ml (TUSSI-ORGANIDIN NR)  mg/5 mL syrup from May. She wants to know if you can send her some more in because it seems to have helped her. As far as the appt goes, she will keep her scheduled appt with you on 9/1 with you.

## 2022-08-18 ENCOUNTER — TELEPHONE (OUTPATIENT)
Dept: FAMILY MEDICINE | Facility: CLINIC | Age: 87
End: 2022-08-18

## 2022-09-06 DIAGNOSIS — F51.01 PRIMARY INSOMNIA: ICD-10-CM

## 2022-09-06 RX ORDER — ALPRAZOLAM 0.5 MG/1
0.5 TABLET ORAL 3 TIMES DAILY PRN
Qty: 90 TABLET | Refills: 5 | Status: SHIPPED | OUTPATIENT
Start: 2022-09-06 | End: 2022-09-26 | Stop reason: SDUPTHER

## 2022-09-06 NOTE — TELEPHONE ENCOUNTER
----- Message from Lea Jim sent at 9/6/2022  3:33 PM CDT -----  Patient called and stated that she need to have her alprazolam called into [facility] if any questions please give her a call at at [phone]

## 2022-09-07 ENCOUNTER — TELEPHONE (OUTPATIENT)
Dept: FAMILY MEDICINE | Facility: CLINIC | Age: 87
End: 2022-09-07

## 2022-09-07 NOTE — TELEPHONE ENCOUNTER
----- Message from Lea Jim sent at 9/7/2022  3:43 PM CDT -----  Patient called and would like to see if she can come in on the same day but in the afternoon please give her a call at [phone]

## 2022-09-26 ENCOUNTER — OFFICE VISIT (OUTPATIENT)
Dept: FAMILY MEDICINE | Facility: CLINIC | Age: 87
End: 2022-09-26
Payer: MEDICARE

## 2022-09-26 VITALS
HEIGHT: 59 IN | HEART RATE: 78 BPM | WEIGHT: 131 LBS | OXYGEN SATURATION: 94 % | SYSTOLIC BLOOD PRESSURE: 134 MMHG | DIASTOLIC BLOOD PRESSURE: 74 MMHG | BODY MASS INDEX: 26.41 KG/M2

## 2022-09-26 DIAGNOSIS — E03.9 ACQUIRED HYPOTHYROIDISM: ICD-10-CM

## 2022-09-26 DIAGNOSIS — J43.1 PANLOBULAR EMPHYSEMA: ICD-10-CM

## 2022-09-26 DIAGNOSIS — J96.11 CHRONIC HYPOXEMIC RESPIRATORY FAILURE: ICD-10-CM

## 2022-09-26 DIAGNOSIS — M15.9 PRIMARY OSTEOARTHRITIS INVOLVING MULTIPLE JOINTS: ICD-10-CM

## 2022-09-26 DIAGNOSIS — N18.31 STAGE 3A CHRONIC KIDNEY DISEASE: ICD-10-CM

## 2022-09-26 DIAGNOSIS — R63.4 WEIGHT LOSS: Primary | ICD-10-CM

## 2022-09-26 DIAGNOSIS — I10 ESSENTIAL HYPERTENSION: ICD-10-CM

## 2022-09-26 DIAGNOSIS — F51.01 PRIMARY INSOMNIA: ICD-10-CM

## 2022-09-26 DIAGNOSIS — E78.1 HYPERTRIGLYCERIDEMIA: ICD-10-CM

## 2022-09-26 DIAGNOSIS — G31.84 MILD COGNITIVE IMPAIRMENT WITH MEMORY LOSS: ICD-10-CM

## 2022-09-26 DIAGNOSIS — I87.8 VENOUS STASIS OF BOTH LOWER EXTREMITIES: ICD-10-CM

## 2022-09-26 PROCEDURE — 99214 OFFICE O/P EST MOD 30 MIN: CPT | Mod: S$GLB,,, | Performed by: FAMILY MEDICINE

## 2022-09-26 PROCEDURE — 99214 PR OFFICE/OUTPT VISIT, EST, LEVL IV, 30-39 MIN: ICD-10-PCS | Mod: S$GLB,,, | Performed by: FAMILY MEDICINE

## 2022-09-26 RX ORDER — ALPRAZOLAM 0.5 MG/1
0.5 TABLET ORAL 3 TIMES DAILY PRN
Qty: 90 TABLET | Refills: 3 | Status: SHIPPED | OUTPATIENT
Start: 2022-09-26 | End: 2022-10-10 | Stop reason: SDUPTHER

## 2022-09-26 NOTE — PROGRESS NOTES
SUBJECTIVE:    Patient ID: Valerie Jones is a 89 y.o. female.    Chief Complaint: Follow-up (Follow up//no med bottles//tc)     89-year-old female accompanied by her daughter today.  Patient has lost 20 lb this year.  She has no real appetite and not much thirst.  She will forget eat breakfast and lunch daughter brings over a supper every night for her to eat.  Patient lives with her son.  Patient is not very active in doing chores or housework anymore.  Watches TV a lot. Still goes to [employer] on Sundays.  She will drink coffee, water, daughter provides boost supplements.     No bladder incontinence or fecal incontinence.  She sleeps well with her  alprazolam.     History of COPD, wears oxygen 2 L nasal cannula at night only     No recent falls and can walk independently without a cane     Had a hospital admission in July with a fall and some dehydration.  Chronic kidney disease diagnosed, she still takes naproxen occasionally for arthritis of the feet and ankles      No results displayed because visit has over 200 results.      Admission on 06/21/2022, Discharged on 06/21/2022   Component Date Value Ref Range Status    WBC 06/21/2022 10.07  3.90 - 12.70 K/uL Final    RBC 06/21/2022 4.34  4.00 - 5.40 M/uL Final    Hemoglobin 06/21/2022 12.6  12.0 - 16.0 g/dL Final    Hematocrit 06/21/2022 38.2  37.0 - 48.5 % Final    MCV 06/21/2022 88  82 - 98 fL Final    MCH 06/21/2022 29.0  27.0 - 31.0 pg Final    MCHC 06/21/2022 33.0  32.0 - 36.0 g/dL Final    RDW 06/21/2022 15.1 (H)  11.5 - 14.5 % Final    Platelets 06/21/2022 476 (H)  150 - 450 K/uL Final    MPV 06/21/2022 9.4  9.2 - 12.9 fL Final    Immature Granulocytes 06/21/2022 0.2  0.0 - 0.5 % Final    Gran # (ANC) 06/21/2022 7.7  1.8 - 7.7 K/uL Final    Immature Grans (Abs) 06/21/2022 0.02  0.00 - 0.04 K/uL Final    Lymph # 06/21/2022 1.4  1.0 - 4.8 K/uL Final    Mono # 06/21/2022 0.7  0.3 - 1.0 K/uL Final    Eos # 06/21/2022 0.2  0.0 - 0.5 K/uL Final    Baso #  06/21/2022 0.09  0.00 - 0.20 K/uL Final    nRBC 06/21/2022 0  0 /100 WBC Final    Gran % 06/21/2022 76.4 (H)  38.0 - 73.0 % Final    Lymph % 06/21/2022 13.7 (L)  18.0 - 48.0 % Final    Mono % 06/21/2022 7.1  4.0 - 15.0 % Final    Eosinophil % 06/21/2022 1.7  0.0 - 8.0 % Final    Basophil % 06/21/2022 0.9  0.0 - 1.9 % Final    Differential Method 06/21/2022 Automated   Final    Sodium 06/21/2022 134 (L)  136 - 145 mmol/L Final    Potassium 06/21/2022 2.9 (LL)  3.5 - 5.1 mmol/L Final    Chloride 06/21/2022 93 (L)  95 - 110 mmol/L Final    CO2 06/21/2022 31 (H)  23 - 29 mmol/L Final    Glucose 06/21/2022 104  70 - 110 mg/dL Final    BUN 06/21/2022 21  8 - 23 mg/dL Final    Creatinine 06/21/2022 1.2  0.5 - 1.4 mg/dL Final    Calcium 06/21/2022 9.2  8.7 - 10.5 mg/dL Final    Total Protein 06/21/2022 7.8  6.0 - 8.4 g/dL Final    Albumin 06/21/2022 3.8  3.5 - 5.2 g/dL Final    Total Bilirubin 06/21/2022 0.6  0.1 - 1.0 mg/dL Final    Alkaline Phosphatase 06/21/2022 45 (L)  55 - 135 U/L Final    AST 06/21/2022 28  10 - 40 U/L Final    ALT 06/21/2022 14  10 - 44 U/L Final    Anion Gap 06/21/2022 10  8 - 16 mmol/L Final    eGFR if  06/21/2022 46.6 (A)  >60 mL/min/1.73 m^2 Final    eGFR if non African American 06/21/2022 40.4 (A)  >60 mL/min/1.73 m^2 Final    Lipase 06/21/2022 37  4 - 60 U/L Final    Specimen UA 06/21/2022 Urine, Clean Catch   Final    Color, UA 06/21/2022 Yellow  Yellow, Straw, Amanda Final    Appearance, UA 06/21/2022 Clear  Clear Final    pH, UA 06/21/2022 6.0  5.0 - 8.0 Final    Specific Gravity, UA 06/21/2022 1.015  1.005 - 1.030 Final    Protein, UA 06/21/2022 Negative  Negative Final    Glucose, UA 06/21/2022 Negative  Negative Final    Ketones, UA 06/21/2022 Negative  Negative Final    Bilirubin (UA) 06/21/2022 Negative  Negative Final    Occult Blood UA 06/21/2022 Negative  Negative Final    Nitrite, UA 06/21/2022 Negative  Negative Final    Urobilinogen, UA 06/21/2022 Negative  Negative  EU/dL Final    Leukocytes, UA 06/21/2022 2+ (A)  Negative Final    POC Creatinine 06/21/2022 1.2  0.5 - 1.4 mg/dL Final    Sample 06/21/2022 VENOUS   Final    RBC, UA 06/21/2022 3  0 - 4 /hpf Final    WBC, UA 06/21/2022 13 (H)  0 - 5 /hpf Final    Bacteria 06/21/2022 Negative  None-Occ /hpf Final    Squam Epithel, UA 06/21/2022 5  /hpf Final    Hyaline Casts, UA 06/21/2022 19 (A)  0-1/lpf /lpf Final    Microscopic Comment 06/21/2022 SEE COMMENT   Final    Urine Culture, Routine 06/21/2022 Multiple organisms isolated. None in predominance.  Repeat if   Final    Urine Culture, Routine 06/21/2022 clinically necessary.   Final   Office Visit on 06/14/2022   Component Date Value Ref Range Status    POC Blood, Urine 06/14/2022 Positive (A)  Negative Final    POC Bilirubin, Urine 06/14/2022 Negative  Negative Final    POC Urobilinogen, Urine 06/14/2022 0.2  0.1 - 1.1 Final    POC Ketones, Urine 06/14/2022 Negative  Negative Final    POC Protein, Urine 06/14/2022 Positive (A)  Negative Final    POC Nitrates, Urine 06/14/2022 Negative  Negative Final    POC Glucose, Urine 06/14/2022 Negative  Negative Final    pH, UA 06/14/2022 6.5   Final    POC Specific Gravity, Urine 06/14/2022 1.015  1.003 - 1.029 Final    POC Leukocytes, Urine 06/14/2022 Positive (A)  Negative Final    Urine Culture, Routine 06/14/2022  (A)   Final   Office Visit on 04/19/2022   Component Date Value Ref Range Status    POC Blood, Urine 04/19/2022 Positive (A)  Negative Final    POC Bilirubin, Urine 04/19/2022 Negative  Negative Final    POC Urobilinogen, Urine 04/19/2022 0.2  0.1 - 1.1 Final    POC Ketones, Urine 04/19/2022 Negative  Negative Final    POC Protein, Urine 04/19/2022 Positive (A)  Negative Final    POC Nitrates, Urine 04/19/2022 Negative  Negative Final    POC Glucose, Urine 04/19/2022 Negative  Negative Final    pH, UA 04/19/2022 6.0   Final    POC Specific Gravity, Urine 04/19/2022 1.020  1.003 - 1.029 Final    POC Leukocytes, Urine  2022 Negative  Negative Final    Urine Culture, Routine 2022  (A)   Final       Past Medical History:   Diagnosis Date    Anxiety     Arthritis     Asthma     COPD (chronic obstructive pulmonary disease)     Hypertension     Pain in the abdomen 2019    Thyroid disease      Social History     Socioeconomic History    Marital status:    Tobacco Use    Smoking status: Former     Packs/day: 0.50     Years: 5.00     Pack years: 2.50     Types: Cigarettes     Quit date: 2/15/2005     Years since quittin.6    Smokeless tobacco: Never   Substance and Sexual Activity    Alcohol use: No    Drug use: No    Sexual activity: Never     Past Surgical History:   Procedure Laterality Date    CATARACT EXTRACTION, BILATERAL  2017    Dr Collins     SECTION      x's2    CHOLECYSTECTOMY      COLONOSCOPY  2009    ESOPHAGOGASTRODUODENOSCOPY (EGD) WITH DILATION      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    LAPAROSCOPIC CHOLECYSTECTOMY N/A 2019    Procedure: CHOLECYSTECTOMY, LAPAROSCOPIC;  Surgeon: Toney Grace III, MD;  Location: [facility];  Service: General;  Laterality: N/A;    SKIN CANCER EXCISION      Dr.Weil     Family History   Problem Relation Age of Onset    Diabetes Mother     Depression Maternal Grandmother     Depression Maternal Grandfather        Review of patient's allergies indicates:   Allergen Reactions    Sulfa (sulfonamide antibiotics) Swelling    Androgenic anabolic steroid Swelling     Feet swelling     Aspirin Other (See Comments)     Upset stomach     Erythromycin Itching    Methylprednisolone Other (See Comments)    Penicillins Itching       Current Outpatient Medications:     acetaminophen (TYLENOL) 500 MG tablet, Take 500 mg by mouth nightly as needed for Pain., Disp: , Rfl:     albuterol sulfate (PROAIR RESPICLICK) 90 mcg/actuation inhaler, Inhale 2 puffs into the lungs every 4 (four) hours. Rescue2 puffs every 4 hours as needed for cough, wheeze, or shortness of breath,  Disp: 1 each, Rfl: 2    albuterol-ipratropium (DUO-NEB) 2.5 mg-0.5 mg/3 mL nebulizer solution, Take 3 mLs by nebulization every 6 (six) hours as needed., Disp: 120 vial, Rfl: 1    ALPRAZolam (XANAX) 0.5 MG tablet, Take 1 tablet (0.5 mg total) by mouth 3 (three) times daily as needed for Anxiety., Disp: 90 tablet, Rfl: 3    atenoloL (TENORMIN) 50 MG tablet, Take 1 tablet (50 mg total) by mouth once daily., Disp: 90 tablet, Rfl: 0    cetirizine (ZYRTEC) 10 MG tablet, Take 1 tablet (10 mg total) by mouth once daily., Disp: 30 tablet, Rfl: 3    clobetasoL 0.025 % Crea, Apply topically., Disp: , Rfl:     dexAMETHasone (DECADRON) 6 MG tablet, Take 1 tablet daily, Disp: 15 tablet, Rfl: 0    diclofenac sodium (VOLTAREN) 1 % Gel, Apply 2 g topically once daily., Disp: 100 g, Rfl: 1    fluticasone-umeclidin-vilanter (TRELEGY ELLIPTA) 100-62.5-25 mcg DsDv, Inhale 1 puff into the lungs once daily., Disp: 60 each, Rfl: 0    furosemide (LASIX) 20 MG tablet, Take 1 tablet (20 mg total) by mouth once daily., Disp: 80 tablet, Rfl: 0    HYDROcodone-acetaminophen (NORCO)  mg per tablet, Take 1 tablet by mouth every 4 (four) hours as needed for Pain. (Patient taking differently: Take 0.5 tablets by mouth every 4 (four) hours as needed for Pain.), Disp: 60 tablet, Rfl: 0    levothyroxine (SYNTHROID) 50 MCG tablet, Take 1 tablet (50 mcg total) by mouth once daily., Disp: 90 tablet, Rfl: 1    nitrofurantoin, macrocrystal-monohydrate, (MACROBID) 100 MG capsule, Take 1 capsule (100 mg total) by mouth 2 (two) times daily., Disp: 20 capsule, Rfl: 0    ondansetron (ZOFRAN) 4 MG tablet, Take 1 tablet (4 mg total) by mouth every 6 (six) hours as needed., Disp: 30 tablet, Rfl: 1    pantoprazole (PROTONIX) 20 MG tablet, Take 2 tablets (40 mg total) by mouth once daily., Disp: 30 tablet, Rfl: 0    potassium bicarbonate (K-LYTE) disintegrating tablet, Take 1 tablet (25 mEq total) by mouth once daily., Disp: 10 tablet, Rfl: 0    potassium  "chloride 10% (KAYCIEL) 20 mEq/15 mL oral solution, Take 15 mLs (20 mEq total) by mouth once daily., Disp: 450 mL, Rfl: 1    Review of Systems   Constitutional:  Positive for appetite change (patient has no appetite) and unexpected weight change (20 lb weight loss in 1 year). Negative for chills, fatigue and fever.   HENT:  Negative for congestion, ear pain, sinus pain, sore throat and trouble swallowing.    Eyes:  Negative for pain, discharge and visual disturbance.   Respiratory:  Negative for apnea, cough, shortness of breath and wheezing.    Cardiovascular:  Negative for chest pain, palpitations and leg swelling.   Gastrointestinal:  Negative for abdominal pain, blood in stool, constipation, diarrhea, nausea and vomiting.   Endocrine: Negative for heat intolerance, polydipsia and polyuria.   Genitourinary:  Negative for difficulty urinating, dyspareunia, dysuria, frequency, hematuria and menstrual problem.   Musculoskeletal:  Positive for arthralgias. Negative for back pain, gait problem, joint swelling and myalgias.   Allergic/Immunologic: Negative for environmental allergies, food allergies and immunocompromised state.   Neurological:  Negative for dizziness, tremors, seizures, numbness and headaches.   Psychiatric/Behavioral:  Negative for behavioral problems, confusion, hallucinations and suicidal ideas. The patient is not nervous/anxious.         Objective:      Vitals:    09/26/22 1334   BP: 134/74   Pulse: 78   SpO2: (!) 94%   Weight: 59.4 kg (131 lb)   Height: 4' 11" (1.499 m)     Physical Exam  Vitals and nursing note reviewed.   Constitutional:       Appearance: She is well-developed.      Comments: Frail elderly female in no apparent distress   HENT:      Head: Normocephalic and atraumatic.      Right Ear: Tympanic membrane and external ear normal.      Left Ear: Tympanic membrane and external ear normal.      Nose: Nose normal.      Mouth/Throat:      Pharynx: Oropharynx is clear. No posterior " oropharyngeal erythema.   Eyes:      Pupils: Pupils are equal, round, and reactive to light.   Neck:      Thyroid: No thyromegaly.      Vascular: No carotid bruit.   Cardiovascular:      Rate and Rhythm: Normal rate and regular rhythm.      Heart sounds: Normal heart sounds. No murmur heard.  Pulmonary:      Effort: Pulmonary effort is normal.      Breath sounds: Normal breath sounds. No wheezing or rales.   Abdominal:      General: Bowel sounds are normal. There is no distension.      Palpations: Abdomen is soft.      Tenderness: There is no abdominal tenderness.   Musculoskeletal:         General: No tenderness or deformity. Normal range of motion.      Cervical back: Normal range of motion and neck supple.      Lumbar back: Normal. No spasms.      Comments: Bends 90 degrees at  waist, good range of motion shoulders and knees, no pitting edema to lower extremities, osteo arthritic changes to her hands knuckles and fingers   Lymphadenopathy:      Cervical: No cervical adenopathy.   Skin:     General: Skin is warm and dry.      Findings: No rash.   Neurological:      Mental Status: She is alert and oriented to person, place, and time. Mental status is at baseline.      Cranial Nerves: No cranial nerve deficit.      Coordination: Coordination normal.      Gait: Gait normal.      Comments: Short-term memory loss -remembered 1 present out of the last 3.  [place]iden.   Psychiatric:         Mood and Affect: Mood normal.         Behavior: Behavior normal.         Thought Content: Thought content normal.         Judgment: Judgment normal.         Assessment:       1. Weight loss    2. Primary insomnia    3. Stage 3a chronic kidney disease    4. Chronic hypoxemic respiratory failure    5. Panlobular emphysema    6. Essential hypertension    7. Hypertriglyceridemia    8. Venous stasis of both lower extremities    9. Acquired hypothyroidism    10. Primary osteoarthritis involving multiple joints    11. Mild cognitive impairment  with memory loss           Plan:       Weight loss   Patient has lost 20 lb in 1 year.  We will increase her caloric intake with boost supplements 2 a day also daughter brings her meal every night.  Primary insomnia  Comments:  she just uses low dose prn alprazolam. refills as needed.  Orders:  -     ALPRAZolam (XANAX) 0.5 MG tablet; Take 1 tablet (0.5 mg total) by mouth 3 (three) times daily as needed for Anxiety.  Dispense: 90 tablet; Refill: 3    Stage 3a chronic kidney disease  -     Basic Metabolic Panel; Future; Expected date: 09/26/2022    Recheck kidney function today, DC NSAIDs, switch to Tylenol for pain management  Chronic hypoxemic respiratory failure   Continue O2 at night for sleep  Panlobular emphysema    Essential hypertension   Blood pressure well controlled  Hypertriglyceridemia    Venous stasis of both lower extremities    Acquired hypothyroidism    Primary osteoarthritis involving multiple joints   Discontinue NSAIDs  Mild cognitive impairment with memory loss   Becoming more forgetful  Follow up in about 3 months (around 12/26/2022), or ckd ,wt loss.        9/26/2022 Reed Andersen

## 2022-09-27 LAB
BUN SERPL-MCNC: 14 MG/DL (ref 7–25)
BUN/CREAT SERPL: ABNORMAL (CALC) (ref 6–22)
CALCIUM SERPL-MCNC: 9.7 MG/DL (ref 8.6–10.4)
CHLORIDE SERPL-SCNC: 102 MMOL/L (ref 98–110)
CO2 SERPL-SCNC: 32 MMOL/L (ref 20–32)
CREAT SERPL-MCNC: 0.75 MG/DL (ref 0.6–0.95)
EGFR: 76 ML/MIN/1.73M2
GLUCOSE SERPL-MCNC: 104 MG/DL (ref 65–99)
POTASSIUM SERPL-SCNC: 4.6 MMOL/L (ref 3.5–5.3)
SODIUM SERPL-SCNC: 140 MMOL/L (ref 135–146)

## 2022-10-05 ENCOUNTER — TELEPHONE (OUTPATIENT)
Dept: FAMILY MEDICINE | Facility: CLINIC | Age: 87
End: 2022-10-05

## 2022-10-05 NOTE — TELEPHONE ENCOUNTER
Spoke with pt in regards to recent lab results. Verbalized per Dr. Andersen that the patient is no longer dehydrated.  Kidneys function and sugar looks fine. Pt acknowledge understanding.

## 2022-10-05 NOTE — TELEPHONE ENCOUNTER
----- Message from Reed Andersen MD sent at 10/4/2022  8:25 PM CDT -----  Call patient's daughter.  Patient is no longer dehydrated.  Kidneys function and sugar looks fine.

## 2022-10-10 ENCOUNTER — TELEPHONE (OUTPATIENT)
Dept: FAMILY MEDICINE | Facility: CLINIC | Age: 87
End: 2022-10-10

## 2022-10-10 DIAGNOSIS — F51.01 PRIMARY INSOMNIA: ICD-10-CM

## 2022-10-10 RX ORDER — ALPRAZOLAM 0.5 MG/1
0.5 TABLET ORAL 3 TIMES DAILY PRN
Qty: 90 TABLET | Refills: 3 | Status: SHIPPED | OUTPATIENT
Start: 2022-10-10 | End: 2023-03-09 | Stop reason: SDUPTHER

## 2022-10-10 NOTE — TELEPHONE ENCOUNTER
----- Message from Lea Jim sent at 10/10/2022 11:40 AM CDT -----  Patient called and stated that she need a refill of her alprazolam called into [facility] if any questions please give her a call back at [phone]

## 2022-10-10 NOTE — TELEPHONE ENCOUNTER
----- Message from Ludmila Barrera sent at 10/10/2022  3:29 PM CDT -----  Pt needs refill on alprazolam   [facility]   [phone]

## 2022-11-10 ENCOUNTER — TELEPHONE (OUTPATIENT)
Dept: FAMILY MEDICINE | Facility: CLINIC | Age: 87
End: 2022-11-10

## 2022-11-10 NOTE — TELEPHONE ENCOUNTER
----- Message from Ludmila Barrera sent at 11/10/2022 11:05 AM CST -----  Neeta hernández- granddaughter is calling to talk to nurse about her care   [phone]
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
## 2023-02-13 ENCOUNTER — TELEPHONE (OUTPATIENT)
Dept: FAMILY MEDICINE | Facility: CLINIC | Age: 88
End: 2023-02-13

## 2023-02-13 NOTE — TELEPHONE ENCOUNTER
"Spoke with patients friend - not on HIPAA who states she has a large mole on her forehead that "catches" when she griffiths her hair. She is going to call the dermatologist to get scheduled. She wasn't sure if we were able to do that in the office and I let her know generally cannot remove moles but depends on the size, shape, etc. Dermatologist would be best. She will have a family member call back to schedule regular OV  "

## 2023-02-13 NOTE — TELEPHONE ENCOUNTER
----- Message from Lea Jim sent at 2/13/2023 11:52 AM CST -----  Patient friend  Denise called and need to  schedule an appointment she has a spot on her head that she need to have looked at and removed please give her a call at [phone]

## 2023-02-27 ENCOUNTER — TELEPHONE (OUTPATIENT)
Dept: FAMILY MEDICINE | Facility: CLINIC | Age: 88
End: 2023-02-27

## 2023-02-27 NOTE — TELEPHONE ENCOUNTER
Pt states she has a rough mole that she would like removed. Pt advised no appts this week, but she can go to dermatologist. Pt states she will call her dermatologist.

## 2023-02-27 NOTE — TELEPHONE ENCOUNTER
----- Message from Katlin Thibodeaux sent at 2/27/2023  2:11 PM CST -----  Pt would like to be seen for something on her forehead. [phone]

## 2023-03-09 DIAGNOSIS — F51.01 PRIMARY INSOMNIA: ICD-10-CM

## 2023-03-09 RX ORDER — ALPRAZOLAM 0.5 MG/1
0.5 TABLET ORAL 3 TIMES DAILY PRN
Qty: 90 TABLET | Refills: 3 | Status: SHIPPED | OUTPATIENT
Start: 2023-03-09 | End: 2023-07-12 | Stop reason: SDUPTHER

## 2023-03-09 NOTE — TELEPHONE ENCOUNTER
----- Message from Leslee Stuart sent at 3/9/2023  4:52 PM CST -----  -Pt called at 4:40pm and said she needs a refill on alprazolam.  [phone]

## 2023-05-11 NOTE — TELEPHONE ENCOUNTER
----- Message from Lea Jim sent at 5/11/2023  2:38 PM CDT -----  Patient called with another phone number to reach her at [phone]

## 2023-05-11 NOTE — TELEPHONE ENCOUNTER
----- Message from Lea Jim sent at 5/11/2023  2:30 PM CDT -----  Patient called and would like to be seen tomorrow she has a coughing  and sore throat please call her at [phone]

## 2023-05-12 RX ORDER — BENZONATATE 100 MG/1
100 CAPSULE ORAL 3 TIMES DAILY PRN
Qty: 30 CAPSULE | Refills: 0 | Status: SHIPPED | OUTPATIENT
Start: 2023-05-12 | End: 2023-05-22

## 2023-05-12 RX ORDER — DOXYCYCLINE 100 MG/1
100 CAPSULE ORAL 2 TIMES DAILY
Qty: 20 CAPSULE | Refills: 0 | Status: SHIPPED | OUTPATIENT
Start: 2023-05-12 | End: 2024-03-07 | Stop reason: SDUPTHER

## 2023-05-12 NOTE — TELEPHONE ENCOUNTER
Spoke to pt and let her know Dr. Andersen is sending Doxycycline 100 BID #20 and Benzonatate 100 TID #30 Pt verbalized understanding and pharmacy is [facility]

## 2023-05-13 ENCOUNTER — HOSPITAL ENCOUNTER (EMERGENCY)
Facility: HOSPITAL | Age: 88
Discharge: HOME | End: 2023-05-13
Attending: EMERGENCY MEDICINE
Payer: MEDICARE

## 2023-05-13 VITALS
OXYGEN SATURATION: 95 % | TEMPERATURE: 98 F | DIASTOLIC BLOOD PRESSURE: 79 MMHG | SYSTOLIC BLOOD PRESSURE: 184 MMHG | WEIGHT: 135 LBS | RESPIRATION RATE: 18 BRPM | BODY MASS INDEX: 27.27 KG/M2 | HEART RATE: 97 BPM

## 2023-05-13 DIAGNOSIS — J40 BRONCHITIS: ICD-10-CM

## 2023-05-13 DIAGNOSIS — R05.9 COUGH: ICD-10-CM

## 2023-05-13 DIAGNOSIS — J44.1 COPD WITH ACUTE EXACERBATION: Primary | ICD-10-CM

## 2023-05-13 DIAGNOSIS — R06.02 SOB (SHORTNESS OF BREATH): ICD-10-CM

## 2023-05-13 LAB
INFLUENZA A, MOLECULAR: NEGATIVE
INFLUENZA B, MOLECULAR: NEGATIVE
SARS-COV-2 RDRP RESP QL NAA+PROBE: NEGATIVE
SPECIMEN SOURCE: NORMAL

## 2023-05-13 PROCEDURE — U0002 COVID-19 LAB TEST NON-CDC: HCPCS | Performed by: EMERGENCY MEDICINE

## 2023-05-13 PROCEDURE — 99284 EMERGENCY DEPT VISIT MOD MDM: CPT | Mod: 25

## 2023-05-13 PROCEDURE — 25000242 PHARM REV CODE 250 ALT 637 W/ HCPCS: Performed by: EMERGENCY MEDICINE

## 2023-05-13 PROCEDURE — 93010 ELECTROCARDIOGRAM REPORT: CPT | Mod: ,,, | Performed by: INTERNAL MEDICINE

## 2023-05-13 PROCEDURE — 87502 INFLUENZA DNA AMP PROBE: CPT | Performed by: EMERGENCY MEDICINE

## 2023-05-13 PROCEDURE — 93010 EKG 12-LEAD: ICD-10-PCS | Mod: ,,, | Performed by: INTERNAL MEDICINE

## 2023-05-13 PROCEDURE — 99900031 HC PATIENT EDUCATION (STAT)

## 2023-05-13 PROCEDURE — 93005 ELECTROCARDIOGRAM TRACING: CPT | Performed by: INTERNAL MEDICINE

## 2023-05-13 PROCEDURE — 27000221 HC OXYGEN, UP TO 24 HOURS

## 2023-05-13 PROCEDURE — 94761 N-INVAS EAR/PLS OXIMETRY MLT: CPT

## 2023-05-13 PROCEDURE — 94640 AIRWAY INHALATION TREATMENT: CPT

## 2023-05-13 RX ORDER — HYDROCODONE POLISTIREX AND CHLORPHENIRAMINE POLISTIREX 10; 8 MG/5ML; MG/5ML
2.5 SUSPENSION, EXTENDED RELEASE ORAL EVERY 12 HOURS PRN
Qty: 25 ML | Refills: 0 | Status: SHIPPED | OUTPATIENT
Start: 2023-05-13 | End: 2023-05-18

## 2023-05-13 RX ORDER — ALBUTEROL SULFATE 2.5 MG/.5ML
2.5 SOLUTION RESPIRATORY (INHALATION) EVERY 4 HOURS PRN
Qty: 30 EACH | Refills: 1 | Status: SHIPPED | OUTPATIENT
Start: 2023-05-13 | End: 2024-05-12

## 2023-05-13 RX ORDER — IPRATROPIUM BROMIDE AND ALBUTEROL SULFATE 2.5; .5 MG/3ML; MG/3ML
3 SOLUTION RESPIRATORY (INHALATION)
Status: COMPLETED | OUTPATIENT
Start: 2023-05-13 | End: 2023-05-13

## 2023-05-13 RX ORDER — ALBUTEROL SULFATE 90 UG/1
1-2 AEROSOL, METERED RESPIRATORY (INHALATION) EVERY 6 HOURS PRN
Qty: 18 G | Refills: 1 | Status: SHIPPED | OUTPATIENT
Start: 2023-05-13 | End: 2023-06-12

## 2023-05-13 RX ADMIN — IPRATROPIUM BROMIDE AND ALBUTEROL SULFATE 3 ML: .5; 3 SOLUTION RESPIRATORY (INHALATION) at 02:05

## 2023-05-13 NOTE — ED PROVIDER NOTES
Encounter Date: 2023       History     Chief Complaint   Patient presents with    Shortness of Breath     Nasal congestion for 3 days with cough.  Started feeling sob tonight.      Emergent evaluation of an 89-year-old female with history of COPD does not use home oxygen has not being compliant with doing breathing treatments, history of hypertension hypothyroidism presents to the ER due to 2-3 days of cough that is productive but unable to see the sputum sinus pressure rhinorrhea that is now improved shortness of breath and wheezing.  Patient reported shortness of breath worsened tonight.  She would difficulty breathing when to come in no fevers chills or sweats.  No ear pain.  She did have a scratchy throat.  No abdominal pain nausea vomiting diarrhea.  She was prescribed doxycycline and Tessalon Perles by her primary after calling and telling them about her symptoms she is taken 2 doses of doxycycline at this time    Review of patient's allergies indicates:   Allergen Reactions    Sulfa (sulfonamide antibiotics) Swelling    Androgenic anabolic steroid Swelling     Feet swelling     Aspirin Other (See Comments)     Upset stomach     Erythromycin Itching    Methylprednisolone Other (See Comments)    Penicillins Itching     Past Medical History:   Diagnosis Date    Anxiety     Arthritis     Asthma     COPD (chronic obstructive pulmonary disease)     Hypertension     Pain in the abdomen 2019    Thyroid disease      Past Surgical History:   Procedure Laterality Date    CATARACT EXTRACTION, BILATERAL  2017    Dr Collins     SECTION      x's2    CHOLECYSTECTOMY      COLONOSCOPY  2009    ESOPHAGOGASTRODUODENOSCOPY (EGD) WITH DILATION      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    LAPAROSCOPIC CHOLECYSTECTOMY N/A 2019    Procedure: CHOLECYSTECTOMY, LAPAROSCOPIC;  Surgeon: Toney Grace III, MD;  Location: [facility];  Service: General;  Laterality: N/A;    SKIN CANCER EXCISION      Dr.Weil Family  History   Problem Relation Age of Onset    Diabetes Mother     Depression Maternal Grandmother     Depression Maternal Grandfather      Social History     Tobacco Use    Smoking status: Former     Packs/day: 0.50     Years: 5.00     Pack years: 2.50     Types: Cigarettes     Quit date: 2/15/2005     Years since quittin.2    Smokeless tobacco: Never   Substance Use Topics    Alcohol use: No    Drug use: No     Review of Systems   Constitutional:  Negative for activity change, appetite change, chills, diaphoresis, fatigue and fever.   HENT:  Positive for congestion, sinus pressure, sinus pain, sore throat and voice change. Negative for postnasal drip and rhinorrhea.    Respiratory:  Positive for cough, shortness of breath and wheezing. Negative for chest tightness and stridor.    Cardiovascular:  Negative for chest pain and palpitations.   Gastrointestinal:  Negative for abdominal distention, abdominal pain, blood in stool, constipation, diarrhea, nausea and vomiting.   Genitourinary:  Negative for dysuria, flank pain, frequency, hematuria and urgency.   Musculoskeletal:  Negative for arthralgias, back pain, myalgias, neck pain and neck stiffness.   Skin:  Negative for rash.   Neurological:  Negative for dizziness, syncope, weakness, light-headedness and headaches.   Hematological:  Does not bruise/bleed easily.   Psychiatric/Behavioral:  Negative for confusion. The patient is not nervous/anxious.    All other systems reviewed and are negative.    Physical Exam     Initial Vitals [23 0028]   BP Pulse Resp Temp SpO2   (!) 196/83 108 16 98.3 °F (36.8 °C) (!) 92 %      MAP       --         Physical Exam    Nursing note and vitals reviewed.  Constitutional: She appears well-developed and well-nourished. She is not diaphoretic. No distress.   HENT:   Head: Normocephalic and atraumatic.   Right Ear: External ear normal.   Left Ear: External ear normal.   Nose: Nose normal.   Mouth/Throat: Oropharynx is clear and  moist.   Eyes: Conjunctivae and EOM are normal. Pupils are equal, round, and reactive to light.   Neck: Neck supple. No tracheal deviation present.   Normal range of motion.  Cardiovascular:  Normal rate, regular rhythm, normal heart sounds and intact distal pulses.     Exam reveals no gallop and no friction rub.       No murmur heard.  Blood pressure 196/83 pulse 108   Pulmonary/Chest: Breath sounds normal. No stridor. No respiratory distress. She has no wheezes. She has no rhonchi. She has no rales. She exhibits no tenderness.   Sats 91% on room air respirations 16 95% on 1 L nasal cannula rhonchi in left mid lung wheezes in bilateral bases   Abdominal: Abdomen is soft. Bowel sounds are normal. She exhibits no distension and no mass. There is no abdominal tenderness. There is no rebound and no guarding.   Musculoskeletal:         General: No edema. Normal range of motion.      Cervical back: Normal range of motion and neck supple.     Neurological: She is alert and oriented to person, place, and time. She has normal strength. No cranial nerve deficit or sensory deficit.   Skin: Skin is warm and dry. No rash noted. No erythema. No pallor.   Psychiatric: She has a normal mood and affect. Her behavior is normal. Judgment and thought content normal.       ED Course   Procedures  Labs Reviewed   SARS-COV-2 RNA AMPLIFICATION, QUAL   INFLUENZA A AND B ANTIGEN    Narrative:     Specimen Source->Nasopharyngeal Swab     EKG Readings: (Independently Interpreted)   Initial Reading: No STEMI. Rhythm: Sinus Tachycardia. Heart Rate: 102. Ectopy: No Ectopy. Conduction: Normal.     Imaging Results              X-Ray Chest AP Portable (In process)                      Medications   albuterol-ipratropium 2.5 mg-0.5 mg/3 mL nebulizer solution 3 mL (has no administration in time range)     Medical Decision Making:   Independently Interpreted Test(s):   I have ordered and independently interpreted X-rays - see prior notes.  I have  ordered and independently interpreted EKG Reading(s) - see prior notes  Clinical Tests:   Lab Tests: Ordered and Reviewed  Radiological Study: Ordered and Reviewed  Medical Tests: Ordered and Reviewed  ED Management:  Emergent evaluation of an 89-year-old female with history of COPD does not use home oxygen has not being compliant with doing breathing treatments, history of hypertension hypothyroidism presents to the ER due to 2-3 days of cough that is productive but unable to see the sputum sinus pressure rhinorrhea that is now improved shortness of breath and wheezing.  Patient reported shortness of breath worsened tonight.  She would difficulty breathing when to come in no fevers chills or sweats.  No ear pain.  She did have a scratchy throat.  No abdominal pain nausea vomiting diarrhea.  She was prescribed doxycycline and Tessalon Perles by her primary after calling and telling them about her symptoms she is taken 2 doses of doxycycline at this time  On physical exam patient is awake alert oriented sats of 91% on room air respirations 16 wheezes in bilateral lower lobes and rales in left mid lung.  Blood pressure 196/83 pulse 108 afebrile.  Patient is very talkative and friendly.  [facility]    Patient presents for emergent evaluation of acute shortness of breath, cough, wheezing that poses a threat to life and/or bodily function.   Differential diagnosis includes but was not limited to pneumonia, bronchitis, pleurisy, COPD exacerbation, asthma exacerbation, PE, ACS, heart failure.   In the ED patient found to have acute bronchitis with COPD exacerbation.    I ordered labs and personally reviewed them.  Labs significant for COVID in flu negative.  I ordered X-rays and personally reviewed them and reviewed the radiologist interpretation.  Xray significant for chronic COPD changes no infiltrates seen no cardiomegaly.         Discharge [facility]     Patient was managed in the ED with DuoNeb while here    The response to  treatment was good.  Patient will be discharged home I have written for albuterol neb yells nebulizer machine and inhaler which she reports she is does not have.  She reports she is not taken any medication for blood pressure and denies a hypertensive history reports her blood pressures only high which comes to the ER.  I will not give her steroids due to the elevated blood pressure reading.  She will be discharged home with Tussionex for her cough and to continue the doxycycline as prescribed.  Heart rate improved to 90s prior to discharge home  Patient was discharged in stable condition.  Detailed return precautions discussed.  Patient was told to follow up with primary care physician or specialist based on their diagnosis  Roxy Sylvester MD                          Clinical Impression:   Final diagnoses:  [R05.9] Cough  [R06.02] SOB (shortness of breath)  [J44.1] COPD with acute exacerbation (Primary)  [J40] Bronchitis        ED Disposition Condition    Discharge Stable          ED Prescriptions       Medication Sig Dispense Start Date End Date Auth. Provider    albuterol (PROVENTIL/VENTOLIN HFA) 90 mcg/actuation inhaler Inhale 1-2 puffs into the lungs every 6 (six) hours as needed for Wheezing. Rescue 18 g 5/13/2023 6/12/2023 Roxy Sylvester MD    albuterol sulfate 2.5 mg/0.5 mL Nebu Take 2.5 mg by nebulization every 4 (four) hours as needed (sob wheezing). Rescue    Please dispense 1 nebulizer machine 30 each 5/13/2023 5/12/2024 Roxy Sylvester MD    hydrocodone-chlorpheniramine (TUSSIONEX) 10-8 mg/5 mL suspension Take 2.5 mLs by mouth every 12 (twelve) hours as needed for Cough. 25 mL 5/13/2023 5/18/2023 Roxy Sylvester MD          Follow-up Information       Follow up With Specialties Details Why Contact Info Additional Information    [facility] - Emergency Dept Emergency Medicine Go to  If symptoms worsen [address] [facility] [address] [phone] 1st floor     Reed Andersen MD Family Medicine, Home Health Services, Hospice Services Call in 2 days If your symptoms do not improve [address]  [address]  [place] [address]  [phone]                Roxy Sylvester MD  05/13/23 0203

## 2023-07-12 DIAGNOSIS — F51.01 PRIMARY INSOMNIA: ICD-10-CM

## 2023-07-12 RX ORDER — ALPRAZOLAM 0.5 MG/1
0.5 TABLET ORAL 3 TIMES DAILY PRN
Qty: 90 TABLET | Refills: 1 | Status: SHIPPED | OUTPATIENT
Start: 2023-07-12 | End: 2023-08-30 | Stop reason: SDUPTHER

## 2023-07-12 NOTE — TELEPHONE ENCOUNTER
----- Message from Griselda Rogers MA sent at 7/12/2023  3:23 PM CDT -----  Regarding: refill issue  Pt calling [facility] the pharmacy is [facility] to call them to refill the alprazolam. [phone]

## 2023-07-27 ENCOUNTER — TELEPHONE (OUTPATIENT)
Dept: FAMILY MEDICINE | Facility: CLINIC | Age: 88
End: 2023-07-27

## 2023-07-27 NOTE — TELEPHONE ENCOUNTER
----- Message from Lea Jim sent at 7/27/2023 12:59 PM CDT -----  CRISTINA : patient daughter dimas Charles  called and wanted to check the patient appointment date and time, I advised her of the information . No patient call back needed for this message.

## 2023-08-07 ENCOUNTER — TELEPHONE (OUTPATIENT)
Dept: FAMILY MEDICINE | Facility: CLINIC | Age: 88
End: 2023-08-07

## 2023-08-07 NOTE — TELEPHONE ENCOUNTER
----- Message from Payal Hylton sent at 8/7/2023  2:16 PM CDT -----  The patient has an appointment tomorrow with Dr. Andersen. She needs to reschedule it. I cancelled it. She needs to reschedule for the end of August or some where in September. Dr. Andersen did not have any openings. She only wants to see Dr. Andersen. She is coming in for refills. Pt's # [phone]

## 2023-08-30 ENCOUNTER — TELEPHONE (OUTPATIENT)
Dept: FAMILY MEDICINE | Facility: CLINIC | Age: 88
End: 2023-08-30

## 2023-08-30 ENCOUNTER — OFFICE VISIT (OUTPATIENT)
Dept: FAMILY MEDICINE | Facility: CLINIC | Age: 88
End: 2023-08-30
Attending: FAMILY MEDICINE
Payer: MEDICARE

## 2023-08-30 VITALS
HEART RATE: 100 BPM | BODY MASS INDEX: 25.24 KG/M2 | HEIGHT: 59 IN | SYSTOLIC BLOOD PRESSURE: 138 MMHG | WEIGHT: 125.19 LBS | DIASTOLIC BLOOD PRESSURE: 62 MMHG | OXYGEN SATURATION: 95 %

## 2023-08-30 DIAGNOSIS — Z79.899 ENCOUNTER FOR LONG-TERM (CURRENT) USE OF OTHER MEDICATIONS: ICD-10-CM

## 2023-08-30 DIAGNOSIS — F51.01 PRIMARY INSOMNIA: ICD-10-CM

## 2023-08-30 DIAGNOSIS — I10 ESSENTIAL HYPERTENSION: ICD-10-CM

## 2023-08-30 DIAGNOSIS — N18.32 STAGE 3B CHRONIC KIDNEY DISEASE: ICD-10-CM

## 2023-08-30 DIAGNOSIS — Z51.81 ENCOUNTER FOR THERAPEUTIC DRUG MONITORING: ICD-10-CM

## 2023-08-30 DIAGNOSIS — J44.89 COPD WITH ASTHMA: ICD-10-CM

## 2023-08-30 DIAGNOSIS — G30.1 MILD LATE ONSET ALZHEIMER'S DEMENTIA WITHOUT BEHAVIORAL DISTURBANCE, PSYCHOTIC DISTURBANCE, MOOD DISTURBANCE, OR ANXIETY: Primary | ICD-10-CM

## 2023-08-30 DIAGNOSIS — F02.A0 MILD LATE ONSET ALZHEIMER'S DEMENTIA WITHOUT BEHAVIORAL DISTURBANCE, PSYCHOTIC DISTURBANCE, MOOD DISTURBANCE, OR ANXIETY: Primary | ICD-10-CM

## 2023-08-30 DIAGNOSIS — E03.9 ACQUIRED HYPOTHYROIDISM: ICD-10-CM

## 2023-08-30 DIAGNOSIS — M15.9 PRIMARY OSTEOARTHRITIS INVOLVING MULTIPLE JOINTS: ICD-10-CM

## 2023-08-30 PROBLEM — F11.20 UNCOMPLICATED OPIOID DEPENDENCE: Status: RESOLVED | Noted: 2020-09-16 | Resolved: 2023-08-30

## 2023-08-30 PROCEDURE — 99214 PR OFFICE/OUTPT VISIT, EST, LEVL IV, 30-39 MIN: ICD-10-PCS | Mod: S$GLB,,, | Performed by: FAMILY MEDICINE

## 2023-08-30 PROCEDURE — 99214 OFFICE O/P EST MOD 30 MIN: CPT | Mod: S$GLB,,, | Performed by: FAMILY MEDICINE

## 2023-08-30 RX ORDER — ALPRAZOLAM 0.5 MG/1
0.5 TABLET ORAL 3 TIMES DAILY PRN
Qty: 90 TABLET | Refills: 3 | Status: SHIPPED | OUTPATIENT
Start: 2023-08-30 | End: 2024-01-04 | Stop reason: SDUPTHER

## 2023-08-30 NOTE — TELEPHONE ENCOUNTER
"Spoke with patient who states she can't come in today because her son is in the hospital. States She does not want to reschedule at this time because she has to "see how this goes" first and she will call back   "

## 2023-08-30 NOTE — PROGRESS NOTES
SUBJECTIVE:    Patient ID: Valerie Jones is a 89 y.o. female.    Chief Complaint: Follow-up (No bottles//Pt is here for a check up and medication refills//ANTONY )    This 89-year-old female is here for six-month checkup.  She lives her son.  Her daughter brings her supper every night.  She denies any pain and has taken herself off of most of her medications.  She continues to take Xanax 0.5 mg HS for insomnia.  She has had no falls.    Appetite is decreased she has lost 10 lb since our last visit.  Does drink coffee /tea during the day declines any boost supplements.    SHe is getting some short-term memory loss,  asks repetitive questions.  He is able to ambulate around the house very well.  No longer driving a vehicle.        Admission on 2023, Discharged on 2023   Component Date Value Ref Range Status    SARS-CoV-2 RNA, Amplification, Qual 2023 Negative  Negative Final    Influenza A, Molecular 2023 Negative  Negative Final    Influenza B, Molecular 2023 Negative  Negative Final    Flu A & B Source 2023 Nasal swab   Final       Past Medical History:   Diagnosis Date    Anxiety     Arthritis     Asthma     COPD (chronic obstructive pulmonary disease)     Hypertension     Pain in the abdomen 2019    Thyroid disease      Social History     Socioeconomic History    Marital status:    Tobacco Use    Smoking status: Former     Current packs/day: 0.00     Average packs/day: 0.5 packs/day for 5.0 years (2.5 ttl pk-yrs)     Types: Cigarettes     Start date: 2/15/2000     Quit date: 2/15/2005     Years since quittin.5     Passive exposure: Past    Smokeless tobacco: Never   Substance and Sexual Activity    Alcohol use: No    Drug use: No    Sexual activity: Never     Social Determinants of Health     Stress: No Stress Concern Present (2019)    [employer] [employer] - Occupational Stress Questionnaire     Feeling of Stress : Not at all     Past Surgical  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      Review of patient's allergies indicates:   Allergen Reactions    Sulfa (sulfonamide antibiotics) Swelling    Androgenic anabolic steroid Swelling     Feet swelling     Aspirin Other (See Comments)     Upset stomach     Erythromycin Itching    Methylprednisolone Other (See Comments)    Penicillins Itching       Current Outpatient Medications:     acetaminophen (TYLENOL) 500 MG tablet, Take 500 mg by mouth nightly as needed for Pain., Disp: , Rfl:     albuterol sulfate (PROAIR RESPICLICK) 90 mcg/actuation inhaler, Inhale 2 puffs into the lungs every 4 (four) hours. Rescue2 puffs every 4 hours as needed for cough, wheeze, or shortness of breath, Disp: 1 each, Rfl: 2    albuterol sulfate 2.5 mg/0.5 mL Nebu, Take 2.5 mg by nebulization every 4 (four) hours as needed (sob wheezing). Rescue  Please dispense 1 nebulizer machine, Disp: 30 each, Rfl: 1    albuterol-ipratropium (DUO-NEB) 2.5 mg-0.5 mg/3 mL nebulizer solution, Take 3 mLs by nebulization every 6 (six) hours as needed., Disp: 120 vial, Rfl: 1    ALPRAZolam (XANAX) 0.5 MG tablet, Take 1 tablet (0.5 mg total) by mouth 3 (three) times daily as needed for  Anxiety., Disp: 90 tablet, Rfl: 3    atenoloL (TENORMIN) 50 MG tablet, Take 1 tablet (50 mg total) by mouth once daily., Disp: 90 tablet, Rfl: 0    cetirizine (ZYRTEC) 10 MG tablet, Take 1 tablet (10 mg total) by mouth once daily., Disp: 30 tablet, Rfl: 3    clobetasoL 0.025 % Crea, Apply topically., Disp: , Rfl:     dexAMETHasone (DECADRON) 6 MG tablet, Take 1 tablet daily, Disp: 15 tablet, Rfl: 0    diclofenac sodium (VOLTAREN) 1 % Gel, Apply 2 g topically once daily., Disp: 100 g, Rfl: 1    doxycycline (MONODOX) 100 MG capsule, Take 1 capsule (100 mg total) by mouth 2 (two) times daily., Disp: 20 capsule, Rfl: 0    fluticasone-umeclidin-vilanter (TRELEGY ELLIPTA) 100-62.5-25 mcg DsDv, Inhale 1 puff into the lungs once daily., Disp: 60 each, Rfl: 0    furosemide (LASIX) 20 MG tablet, Take 1 tablet (20 mg total) by mouth once daily., Disp: 80 tablet, Rfl: 0    HYDROcodone-acetaminophen (NORCO)  mg per tablet, Take 1 tablet by mouth every 4 (four) hours as needed for Pain. (Patient taking differently: Take 0.5 tablets by mouth every 4 (four) hours as needed for Pain.), Disp: 60 tablet, Rfl: 0    levothyroxine (SYNTHROID) 50 MCG tablet, Take 1 tablet (50 mcg total) by mouth once daily., Disp: 90 tablet, Rfl: 1    pantoprazole (PROTONIX) 20 MG tablet, Take 2 tablets (40 mg total) by mouth once daily., Disp: 30 tablet, Rfl: 0    potassium bicarbonate (K-LYTE) disintegrating tablet, Take 1 tablet (25 mEq total) by mouth once daily., Disp: 10 tablet, Rfl: 0    potassium chloride 10% (KAYCIEL) 20 mEq/15 mL oral solution, Take 15 mLs (20 mEq total) by mouth once daily., Disp: 450 mL, Rfl: 1    Review of Systems   Constitutional:  Positive for unexpected weight change (Lost 10 lb since last visit). Negative for appetite change, chills, fatigue and fever.   HENT:  Negative for ear discharge and ear pain.    Eyes:  Negative for pain, discharge and visual disturbance.   Respiratory:  Negative for apnea, cough, shortness  "of breath and wheezing.    Cardiovascular:  Negative for chest pain, palpitations and leg swelling.   Gastrointestinal:  Negative for abdominal pain, blood in stool, constipation, diarrhea, nausea, vomiting and reflux.   Endocrine: Negative for cold intolerance, heat intolerance and polydipsia.   Genitourinary:  Negative for bladder incontinence, dysuria, hematuria, nocturia and urgency.   Musculoskeletal:  Negative for gait problem, joint swelling and myalgias.   Neurological:  Negative for dizziness, seizures and numbness.   Psychiatric/Behavioral:  Positive for confusion and sleep disturbance (takes Xanax 0.5 mg HS). Negative for behavioral problems and hallucinations. The patient is not nervous/anxious.            Objective:      Vitals:    08/30/23 1331   BP: 138/62   Pulse: 100   SpO2: 95%   Weight: 56.8 kg (125 lb 3.2 oz)   Height: 4' 11" (1.499 m)     Physical Exam  Vitals and nursing note reviewed.   Constitutional:       Appearance: She is well-developed.      Comments: Frail elderly female in no distress   HENT:      Head: Normocephalic and atraumatic.      Right Ear: Tympanic membrane and external ear normal.      Left Ear: Tympanic membrane and external ear normal.      Nose: Nose normal.      Mouth/Throat:      Pharynx: Oropharynx is clear.   Eyes:      Pupils: Pupils are equal, round, and reactive to light.   Neck:      Thyroid: No thyromegaly.      Vascular: No carotid bruit.   Cardiovascular:      Rate and Rhythm: Normal rate and regular rhythm.      Heart sounds: Normal heart sounds. No murmur heard.  Pulmonary:      Effort: Pulmonary effort is normal.      Breath sounds: Normal breath sounds. No wheezing or rales.   Abdominal:      General: Bowel sounds are normal. There is no distension.      Palpations: Abdomen is soft.      Tenderness: There is no abdominal tenderness.   Musculoskeletal:         General: No tenderness or deformity. Normal range of motion.      Cervical back: Normal range of " motion and neck supple.      Lumbar back: Normal. No spasms.      Comments: Bends 90 degrees at  waist, shoulders and knees good range of motion without crepitance.  No pitting edema to lower extremities   Lymphadenopathy:      Cervical: No cervical adenopathy.   Skin:     General: Skin is warm and dry.      Findings: No rash.   Neurological:      Mental Status: She is alert and oriented to person, place, and time.      Cranial Nerves: No cranial nerve deficit.      Coordination: Coordination normal.      Gait: Gait normal.      Comments: Does have short-term memory loss, uses repetitive sentences and phrases.   Psychiatric:         Mood and Affect: Mood normal.         Behavior: Behavior normal.         Thought Content: Thought content normal.         Judgment: Judgment normal.           Assessment:       1. Mild late onset Alzheimer's dementia without behavioral disturbance, psychotic disturbance, mood disturbance, or anxiety    2. Primary insomnia    3. Encounter for therapeutic drug monitoring    4. Encounter for long-term (current) use of other medications    5. Essential hypertension    6. Primary insomnia    7. COPD with asthma    8. Stage 3b chronic kidney disease    9. Acquired hypothyroidism    10. Primary osteoarthritis involving multiple joints         Plan:       Mild late onset Alzheimer's dementia without behavioral disturbance, psychotic disturbance, mood disturbance, or anxiety  Family taking good care of this patient with some short-term memory loss occurring.  Patient has stopped taking most of her medications on her own.  Primary insomnia  -     ALPRAZolam (XANAX) 0.5 MG tablet; Take 1 tablet (0.5 mg total) by mouth 3 (three) times daily as needed for Anxiety.  Dispense: 90 tablet; Refill: 3  Refill Xanax per HS use  Encounter for therapeutic drug monitoring    Encounter for long-term (current) use of other medications    Essential hypertension  -     CBC Auto Differential; Future; Expected date:  08/30/2023  -     Comprehensive Metabolic Panel; Future; Expected date: 08/30/2023  -     Lipid Panel; Future; Expected date: 08/30/2023  -     TSH w/reflex to FT4; Future; Expected date: 08/30/2023  Blood pressure is normal today without any blood pressure medication, knees baseline lab work drawn today  Primary insomnia  Comments:  she just uses low dose prn alprazolam. refills as needed.  Orders:  -     ALPRAZolam (XANAX) 0.5 MG tablet; Take 1 tablet (0.5 mg total) by mouth 3 (three) times daily as needed for Anxiety.  Dispense: 90 tablet; Refill: 3    COPD with asthma    Stage 3b chronic kidney disease    Acquired hypothyroidism    Primary osteoarthritis involving multiple joints  Will need a flu vaccine later this fall.    No follow-ups on file.        8/30/2023 Reed Andersen

## 2023-08-30 NOTE — TELEPHONE ENCOUNTER
----- Message from Lea Jim sent at 8/30/2023 11:02 AM CDT -----  Patient called and stated that she need to reschedule her appointment for today,she stated that she is unable to get her car started. Please give her a call at [phone]

## 2023-08-30 NOTE — TELEPHONE ENCOUNTER
----- Message from Lea Hernán sent at 8/30/2023 11:17 AM CDT -----  FYI: patient called back and stated that she now can come in,and she would like to know if she can get back on the schedule? Called the nurse Karrie and asked if the patient can be put back on and she stated she would put her back on the schedule. The patient advised me that her car would not start and now her daughter is bringing her and she told the nurse that her son was in the hospital. No patient call back is needed for this message.

## 2023-08-31 LAB
ALBUMIN SERPL-MCNC: 4 G/DL (ref 3.6–5.1)
ALBUMIN/GLOB SERPL: 1.3 (CALC) (ref 1–2.5)
ALP SERPL-CCNC: 65 U/L (ref 37–153)
ALT SERPL-CCNC: 5 U/L (ref 6–29)
AST SERPL-CCNC: 16 U/L (ref 10–35)
BASOPHILS # BLD AUTO: 53 CELLS/UL (ref 0–200)
BASOPHILS NFR BLD AUTO: 0.8 %
BILIRUB SERPL-MCNC: 0.4 MG/DL (ref 0.2–1.2)
BUN SERPL-MCNC: 16 MG/DL (ref 7–25)
BUN/CREAT SERPL: ABNORMAL (CALC) (ref 6–22)
CALCIUM SERPL-MCNC: 9.2 MG/DL (ref 8.6–10.4)
CHLORIDE SERPL-SCNC: 103 MMOL/L (ref 98–110)
CHOLEST SERPL-MCNC: 180 MG/DL
CHOLEST/HDLC SERPL: 3.1 (CALC)
CO2 SERPL-SCNC: 29 MMOL/L (ref 20–32)
CREAT SERPL-MCNC: 0.85 MG/DL (ref 0.6–0.95)
EGFR: 65 ML/MIN/1.73M2
EOSINOPHIL # BLD AUTO: 59 CELLS/UL (ref 15–500)
EOSINOPHIL NFR BLD AUTO: 0.9 %
ERYTHROCYTE [DISTWIDTH] IN BLOOD BY AUTOMATED COUNT: 14.1 % (ref 11–15)
GLOBULIN SER CALC-MCNC: 3 G/DL (CALC) (ref 1.9–3.7)
GLUCOSE SERPL-MCNC: 109 MG/DL (ref 65–99)
HCT VFR BLD AUTO: 33.6 % (ref 35–45)
HDLC SERPL-MCNC: 58 MG/DL
HGB BLD-MCNC: 11.1 G/DL (ref 11.7–15.5)
LDLC SERPL CALC-MCNC: 98 MG/DL (CALC)
LYMPHOCYTES # BLD AUTO: 1736 CELLS/UL (ref 850–3900)
LYMPHOCYTES NFR BLD AUTO: 26.3 %
MCH RBC QN AUTO: 29.2 PG (ref 27–33)
MCHC RBC AUTO-ENTMCNC: 33 G/DL (ref 32–36)
MCV RBC AUTO: 88.4 FL (ref 80–100)
MONOCYTES # BLD AUTO: 528 CELLS/UL (ref 200–950)
MONOCYTES NFR BLD AUTO: 8 %
NEUTROPHILS # BLD AUTO: 4224 CELLS/UL (ref 1500–7800)
NEUTROPHILS NFR BLD AUTO: 64 %
NONHDLC SERPL-MCNC: 122 MG/DL (CALC)
PLATELET # BLD AUTO: 355 THOUSAND/UL (ref 140–400)
PMV BLD REES-ECKER: 10.2 FL (ref 7.5–12.5)
POTASSIUM SERPL-SCNC: 4.5 MMOL/L (ref 3.5–5.3)
PROT SERPL-MCNC: 7 G/DL (ref 6.1–8.1)
RBC # BLD AUTO: 3.8 MILLION/UL (ref 3.8–5.1)
SODIUM SERPL-SCNC: 143 MMOL/L (ref 135–146)
TRIGL SERPL-MCNC: 147 MG/DL
TSH SERPL-ACNC: 2.16 MIU/L (ref 0.4–4.5)
WBC # BLD AUTO: 6.6 THOUSAND/UL (ref 3.8–10.8)

## 2023-09-04 NOTE — PROGRESS NOTES
Call patient's family.  Looks rather stable.  She still has anemia with hematocrit of 33.6 but unchanged.  Sugar kidneys liver and thyroid all normal.  Cholesterol normal at 180.  Continue current medications.

## 2023-09-05 ENCOUNTER — TELEPHONE (OUTPATIENT)
Dept: FAMILY MEDICINE | Facility: CLINIC | Age: 88
End: 2023-09-05

## 2023-09-05 NOTE — TELEPHONE ENCOUNTER
----- Message from Reed Andersen MD sent at 9/4/2023  2:31 PM CDT -----  Call patient's family.  Looks rather stable.  She still has anemia with hematocrit of 33.6 but unchanged.  Sugar kidneys liver and thyroid all normal.  Cholesterol normal at 180.  Continue current medications.

## 2023-09-18 ENCOUNTER — TELEPHONE (OUTPATIENT)
Dept: FAMILY MEDICINE | Facility: CLINIC | Age: 88
End: 2023-09-18

## 2023-09-18 NOTE — TELEPHONE ENCOUNTER
----- Message from Nimo Tabor sent at 9/18/2023 12:34 PM CDT -----  Refill for Xanax. [facility]. Pt #[phone]     Date of Admission: 1/17/19    Patient is a 77y old  Female who presents with a chief complaint of right foot pressure ulcer pain (17 Jan 2019 15:46)      HISTORY OF PRESENT ILLNESS:   78 y/o female, with a PmHx of COPD (on 2-3L O2 prn), paroxsymal a-fib (on pradaxa), CAD (s/p stent 11/2018), DCHF, HTN, HLD, and anxiety, presenting to the [facility] ED with right foot pressure ulcer pain x several weeks. The patient has a dime-sized ulcer with scant serosanguinous drainage on her right calcaneous that she acquired from a shoe weeks ago. She reports that she woke up last night and started to experience SOB and midsternal chest discomfort associated with palpitations mostly when lying flat. She also reports worsening b/l le edema and JACKSON with walking 1/2 block. She also endorses severe right foot pain 10/10. The patient denies fever, chills, SOB at rest, diaphoresis, dizziness, claudication, wheezing, changes in vision and hearing, abdominal pain, change in bowel and urinary habits, dysuria, hematuria. Currently resting comfortably, no cp or sob at this time.     Allergies    No Known Allergies    Intolerances    	    MEDICATIONS:  clopidogrel Tablet 75 milliGRAM(s) Oral daily  furosemide   Injectable 40 milliGRAM(s) IV Push every 12 hours  metoprolol tartrate 12.5 milliGRAM(s) Oral every 12 hours    vancomycin    Solution 125 milliGRAM(s) Oral every 6 hours    buDESOnide  80 MICROgram(s)/formoterol 4.5 MICROgram(s) Inhaler 2 Puff(s) Inhalation two times a day    acetaminophen   Tablet .. 650 milliGRAM(s) Oral every 6 hours PRN  traMADol 25 milliGRAM(s) Oral every 8 hours PRN  traMADol 25 milliGRAM(s) Oral once    pantoprazole  Injectable 40 milliGRAM(s) IV Push two times a day  senna 2 Tablet(s) Oral at bedtime    atorvastatin 40 milliGRAM(s) Oral at bedtime        PAST MEDICAL & SURGICAL HISTORY:  CAD (coronary artery disease): s/p stent 2018  CHF (congestive heart failure)  COPD (chronic obstructive pulmonary disease): on 2-3L O2 at home prn  Anxiety  TIA (transient ischemic attack): many years ago  PAF (paroxysmal atrial fibrillation)  HLD (hyperlipidemia)  HTN (hypertension)  H/O total hysterectomy: 2014  History of cataract surgery: b/l 2015  History of repair of hip fracture: luisa placed in RLE 2015  H/O spinal fusion: c2-6 in 2017      FAMILY HISTORY:  No pertinent family history in first degree relatives      SOCIAL HISTORY:    [ ] Non-smoker  [ ] Smoker  [ ] Alcohol      REVIEW OF SYSTEMS:    CONSTITUTIONAL: No weakness, fevers or chills  EYES/ENT: No visual changes;  No dysphagia  NECK: No pain or stiffness  RESPIRATORY: No cough, wheezing, hemoptysis; No shortness of breath  CARDIOVASCULAR: No chest pain or palpitations; No lower extremity edema  GASTROINTESTINAL: No abdominal or epigastric pain. No nausea, vomiting, or hematemesis; No diarrhea or constipation. No melena or hematochezia.  BACK: No back pain  GENITOURINARY: No dysuria, frequency or hematuria  NEUROLOGICAL: No numbness or weakness  SKIN: No itching, burning, rashes, or lesions   All other review of systems is negative unless indicated above.  PHYSICAL EXAM:  T(C): 35.5 (01-17-19 @ 13:03), Max: 36 (01-17-19 @ 10:45)  HR: 112 (01-17-19 @ 13:03) (86 - 123)  BP: 105/58 (01-17-19 @ 13:03) (85/47 - 105/58)  RR: 16 (01-17-19 @ 12:03) (16 - 28)  SpO2: 100% (01-17-19 @ 12:03) (92% - 100%)  Wt(kg): --  I&O's Summary      Appearance: Normal	  HEENT:   Normal oral mucosa, PERRL, EOMI	  Lymphatic: No lymphadenopathy  Cardiovascular: Normal S1 S2, No JVD, No murmurs, No edema  Respiratory: Lungs clear to auscultation	  Psychiatry: A & O x 3, Mood & affect appropriate  Gastrointestinal:  Soft, Non-tender, + BS	  Skin: No rashes, No ecchymoses, No cyanosis	  Neurologic: Non-focal  Extremities: Normal range of motion, No clubbing, cyanosis or edema  Vascular: Peripheral pulses palpable 2+ bilaterally        LABS:	 	    CBC Full  -  ( 17 Jan 2019 09:20 )  WBC Count : 7.82 K/uL  Hemoglobin : 8.0 g/dL  Hematocrit : 25.6 %  Platelet Count - Automated : 241 K/uL  Mean Cell Volume : 87.4 fL  Mean Cell Hemoglobin : 27.3 pg  Mean Cell Hemoglobin Concentration : 31.3 %  Auto Neutrophil # : 6.24 K/uL  Auto Lymphocyte # : 0.78 K/uL  Auto Monocyte # : 0.60 K/uL  Auto Eosinophil # : 0.11 K/uL  Auto Basophil # : 0.05 K/uL  Auto Neutrophil % : 79.8 %  Auto Lymphocyte % : 10.0 %  Auto Monocyte % : 7.7 %  Auto Eosinophil % : 1.4 %  Auto Basophil % : 0.6 %    01-17    134<L>  |  96<L>  |  36<H>  ----------------------------<  92  4.1   |  26  |  1.68<H>    Ca    8.6      17 Jan 2019 06:10    TPro  6.4  /  Alb  2.9<L>  /  TBili  0.6  /  DBili  x   /  AST  15  /  ALT  10  /  AlkPhos  115  01-17      proBNP: Serum Pro-Brain Natriuretic Peptide: 46974 pg/mL (01-17 @ 09:20)    Lipid Profile:   HgA1c:   TSH:       CARDIAC MARKERS:            TELEMETRY: 	    ECG:  	  RADIOLOGY:  OTHER: 	    PREVIOUS DIAGNOSTIC TESTING:    [ ] Echocardiogram:  [ ]  Catheterization:  [ ] Stress Test:  	  	  ASSESSMENT/PLAN: Date of Admission: 1/17/19    Patient is a 77y old  Female who presents with a chief complaint of right foot pressure ulcer pain (17 Jan 2019 15:46)      HISTORY OF PRESENT ILLNESS:   78 y/o female with COPD (on 2-3L O2 prn), a-fib (on pradaxa), CAD (s/p RCA stent 11/2018), HTN, HLD who presented with foot pain and CP. Pt notes she has had an ulcer on her right leg and was being treated for an infection but the pain continues to get worse. It woke her up last and she decided to come in. At the same time also had chest pressure and SOB. The CP and SOB have now resolved but continues to have severe foot pain. Denies palp, dizziness, orthopnea, PND. In the ED, pt was noted to be in afib w/ RVR to 110-120, initially hypotensive to 80s/60s, with slow drop in hbg over the past month from 10-11 at baseline to 8. Pt denies hematuria, hematochezia, melena, or hematemesis. Upon my evaluation, pt's BP had improved to 100s/60s which she reports is her baseline.     Allergies    No Known Allergies    Intolerances    	    MEDICATIONS:  clopidogrel Tablet 75 milliGRAM(s) Oral daily  furosemide   Injectable 40 milliGRAM(s) IV Push every 12 hours  metoprolol tartrate 12.5 milliGRAM(s) Oral every 12 hours    vancomycin    Solution 125 milliGRAM(s) Oral every 6 hours    buDESOnide  80 MICROgram(s)/formoterol 4.5 MICROgram(s) Inhaler 2 Puff(s) Inhalation two times a day    acetaminophen   Tablet .. 650 milliGRAM(s) Oral every 6 hours PRN  traMADol 25 milliGRAM(s) Oral every 8 hours PRN  traMADol 25 milliGRAM(s) Oral once    pantoprazole  Injectable 40 milliGRAM(s) IV Push two times a day  senna 2 Tablet(s) Oral at bedtime    atorvastatin 40 milliGRAM(s) Oral at bedtime        PAST MEDICAL & SURGICAL HISTORY:  CAD (coronary artery disease): s/p stent 2018  CHF (congestive heart failure)  COPD (chronic obstructive pulmonary disease): on 2-3L O2 at home prn  Anxiety  TIA (transient ischemic attack): many years ago  PAF (paroxysmal atrial fibrillation)  HLD (hyperlipidemia)  HTN (hypertension)  H/O total hysterectomy: 2014  History of cataract surgery: b/l 2015  History of repair of hip fracture: luisa placed in RLE 2015  H/O spinal fusion: c2-6 in 2017      FAMILY HISTORY:  No pertinent family history in first degree relatives      SOCIAL HISTORY:    Non smoker      REVIEW OF SYSTEMS:    CONSTITUTIONAL: No weakness, fevers or chills  EYES/ENT: No visual changes;  No dysphagia  RESPIRATORY: No cough, wheezing, hemoptysis; No shortness of breath  CARDIOVASCULAR: No chest pain or palpitations; No lower extremity edema  GASTROINTESTINAL: No abdominal or epigastric pain. No nausea, vomiting, or hematemesis; No diarrhea or constipation.   GENITOURINARY: No dysuria, frequency or hematuria  NEUROLOGICAL: No numbness or weakness  SKIN: No itching, burning, rashes, or lesions   All other review of systems is negative unless indicated above.    PHYSICAL EXAM:  T(C): 35.5 (01-17-19 @ 13:03), Max: 36 (01-17-19 @ 10:45)  HR: 112 (01-17-19 @ 13:03) (86 - 123)  BP: 105/58 (01-17-19 @ 13:03) (85/47 - 105/58)  RR: 16 (01-17-19 @ 12:03) (16 - 28)  SpO2: 100% (01-17-19 @ 12:03) (92% - 100%)  Wt(kg): --  I&O's Summary      Appearance: Normal	  HEENT:   Normal oral mucosa  Cardiovascular: Tachycardic, Normal S1 S2, Engorged neck veins, No murmurs  Respiratory: Crackles at bases  Psychiatry: A & O x 3, Mood & affect appropriate  Gastrointestinal:  Soft, Non-tender, + BS	  Skin: No rashes, No ecchymoses, No cyanosis	  Neurologic: Non-focal  Extremities: RLE warm, red, edematous, painful to touch, LLE also with significant edema; chronic venous changes        LABS:	 	    CBC Full  -  ( 17 Jan 2019 09:20 )  WBC Count : 7.82 K/uL  Hemoglobin : 8.0 g/dL  Hematocrit : 25.6 %  Platelet Count - Automated : 241 K/uL  Mean Cell Volume : 87.4 fL  Mean Cell Hemoglobin : 27.3 pg  Mean Cell Hemoglobin Concentration : 31.3 %  Auto Neutrophil # : 6.24 K/uL  Auto Lymphocyte # : 0.78 K/uL  Auto Monocyte # : 0.60 K/uL  Auto Eosinophil # : 0.11 K/uL  Auto Basophil # : 0.05 K/uL  Auto Neutrophil % : 79.8 %  Auto Lymphocyte % : 10.0 %  Auto Monocyte % : 7.7 %  Auto Eosinophil % : 1.4 %  Auto Basophil % : 0.6 %    01-17    134<L>  |  96<L>  |  36<H>  ----------------------------<  92  4.1   |  26  |  1.68<H>    Ca    8.6      17 Jan 2019 06:10    TPro  6.4  /  Alb  2.9<L>  /  TBili  0.6  /  DBili  x   /  AST  15  /  ALT  10  /  AlkPhos  115  01-17      proBNP: Serum Pro-Brain Natriuretic Peptide: 83085 pg/mL (01-17 @ 09:20)    TELEMETRY: 	  afib -120  ECG:  	no ischemic changes  RADIOLOGY: CXR: mild congestion    ASSESSMENT/PLAN: 	  78 y/o female with COPD (on 2-3L O2 prn), a-fib (on pradaxa), CAD (s/p RCA stent 11/2018), HTN, HLD who presented with foot pain and CP. CCU consulted in setting of anemia requiring possible transfusion with volume overload and hypotension.    - CP, SOB resolved, BP improved to 100/60s which is baseline for pt  - no active GIB - hbg has been dropping for the past month from 10-11 at baseline to 8 today  - on pradaxa for afib - can hold for now, no need for reversal given no active overt bleed  - continue plavix given recent RCA stent in 11/2018  - trend hgb, anemia w/u  - treat RLE cellulitis  - does not require CCU adm at this time given pt's BP has stabilized and she is not having any cardiac symptoms  - Dr. Vieyra's team to follow for cardiology consults      Discussed with Dr. Corey.    Italo Roldan MD  Cardiology Fellow

## 2024-01-04 DIAGNOSIS — F51.01 PRIMARY INSOMNIA: ICD-10-CM

## 2024-01-04 RX ORDER — ALPRAZOLAM 0.5 MG/1
0.5 TABLET ORAL 3 TIMES DAILY PRN
Qty: 90 TABLET | Refills: 3 | Status: SHIPPED | OUTPATIENT
Start: 2024-01-11

## 2024-01-04 NOTE — TELEPHONE ENCOUNTER
----- Message from Ludmila Barrera sent at 1/4/2024  3:10 PM CST -----  [facility]    [phone]

## 2024-01-04 NOTE — TELEPHONE ENCOUNTER
Per  Xanax last fill on 12/12/23  Prescription set up to be filled on 1/11/24.   Prescription pended for provider review.

## 2024-02-22 RX ORDER — AZITHROMYCIN 250 MG/1
TABLET, FILM COATED ORAL
Qty: 6 TABLET | Refills: 0 | Status: SHIPPED | OUTPATIENT
Start: 2024-02-22 | End: 2024-02-27

## 2024-02-22 NOTE — TELEPHONE ENCOUNTER
"Per Dr. Andersen, "Try Zithromax zpak and OTC Coricodin HBP tablets PO q 6 hr prn cough." Patient verbalized understanding of Dr. Andersen's message.     [facility]   "

## 2024-02-22 NOTE — TELEPHONE ENCOUNTER
----- Message from Ananya Gandhi MA sent at 2/22/2024  9:14 AM CST -----  Patient is calling stating her head is clogged and her throat is sore.  Symptoms started last night.  States she has tried OTC rx but it is not helping.  Requesting an office visit this afternoon if possible, will need to find a ride.    Pt: [phone]

## 2024-02-22 NOTE — TELEPHONE ENCOUNTER
Unable to speak with patient at this time on given phone number.     Patient started last night. Current symptoms dry cough and feels bad. Denies headache, fever and nausea or vomiting. States it is all in her head but no headache.   Have has not tired anything over the counter. No one sick around patient. No access to an at home COVID test and states has never had one.

## 2024-03-06 ENCOUNTER — TELEPHONE (OUTPATIENT)
Dept: FAMILY MEDICINE | Facility: CLINIC | Age: 89
End: 2024-03-06
Payer: COMMERCIAL

## 2024-03-06 NOTE — TELEPHONE ENCOUNTER
----- Message from Tabitha Enriquez sent at 3/6/2024 12:27 PM CST -----  Pt needs to reschedule her appointment. She is not able to come in tomorrow.    [phone]

## 2024-03-07 ENCOUNTER — OFFICE VISIT (OUTPATIENT)
Dept: FAMILY MEDICINE | Facility: CLINIC | Age: 89
End: 2024-03-07
Payer: MEDICARE

## 2024-03-07 ENCOUNTER — TELEPHONE (OUTPATIENT)
Dept: FAMILY MEDICINE | Facility: CLINIC | Age: 89
End: 2024-03-07

## 2024-03-07 VITALS
OXYGEN SATURATION: 93 % | WEIGHT: 125.5 LBS | HEART RATE: 97 BPM | HEIGHT: 58 IN | BODY MASS INDEX: 26.35 KG/M2 | SYSTOLIC BLOOD PRESSURE: 142 MMHG | DIASTOLIC BLOOD PRESSURE: 90 MMHG

## 2024-03-07 DIAGNOSIS — F51.01 PRIMARY INSOMNIA: ICD-10-CM

## 2024-03-07 DIAGNOSIS — E78.1 HYPERTRIGLYCERIDEMIA: ICD-10-CM

## 2024-03-07 DIAGNOSIS — I87.8 VENOUS STASIS OF BOTH LOWER EXTREMITIES: ICD-10-CM

## 2024-03-07 DIAGNOSIS — J43.1 PANLOBULAR EMPHYSEMA: ICD-10-CM

## 2024-03-07 DIAGNOSIS — G30.1 MILD LATE ONSET ALZHEIMER'S DEMENTIA WITHOUT BEHAVIORAL DISTURBANCE, PSYCHOTIC DISTURBANCE, MOOD DISTURBANCE, OR ANXIETY: ICD-10-CM

## 2024-03-07 DIAGNOSIS — E03.9 ACQUIRED HYPOTHYROIDISM: ICD-10-CM

## 2024-03-07 DIAGNOSIS — F02.A0 MILD LATE ONSET ALZHEIMER'S DEMENTIA WITHOUT BEHAVIORAL DISTURBANCE, PSYCHOTIC DISTURBANCE, MOOD DISTURBANCE, OR ANXIETY: ICD-10-CM

## 2024-03-07 DIAGNOSIS — I10 ESSENTIAL HYPERTENSION: ICD-10-CM

## 2024-03-07 DIAGNOSIS — J44.89 COPD WITH ASTHMA: ICD-10-CM

## 2024-03-07 DIAGNOSIS — M15.9 PRIMARY OSTEOARTHRITIS INVOLVING MULTIPLE JOINTS: ICD-10-CM

## 2024-03-07 DIAGNOSIS — J20.9 ACUTE BRONCHITIS, UNSPECIFIED ORGANISM: Primary | ICD-10-CM

## 2024-03-07 DIAGNOSIS — N18.32 STAGE 3B CHRONIC KIDNEY DISEASE: ICD-10-CM

## 2024-03-07 DIAGNOSIS — K58.2 IRRITABLE BOWEL SYNDROME WITH BOTH CONSTIPATION AND DIARRHEA: ICD-10-CM

## 2024-03-07 PROCEDURE — 99214 OFFICE O/P EST MOD 30 MIN: CPT | Mod: S$GLB,,, | Performed by: FAMILY MEDICINE

## 2024-03-07 RX ORDER — ALPRAZOLAM 0.5 MG/1
0.5 TABLET ORAL NIGHTLY PRN
Qty: 90 TABLET | Refills: 1 | Status: SHIPPED | OUTPATIENT
Start: 2024-03-07 | End: 2024-05-09 | Stop reason: SDUPTHER

## 2024-03-07 RX ORDER — PREDNISONE 10 MG/1
10 TABLET ORAL DAILY
Qty: 20 TABLET | Refills: 0 | Status: SHIPPED | OUTPATIENT
Start: 2024-03-07

## 2024-03-07 RX ORDER — DOXYCYCLINE 100 MG/1
100 CAPSULE ORAL 2 TIMES DAILY
Qty: 20 CAPSULE | Refills: 0 | Status: SHIPPED | OUTPATIENT
Start: 2024-03-07

## 2024-03-07 RX ORDER — CHLORDIAZEPOXIDE HYDROCHLORIDE AND CLIDINIUM BROMIDE 5; 2.5 MG/1; MG/1
1 CAPSULE ORAL DAILY PRN
Qty: 30 CAPSULE | Refills: 1 | Status: SHIPPED | OUTPATIENT
Start: 2024-03-07

## 2024-03-07 NOTE — PROGRESS NOTES
SUBJECTIVE:    Patient ID: Valerie Jones is a 89 y.o. female.    Chief Complaint: Follow-up (Chest congestion x 4 weeks// Refills// denies flu shot// -ERL)    89-year-old female with dementia.  She is brought in by her family member.  Her appetite is up and down.  She lives with her son.  She states she has not very active but she has not very hungry either.  She watches TV during the day.  She eats a light breakfast with a sweet roll and coffee, no lunch, daughter brings her supper.    She quit smoking 15 years ago.    She stopped all her medications.  She has decreased urine output she does not drink much fluid.    Osteoarthritis occasionally aches.    Complains of coughing for 3 weeks.    She ambulates well without assistance.  No recent falls.        No visits with results within 6 Month(s) from this visit.   Latest known visit with results is:   Office Visit on 08/30/2023   Component Date Value Ref Range Status    WBC 08/30/2023 6.6  3.8 - 10.8 Thousand/uL Final    RBC 08/30/2023 3.80  3.80 - 5.10 Million/uL Final    Hemoglobin 08/30/2023 11.1 (L)  11.7 - 15.5 g/dL Final    Hematocrit 08/30/2023 33.6 (L)  35.0 - 45.0 % Final    MCV 08/30/2023 88.4  80.0 - 100.0 fL Final    MCH 08/30/2023 29.2  27.0 - 33.0 pg Final    MCHC 08/30/2023 33.0  32.0 - 36.0 g/dL Final    RDW 08/30/2023 14.1  11.0 - 15.0 % Final    Platelets 08/30/2023 355  140 - 400 Thousand/uL Final    MPV 08/30/2023 10.2  7.5 - 12.5 fL Final    Neutrophils, Abs 08/30/2023 4,224  1,500 - 7,800 cells/uL Final    Lymph # 08/30/2023 1,736  850 - 3,900 cells/uL Final    Mono # 08/30/2023 528  200 - 950 cells/uL Final    Eos # 08/30/2023 59  15 - 500 cells/uL Final    Baso # 08/30/2023 53  0 - 200 cells/uL Final    Neutrophils Relative 08/30/2023 64  % Final    Lymph % 08/30/2023 26.3  % Final    Mono % 08/30/2023 8.0  % Final    Eosinophil % 08/30/2023 0.9  % Final    Basophil % 08/30/2023 0.8  % Final    Glucose 08/30/2023 109 (H)  65 - 99 mg/dL Final     BUN 2023 16  7 - 25 mg/dL Final    Creatinine 2023 0.85  0.60 - 0.95 mg/dL Final    eGFR 2023 65  > OR = 60 mL/min/1.73m2 Final    BUN/Creatinine Ratio 2023 SEE NOTE:  6 - 22 (calc) Final    Sodium 2023 143  135 - 146 mmol/L Final    Potassium 2023 4.5  3.5 - 5.3 mmol/L Final    Chloride 2023 103  98 - 110 mmol/L Final    CO2 2023 29  20 - 32 mmol/L Final    Calcium 2023 9.2  8.6 - 10.4 mg/dL Final    Total Protein 2023 7.0  6.1 - 8.1 g/dL Final    Albumin 2023 4.0  3.6 - 5.1 g/dL Final    Globulin, Total 2023 3.0  1.9 - 3.7 g/dL (calc) Final    Albumin/Globulin Ratio 2023 1.3  1.0 - 2.5 (calc) Final    Total Bilirubin 2023 0.4  0.2 - 1.2 mg/dL Final    Alkaline Phosphatase 2023 65  37 - 153 U/L Final    AST 2023 16  10 - 35 U/L Final    ALT 2023 5 (L)  6 - 29 U/L Final    Cholesterol 2023 180  <200 mg/dL Final    HDL 2023 58  > OR = 50 mg/dL Final    Triglycerides 2023 147  <150 mg/dL Final    LDL Cholesterol 2023 98  mg/dL (calc) Final    HDL/Cholesterol Ratio 2023 3.1  <5.0 (calc) Final    Non HDL Chol. (LDL+VLDL) 2023 122  <130 mg/dL (calc) Final    TSH w/reflex to FT4 2023 2.16  0.40 - 4.50 mIU/L Final       Past Medical History:   Diagnosis Date    Anxiety     Arthritis     Asthma     COPD (chronic obstructive pulmonary disease)     Hypertension     Pain in the abdomen 2019    Thyroid disease      Social History     Socioeconomic History    Marital status:    Tobacco Use    Smoking status: Former     Current packs/day: 0.00     Average packs/day: 0.5 packs/day for 5.0 years (2.5 ttl pk-yrs)     Types: Cigarettes     Start date: 2/15/2000     Quit date: 2/15/2005     Years since quittin.0     Passive exposure: Past    Smokeless tobacco: Never   Substance and Sexual Activity    Alcohol use: No    Drug use: No    Sexual activity: Never     Social  Determinants of Health     Stress: No Stress Concern Present (2019)    [employer] [place] [employer] - Occupational Stress Questionnaire     Feeling of Stress : Not at all     Past Surgical History:   Procedure Laterality Date    CATARACT EXTRACTION, BILATERAL      Dr Collins     SECTION      x's2    CHOLECYSTECTOMY      COLONOSCOPY  2009    ESOPHAGOGASTRODUODENOSCOPY (EGD) WITH DILATION      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    LAPAROSCOPIC CHOLECYSTECTOMY N/A 2019    Procedure: CHOLECYSTECTOMY, LAPAROSCOPIC;  Surgeon: Toney Grace III, MD;  Location: [facility];  Service: General;  Laterality: N/A;    SKIN CANCER EXCISION      Dr.Weil     Family History   Problem Relation Age of Onset    Diabetes Mother     Depression Maternal Grandmother     Depression Maternal Grandfather        The CVD Risk score (Sullyino, et al., 2008) failed to calculate for the following reasons:    The 2008 CVD risk score is only valid for ages 30 to 74    All of your core healthy metrics are met.      Review of patient's allergies indicates:   Allergen Reactions    Sulfa (sulfonamide antibiotics) Swelling    Androgenic anabolic steroid Swelling     Feet swelling     Aspirin Other (See Comments)     Upset stomach     Erythromycin Itching    Methylprednisolone Other (See Comments)    Penicillins Itching       Current Outpatient Medications:     acetaminophen (TYLENOL) 500 MG tablet, Take 500 mg by mouth nightly as needed for Pain., Disp: , Rfl:     albuterol sulfate (PROAIR RESPICLICK) 90 mcg/actuation inhaler, Inhale 2 puffs into the lungs every 4 (four) hours. Rescue2 puffs every 4 hours as needed for cough, wheeze, or shortness of breath, Disp: 1 each, Rfl: 2    albuterol sulfate 2.5 mg/0.5 mL Nebu, Take 2.5 mg by nebulization every 4 (four) hours as needed (sob wheezing). Rescue  Please dispense 1 nebulizer machine, Disp: 30 each, Rfl: 1    albuterol-ipratropium (DUO-NEB) 2.5 mg-0.5 mg/3 mL  nebulizer solution, Take 3 mLs by nebulization every 6 (six) hours as needed., Disp: 120 vial, Rfl: 1    ALPRAZolam (XANAX) 0.5 MG tablet, Take 1 tablet (0.5 mg total) by mouth nightly as needed for Anxiety., Disp: 90 tablet, Rfl: 1    atenoloL (TENORMIN) 50 MG tablet, Take 1 tablet (50 mg total) by mouth once daily., Disp: 90 tablet, Rfl: 0    cetirizine (ZYRTEC) 10 MG tablet, Take 1 tablet (10 mg total) by mouth once daily., Disp: 30 tablet, Rfl: 3    chlordiazepoxide-clidinium 5-2.5 mg (LIBRAX) 5-2.5 mg Cap, Take 1 capsule by mouth daily as needed., Disp: 30 capsule, Rfl: 1    clobetasoL 0.025 % Crea, Apply topically., Disp: , Rfl:     dexAMETHasone (DECADRON) 6 MG tablet, Take 1 tablet daily (Patient not taking: Reported on 3/7/2024), Disp: 15 tablet, Rfl: 0    diclofenac sodium (VOLTAREN) 1 % Gel, Apply 2 g topically once daily., Disp: 100 g, Rfl: 1    doxycycline (MONODOX) 100 MG capsule, Take 1 capsule (100 mg total) by mouth 2 (two) times daily., Disp: 20 capsule, Rfl: 0    fluticasone-umeclidin-vilanter (TRELEGY ELLIPTA) 100-62.5-25 mcg DsDv, Inhale 1 puff into the lungs once daily., Disp: 60 each, Rfl: 3    furosemide (LASIX) 20 MG tablet, Take 1 tablet (20 mg total) by mouth once daily. (Patient not taking: Reported on 3/7/2024), Disp: 80 tablet, Rfl: 0    HYDROcodone-acetaminophen (NORCO)  mg per tablet, Take 1 tablet by mouth every 4 (four) hours as needed for Pain. (Patient not taking: Reported on 3/7/2024), Disp: 60 tablet, Rfl: 0    levothyroxine (SYNTHROID) 50 MCG tablet, Take 1 tablet (50 mcg total) by mouth once daily., Disp: 90 tablet, Rfl: 1    pantoprazole (PROTONIX) 20 MG tablet, Take 2 tablets (40 mg total) by mouth once daily., Disp: 30 tablet, Rfl: 0    potassium bicarbonate (K-LYTE) disintegrating tablet, Take 1 tablet (25 mEq total) by mouth once daily. (Patient not taking: Reported on 3/7/2024), Disp: 10 tablet, Rfl: 0    potassium chloride 10% (KAYCIEL) 20 mEq/15 mL oral solution,  "Take 15 mLs (20 mEq total) by mouth once daily. (Patient not taking: Reported on 3/7/2024), Disp: 450 mL, Rfl: 1    predniSONE (DELTASONE) 10 MG tablet, Take 1 tablet (10 mg total) by mouth once daily., Disp: 20 tablet, Rfl: 0    Review of Systems   Constitutional:  Negative for appetite change, chills, fatigue, fever and unexpected weight change.   HENT:  Negative for ear discharge and ear pain.    Eyes:  Negative for pain, discharge and visual disturbance.   Respiratory:  Negative for apnea, cough, shortness of breath and wheezing.    Cardiovascular:  Negative for chest pain, palpitations and leg swelling.   Gastrointestinal:  Negative for abdominal pain, blood in stool, constipation, diarrhea, nausea, vomiting and reflux.   Endocrine: Negative for cold intolerance, heat intolerance and polydipsia.   Genitourinary:  Negative for bladder incontinence, dysuria, hematuria, nocturia and urgency.   Musculoskeletal:  Positive for arthralgias. Negative for gait problem, joint swelling and myalgias.   Neurological:  Positive for memory loss. Negative for dizziness, seizures and numbness.   Psychiatric/Behavioral:  Negative for behavioral problems and hallucinations. The patient is not nervous/anxious.            Objective:      Vitals:    03/07/24 1551   BP: (!) 142/90   Pulse: 97   SpO2: (!) 93%   Weight: 56.9 kg (125 lb 8 oz)   Height: 4' 10.4" (1.483 m)     Physical Exam  Vitals and nursing note reviewed.   Constitutional:       General: She is not in acute distress.     Appearance: Normal appearance. She is well-developed. She is not toxic-appearing.   HENT:      Head: Normocephalic and atraumatic.      Right Ear: Tympanic membrane and external ear normal.      Left Ear: Tympanic membrane and external ear normal.      Nose: Nose normal.      Mouth/Throat:      Pharynx: Oropharynx is clear.   Eyes:      Pupils: Pupils are equal, round, and reactive to light.   Neck:      Thyroid: No thyromegaly.      Vascular: No " carotid bruit.   Cardiovascular:      Rate and Rhythm: Normal rate and regular rhythm.      Heart sounds: Normal heart sounds. No murmur heard.  Pulmonary:      Effort: Pulmonary effort is normal.      Breath sounds: Normal breath sounds. No wheezing or rales.   Abdominal:      General: Bowel sounds are normal. There is no distension.      Palpations: Abdomen is soft.      Tenderness: There is no abdominal tenderness.   Musculoskeletal:         General: No tenderness or deformity. Normal range of motion.      Cervical back: Normal range of motion and neck supple.      Lumbar back: Normal. No spasms.      Comments: Bends 90 degrees at  waist shoulders and knees good range of motion without crepitance.  No pitting edema to lower extremities   Lymphadenopathy:      Cervical: No cervical adenopathy.   Skin:     General: Skin is warm and dry.      Findings: No rash.   Neurological:      Mental Status: She is alert and oriented to person, place, and time.      Cranial Nerves: No cranial nerve deficit.      Coordination: Coordination normal.      Gait: Gait normal.   Psychiatric:         Behavior: Behavior normal.         Thought Content: Thought content normal.         Judgment: Judgment normal.           Assessment:       1. Acute bronchitis, unspecified organism    2. Primary insomnia    3. COPD with asthma    4. Irritable bowel syndrome with both constipation and diarrhea    5. Essential hypertension    6. Mild late onset Alzheimer's dementia without behavioral disturbance, psychotic disturbance, mood disturbance, or anxiety    7. Panlobular emphysema    8. Venous stasis of both lower extremities    9. Hypertriglyceridemia    10. Stage 3b chronic kidney disease    11. Acquired hypothyroidism    12. Primary osteoarthritis involving multiple joints         Plan:       Acute bronchitis, unspecified organism  -     predniSONE (DELTASONE) 10 MG tablet; Take 1 tablet (10 mg total) by mouth once daily.  Dispense: 20 tablet;  Refill: 0  -     doxycycline (MONODOX) 100 MG capsule; Take 1 capsule (100 mg total) by mouth 2 (two) times daily.  Dispense: 20 capsule; Refill: 0  Doxycycline 100 mg b.i.d., prednisone 10 mg daily, refill Trelegy inhaler.  Primary insomnia  Comments:  she just uses low dose prn alprazolam. refills as needed.  Orders:  -     ALPRAZolam (XANAX) 0.5 MG tablet; Take 1 tablet (0.5 mg total) by mouth nightly as needed for Anxiety.  Dispense: 90 tablet; Refill: 1    COPD with asthma  -     fluticasone-umeclidin-vilanter (TRELEGY ELLIPTA) 100-62.5-25 mcg DsDv; Inhale 1 puff into the lungs once daily.  Dispense: 60 each; Refill: 3  -     Comprehensive Metabolic Panel; Future; Expected date: 03/07/2024  -     Lipid Panel; Future; Expected date: 03/07/2024  -     TSH w/reflex to FT4; Future; Expected date: 03/07/2024  -     CBC Auto Differential; Future; Expected date: 03/07/2024  Due for lab work soon  Irritable bowel syndrome with both constipation and diarrhea  -     chlordiazepoxide-clidinium 5-2.5 mg (LIBRAX) 5-2.5 mg Cap; Take 1 capsule by mouth daily as needed.  Dispense: 30 capsule; Refill: 1  Librax for irritable bowel syndrome p.r.n.  Essential hypertension  -     Comprehensive Metabolic Panel; Future; Expected date: 03/07/2024  -     Lipid Panel; Future; Expected date: 03/07/2024  -     TSH w/reflex to FT4; Future; Expected date: 03/07/2024  -     CBC Auto Differential; Future; Expected date: 03/07/2024    Mild late onset Alzheimer's dementia without behavioral disturbance, psychotic disturbance, mood disturbance, or anxiety    Panlobular emphysema    Venous stasis of both lower extremities    Hypertriglyceridemia    Stage 3b chronic kidney disease    Acquired hypothyroidism    Primary osteoarthritis involving multiple joints      No follow-ups on file.        3/9/2024 Reed Andersen       Syncope

## 2024-03-07 NOTE — TELEPHONE ENCOUNTER
----- Message from Eloina Tovar sent at 3/7/2024  3:19 PM CST -----  Patient had a appointment for today and canceled it yesterday daughter says she has dementia. Daughter says that she needs refills and has chest congestion and  would like to be worked in.

## 2024-03-07 NOTE — TELEPHONE ENCOUNTER
Calling patient to get consent to speak with daughter, no answer on home phone. Spoke with Danita Charles and patient is with patient and gave consent for us to speak with daughter.     Lawanda states there appointment for today got cancelled and rescheduled. Informed that we spoke with patient yesterday and informed that she will not be able to make it and we got her rescheduled.   Danita Charles informs that they are sitting in the lobby in the clinic.   Per Dr. Andersen get the patient an appointment today with him.   Patient has been checked in and will be seen by provider today.

## 2024-05-09 DIAGNOSIS — F51.01 PRIMARY INSOMNIA: ICD-10-CM

## 2024-05-09 RX ORDER — ALPRAZOLAM 0.5 MG/1
0.5 TABLET ORAL 3 TIMES DAILY PRN
Qty: 90 TABLET | Refills: 1 | Status: SHIPPED | OUTPATIENT
Start: 2024-05-09

## 2024-05-09 NOTE — TELEPHONE ENCOUNTER
----- Message from Shante Espinoza sent at 5/9/2024 12:57 PM CDT -----  Daughter dimas  calling about alprazolam written for the wrong dosage. Please f/u with pt daughter.   [phone]

## 2024-05-09 NOTE — TELEPHONE ENCOUNTER
"Danita states patient's insurance isn't going cover the alprazolam. Instruction state to take 1 tablet night as needed for anxiety. Danita informs patient takes 1 tablet by mouth 3 times daily as needed for anxiety.   Upon review of chart directions from all previous prescription of alprazolam do say to take 1 tablet by mouth 3 times daily as needed for Anxiety.   [facility].   Per Dr. Andersen, "Okay to changed directions back to 1 tablet 3 times daily as needed for Anxiety.   Prescription pended for provider review.   "

## 2024-05-13 ENCOUNTER — TELEPHONE (OUTPATIENT)
Dept: FAMILY MEDICINE | Facility: CLINIC | Age: 89
End: 2024-05-13
Payer: COMMERCIAL

## 2024-05-13 NOTE — TELEPHONE ENCOUNTER
----- Message from Payal Hylton sent at 5/13/2024  3:03 PM CDT -----  Please call about her alprazolam .She said she usually gets # 90 for 3 months. The prescription she has now says # 30. She does not need it filled right now. She just wants to know why did she  only get  # 30.  pt's # [phone]

## 2024-06-12 RX ORDER — ACYCLOVIR 400 MG/1
400 TABLET ORAL 3 TIMES DAILY
Qty: 21 TABLET | Refills: 0 | Status: SHIPPED | OUTPATIENT
Start: 2024-06-12 | End: 2025-06-12

## 2024-06-12 NOTE — TELEPHONE ENCOUNTER
----- Message from Shante Espinoza sent at 6/12/2024  2:05 PM CDT -----  Contact: Dimas  Daughter dimas calling about fever blisters in pt mouth that she is unable to get rid off. Pharmacist stating there is prescribed medications to help get rid of that.   [phone]

## 2024-06-12 NOTE — TELEPHONE ENCOUNTER
Spoke with patients daughter, states she has had recurring fever blisters around her mouth on her lip close to the inside of her mouth. States the pharmacist mentioned magic mouth wash.

## 2024-06-12 NOTE — TELEPHONE ENCOUNTER
"Per Dr. Andersen "magic mouthwash 5cc rinse and expectorate 120ml. Acyclovir 400mg 1 po TID #21." Spoke with dimas and let her know.   "

## 2024-07-11 DIAGNOSIS — F51.01 PRIMARY INSOMNIA: ICD-10-CM

## 2024-07-11 RX ORDER — ALPRAZOLAM 0.5 MG/1
0.5 TABLET ORAL 3 TIMES DAILY PRN
Qty: 90 TABLET | Refills: 1 | Status: SHIPPED | OUTPATIENT
Start: 2024-07-11

## 2024-07-11 NOTE — TELEPHONE ENCOUNTER
----- Message from Nimo Tabor sent at 7/11/2024  2:47 PM CDT -----  Contact: Danita Remy for alprazolam. Kasia Samayoa @[phone]

## 2024-08-26 ENCOUNTER — TELEPHONE (OUTPATIENT)
Dept: FAMILY MEDICINE | Facility: CLINIC | Age: 89
End: 2024-08-26
Payer: COMMERCIAL

## 2024-08-26 DIAGNOSIS — I10 ESSENTIAL HYPERTENSION: ICD-10-CM

## 2024-08-26 DIAGNOSIS — Z79.899 ENCOUNTER FOR LONG-TERM (CURRENT) USE OF OTHER MEDICATIONS: Primary | ICD-10-CM

## 2024-08-26 DIAGNOSIS — N18.31 STAGE 3A CHRONIC KIDNEY DISEASE: ICD-10-CM

## 2024-08-26 DIAGNOSIS — E78.1 HYPERTRIGLYCERIDEMIA: ICD-10-CM

## 2024-08-26 DIAGNOSIS — E03.9 ACQUIRED HYPOTHYROIDISM: ICD-10-CM

## 2024-08-26 DIAGNOSIS — M15.9 PRIMARY OSTEOARTHRITIS INVOLVING MULTIPLE JOINTS: Primary | ICD-10-CM

## 2024-08-26 NOTE — TELEPHONE ENCOUNTER
----- Message from [facility], RT sent at 8/26/2024  2:45 PM CDT -----  Daughter, Danita, said that she fell recently and is ok, but wants to know if she could get an order for a walker. [phone]

## 2024-08-26 NOTE — TELEPHONE ENCOUNTER
Spoke to daughter, Danita, that fasting lab and urine is due a week prior to visit, orders at [facility], encouraged water. Advised she can take morning meds that do not need to be taken with food.

## 2024-09-10 ENCOUNTER — TELEPHONE (OUTPATIENT)
Dept: FAMILY MEDICINE | Facility: CLINIC | Age: 89
End: 2024-09-10
Payer: COMMERCIAL

## 2024-09-10 NOTE — TELEPHONE ENCOUNTER
----- Message from Nimo Tabor sent at 9/10/2024  3:32 PM CDT -----  Contact: Danita  Pt needs to reschedule her cancelled appt. Danita @[phone]

## 2024-09-12 DIAGNOSIS — F51.01 PRIMARY INSOMNIA: ICD-10-CM

## 2024-09-12 RX ORDER — ALPRAZOLAM 0.5 MG/1
0.5 TABLET ORAL 3 TIMES DAILY PRN
Qty: 90 TABLET | Refills: 1 | Status: SHIPPED | OUTPATIENT
Start: 2024-09-14

## 2024-09-12 NOTE — TELEPHONE ENCOUNTER
----- Message from Payal Hylton sent at 9/12/2024  3:01 PM CDT -----  Refill Alprazolam for 90 day supply. Her appointment is 10/14/2024. [facility]. Danita the patients daughter's # [phone]

## 2024-09-26 ENCOUNTER — TELEPHONE (OUTPATIENT)
Dept: FAMILY MEDICINE | Facility: CLINIC | Age: 89
End: 2024-09-26
Payer: COMMERCIAL

## 2024-09-26 NOTE — TELEPHONE ENCOUNTER
Spoke to Danita, daughter, that fasting lab and urine is due a week prior to visit, orders at [facility], encouraged water. Advised he can take morning meds that do not need to be taken with food.

## 2024-10-14 ENCOUNTER — TELEPHONE (OUTPATIENT)
Dept: FAMILY MEDICINE | Facility: CLINIC | Age: 89
End: 2024-10-14

## 2024-10-14 ENCOUNTER — OFFICE VISIT (OUTPATIENT)
Dept: FAMILY MEDICINE | Facility: CLINIC | Age: 89
End: 2024-10-14
Payer: MEDICARE

## 2024-10-14 VITALS
DIASTOLIC BLOOD PRESSURE: 80 MMHG | SYSTOLIC BLOOD PRESSURE: 138 MMHG | WEIGHT: 131 LBS | HEIGHT: 59 IN | BODY MASS INDEX: 26.41 KG/M2 | OXYGEN SATURATION: 96 % | HEART RATE: 88 BPM

## 2024-10-14 DIAGNOSIS — J96.11 CHRONIC HYPOXEMIC RESPIRATORY FAILURE: ICD-10-CM

## 2024-10-14 DIAGNOSIS — F02.A0 MILD LATE ONSET ALZHEIMER'S DEMENTIA WITHOUT BEHAVIORAL DISTURBANCE, PSYCHOTIC DISTURBANCE, MOOD DISTURBANCE, OR ANXIETY: ICD-10-CM

## 2024-10-14 DIAGNOSIS — J43.1 PANLOBULAR EMPHYSEMA: ICD-10-CM

## 2024-10-14 DIAGNOSIS — J44.89 COPD WITH ASTHMA: Primary | ICD-10-CM

## 2024-10-14 DIAGNOSIS — L97.909 VENOUS ULCER: ICD-10-CM

## 2024-10-14 DIAGNOSIS — E03.9 ACQUIRED HYPOTHYROIDISM: ICD-10-CM

## 2024-10-14 DIAGNOSIS — I10 ESSENTIAL HYPERTENSION: ICD-10-CM

## 2024-10-14 DIAGNOSIS — F51.01 PRIMARY INSOMNIA: ICD-10-CM

## 2024-10-14 DIAGNOSIS — N18.32 STAGE 3B CHRONIC KIDNEY DISEASE: ICD-10-CM

## 2024-10-14 DIAGNOSIS — I87.8 VENOUS STASIS OF BOTH LOWER EXTREMITIES: ICD-10-CM

## 2024-10-14 DIAGNOSIS — E78.1 HYPERTRIGLYCERIDEMIA: ICD-10-CM

## 2024-10-14 DIAGNOSIS — M15.0 PRIMARY OSTEOARTHRITIS INVOLVING MULTIPLE JOINTS: ICD-10-CM

## 2024-10-14 DIAGNOSIS — G30.1 MILD LATE ONSET ALZHEIMER'S DEMENTIA WITHOUT BEHAVIORAL DISTURBANCE, PSYCHOTIC DISTURBANCE, MOOD DISTURBANCE, OR ANXIETY: ICD-10-CM

## 2024-10-14 DIAGNOSIS — I87.303 STASIS EDEMA OF BOTH LOWER EXTREMITIES: ICD-10-CM

## 2024-10-14 DIAGNOSIS — I83.009 VENOUS ULCER: ICD-10-CM

## 2024-10-14 DIAGNOSIS — Z23 NEED FOR INFLUENZA VACCINATION: ICD-10-CM

## 2024-10-14 PROCEDURE — 99214 OFFICE O/P EST MOD 30 MIN: CPT | Mod: S$GLB,,, | Performed by: FAMILY MEDICINE

## 2024-10-14 PROCEDURE — G0008 ADMIN INFLUENZA VIRUS VAC: HCPCS | Mod: S$GLB,,, | Performed by: FAMILY MEDICINE

## 2024-10-14 PROCEDURE — 90653 IIV ADJUVANT VACCINE IM: CPT | Mod: S$GLB,,, | Performed by: FAMILY MEDICINE

## 2024-10-14 RX ORDER — POTASSIUM CHLORIDE 750 MG/1
10 CAPSULE, EXTENDED RELEASE ORAL DAILY
Qty: 30 CAPSULE | Refills: 3 | Status: SHIPPED | OUTPATIENT
Start: 2024-10-14

## 2024-10-14 RX ORDER — ALPRAZOLAM 0.5 MG/1
0.5 TABLET ORAL 3 TIMES DAILY PRN
Qty: 90 TABLET | Refills: 3 | Status: SHIPPED | OUTPATIENT
Start: 2024-10-14

## 2024-10-14 RX ORDER — FUROSEMIDE 20 MG/1
20 TABLET ORAL DAILY
Qty: 30 TABLET | Refills: 2 | Status: SHIPPED | OUTPATIENT
Start: 2024-10-14 | End: 2025-10-14

## 2024-10-14 RX ORDER — MUPIROCIN 20 MG/G
OINTMENT TOPICAL 2 TIMES DAILY
Qty: 30 G | Refills: 3 | Status: SHIPPED | OUTPATIENT
Start: 2024-10-14

## 2024-10-14 NOTE — TELEPHONE ENCOUNTER
Per [facility], patient gave urine sample, but it wasn't enough to do micro and/or u/a. FYI to Dr Andersen. She is seeing him now I believe

## 2024-10-14 NOTE — TELEPHONE ENCOUNTER
----- Message from Tabitha sent at 10/14/2024  4:39 PM CDT -----  Pt needs to schedule a 3 month f/u.    [phone]

## 2024-10-15 ENCOUNTER — TELEPHONE (OUTPATIENT)
Dept: FAMILY MEDICINE | Facility: CLINIC | Age: 89
End: 2024-10-15
Payer: COMMERCIAL

## 2024-10-15 NOTE — TELEPHONE ENCOUNTER
"Spoke to pts daughter and let her know per Dr. Andersen " pt is anemic and needs to start vitron C one tab daily and repeat labs in 2 months"     Pts daughter verbalized understanding     Reminder created   "

## 2024-10-16 ENCOUNTER — HOSPITAL ENCOUNTER (OUTPATIENT)
Dept: RADIOLOGY | Facility: HOSPITAL | Age: 89
Discharge: HOME | End: 2024-10-16
Attending: FAMILY MEDICINE
Payer: MEDICARE

## 2024-10-16 DIAGNOSIS — J44.89 COPD WITH ASTHMA: ICD-10-CM

## 2024-10-16 PROCEDURE — 71046 X-RAY EXAM CHEST 2 VIEWS: CPT | Mod: 26,,, | Performed by: RADIOLOGY

## 2024-10-16 PROCEDURE — 71046 X-RAY EXAM CHEST 2 VIEWS: CPT | Mod: TC,PO

## 2024-10-16 NOTE — PROGRESS NOTES
SUBJECTIVE:    Patient ID: Valerie Jones is a 89 y.o. female.    Chief Complaint: Follow-up (Loss balance, swelling foot for 3 months, flu vaccine ordered, shortness of breath, discuss about medication, pt take only one medication, pt already had blood work before the appt, abc )    89-year-old female brought in by her daughter.  Patient has been having increasing dyspnea on exertion.  More pedal edema and some weeping lesions to both legs.  No redness or cellulitis noted.  No chest pain reported.  Not currently on any diuretics.    Her left pinna has a crusty bleeding lesion, patient apparently lost her balance and fell and hit her head on the wall a few days ago.    Complains of restless legs symptoms at night    Will see Ophthalmology Dr. Collins for eye exam soon.    History of COPD, not using her albuterol inhaler recently.    Appetite is good, gained 6 lb recently.    Follow-up  Pertinent negatives include no abdominal pain, chest pain, chills, coughing, fatigue, fever, joint swelling, myalgias, nausea, numbness or vomiting.       Telephone on 08/26/2024   Component Date Value Ref Range Status    Glucose 10/14/2024 94  65 - 99 mg/dL Final    BUN 10/14/2024 18  7 - 25 mg/dL Final    Creatinine 10/14/2024 0.69  0.60 - 0.95 mg/dL Final    eGFR 10/14/2024 82  > OR = 60 mL/min/1.73m2 Final    BUN/Creatinine Ratio 10/14/2024 SEE NOTE:  6 - 22 (calc) Final    Sodium 10/14/2024 140  135 - 146 mmol/L Final    Potassium 10/14/2024 4.4  3.5 - 5.3 mmol/L Final    Chloride 10/14/2024 102  98 - 110 mmol/L Final    CO2 10/14/2024 29  20 - 32 mmol/L Final    Calcium 10/14/2024 9.1  8.6 - 10.4 mg/dL Final    Total Protein 10/14/2024 7.2  6.1 - 8.1 g/dL Final    Albumin 10/14/2024 4.0  3.6 - 5.1 g/dL Final    Globulin, Total 10/14/2024 3.2  1.9 - 3.7 g/dL (calc) Final    Albumin/Globulin Ratio 10/14/2024 1.3  1.0 - 2.5 (calc) Final    Total Bilirubin 10/14/2024 0.6  0.2 - 1.2 mg/dL Final    Alkaline Phosphatase  10/14/2024 72  37 - 153 U/L Final    AST 10/14/2024 16  10 - 35 U/L Final    ALT 10/14/2024 10  6 - 29 U/L Final    Cholesterol 10/14/2024 141  <200 mg/dL Final    HDL 10/14/2024 63  > OR = 50 mg/dL Final    Triglycerides 10/14/2024 58  <150 mg/dL Final    LDL Cholesterol 10/14/2024 65  mg/dL (calc) Final    HDL/Cholesterol Ratio 10/14/2024 2.2  <5.0 (calc) Final    Non HDL Chol. (LDL+VLDL) 10/14/2024 78  <130 mg/dL (calc) Final    TSH w/reflex to FT4 10/14/2024 2.95  0.40 - 4.50 mIU/L Final    WBC 10/14/2024 8.2  3.8 - 10.8 Thousand/uL Final    RBC 10/14/2024 3.72 (L)  3.80 - 5.10 Million/uL Final    Hemoglobin 10/14/2024 8.2 (L)  11.7 - 15.5 g/dL Final    Hematocrit 10/14/2024 29.2 (L)  35.0 - 45.0 % Final    MCV 10/14/2024 78.5 (L)  80.0 - 100.0 fL Final    MCH 10/14/2024 22.0 (L)  27.0 - 33.0 pg Final    MCHC 10/14/2024 28.1 (L)  32.0 - 36.0 g/dL Final    RDW 10/14/2024 15.3 (H)  11.0 - 15.0 % Final    Platelets 10/14/2024 483 (H)  140 - 400 Thousand/uL Final    MPV 10/14/2024 9.6  7.5 - 12.5 fL Final    Neutrophils, Abs 10/14/2024 5,519  1,500 - 7,800 cells/uL Final    Lymph # 10/14/2024 1,681  850 - 3,900 cells/uL Final    Mono # 10/14/2024 730  200 - 950 cells/uL Final    Eos # 10/14/2024 213  15 - 500 cells/uL Final    Baso # 10/14/2024 57  0 - 200 cells/uL Final    Neutrophils Relative 10/14/2024 67.3  % Final    Lymph % 10/14/2024 20.5  % Final    Mono % 10/14/2024 8.9  % Final    Eosinophil % 10/14/2024 2.6  % Final    Basophil % 10/14/2024 0.7  % Final       Past Medical History:   Diagnosis Date    Anxiety     Arthritis     Asthma     COPD (chronic obstructive pulmonary disease)     Hypertension     Pain in the abdomen 2019    Thyroid disease      Social History     Socioeconomic History    Marital status:    Tobacco Use    Smoking status: Former     Current packs/day: 0.00     Average packs/day: 0.5 packs/day for 5.0 years (2.5 ttl pk-yrs)     Types:  Cigarettes     Start date: 2/15/2000     Quit date: 2/15/2005     Years since quittin.6     Passive exposure: Past    Smokeless tobacco: Never   Substance and Sexual Activity    Alcohol use: No    Drug use: No    Sexual activity: Never     Social Drivers of Health     Stress: No Stress Concern Present (2019)    [employer] - Occupational Stress Questionnaire     Feeling of Stress : Not at all     Past Surgical History:   Procedure Laterality Date    CATARACT EXTRACTION, BILATERAL      Dr Collins     SECTION      x's2    CHOLECYSTECTOMY      COLONOSCOPY      ESOPHAGOGASTRODUODENOSCOPY (EGD) WITH DILATION      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    LAPAROSCOPIC CHOLECYSTECTOMY N/A 2019    Procedure: CHOLECYSTECTOMY, LAPAROSCOPIC;  Surgeon: Toney Grace III, MD;  Location: [facility];  Service: General;  Laterality: N/A;    SKIN CANCER EXCISION      Dr.Weil     Family History   Problem Relation Name Age of Onset    Diabetes Mother      Depression Maternal Grandmother      Depression Maternal Grandfather         The CVD Risk score (Sullyino et al., 2008) failed to calculate for the following reasons:    The 2008 CVD risk score is only valid for ages 30 to 74    All of your core healthy metrics are met.      Review of patient's allergies indicates:   Allergen Reactions    Sulfa (sulfonamide antibiotics) Swelling    Androgenic anabolic steroid Swelling     Feet swelling     Aspirin Other (See Comments)     Upset stomach     Erythromycin Itching    Methylprednisolone Other (See Comments)    Penicillins Itching       Current Outpatient Medications:     (Magic mouthwash) 1:1:1 diphenhydrAMINE(Benadryl) 12.5mg/5ml liq, aluminum & magnesium hydroxide-simethicone (Maalox), LIDOcaine viscous 2%, Swish and spit 5 mLs every 4 (four) hours as needed. for mouth sores (Patient not taking: Reported on 10/14/2024), Disp: 120 mL, Rfl: 0     acetaminophen (TYLENOL) 500 MG tablet, Take 500 mg by mouth nightly as needed for Pain. (Patient not taking: Reported on 10/14/2024), Disp: , Rfl:     acyclovir (ZOVIRAX) 400 MG tablet, Take 1 tablet (400 mg total) by mouth 3 (three) times daily. (Patient not taking: Reported on 10/14/2024), Disp: 21 tablet, Rfl: 0    albuterol sulfate (PROAIR RESPICLICK) 90 mcg/actuation inhaler, Inhale 2 puffs into the lungs every 4 (four) hours. Rescue2 puffs every 4 hours as needed for cough, wheeze, or shortness of breath (Patient not taking: Reported on 10/14/2024), Disp: 1 each, Rfl: 2    albuterol-ipratropium (DUO-NEB) 2.5 mg-0.5 mg/3 mL nebulizer solution, Take 3 mLs by nebulization every 6 (six) hours as needed., Disp: 120 vial, Rfl: 1    ALPRAZolam (XANAX) 0.5 MG tablet, Take 1 tablet (0.5 mg total) by mouth 3 (three) times daily as needed for Anxiety., Disp: 90 tablet, Rfl: 3    atenoloL (TENORMIN) 50 MG tablet, Take 1 tablet (50 mg total) by mouth once daily. (Patient not taking: Reported on 10/14/2024), Disp: 90 tablet, Rfl: 0    cetirizine (ZYRTEC) 10 MG tablet, Take 1 tablet (10 mg total) by mouth once daily. (Patient not taking: Reported on 10/14/2024), Disp: 30 tablet, Rfl: 3    chlordiazepoxide-clidinium 5-2.5 mg (LIBRAX) 5-2.5 mg Cap, Take 1 capsule by mouth daily as needed. (Patient not taking: Reported on 10/14/2024), Disp: 30 capsule, Rfl: 1    clobetasoL 0.025 % Crea, Apply topically. (Patient not taking: Reported on 10/14/2024), Disp: , Rfl:     dexAMETHasone (DECADRON) 6 MG tablet, Take 1 tablet daily (Patient not taking: Reported on 10/14/2024), Disp: 15 tablet, Rfl: 0    diclofenac sodium (VOLTAREN) 1 % Gel, Apply 2 g topically once daily. (Patient not taking: Reported on 10/14/2024), Disp: 100 g, Rfl: 1    doxycycline (MONODOX) 100 MG capsule, Take 1 capsule (100 mg total) by mouth 2 (two) times daily. (Patient not taking: Reported on 10/14/2024), Disp: 20 capsule, Rfl: 0     fluticasone-umeclidin-vilanter (TRELEGY ELLIPTA) 100-62.5-25 mcg DsDv, Inhale 1 puff into the lungs once daily. (Patient not taking: Reported on 10/14/2024), Disp: 60 each, Rfl: 3    furosemide (LASIX) 20 MG tablet, Take 1 tablet (20 mg total) by mouth once daily. For fluid, Disp: 30 tablet, Rfl: 2    HYDROcodone-acetaminophen (NORCO)  mg per tablet, Take 1 tablet by mouth every 4 (four) hours as needed for Pain. (Patient not taking: Reported on 10/14/2024), Disp: 60 tablet, Rfl: 0    levothyroxine (SYNTHROID) 50 MCG tablet, Take 1 tablet (50 mcg total) by mouth once daily. (Patient not taking: Reported on 10/14/2024), Disp: 90 tablet, Rfl: 1    mupirocin (BACTROBAN) 2 % ointment, Apply topically 2 (two) times daily., Disp: 30 g, Rfl: 3    pantoprazole (PROTONIX) 20 MG tablet, Take 2 tablets (40 mg total) by mouth once daily. (Patient not taking: Reported on 10/14/2024), Disp: 30 tablet, Rfl: 0    potassium bicarbonate (K-LYTE) disintegrating tablet, Take 1 tablet (25 mEq total) by mouth once daily. (Patient not taking: Reported on 10/14/2024), Disp: 10 tablet, Rfl: 0    potassium chloride (MICRO-K) 10 MEQ CpSR, Take 1 capsule (10 mEq total) by mouth once daily., Disp: 30 capsule, Rfl: 3    predniSONE (DELTASONE) 10 MG tablet, Take 1 tablet (10 mg total) by mouth once daily. (Patient not taking: Reported on 10/14/2024), Disp: 20 tablet, Rfl: 0    Review of Systems   Constitutional:  Negative for appetite change, chills, fatigue, fever and unexpected weight change.   HENT:  Negative for ear discharge and ear pain.    Eyes:  Negative for pain, discharge and visual disturbance.   Respiratory:  Positive for shortness of breath. Negative for apnea, cough and wheezing.    Cardiovascular:  Positive for leg swelling. Negative for chest pain and palpitations.   Gastrointestinal:  Negative for abdominal pain, blood in stool, constipation, diarrhea, nausea, vomiting and reflux.   Endocrine: Negative for cold  "intolerance, heat intolerance and polydipsia.   Genitourinary:  Negative for bladder incontinence, dysuria, hematuria, nocturia and urgency.   Musculoskeletal:  Negative for gait problem, joint swelling and myalgias.   Neurological:  Negative for dizziness, seizures and numbness.   Psychiatric/Behavioral:  Negative for behavioral problems and hallucinations. The patient is not nervous/anxious.            Objective:      Vitals:    10/14/24 1602   BP: 138/80   Pulse: 88   SpO2: 96%   Weight: 59.4 kg (131 lb)   Height: 4' 10.5" (1.486 m)     Physical Exam  Vitals and nursing note reviewed.   Constitutional:       General: She is not in acute distress.     Appearance: Normal appearance. She is well-developed. She is ill-appearing. She is not toxic-appearing.   HENT:      Head: Normocephalic and atraumatic.      Right Ear: Tympanic membrane and external ear normal.      Left Ear: Tympanic membrane and external ear normal.      Nose: Nose normal.      Mouth/Throat:      Pharynx: Oropharynx is clear. No posterior oropharyngeal erythema.   Eyes:      Pupils: Pupils are equal, round, and reactive to light.   Neck:      Thyroid: No thyromegaly.      Vascular: No carotid bruit.   Cardiovascular:      Rate and Rhythm: Normal rate and regular rhythm.      Heart sounds: Normal heart sounds. No murmur heard.  Pulmonary:      Effort: Pulmonary effort is normal.      Breath sounds: Rales (Minimal crackles in the bases) present. No wheezing.   Abdominal:      General: Bowel sounds are normal. There is no distension.      Palpations: Abdomen is soft.      Tenderness: There is no abdominal tenderness.   Musculoskeletal:         General: No tenderness or deformity. Normal range of motion.      Cervical back: Normal range of motion and neck supple.      Lumbar back: Normal. No spasms.      Right lower leg: Edema (2+ pitting edema) present.      Left lower leg: Edema (2+ pitting edema up to knees) present.      Comments: Bends 90 degrees " at  waist, shoulders and knees have full range of motion, no pitting edema to lower extremities   Lymphadenopathy:      Cervical: No cervical adenopathy.   Skin:     General: Skin is warm and dry.      Findings: No rash.      Comments: Each leg has a small venous stasis ulcer weeping clear to serous fluid   Neurological:      General: No focal deficit present.      Mental Status: She is alert and oriented to person, place, and time. Mental status is at baseline.      Cranial Nerves: No cranial nerve deficit.      Coordination: Coordination normal.      Gait: Gait abnormal.   Psychiatric:         Mood and Affect: Mood normal.         Behavior: Behavior normal.         Thought Content: Thought content normal.         Judgment: Judgment normal.         Assessment:       1. COPD with asthma    2. Need for influenza vaccination    3. Panlobular emphysema    4. Chronic hypoxemic respiratory failure    5. Primary insomnia    6. Stasis edema of both lower extremities    7. Venous ulcer    8. Mild late onset Alzheimer's dementia without behavioral disturbance, psychotic disturbance, mood disturbance, or anxiety    9. Venous stasis of both lower extremities    10. Essential hypertension    11. Hypertriglyceridemia    12. Stage 3b chronic kidney disease    13. Acquired hypothyroidism    14. Primary osteoarthritis involving multiple joints         Plan:       COPD with asthma  -     X-Ray Chest PA And Lateral; Future; Expected date: 10/14/2024  Patient needs x-ray this week, add Trelegy 200 inhaler 1 puff daily  Need for influenza vaccination  -     influenza (adjuvanted) (Fluad) 45 mcg/0.5 mL IM vaccine (> or = 64 yo) 0.5 mL  Flu vaccine today  Panlobular emphysema  Trelegy inhaler added  Chronic hypoxemic respiratory failure  O2 saturations are decent  Primary insomnia  Comments:  she just uses low dose prn alprazolam. refills as needed.  Orders:  -     ALPRAZolam (XANAX) 0.5 MG tablet; Take 1 tablet (0.5 mg total) by mouth 3  (three) times daily as needed for Anxiety.  Dispense: 90 tablet; Refill: 3    Stasis edema of both lower extremities  -     furosemide (LASIX) 20 MG tablet; Take 1 tablet (20 mg total) by mouth once daily. For fluid  Dispense: 30 tablet; Refill: 2  -     potassium chloride (MICRO-K) 10 MEQ CpSR; Take 1 capsule (10 mEq total) by mouth once daily.  Dispense: 30 capsule; Refill: 3  2+ edema of the lower extremities, add Lasix 20 mg and Micro-K 10 mEq daily  Venous ulcer  -     mupirocin (BACTROBAN) 2 % ointment; Apply topically 2 (two) times daily.  Dispense: 30 g; Refill: 3  Trial of mupirocin cream  Mild late onset Alzheimer's dementia without behavioral disturbance, psychotic disturbance, mood disturbance, or anxiety  Cared for by her daughter  Venous stasis of both lower extremities  Lasix and Micro-K  Essential hypertension  Blood pressure well controlled  Hypertriglyceridemia    Stage 3b chronic kidney disease  Lab work ordered earlier today  Acquired hypothyroidism    Primary osteoarthritis involving multiple joints  Not on pain medications    Follow up in about 3 months (around 1/14/2025), or copd  stasis  edema.        10/15/2024 Reed Andersen

## 2024-10-21 ENCOUNTER — TELEPHONE (OUTPATIENT)
Dept: FAMILY MEDICINE | Facility: CLINIC | Age: 89
End: 2024-10-21
Payer: COMMERCIAL

## 2024-10-21 RX ORDER — DOXYCYCLINE 100 MG/1
100 CAPSULE ORAL 2 TIMES DAILY
Qty: 20 CAPSULE | Refills: 0 | Status: SHIPPED | OUTPATIENT
Start: 2024-10-21

## 2024-10-21 NOTE — TELEPHONE ENCOUNTER
Spoke to daughter, Danita Charles, with result verbatim per Dr Andersen. Verbalized understanding.

## 2024-10-21 NOTE — TELEPHONE ENCOUNTER
----- Message from Tech Indira sent at 10/21/2024  2:14 PM CDT -----  Daughter, Ella, said that she is still having cellulitis in her legs. Said swelling is better and wants to know if she should be on something for that. Said Dr Andersen gave her Bactroban for sores at visit. [phone] Lawanda

## 2024-10-21 NOTE — TELEPHONE ENCOUNTER
----- Message from Reed Andersen MD sent at 10/20/2024  8:03 PM CDT -----  Call patient's daughter.  Chest x-ray shows no fluid in the lungs.  The lungs do have COPD or emphysema which makes her very short of breath.  No congestive heart failure or fluid in the lungs.

## 2024-10-29 ENCOUNTER — TELEPHONE (OUTPATIENT)
Dept: FAMILY MEDICINE | Facility: CLINIC | Age: 89
End: 2024-10-29
Payer: MEDICARE

## 2024-10-29 DIAGNOSIS — J44.89 COPD WITH ASTHMA: ICD-10-CM

## 2024-10-29 RX ORDER — FLUTICASONE FUROATE, UMECLIDINIUM BROMIDE AND VILANTEROL TRIFENATATE 100; 62.5; 25 UG/1; UG/1; UG/1
1 POWDER RESPIRATORY (INHALATION) DAILY
Qty: 60 EACH | Refills: 3 | Status: SHIPPED | OUTPATIENT
Start: 2024-10-29

## 2024-10-30 ENCOUNTER — OFFICE VISIT (OUTPATIENT)
Dept: FAMILY MEDICINE | Facility: CLINIC | Age: 89
End: 2024-10-30
Payer: MEDICARE

## 2024-10-30 VITALS
DIASTOLIC BLOOD PRESSURE: 70 MMHG | HEART RATE: 85 BPM | BODY MASS INDEX: 26.61 KG/M2 | SYSTOLIC BLOOD PRESSURE: 126 MMHG | OXYGEN SATURATION: 94 % | HEIGHT: 59 IN | WEIGHT: 132 LBS

## 2024-10-30 DIAGNOSIS — J44.9 CHRONIC OBSTRUCTIVE PULMONARY DISEASE, UNSPECIFIED COPD TYPE: ICD-10-CM

## 2024-10-30 DIAGNOSIS — F02.A0 MILD LATE ONSET ALZHEIMER'S DEMENTIA WITHOUT BEHAVIORAL DISTURBANCE, PSYCHOTIC DISTURBANCE, MOOD DISTURBANCE, OR ANXIETY: ICD-10-CM

## 2024-10-30 DIAGNOSIS — I87.8 VENOUS STASIS OF BOTH LOWER EXTREMITIES: Primary | ICD-10-CM

## 2024-10-30 DIAGNOSIS — G30.1 MILD LATE ONSET ALZHEIMER'S DEMENTIA WITHOUT BEHAVIORAL DISTURBANCE, PSYCHOTIC DISTURBANCE, MOOD DISTURBANCE, OR ANXIETY: ICD-10-CM

## 2024-10-30 PROCEDURE — 99214 OFFICE O/P EST MOD 30 MIN: CPT | Mod: S$GLB,,, | Performed by: NURSE PRACTITIONER

## 2024-11-12 NOTE — LETTER
[facility]   [facility] [address]  [place] [address]  Phone: [phone]  Fax: [phone]   2019    Dr Koch,    Re: Valerie Jones,  19    Pt has chronic lung disease in stable and optimal shape for cholecystectomy.  She has had low 02 sat on room air for last 3 yrs in upper 80's, and she uses oxygen occasionally.  Her fev1 was 90% in 2017.    Would request she take low dose prednisone, 20 mg daily on  and , then use ics (trelegy) to maximize lungs.    She is cleared for cholecystectomy from pulmonary perspective.    Thank You,       Davidson Mack MD  Pulmonary.    V-Y Flap Text: The defect edges were debeveled with a #15 scalpel blade.  Given the location of the defect, shape of the defect and the proximity to free margins a V-Y flap was deemed most appropriate.  Using a sterile surgical marker, an appropriate advancement flap was drawn incorporating the defect and placing the expected incisions within the relaxed skin tension lines where possible.    The area thus outlined was incised deep to adipose tissue with a #15 scalpel blade.  The skin margins were undermined to an appropriate distance in all directions utilizing iris scissors.

## 2024-11-18 DIAGNOSIS — J44.89 COPD WITH ASTHMA: ICD-10-CM

## 2024-11-18 RX ORDER — FLUTICASONE FUROATE, UMECLIDINIUM BROMIDE AND VILANTEROL TRIFENATATE 100; 62.5; 25 UG/1; UG/1; UG/1
1 POWDER RESPIRATORY (INHALATION) DAILY
Qty: 60 EACH | Refills: 3 | Status: SHIPPED | OUTPATIENT
Start: 2024-11-18

## 2024-11-18 NOTE — TELEPHONE ENCOUNTER
----- Message from Eloina sent at 11/18/2024  4:11 PM CST -----  Pharmacy is having trouble getting trilligy can you write a script for her to shop around.  Call [phone] Danita is her daughter

## 2024-12-03 ENCOUNTER — TELEPHONE (OUTPATIENT)
Dept: FAMILY MEDICINE | Facility: CLINIC | Age: 89
End: 2024-12-03
Payer: MEDICARE

## 2024-12-03 DIAGNOSIS — D69.2 OTHER NONTHROMBOCYTOPENIC PURPURA: ICD-10-CM

## 2024-12-03 DIAGNOSIS — Z79.899 ENCOUNTER FOR LONG-TERM (CURRENT) USE OF MEDICATIONS: Primary | ICD-10-CM

## 2024-12-03 NOTE — TELEPHONE ENCOUNTER
----- Message from Anita Jacobson sent at 12/2/2024  7:30 AM CST -----    ----- Message -----  From: Venessa Florez LPN  Sent: 12/2/2024  12:00 AM CST  To: Reed Andersen Staff    Per Dr. Andersen repeat CBC and Ferritin in 2 months

## 2024-12-03 NOTE — TELEPHONE ENCOUNTER
Spoke to Danita Charles, daughter, that non-fasting lab is due. Said she will come sometime this month. Orders to [facility]. Pended.

## 2025-01-02 ENCOUNTER — TELEPHONE (OUTPATIENT)
Dept: FAMILY MEDICINE | Facility: CLINIC | Age: OVER 89
End: 2025-01-02
Payer: MEDICARE

## 2025-01-02 NOTE — TELEPHONE ENCOUNTER
Left mess lab is due to recheck iron levels. She has office visit 1/13 with Dr Andersen. Advised she can get done then, but to come in a little early as the lab closes at 3:30. Updated remind me.

## 2025-01-13 ENCOUNTER — TELEPHONE (OUTPATIENT)
Dept: FAMILY MEDICINE | Facility: CLINIC | Age: OVER 89
End: 2025-01-13

## 2025-01-13 ENCOUNTER — OFFICE VISIT (OUTPATIENT)
Dept: FAMILY MEDICINE | Facility: CLINIC | Age: OVER 89
End: 2025-01-13
Payer: MEDICARE

## 2025-01-13 VITALS
BODY MASS INDEX: 26.21 KG/M2 | HEIGHT: 59 IN | DIASTOLIC BLOOD PRESSURE: 60 MMHG | HEART RATE: 95 BPM | WEIGHT: 130 LBS | OXYGEN SATURATION: 97 % | SYSTOLIC BLOOD PRESSURE: 138 MMHG

## 2025-01-13 DIAGNOSIS — I83.009 VENOUS ULCER: ICD-10-CM

## 2025-01-13 DIAGNOSIS — I87.303 STASIS EDEMA OF BOTH LOWER EXTREMITIES: ICD-10-CM

## 2025-01-13 DIAGNOSIS — E78.1 HYPERTRIGLYCERIDEMIA: ICD-10-CM

## 2025-01-13 DIAGNOSIS — Z00.00 ENCOUNTER FOR MEDICARE ANNUAL WELLNESS EXAM: ICD-10-CM

## 2025-01-13 DIAGNOSIS — J96.11 CHRONIC HYPOXEMIC RESPIRATORY FAILURE: ICD-10-CM

## 2025-01-13 DIAGNOSIS — F51.01 PRIMARY INSOMNIA: ICD-10-CM

## 2025-01-13 DIAGNOSIS — J44.89 COPD WITH ASTHMA: ICD-10-CM

## 2025-01-13 DIAGNOSIS — M15.0 PRIMARY OSTEOARTHRITIS INVOLVING MULTIPLE JOINTS: ICD-10-CM

## 2025-01-13 DIAGNOSIS — K58.2 IRRITABLE BOWEL SYNDROME WITH BOTH CONSTIPATION AND DIARRHEA: ICD-10-CM

## 2025-01-13 DIAGNOSIS — F02.A0 MILD LATE ONSET ALZHEIMER'S DEMENTIA WITHOUT BEHAVIORAL DISTURBANCE, PSYCHOTIC DISTURBANCE, MOOD DISTURBANCE, OR ANXIETY: ICD-10-CM

## 2025-01-13 DIAGNOSIS — N18.32 STAGE 3B CHRONIC KIDNEY DISEASE: ICD-10-CM

## 2025-01-13 DIAGNOSIS — I10 ESSENTIAL HYPERTENSION: ICD-10-CM

## 2025-01-13 DIAGNOSIS — L97.909 VENOUS ULCER: ICD-10-CM

## 2025-01-13 DIAGNOSIS — I87.8 VENOUS STASIS OF BOTH LOWER EXTREMITIES: ICD-10-CM

## 2025-01-13 DIAGNOSIS — D69.2 OTHER NONTHROMBOCYTOPENIC PURPURA: ICD-10-CM

## 2025-01-13 DIAGNOSIS — N30.00 ACUTE CYSTITIS WITHOUT HEMATURIA: Primary | ICD-10-CM

## 2025-01-13 DIAGNOSIS — G30.1 MILD LATE ONSET ALZHEIMER'S DEMENTIA WITHOUT BEHAVIORAL DISTURBANCE, PSYCHOTIC DISTURBANCE, MOOD DISTURBANCE, OR ANXIETY: ICD-10-CM

## 2025-01-13 DIAGNOSIS — E03.9 ACQUIRED HYPOTHYROIDISM: ICD-10-CM

## 2025-01-13 PROCEDURE — 99214 OFFICE O/P EST MOD 30 MIN: CPT | Mod: S$GLB,,, | Performed by: FAMILY MEDICINE

## 2025-01-13 RX ORDER — NITROFURANTOIN 25; 75 MG/1; MG/1
100 CAPSULE ORAL 2 TIMES DAILY
Qty: 20 CAPSULE | Refills: 0 | Status: SHIPPED | OUTPATIENT
Start: 2025-01-13

## 2025-01-13 RX ORDER — POTASSIUM CHLORIDE 750 MG/1
10 CAPSULE, EXTENDED RELEASE ORAL DAILY
Qty: 90 CAPSULE | Refills: 3 | Status: SHIPPED | OUTPATIENT
Start: 2025-01-13

## 2025-01-13 RX ORDER — FUROSEMIDE 20 MG/1
20 TABLET ORAL DAILY
Qty: 90 TABLET | Refills: 3 | Status: SHIPPED | OUTPATIENT
Start: 2025-01-13 | End: 2026-01-13

## 2025-01-13 RX ORDER — MUPIROCIN 20 MG/G
OINTMENT TOPICAL 2 TIMES DAILY
Qty: 30 G | Refills: 3 | Status: SHIPPED | OUTPATIENT
Start: 2025-01-13

## 2025-01-13 RX ORDER — FLUTICASONE FUROATE, UMECLIDINIUM BROMIDE AND VILANTEROL TRIFENATATE 100; 62.5; 25 UG/1; UG/1; UG/1
1 POWDER RESPIRATORY (INHALATION) DAILY
Qty: 60 EACH | Refills: 3 | Status: SHIPPED | OUTPATIENT
Start: 2025-01-13

## 2025-01-13 RX ORDER — CHLORDIAZEPOXIDE HYDROCHLORIDE AND CLIDINIUM BROMIDE 5; 2.5 MG/1; MG/1
1 CAPSULE ORAL DAILY PRN
Qty: 30 CAPSULE | Refills: 1 | Status: SHIPPED | OUTPATIENT
Start: 2025-01-13

## 2025-01-13 RX ORDER — ALPRAZOLAM 0.5 MG/1
0.5 TABLET ORAL 3 TIMES DAILY PRN
Qty: 90 TABLET | Refills: 5 | Status: SHIPPED | OUTPATIENT
Start: 2025-01-13

## 2025-01-13 NOTE — TELEPHONE ENCOUNTER
----- Message from Shante sent at 1/13/2025  4:23 PM CST -----  6 month appointment needed   [phone]

## 2025-01-14 ENCOUNTER — TELEPHONE (OUTPATIENT)
Dept: FAMILY MEDICINE | Facility: CLINIC | Age: OVER 89
End: 2025-01-14
Payer: MEDICARE

## 2025-01-14 NOTE — PROGRESS NOTES
SUBJECTIVE:    Patient ID: Valerie Jones is a 89 y.o. female.    Chief Complaint: Follow-up (No bottles, need refills, bilateral leg swelling, pt has blood work before the appt,abc )    Follow-up  Pertinent negatives include no abdominal pain, chest pain, chills, coughing, fatigue, fever, joint swelling, myalgias, nausea, numbness or vomiting.       History of Present Illness    CHIEF COMPLAINT:  Valerie presents today for follow-up regarding medication management and swelling in feet.    EDEMA:  She reports bilateral foot swelling for the past two weeks, which occurs when not taking Lasix. She has misplaced her Lasix and requires a refill.    URINARY SYMPTOMS:  She reports symptoms consistent with urinary tract infection over the last 3-4 days.    APPETITE AND WEIGHT:  She reports poor appetite with irregular eating patterns, consisting of sweet rolls in the morning, skipping lunch, and difficulty eating dinner. Despite decreased food intake, she has gained 5 lbs since earlier this year.    CURRENT MEDICATIONS:  She had good response without side effects to Trelegy sample but reports pharmacy unable to fill prescription. She takes Alprazolam nightly for anxiety and sleep, and keeps Librax on hand for occasional GI issues. She has discontinued hydrocodone, steroids, and Zyrtec previously used for sinus symptoms.    SOCIAL HISTORY:  She denies alcohol and tobacco use.      ROS:  Constitutional: -appetite change, -chills, -fatigue, -fever, -unexpected weight change, +loss of appetite  HENT: -ear pain, -trouble swallowing  Eyes: -pain, -discharge, -visual disturbance  Respiratory: -apnea, -cough, -shortness of breath, -wheezing  Cardiovascular: -chest pain, -leg swelling  Gastrointestinal: -abdominal pain, -blood in stool, -constipation, -diarrhea, -nausea, -vomiting, -reflux  Endocrine: -cold intolerance, -heat intolerance, -polydipsia  Genitourinary: -bladder incontinence, -dysuria, -erectile dysfunction,  +frequency, -hematuria, -testicular pain, +urgency, -nocturia  Musculoskeletal: -gait problem, -joint swelling, -myalgia  Neurological: -dizziness, -seizures, -numbness  Psychiatric/Behavioral: -agitation, -hallucinations, -nervous, -anxiety symptoms         Telephone on 08/26/2024   Component Date Value Ref Range Status    Glucose 10/14/2024 94  65 - 99 mg/dL Final    BUN 10/14/2024 18  7 - 25 mg/dL Final    Creatinine 10/14/2024 0.69  0.60 - 0.95 mg/dL Final    eGFR 10/14/2024 82  > OR = 60 mL/min/1.73m2 Final    BUN/Creatinine Ratio 10/14/2024 SEE NOTE:  6 - 22 (calc) Final    Sodium 10/14/2024 140  135 - 146 mmol/L Final    Potassium 10/14/2024 4.4  3.5 - 5.3 mmol/L Final    Chloride 10/14/2024 102  98 - 110 mmol/L Final    CO2 10/14/2024 29  20 - 32 mmol/L Final    Calcium 10/14/2024 9.1  8.6 - 10.4 mg/dL Final    Total Protein 10/14/2024 7.2  6.1 - 8.1 g/dL Final    Albumin 10/14/2024 4.0  3.6 - 5.1 g/dL Final    Globulin, Total 10/14/2024 3.2  1.9 - 3.7 g/dL (calc) Final    Albumin/Globulin Ratio 10/14/2024 1.3  1.0 - 2.5 (calc) Final    Total Bilirubin 10/14/2024 0.6  0.2 - 1.2 mg/dL Final    Alkaline Phosphatase 10/14/2024 72  37 - 153 U/L Final    AST 10/14/2024 16  10 - 35 U/L Final    ALT 10/14/2024 10  6 - 29 U/L Final    Cholesterol 10/14/2024 141  <200 mg/dL Final    HDL 10/14/2024 63  > OR = 50 mg/dL Final    Triglycerides 10/14/2024 58  <150 mg/dL Final    LDL Cholesterol 10/14/2024 65  mg/dL (calc) Final    HDL/Cholesterol Ratio 10/14/2024 2.2  <5.0 (calc) Final    Non HDL Chol. (LDL+VLDL) 10/14/2024 78  <130 mg/dL (calc) Final    TSH w/reflex to FT4 10/14/2024 2.95  0.40 - 4.50 mIU/L Final    WBC 10/14/2024 8.2  3.8 - 10.8 Thousand/uL Final    RBC 10/14/2024 3.72 (L)  3.80 - 5.10 Million/uL Final    Hemoglobin 10/14/2024 8.2 (L)  11.7 - 15.5 g/dL Final    Hematocrit 10/14/2024 29.2 (L)  35.0 - 45.0 % Final    MCV 10/14/2024 78.5 (L)  80.0 - 100.0 fL Final    MCH 10/14/2024 22.0 (L)  27.0 - 33.0 pg  Final    MCHC 10/14/2024 28.1 (L)  32.0 - 36.0 g/dL Final    RDW 10/14/2024 15.3 (H)  11.0 - 15.0 % Final    Platelets 10/14/2024 483 (H)  140 - 400 Thousand/uL Final    MPV 10/14/2024 9.6  7.5 - 12.5 fL Final    Neutrophils, Abs 10/14/2024 5,519  1,500 - 7,800 cells/uL Final    Lymph # 10/14/2024 1,681  850 - 3,900 cells/uL Final    Mono # 10/14/2024 730  200 - 950 cells/uL Final    Eos # 10/14/2024 213  15 - 500 cells/uL Final    Baso # 10/14/2024 57  0 - 200 cells/uL Final    Neutrophils Relative 10/14/2024 67.3  % Final    Lymph % 10/14/2024 20.5  % Final    Mono % 10/14/2024 8.9  % Final    Eosinophil % 10/14/2024 2.6  % Final    Basophil % 10/14/2024 0.7  % Final       Past Medical History:   Diagnosis Date    Anxiety     Arthritis     Asthma     COPD (chronic obstructive pulmonary disease)     Hypertension     Pain in the abdomen     Thyroid disease      Social History     Socioeconomic History    Marital status:    Tobacco Use    Smoking status: Former     Current packs/day: 0.00     Average packs/day: 0.5 packs/day for 5.0 years (2.5 ttl pk-yrs)     Types: Cigarettes     Start date: 2/15/2000     Quit date: 2/15/2005     Years since quittin.9     Passive exposure: Past    Smokeless tobacco: Never   Substance and Sexual Activity    Alcohol use: No    Drug use: No    Sexual activity: Never     Social Drivers of Health     Stress: No Stress Concern Present (2019)    [employer] - Occupational Stress Questionnaire     Feeling of Stress : Not at all     Past Surgical History:   Procedure Laterality Date    CATARACT EXTRACTION, BILATERAL  2017    Dr Collins     SECTION      x's2    CHOLECYSTECTOMY      COLONOSCOPY  2009    ESOPHAGOGASTRODUODENOSCOPY (EGD) WITH DILATION  2017    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    LAPAROSCOPIC CHOLECYSTECTOMY N/A 2019    Procedure: CHOLECYSTECTOMY, LAPAROSCOPIC;  Surgeon: Toney Grace III, MD;  Location: [facility] OR;   Service: General;  Laterality: N/A;    SKIN CANCER EXCISION  2014    Dr.Weil     Family History   Problem Relation Name Age of Onset    Diabetes Mother      Depression Maternal Grandmother      Depression Maternal Grandfather         The CVD Risk score (Jett, et al., 2008) failed to calculate for the following reasons:    The 2008 CVD risk score is only valid for ages 30 to 74    The patient has a prior MI, stroke, CHF, or peripheral vascular disease diagnosis    All of your core healthy metrics are met.      Review of patient's allergies indicates:   Allergen Reactions    Sulfa (sulfonamide antibiotics) Swelling    Androgenic anabolic steroid Swelling     Feet swelling     Aspirin Other (See Comments)     Upset stomach     Erythromycin Itching    Methylprednisolone Other (See Comments)    Penicillins Itching       Current Outpatient Medications:     (Magic mouthwash) 1:1:1 diphenhydrAMINE(Benadryl) 12.5mg/5ml liq, aluminum & magnesium hydroxide-simethicone (Maalox), LIDOcaine viscous 2%, Swish and spit 5 mLs every 4 (four) hours as needed. for mouth sores (Patient not taking: Reported on 1/13/2025), Disp: 120 mL, Rfl: 0    acetaminophen (TYLENOL) 500 MG tablet, Take 500 mg by mouth nightly as needed for Pain. (Patient not taking: Reported on 10/14/2024), Disp: , Rfl:     acyclovir (ZOVIRAX) 400 MG tablet, Take 1 tablet (400 mg total) by mouth 3 (three) times daily. (Patient not taking: Reported on 1/13/2025), Disp: 21 tablet, Rfl: 0    albuterol-ipratropium (DUO-NEB) 2.5 mg-0.5 mg/3 mL nebulizer solution, Take 3 mLs by nebulization every 6 (six) hours as needed., Disp: 120 vial, Rfl: 1    ALPRAZolam (XANAX) 0.5 MG tablet, Take 1 tablet (0.5 mg total) by mouth 3 (three) times daily as needed for Anxiety., Disp: 90 tablet, Rfl: 5    atenoloL (TENORMIN) 50 MG tablet, Take 1 tablet (50 mg total) by mouth once daily. (Patient not taking: Reported on 1/13/2025), Disp: 90 tablet, Rfl: 0    chlordiazepoxide-clidinium  5-2.5 mg (LIBRAX) 5-2.5 mg Cap, Take 1 capsule by mouth daily as needed., Disp: 30 capsule, Rfl: 1    fluticasone-umeclidin-vilanter (TRELEGY ELLIPTA) 100-62.5-25 mcg DsDv, Inhale 1 puff into the lungs once daily., Disp: 60 each, Rfl: 3    furosemide (LASIX) 20 MG tablet, Take 1 tablet (20 mg total) by mouth once daily. For fluid, Disp: 90 tablet, Rfl: 3    levothyroxine (SYNTHROID) 50 MCG tablet, Take 1 tablet (50 mcg total) by mouth once daily. (Patient not taking: Reported on 10/14/2024), Disp: 90 tablet, Rfl: 1    mupirocin (BACTROBAN) 2 % ointment, Apply topically 2 (two) times daily., Disp: 30 g, Rfl: 3    nitrofurantoin, macrocrystal-monohydrate, (MACROBID) 100 MG capsule, Take 1 capsule (100 mg total) by mouth 2 (two) times daily., Disp: 20 capsule, Rfl: 0    pantoprazole (PROTONIX) 20 MG tablet, Take 2 tablets (40 mg total) by mouth once daily. (Patient not taking: Reported on 1/13/2025), Disp: 30 tablet, Rfl: 0    potassium bicarbonate (K-LYTE) disintegrating tablet, Take 1 tablet (25 mEq total) by mouth once daily., Disp: 10 tablet, Rfl: 0    potassium chloride (MICRO-K) 10 MEQ CpSR, Take 1 capsule (10 mEq total) by mouth once daily., Disp: 90 capsule, Rfl: 3    Review of Systems   Constitutional:  Negative for appetite change, chills, fatigue, fever and unexpected weight change.   HENT:  Negative for ear discharge and ear pain.    Eyes:  Negative for pain, discharge and visual disturbance.   Respiratory:  Positive for wheezing (Trelegy is very beneficial). Negative for apnea, cough and shortness of breath.    Cardiovascular:  Negative for chest pain, palpitations and leg swelling.   Gastrointestinal:  Negative for abdominal pain, blood in stool, constipation, diarrhea, nausea, vomiting and reflux.   Endocrine: Negative for cold intolerance, heat intolerance and polydipsia.   Genitourinary:  Negative for bladder incontinence, dysuria, hematuria, nocturia and urgency.   Musculoskeletal:  Negative for gait  "problem, joint swelling and myalgias.   Neurological:  Positive for memory loss. Negative for dizziness, seizures and numbness.   Psychiatric/Behavioral:  Positive for behavioral problems (Does have some sundowning behavior issues during the evening) and confusion. Negative for hallucinations. The patient is not nervous/anxious.            Objective:      Vitals:    01/13/25 1550   BP: 138/60   Pulse: 95   SpO2: 97%   Weight: 59 kg (130 lb)   Height: 4' 10.5" (1.486 m)     Physical Exam  Vitals and nursing note reviewed.   Constitutional:       General: She is not in acute distress.     Appearance: Normal appearance. She is well-developed. She is not toxic-appearing.      Comments: Elderly female with Alzheimer dementia in no apparent distress   HENT:      Head: Normocephalic and atraumatic.      Right Ear: Tympanic membrane and external ear normal.      Left Ear: Tympanic membrane and external ear normal.      Nose: Nose normal.      Mouth/Throat:      Pharynx: Oropharynx is clear. No posterior oropharyngeal erythema.   Eyes:      Pupils: Pupils are equal, round, and reactive to light.   Neck:      Thyroid: No thyromegaly.      Vascular: No carotid bruit.   Cardiovascular:      Rate and Rhythm: Normal rate and regular rhythm.      Heart sounds: Normal heart sounds. No murmur heard.  Pulmonary:      Effort: Pulmonary effort is normal.      Breath sounds: Normal breath sounds. No wheezing or rales.   Abdominal:      General: Bowel sounds are normal. There is no distension.      Palpations: Abdomen is soft.      Tenderness: There is no abdominal tenderness.   Musculoskeletal:         General: No tenderness or deformity. Normal range of motion.      Cervical back: Normal range of motion and neck supple.      Lumbar back: Normal. No spasms.      Comments: Bends 90 degrees at  waist, shoulders and knees have full range of motion, 1+ pitting edema to lower extremities   Lymphadenopathy:      Cervical: No cervical " adenopathy.   Skin:     General: Skin is warm and dry.      Findings: No rash.   Neurological:      General: No focal deficit present.      Mental Status: She is alert and oriented to person, place, and time. Mental status is at baseline.      Cranial Nerves: No cranial nerve deficit.      Motor: Weakness present.      Coordination: Coordination normal.      Gait: Gait abnormal.   Psychiatric:         Mood and Affect: Mood normal.         Behavior: Behavior normal.         Thought Content: Thought content normal.         Cognition and Memory: Memory is impaired. She exhibits impaired recent memory and impaired remote memory.         Judgment: Judgment normal.       Physical Exam                  Assessment:       1. Acute cystitis without hematuria    2. Stasis edema of both lower extremities    3. Primary insomnia    4. COPD with asthma    5. Irritable bowel syndrome with both constipation and diarrhea    6. Venous ulcer         Plan:       Acute cystitis without hematuria  -     nitrofurantoin, macrocrystal-monohydrate, (MACROBID) 100 MG capsule; Take 1 capsule (100 mg total) by mouth 2 (two) times daily.  Dispense: 20 capsule; Refill: 0  Will add Macrobid 100 mg b.i.d. for symptom control  Stasis edema of both lower extremities  -     furosemide (LASIX) 20 MG tablet; Take 1 tablet (20 mg total) by mouth once daily. For fluid  Dispense: 90 tablet; Refill: 3  -     potassium chloride (MICRO-K) 10 MEQ CpSR; Take 1 capsule (10 mEq total) by mouth once daily.  Dispense: 90 capsule; Refill: 3  Restart Lasix at 20 mg daily  Primary insomnia  Comments:  she just uses low dose prn alprazolam. refills as needed.  Orders:  -     ALPRAZolam (XANAX) 0.5 MG tablet; Take 1 tablet (0.5 mg total) by mouth 3 (three) times daily as needed for Anxiety.  Dispense: 90 tablet; Refill: 5    COPD with asthma  -     fluticasone-umeclidin-vilanter (TRELEGY ELLIPTA) 100-62.5-25 mcg DsDv; Inhale 1 puff into the lungs once daily.  Dispense:  60 each; Refill: 3  Continue Trelegy daily  Irritable bowel syndrome with both constipation and diarrhea  -     chlordiazepoxide-clidinium 5-2.5 mg (LIBRAX) 5-2.5 mg Cap; Take 1 capsule by mouth daily as needed.  Dispense: 30 capsule; Refill: 1    Venous ulcer  -     mupirocin (BACTROBAN) 2 % ointment; Apply topically 2 (two) times daily.  Dispense: 30 g; Refill: 3    Other orders  -     potassium bicarbonate (K-LYTE) disintegrating tablet; Take 1 tablet (25 mEq total) by mouth once daily.  Dispense: 10 tablet; Refill: 0  Cholesterol 141 HDL 63 TG 58 LDL 65    Assessment & Plan    IMPRESSION:  - Assessed medication regimen, noting improvement with Trelegy for breathing  - Evaluated appetite concerns, considering potential neurological factors  - Recognized signs of sundowning syndrome, managing with current Alprazolam regimen  - Considered potential urinary tract infection based on reported symptoms    ALZHEIMER'S DISEASE WITH LATE ONSET:  - Noted patient's loss of appetite, difficulty eating regularly, and behavioral changes, especially sundowning in the evening.  - Observed memory issues and confusion.  - Despite reduced appetite, patient had a 5-pound weight increase.  - Prescribed Alprazolam for anxiety and sleep, with a dosage of 3 times daily.    CHRONIC HYPOXEMIC RESPIRATORY FAILURE:  - Noted significant improvement in the patient's breathing after starting Trelegy.    CHRONIC OBSTRUCTIVE PULMONARY DISEASE, UNSPECIFIED COPD TYPE:  - Prescribed Trelegy for respiratory issues related to both COPD and chronic hypoxemic respiratory failure.    STAGE 3B CHRONIC KIDNEY DISEASE:  - Noted the patient takes Lasix and potassium.  - Inquired about kidney function.  - Prescribed a 90-day supply of Lasix (40mg) and potassium (1 capsule daily).    VARICOSE VEINS OF UNSPECIFIED LOWER EXTREMITY WITH ULCER OF UNSPECIFIED SITE:  - Observed swollen feet and possible vein issues.  - Noted dry, itchy skin on legs and recommended  using moisturizing cream.  - Refilled Bactroban cream for cuts and scrapes.  - Prescribed Lasix to reduce swelling in feet.    HEART FAILURE:  - Continued Lasix; provided 90-day supply.  - Observed improvement in the patient's condition with Lasix, suggesting effective management of heart failure symptoms.    URINARY TRACT INFECTION:  - Started antibiotic for potential urinary tract infection.  - Valerie reports symptoms in the last 3-4 days.  - Ordered blood test to check for urinary tract infection.    ANXIETY:  - Valerie experiences anxiety, particularly in the evenings.    WEIGHT MANAGEMENT:  - Discussed eating habits and recommended allowing patient to eat sweets to compensate for lack of appetite.    GASTROINTESTINAL ISSUES:  - Refilled Librax; provided 30-day supply for occasional use.  - Inquired about bowel movements.  - Prescribed Librax for potential GI issues, including constipation.    DEMENTIA:  - Noted increased agitation in the evenings, possibly indicating sundowning associated with dementia.    PAIN MANAGEMENT:  - Discontinued hydrocodone.  - Instructed the patient to contact the office if pain medication is needed.    OTHER INSTRUCTIONS:  - Reviewed and removed multiple medications from patient's list, indicating a history of various conditions.         Follow up in about 6 months (around 7/13/2025), or [facility]  dementia  copd.        This note was generated with the assistance of ambient listening technology. Verbal consent was obtained by the patient and accompanying visitor(s) for the recording of patient appointment to facilitate this note. I attest to having reviewed and edited the generated note for accuracy, though some syntax or spelling errors may persist. Please contact the author of this note for any clarification.      1/13/2025 Reed Andersen

## 2025-01-15 ENCOUNTER — TELEPHONE (OUTPATIENT)
Dept: FAMILY MEDICINE | Facility: CLINIC | Age: OVER 89
End: 2025-01-15
Payer: MEDICARE

## 2025-01-15 NOTE — LETTER
[address] [facility] [address]  [place] [address]  Phone: [phone]   [phone]                        MD Dean Hawkins MD Chequita Williams, MD Matthew Bassett, PA-C Linda Melerine, ANBIL Ryan, NABIL Raygoza, NABIL      Date: 1/27/2025    Patient: Valerie Jones  YOB: 1933    Dear Ms Jones/Danita Charles,    We tried to reach you several times by telephone to discuss the below results. Please contact our office as soon as possible.     ----- Message from Reed Andersen MD sent at 1/14/2025  1:47 PM CST -----  Call patient's Daughter.  She is quite anemic with hematocrit of 28.4.  Her iron levels are extremely low.  Ferritin level equals 6, normal is 16 to three hundred.  Recommend adding and over-the-counter iron supplements such as Vitron-C 1 tablet daily.  In 2 months let us recheck a CBC and ferritin level again        Sincerely,  Dr Andersen/navneet

## 2025-01-15 NOTE — TELEPHONE ENCOUNTER
----- Message from Reed Andersen MD sent at 1/14/2025  1:47 PM CST -----  Call patient's Daughter.  She is quite anemic with hematocrit of 28.4.  Her iron levels are extremely low.  Ferritin level equals 6, normal is 16 to three hundred.  Recommend adding and over-the-counter iron supplements such as Vitron-C 1 tablet daily.  In 2 months let us recheck a CBC and ferritin level again

## 2025-01-17 NOTE — TELEPHONE ENCOUNTER
Left mess to call me back and it was not urgent.. 3 attempts made. Can I send letter? If so , cert or not?

## 2025-02-04 ENCOUNTER — TELEPHONE (OUTPATIENT)
Dept: FAMILY MEDICINE | Facility: CLINIC | Age: OVER 89
End: 2025-02-04
Payer: MEDICARE

## 2025-02-04 NOTE — TELEPHONE ENCOUNTER
Called patient x 2, this number isn't in her chart. Called Danita Charles, daughter, patient probably calling from certified letter we sent. Gave Danita Charles lab results from 1/27. She will start her on Vitron C and aware I will call when lab is due.

## 2025-02-04 NOTE — TELEPHONE ENCOUNTER
----- Message from Tabitha sent at 2/4/2025  2:57 PM CST -----  Pt returning call from martín    [phone]

## 2025-03-19 NOTE — TELEPHONE ENCOUNTER
----- Message from Ludmila sent at 3/19/2025  2:24 PM CDT -----  Pt daughter dimas is calling and she is having fever blisters in her mouth and causing her trouble eating. Would like to know what to do [phone]

## 2025-03-20 ENCOUNTER — TELEPHONE (OUTPATIENT)
Dept: FAMILY MEDICINE | Facility: CLINIC | Age: OVER 89
End: 2025-03-20
Payer: MEDICARE

## 2025-03-20 NOTE — TELEPHONE ENCOUNTER
----- Message from Ludmila sent at 3/20/2025  3:19 PM CDT -----  - 3:06- pt daughter dimas is calling again. She called yesterday and has not been called back [phone]

## 2025-03-25 ENCOUNTER — TELEPHONE (OUTPATIENT)
Dept: FAMILY MEDICINE | Facility: CLINIC | Age: OVER 89
End: 2025-03-25
Payer: MEDICARE

## 2025-03-25 DIAGNOSIS — Z79.899 ENCOUNTER FOR LONG-TERM (CURRENT) USE OF MEDICATIONS: ICD-10-CM

## 2025-03-25 DIAGNOSIS — D64.9 ANEMIA: Primary | ICD-10-CM

## 2025-03-25 NOTE — TELEPHONE ENCOUNTER
----- Message from Anita Indira sent at 1/27/2025  8:36 AM CST -----  Regarding: Lab due-certified letter sent, couldn't get in touch wiht daughter  ----- Message from Reed Andersen MD sent at 1/14/2025  1:47 PM CST -----  Call patient's Daughter.  She is quite anemic with hematocrit of 28.4.  Her iron levels are extremely low.  Ferritin level equals 6, normal is 16 to three hundred.  Recommend adding and over-the-counter iron supplements such as Vitron-C 1 tablet daily.  In 2 months let us recheck a CBC and ferritin level again

## 2025-03-31 ENCOUNTER — PATIENT OUTREACH (OUTPATIENT)
Dept: NEUROLOGY | Facility: CLINIC | Age: OVER 89
End: 2025-03-31
Payer: MEDICARE

## 2025-04-02 NOTE — PROGRESS NOTES
Dementia Care Management    Recruitment Call      Date of Service: 3/31/25  Care Navigator completing this form: Yandy Morrow  PCP: Reed Andersen MD    Patient: Valerie Jones  MRN: [id number]  : 1933  Age: 89 y.o.  Gender: female  Race: White  Ethnicity: Not  or /a    Objective:   Patient and caregiver potentially eligible for [facility] dementia care management programs.    CTN/SW completed outreach attempt on 2025 to assess patient/caregiver interest in the program and screen for eligibility.       Outreach Outcome:   Declined - Patient/Caregiver not interested in receiving dementia care management at this time. CTN/SW shared contact information with the dyad for them to reach out if interested in receiving care management services in the future.  For numerous reasons, dtr feels this program would not be a good fit for pt/fmly. Offered caregiver support group information, to which daughter was agreeable. Will send via [employer] on 4/3/25.

## 2025-04-08 ENCOUNTER — TELEPHONE (OUTPATIENT)
Dept: FAMILY MEDICINE | Facility: CLINIC | Age: OVER 89
End: 2025-04-08
Payer: MEDICARE

## 2025-04-08 DIAGNOSIS — I10 ESSENTIAL HYPERTENSION: ICD-10-CM

## 2025-04-08 DIAGNOSIS — N18.32 STAGE 3B CHRONIC KIDNEY DISEASE: ICD-10-CM

## 2025-04-08 DIAGNOSIS — D64.9 ANEMIA: ICD-10-CM

## 2025-04-08 DIAGNOSIS — Z79.899 ENCOUNTER FOR LONG-TERM (CURRENT) USE OF MEDICATIONS: Primary | ICD-10-CM

## 2025-04-08 DIAGNOSIS — E78.1 HYPERTRIGLYCERIDEMIA: ICD-10-CM

## 2025-04-08 NOTE — TELEPHONE ENCOUNTER
Left mess for Danita Charles that lab is due to recheck iron levels. When are 1/13 orders due? I couldn't tell from Dr Andersen's note, but if he wants them now we could combine all orders together. It is hard to get her in for labs

## 2025-04-09 NOTE — TELEPHONE ENCOUNTER
New orders pended. Left mess for Danita Charles that we have combined orders and she will now need to fast and give urine sample.

## 2025-04-30 ENCOUNTER — TELEPHONE (OUTPATIENT)
Dept: FAMILY MEDICINE | Facility: CLINIC | Age: OVER 89
End: 2025-04-30
Payer: MEDICARE

## 2025-04-30 NOTE — TELEPHONE ENCOUNTER
----- Message from Anita Indira sent at 4/28/2025  7:16 AM CDT -----  Regarding: FW: Lab due-certified letter sent, couldn't get in touch wiht daughter    ----- Message -----  From: Sol Browning LPN  Sent: 4/22/2025   7:49 AM CDT  To: Reed Andersen Staff  Subject: FW: Lab due-certified letter sent, couldn't #      ----- Message -----  From: Indira Shah, RT  Sent: 3/25/2025  12:00 AM CDT  To: RT Janina  Subject: Lab due-certified letter sent, couldn't get #    ----- Message from Reed Andersen MD sent at 1/14/2025  1:47 PM CST -----  Call patient's Daughter.  She is quite anemic with hematocrit of 28.4.  Her iron levels are extremely low.  Ferritin level equals 6, normal is 16 to three hundred.  Recommend adding and over-the-counter iron supplements such as Vitron-C 1 tablet daily.  In 2 months let us recheck a CBC and ferritin level again

## 2025-04-30 NOTE — TELEPHONE ENCOUNTER
Left mess for Danita Charles that fasting lab and urine is due, 3 attempts made. Can I sign reminder?

## 2025-06-24 ENCOUNTER — PATIENT OUTREACH (OUTPATIENT)
Dept: ADMINISTRATIVE | Facility: HOSPITAL | Age: OVER 89
End: 2025-06-24
Payer: MEDICARE

## 2025-06-24 NOTE — PROGRESS NOTES
Population Health Chart Review & Patient Outreach Details      Additional [facility] Notes:               Updates Requested / Reviewed:      Updated Care Coordination Note, Care Everywhere, , and Immunizations Reconciliation Completed or Queried: [facility] Topics Overdue:      [facility] Score: 0     Patient is not due for any topics at this time.    Shingles/Zoster Vaccine  RSV Vaccine                  Health Maintenance Topic(s) Outreach Outcomes & Actions Taken:    Primary Care Appt - Outreach Outcomes & Actions Taken  : The provider was sent a message about kidney disease referral.

## 2025-06-25 ENCOUNTER — TELEPHONE (OUTPATIENT)
Dept: FAMILY MEDICINE | Facility: CLINIC | Age: OVER 89
End: 2025-06-25
Payer: MEDICARE

## 2025-06-25 PROBLEM — N18.30 CHRONIC KIDNEY DISEASE, STAGE 3: Status: RESOLVED | Noted: 2020-09-16 | Resolved: 2025-06-25

## 2025-06-25 PROBLEM — N17.9 ACUTE RENAL FAILURE SUPERIMPOSED ON STAGE 3 CHRONIC KIDNEY DISEASE: Status: RESOLVED | Noted: 2022-07-02 | Resolved: 2025-06-25

## 2025-06-25 PROBLEM — N18.30 ACUTE RENAL FAILURE SUPERIMPOSED ON STAGE 3 CHRONIC KIDNEY DISEASE: Status: RESOLVED | Noted: 2022-07-02 | Resolved: 2025-06-25

## 2025-06-25 NOTE — TELEPHONE ENCOUNTER
Spoke to ALIYA Webber, that fasting lab and urine is due a week prior to visit, orders at [facility], encouraged water. Advised he can take morning meds that do not need to be taken with food.

## 2025-07-14 ENCOUNTER — OFFICE VISIT (OUTPATIENT)
Dept: FAMILY MEDICINE | Facility: CLINIC | Age: OVER 89
End: 2025-07-14
Payer: MEDICARE

## 2025-07-14 VITALS
WEIGHT: 126.38 LBS | HEIGHT: 59 IN | DIASTOLIC BLOOD PRESSURE: 60 MMHG | HEART RATE: 85 BPM | SYSTOLIC BLOOD PRESSURE: 128 MMHG | OXYGEN SATURATION: 95 % | BODY MASS INDEX: 25.48 KG/M2

## 2025-07-14 DIAGNOSIS — F51.01 PRIMARY INSOMNIA: ICD-10-CM

## 2025-07-14 DIAGNOSIS — E03.9 ACQUIRED HYPOTHYROIDISM: ICD-10-CM

## 2025-07-14 DIAGNOSIS — M15.0 PRIMARY OSTEOARTHRITIS INVOLVING MULTIPLE JOINTS: ICD-10-CM

## 2025-07-14 DIAGNOSIS — E78.1 HYPERTRIGLYCERIDEMIA: ICD-10-CM

## 2025-07-14 DIAGNOSIS — J44.89 COPD WITH ASTHMA: ICD-10-CM

## 2025-07-14 DIAGNOSIS — F02.A0 MILD LATE ONSET ALZHEIMER'S DEMENTIA WITHOUT BEHAVIORAL DISTURBANCE, PSYCHOTIC DISTURBANCE, MOOD DISTURBANCE, OR ANXIETY: Primary | ICD-10-CM

## 2025-07-14 DIAGNOSIS — I87.303 STASIS EDEMA OF BOTH LOWER EXTREMITIES: ICD-10-CM

## 2025-07-14 DIAGNOSIS — I87.8 VENOUS STASIS OF BOTH LOWER EXTREMITIES: ICD-10-CM

## 2025-07-14 DIAGNOSIS — J43.1 PANLOBULAR EMPHYSEMA: ICD-10-CM

## 2025-07-14 DIAGNOSIS — I10 ESSENTIAL HYPERTENSION: ICD-10-CM

## 2025-07-14 DIAGNOSIS — G30.1 MILD LATE ONSET ALZHEIMER'S DEMENTIA WITHOUT BEHAVIORAL DISTURBANCE, PSYCHOTIC DISTURBANCE, MOOD DISTURBANCE, OR ANXIETY: Primary | ICD-10-CM

## 2025-07-14 DIAGNOSIS — K58.2 IRRITABLE BOWEL SYNDROME WITH BOTH CONSTIPATION AND DIARRHEA: ICD-10-CM

## 2025-07-14 PROCEDURE — 99214 OFFICE O/P EST MOD 30 MIN: CPT | Mod: S$GLB,,, | Performed by: FAMILY MEDICINE

## 2025-07-14 RX ORDER — ALPRAZOLAM 0.5 MG/1
0.5 TABLET ORAL 3 TIMES DAILY PRN
Qty: 90 TABLET | Refills: 5 | Status: SHIPPED | OUTPATIENT
Start: 2025-07-14

## 2025-07-14 RX ORDER — FLUTICASONE FUROATE, UMECLIDINIUM BROMIDE AND VILANTEROL TRIFENATATE 100; 62.5; 25 UG/1; UG/1; UG/1
1 POWDER RESPIRATORY (INHALATION) DAILY
Qty: 60 EACH | Refills: 3 | Status: SHIPPED | OUTPATIENT
Start: 2025-07-14

## 2025-07-14 RX ORDER — CHLORDIAZEPOXIDE HYDROCHLORIDE AND CLIDINIUM BROMIDE 5; 2.5 MG/1; MG/1
1 CAPSULE ORAL DAILY PRN
Qty: 30 CAPSULE | Refills: 1 | Status: SHIPPED | OUTPATIENT
Start: 2025-07-14

## 2025-07-14 RX ORDER — FUROSEMIDE 20 MG/1
20 TABLET ORAL DAILY
Qty: 90 TABLET | Refills: 3 | Status: SHIPPED | OUTPATIENT
Start: 2025-07-14 | End: 2026-07-14

## 2025-07-14 RX ORDER — POTASSIUM CHLORIDE 750 MG/1
10 CAPSULE, EXTENDED RELEASE ORAL DAILY
Qty: 90 CAPSULE | Refills: 3 | Status: SHIPPED | OUTPATIENT
Start: 2025-07-14

## 2025-07-14 NOTE — PROGRESS NOTES
SUBJECTIVE:    Patient ID: Valerie Jones is a 89 y.o. female.    Chief Complaint: Follow-up (No bottles//Pt is here for a 6 month follow up//Pt is also having some congestion today//KE)    Follow-up        History of Present Illness    CHIEF COMPLAINT:  89-year-old female presents today for follow up.    RESPIRATORY:  She reports recent viral illness with persistent cough, particularly at night, and mild congestion. During the acute illness, she experienced minimal fever and voice changes. She previously used Trelegy and reports breathing easier and coughing less while on the medication.    Patient has mild Alzheimer dementia.  She is cared for by her daughter.  Patient lives alone but daughter takes her out to eat daily.  No safety issues reported at home.    ENT:  She reports progressive decline in hearing acuity, particularly at night, requiring increased television volume. She can hear telephone calls during daytime. Family has recommended hearing aids, which she currently declines.    DIET:  She reports eating only one meal per day in the evening. Morning intake consists primarily of coffee, occasionally with toast.    MEDICATIONS:  She takes sleep medication as needed at night, Xanax 1-2 times daily (rarely 3 times), potassium, and Lasix as prescribed.    SOCIAL HISTORY:  She is a former smoker who quit many years ago.    MOBILITY:  She remains ambulatory with no recent falls. Previous leg sores have completely resolved with no recurrence.      ROS:  Constitutional: -appetite change, -chills, -fatigue, -fever, -unexpected weight change, +loss of appetite  HENT: -ear pain, -trouble swallowing, +hearing loss  Eyes: -pain, -discharge, -visual disturbance  Respiratory: -apnea, -cough, -shortness of breath, -wheezing, +waking at night coughing, +chest congestion  Cardiovascular: -chest pain, -leg swelling  Gastrointestinal: -abdominal pain, -blood in stool, -constipation, -diarrhea, -nausea, -vomiting,  -reflux  Endocrine: -cold intolerance, -heat intolerance, -polydipsia  Genitourinary: -bladder incontinence, -dysuria, -erectile dysfunction, -frequency, -hematuria, -testicular pain, -urgency, -nocturia  Musculoskeletal: -gait problem, -joint swelling, -myalgia  Neurological: -dizziness, -seizures, -numbness  Psychiatric/Behavioral: -agitation, -hallucinations, -nervous, -anxiety symptoms         No visits with results within 6 Month(s) from this visit.   Latest known visit with results is:   Telephone on 2024   Component Date Value Ref Range Status    WBC 2025 7.1  3.8 - 10.8 Thousand/uL Final    RBC 2025 3.71 (L)  3.80 - 5.10 Million/uL Final    Hemoglobin 2025 8.2 (L)  11.7 - 15.5 g/dL Final    Hematocrit 2025 28.4 (L)  35.0 - 45.0 % Final    MCV 2025 76.5 (L)  80.0 - 100.0 fL Final    MCH 2025 22.1 (L)  27.0 - 33.0 pg Final    MCHC 2025 28.9 (L)  32.0 - 36.0 g/dL Final    RDW 2025 17.3 (H)  11.0 - 15.0 % Final    Platelets 2025 472 (H)  140 - 400 Thousand/uL Final    MPV 2025 10.2  7.5 - 12.5 fL Final    Neutrophils, Abs 2025 5,020  1,500 - 7,800 cells/uL Final    Lymph # 2025 1,370  850 - 3,900 cells/uL Final    Mono # 2025 568  200 - 950 cells/uL Final    Eos # 2025 99  15 - 500 cells/uL Final    Baso # 2025 43  0 - 200 cells/uL Final    Neutrophils Relative 2025 70.7  % Final    Lymph % 2025 19.3  % Final    Mono % 2025 8.0  % Final    Eosinophil % 2025 1.4  % Final    Basophil % 2025 0.6  % Final    Ferritin 2025 6 (L)  16 - 288 ng/mL Final       Past Medical History:   Diagnosis Date    Anxiety     Arthritis     Asthma     COPD (chronic obstructive pulmonary disease)     Hypertension     Pain in the abdomen 2019    Thyroid disease      Social History[1]  Past Surgical History:   Procedure Laterality Date    CATARACT EXTRACTION, BILATERAL  2017    Dr Collins     SECTION   "    x's2    CHOLECYSTECTOMY      COLONOSCOPY  2009    ESOPHAGOGASTRODUODENOSCOPY (EGD) WITH DILATION  2017    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    LAPAROSCOPIC CHOLECYSTECTOMY N/A 11/22/2019    Procedure: CHOLECYSTECTOMY, LAPAROSCOPIC;  Surgeon: Toney Grace III, MD;  Location: [facility];  Service: General;  Laterality: N/A;    SKIN CANCER EXCISION  2014    Dr.Weil     Family History   Problem Relation Name Age of Onset    Diabetes Mother      Depression Maternal Grandmother      Depression Maternal Grandfather         The CVD Risk score (Sullyino et al., 2008) failed to calculate for the following reasons:    The 2008 CVD risk score is only valid for ages 30 to 74    The patient has a prior MI, stroke, CHF, or peripheral vascular disease diagnosis    All of your core healthy metrics are met.      Review of patient's allergies indicates:   Allergen Reactions    Sulfa (sulfonamide antibiotics) Swelling    Androgenic anabolic steroid Swelling     Feet swelling     Aspirin Other (See Comments)     Upset stomach     Erythromycin Itching    Methylprednisolone Other (See Comments)    Penicillins Itching     Current Medications[2]    Review of Systems        Objective:      Vitals:    07/14/25 1533   BP: 128/60   Pulse: 85   SpO2: 95%   Weight: 57.3 kg (126 lb 6.4 oz)   Height: 4' 10.5" (1.486 m)     Physical Exam  Vitals and nursing note reviewed.   Constitutional:       General: She is not in acute distress.     Appearance: Normal appearance. She is well-developed. She is not toxic-appearing.   HENT:      Head: Normocephalic and atraumatic.      Right Ear: Tympanic membrane and external ear normal.      Left Ear: Tympanic membrane and external ear normal.      Nose: Nose normal.      Mouth/Throat:      Pharynx: Oropharynx is clear. No posterior oropharyngeal erythema.   Eyes:      Pupils: Pupils are equal, round, and reactive to light.   Neck:      Thyroid: No thyromegaly.      Vascular: No carotid bruit. "   Cardiovascular:      Rate and Rhythm: Normal rate and regular rhythm.      Heart sounds: Normal heart sounds. No murmur heard.  Pulmonary:      Effort: Pulmonary effort is normal.      Breath sounds: Normal breath sounds. No wheezing or rales.   Abdominal:      General: Bowel sounds are normal. There is no distension.      Palpations: Abdomen is soft.      Tenderness: There is no abdominal tenderness.   Musculoskeletal:         General: No tenderness or deformity. Normal range of motion.      Cervical back: Normal range of motion and neck supple.      Lumbar back: Normal. No spasms.      Comments: Bends 90 degrees at  waist, shoulders and knees have full range of motion, no pitting edema to lower extremities   Lymphadenopathy:      Cervical: No cervical adenopathy.   Skin:     General: Skin is warm and dry.      Findings: No rash.   Neurological:      General: No focal deficit present.      Mental Status: She is alert and oriented to person, place, and time. Mental status is at baseline.      Cranial Nerves: No cranial nerve deficit.      Coordination: Coordination normal.      Gait: Gait abnormal (slow cautious gait).   Psychiatric:         Mood and Affect: Mood normal.         Behavior: Behavior normal.         Thought Content: Thought content normal.         Judgment: Judgment normal.       Physical Exam    Pulmonary: No wheezing. No coughing during exam.  Vitals: O2 saturation: 95%.             Assessment:       1. Mild late onset Alzheimer's dementia without behavioral disturbance, psychotic disturbance, mood disturbance, or anxiety    2. COPD with asthma    3. Primary insomnia    4. Stasis edema of both lower extremities    5. Irritable bowel syndrome with both constipation and diarrhea    6. Panlobular emphysema    7. Essential hypertension    8. Hypertriglyceridemia    9. Venous stasis of both lower extremities    10. Acquired hypothyroidism    11. Primary osteoarthritis involving multiple joints          Plan:       Mild late onset Alzheimer's dementia without behavioral disturbance, psychotic disturbance, mood disturbance, or anxiety  Patient getting good care from family members  COPD with asthma  -     fluticasone-umeclidin-vilanter (TRELEGY ELLIPTA) 100-62.5-25 mcg DsDv; Inhale 1 puff into the lungs once daily.  Dispense: 60 each; Refill: 3  Continue Trelegy inhaler  Primary insomnia  Comments:  she just uses low dose prn alprazolam. refills as needed.  Orders:  -     ALPRAZolam (XANAX) 0.5 MG tablet; Take 1 tablet (0.5 mg total) by mouth 3 (three) times daily as needed for Anxiety.  Dispense: 90 tablet; Refill: 5    Stasis edema of both lower extremities  -     furosemide (LASIX) 20 MG tablet; Take 1 tablet (20 mg total) by mouth once daily. For fluid  Dispense: 90 tablet; Refill: 3  -     potassium chloride (MICRO-K) 10 MEQ CpSR; Take 1 capsule (10 mEq total) by mouth once daily.  Dispense: 90 capsule; Refill: 3  Compliant with furosemide and potassium, no edema today  Irritable bowel syndrome with both constipation and diarrhea  -     chlordiazepoxide-clidinium 5-2.5 mg (LIBRAX) 5-2.5 mg Cap; Take 1 capsule by mouth daily as needed (for  colon).  Dispense: 30 capsule; Refill: 1    Panlobular emphysema  No wheezing today  Essential hypertension    Hypertriglyceridemia    Venous stasis of both lower extremities  No pitting edema to lower extremities  Acquired hypothyroidism    Primary osteoarthritis involving multiple joints  Rarely takes a Tylenol for pain.    Assessment & Plan    J06.9 Acute upper respiratory infection, unspecified  H91.91 Unspecified hearing loss, right ear  H91.92 Unspecified hearing loss, left ear  Z72.4 Inappropriate diet and eating habits  Z87.891 Personal history of nicotine dependence  Z87.898 Personal history of other specified conditions    ACUTE UPPER RESPIRATORY INFECTION:  - Valerie presents with persistent nighttime cough and congestion evident in her voice.  - Examination  showed SpO2 at 95% with no wheezing or respiratory distress.  - Restarted Trelegy prescription and arranged to send it to a different pharmacy due to availability issues.    HEARING LOSS:  - Valerie reports declining hearing ability, particularly when watching TV at night.  - Discussed hearing aid options but decided against pursuing them due to anticipated poor compliance and likely patient dissatisfaction.    INAPPROPRIATE DIET AND EATING HABITS:  - Valerie typically consumes only one meal daily, usually at night, with minimal breakfast intake.  - Recommend adding Boost or Ensure supplement in the morning to increase nutritional intake.    PERSONAL HISTORY OF NICOTINE DEPENDENCE:  - Valerie quit smoking long ago.    PERSONAL HISTORY OF LEG SORES:  - Previous leg sores have resolved.  - Current exam shows healthy legs with no recurrence of sores.    GENERAL HEALTH MANAGEMENT:  - 89-year-old patient is generally doing well with good appetite and mobility.  - Continued medications include potassium, Lasix, and reordered Xanax.         Follow up in about 6 months (around 2026), or Alzheimer dementia, COPD.        This note was generated with the assistance of ambient listening technology. Verbal consent was obtained by the patient and accompanying visitor(s) for the recording of patient appointment to facilitate this note. I attest to having reviewed and edited the generated note for accuracy, though some syntax or spelling errors may persist. Please contact the author of this note for any clarification.      2025 Reed Andersen           [1]   Social History  Socioeconomic History    Marital status:    Tobacco Use    Smoking status: Former     Current packs/day: 0.00     Average packs/day: 0.5 packs/day for 5.0 years (2.5 ttl pk-yrs)     Types: Cigarettes     Start date: 2/15/2000     Quit date: 2/15/2005     Years since quittin.4     Passive exposure: Past    Smokeless tobacco: Never   Substance  and Sexual Activity    Alcohol use: No    Drug use: No    Sexual activity: Never     Social Drivers of Health     Stress: No Stress Concern Present (9/17/2019)    [employer] - Occupational Stress Questionnaire     Feeling of Stress : Not at all   [2]   Current Outpatient Medications:     (Magic mouthwash) 1:1:1 diphenhydrAMINE(Benadryl) 12.5mg/5ml liq, aluminum & magnesium hydroxide-simethicone (Maalox), LIDOcaine viscous 2%, Swish and spit 5 mLs 3 (three) times daily. for mouth sores, Disp: 150 mL, Rfl: 0    iron,carbonyl/ascorbic acid (VITRON-C ORAL), Take 1 tablet by mouth once daily., Disp: , Rfl:     mupirocin (BACTROBAN) 2 % ointment, Apply topically 2 (two) times daily., Disp: 30 g, Rfl: 3    nitrofurantoin, macrocrystal-monohydrate, (MACROBID) 100 MG capsule, Take 1 capsule (100 mg total) by mouth 2 (two) times daily., Disp: 20 capsule, Rfl: 0    potassium bicarbonate (K-LYTE) disintegrating tablet, Take 1 tablet (25 mEq total) by mouth once daily., Disp: 10 tablet, Rfl: 0    acetaminophen (TYLENOL) 500 MG tablet, Take 500 mg by mouth nightly as needed for Pain. (Patient not taking: Reported on 10/14/2024), Disp: , Rfl:     acyclovir (ZOVIRAX) 400 MG tablet, Take 1 tablet (400 mg total) by mouth 3 (three) times daily. (Patient not taking: Reported on 1/13/2025), Disp: 21 tablet, Rfl: 0    albuterol-ipratropium (DUO-NEB) 2.5 mg-0.5 mg/3 mL nebulizer solution, Take 3 mLs by nebulization every 6 (six) hours as needed., Disp: 120 vial, Rfl: 1    ALPRAZolam (XANAX) 0.5 MG tablet, Take 1 tablet (0.5 mg total) by mouth 3 (three) times daily as needed for Anxiety., Disp: 90 tablet, Rfl: 5    atenoloL (TENORMIN) 50 MG tablet, Take 1 tablet (50 mg total) by mouth once daily. (Patient not taking: Reported on 1/13/2025), Disp: 90 tablet, Rfl: 0    chlordiazepoxide-clidinium 5-2.5 mg (LIBRAX) 5-2.5 mg Cap, Take 1 capsule by mouth daily as needed (for  colon)., Disp: 30 capsule, Rfl: 1     fluticasone-umeclidin-vilanter (TRELEGY ELLIPTA) 100-62.5-25 mcg DsDv, Inhale 1 puff into the lungs once daily., Disp: 60 each, Rfl: 3    furosemide (LASIX) 20 MG tablet, Take 1 tablet (20 mg total) by mouth once daily. For fluid, Disp: 90 tablet, Rfl: 3    levothyroxine (SYNTHROID) 50 MCG tablet, Take 1 tablet (50 mcg total) by mouth once daily. (Patient not taking: Reported on 10/14/2024), Disp: 90 tablet, Rfl: 1    pantoprazole (PROTONIX) 20 MG tablet, Take 2 tablets (40 mg total) by mouth once daily. (Patient not taking: Reported on 1/13/2025), Disp: 30 tablet, Rfl: 0    potassium chloride (MICRO-K) 10 MEQ CpSR, Take 1 capsule (10 mEq total) by mouth once daily., Disp: 90 capsule, Rfl: 3

## (undated) DEVICE — SUTURE ETHIBOND 0 MO-6 18 CX45D

## (undated) DEVICE — SCISSORS 5MM APPLIED MEDICAL   CB030

## (undated) DEVICE — DRESSING MEPORE 2.5 X 3   670800

## (undated) DEVICE — DISSECTOR KITTNER 13300

## (undated) DEVICE — RETRIEVER ENDOPOUCH SPEC BAG

## (undated) DEVICE — UNDERGLOVE BIOGEL PI MICRO BLUE SZ 8

## (undated) DEVICE — STERISTRIP 1/2 R1547

## (undated) DEVICE — COVER LIGHT HANDLE LB53

## (undated) DEVICE — NEEDLE INSUFFLATION 120MM 172015

## (undated) DEVICE — CABLE MONOPOLAR 10FT DISPOSABLE

## (undated) DEVICE — TOWEL OR BLUE      MDT2168284

## (undated) DEVICE — SWABSTICK BENZOIN S42450

## (undated) DEVICE — TRAY GENERAL LAPAROSCOPY

## (undated) DEVICE — SYRINGE HYPODERMC LL 22G 1.5 ECLIPSE 30

## (undated) DEVICE — SUTURE MONOCRYL 4-0 PS-2 27 MCP426H

## (undated) DEVICE — TROCAR ADVANCED FIXATION  CFF03

## (undated) DEVICE — DRESSING TEGADERM 2 1/3 X 2 3/4 TD1002

## (undated) DEVICE — PAD BOVIE ADULT

## (undated) DEVICE — SLEEVE TROCAR 5MMX100MM  CTS02

## (undated) DEVICE — SOLUTION IRRI NS BOTTLE 1000ML R5200-01

## (undated) DEVICE — GLOVE BIOGEL MICRO SURGEON PINK SZ 6.5

## (undated) DEVICE — SOLUTION IRRI H2O BOTTLE 1000ML

## (undated) DEVICE — GLOVE BIOGEL PI ULTRA TOUCH GRAY SZ7.5

## (undated) DEVICE — TROCAR BALL. BLNT HASSON C0R47

## (undated) DEVICE — APPLIER CLIP  5MM

## (undated) DEVICE — TUBING INSUFFLATION 10FT